# Patient Record
Sex: FEMALE | Race: BLACK OR AFRICAN AMERICAN | NOT HISPANIC OR LATINO | URBAN - METROPOLITAN AREA
[De-identification: names, ages, dates, MRNs, and addresses within clinical notes are randomized per-mention and may not be internally consistent; named-entity substitution may affect disease eponyms.]

---

## 2022-05-16 ENCOUNTER — INPATIENT (INPATIENT)
Facility: HOSPITAL | Age: 22
LOS: 23 days | Discharge: NURSING, REHAB OR LONG-TERM CARE | End: 2022-06-09
Attending: INTERNAL MEDICINE | Admitting: INTERNAL MEDICINE
Payer: MEDICAID

## 2022-05-16 VITALS
RESPIRATION RATE: 22 BRPM | DIASTOLIC BLOOD PRESSURE: 53 MMHG | SYSTOLIC BLOOD PRESSURE: 95 MMHG | WEIGHT: 125 LBS | TEMPERATURE: 99 F | HEART RATE: 121 BPM | OXYGEN SATURATION: 100 %

## 2022-05-16 LAB
ALBUMIN SERPL ELPH-MCNC: 3.9 G/DL — SIGNIFICANT CHANGE UP (ref 3.5–5.2)
ALP SERPL-CCNC: 129 U/L — HIGH (ref 30–115)
ALT FLD-CCNC: 29 U/L — SIGNIFICANT CHANGE UP (ref 0–41)
ANION GAP SERPL CALC-SCNC: 15 MMOL/L — HIGH (ref 7–14)
APPEARANCE UR: ABNORMAL
AST SERPL-CCNC: 22 U/L — SIGNIFICANT CHANGE UP (ref 0–41)
BACTERIA # UR AUTO: NEGATIVE — SIGNIFICANT CHANGE UP
BASOPHILS # BLD AUTO: 0.07 K/UL — SIGNIFICANT CHANGE UP (ref 0–0.2)
BASOPHILS NFR BLD AUTO: 0.3 % — SIGNIFICANT CHANGE UP (ref 0–1)
BILIRUB SERPL-MCNC: <0.2 MG/DL — SIGNIFICANT CHANGE UP (ref 0.2–1.2)
BILIRUB UR-MCNC: NEGATIVE — SIGNIFICANT CHANGE UP
BUN SERPL-MCNC: 15 MG/DL — SIGNIFICANT CHANGE UP (ref 10–20)
CALCIUM SERPL-MCNC: 9.6 MG/DL — SIGNIFICANT CHANGE UP (ref 8.5–10.1)
CHLORIDE SERPL-SCNC: 99 MMOL/L — SIGNIFICANT CHANGE UP (ref 98–110)
CO2 SERPL-SCNC: 27 MMOL/L — SIGNIFICANT CHANGE UP (ref 17–32)
COLOR SPEC: YELLOW — SIGNIFICANT CHANGE UP
CREAT SERPL-MCNC: 0.5 MG/DL — LOW (ref 0.7–1.5)
DIFF PNL FLD: NEGATIVE — SIGNIFICANT CHANGE UP
EGFR: 136 ML/MIN/1.73M2 — SIGNIFICANT CHANGE UP
EOSINOPHIL # BLD AUTO: 0.29 K/UL — SIGNIFICANT CHANGE UP (ref 0–0.7)
EOSINOPHIL NFR BLD AUTO: 1.2 % — SIGNIFICANT CHANGE UP (ref 0–8)
EPI CELLS # UR: 12 /HPF — HIGH (ref 0–5)
GLUCOSE SERPL-MCNC: 97 MG/DL — SIGNIFICANT CHANGE UP (ref 70–99)
GLUCOSE UR QL: NEGATIVE — SIGNIFICANT CHANGE UP
HCT VFR BLD CALC: 38.5 % — SIGNIFICANT CHANGE UP (ref 37–47)
HGB BLD-MCNC: 11.3 G/DL — LOW (ref 12–16)
HYALINE CASTS # UR AUTO: 13 /LPF — HIGH (ref 0–7)
IMM GRANULOCYTES NFR BLD AUTO: 0.5 % — HIGH (ref 0.1–0.3)
KETONES UR-MCNC: NEGATIVE — SIGNIFICANT CHANGE UP
LEUKOCYTE ESTERASE UR-ACNC: ABNORMAL
LYMPHOCYTES # BLD AUTO: 2.21 K/UL — SIGNIFICANT CHANGE UP (ref 1.2–3.4)
LYMPHOCYTES # BLD AUTO: 9.5 % — LOW (ref 20.5–51.1)
MCHC RBC-ENTMCNC: 23.3 PG — LOW (ref 27–31)
MCHC RBC-ENTMCNC: 29.4 G/DL — LOW (ref 32–37)
MCV RBC AUTO: 79.4 FL — LOW (ref 81–99)
MONOCYTES # BLD AUTO: 1.79 K/UL — HIGH (ref 0.1–0.6)
MONOCYTES NFR BLD AUTO: 7.7 % — SIGNIFICANT CHANGE UP (ref 1.7–9.3)
NEUTROPHILS # BLD AUTO: 18.82 K/UL — HIGH (ref 1.4–6.5)
NEUTROPHILS NFR BLD AUTO: 80.8 % — HIGH (ref 42.2–75.2)
NITRITE UR-MCNC: NEGATIVE — SIGNIFICANT CHANGE UP
NRBC # BLD: 0 /100 WBCS — SIGNIFICANT CHANGE UP (ref 0–0)
PH UR: 7.5 — SIGNIFICANT CHANGE UP (ref 5–8)
PLATELET # BLD AUTO: 535 K/UL — HIGH (ref 130–400)
POTASSIUM SERPL-MCNC: 4.9 MMOL/L — SIGNIFICANT CHANGE UP (ref 3.5–5)
POTASSIUM SERPL-SCNC: 4.9 MMOL/L — SIGNIFICANT CHANGE UP (ref 3.5–5)
PROT SERPL-MCNC: 7.6 G/DL — SIGNIFICANT CHANGE UP (ref 6–8)
PROT UR-MCNC: ABNORMAL
RAPID RVP RESULT: DETECTED
RBC # BLD: 4.85 M/UL — SIGNIFICANT CHANGE UP (ref 4.2–5.4)
RBC # FLD: 18.4 % — HIGH (ref 11.5–14.5)
RBC CASTS # UR COMP ASSIST: 6 /HPF — HIGH (ref 0–4)
SARS-COV-2 RNA SPEC QL NAA+PROBE: DETECTED
SODIUM SERPL-SCNC: 141 MMOL/L — SIGNIFICANT CHANGE UP (ref 135–146)
SP GR SPEC: 1.02 — SIGNIFICANT CHANGE UP (ref 1.01–1.03)
UROBILINOGEN FLD QL: SIGNIFICANT CHANGE UP
WBC # BLD: 23.29 K/UL — HIGH (ref 4.8–10.8)
WBC # FLD AUTO: 23.29 K/UL — HIGH (ref 4.8–10.8)
WBC UR QL: 65 /HPF — HIGH (ref 0–5)

## 2022-05-16 PROCEDURE — 71045 X-RAY EXAM CHEST 1 VIEW: CPT | Mod: 26

## 2022-05-16 PROCEDURE — 93010 ELECTROCARDIOGRAM REPORT: CPT

## 2022-05-16 PROCEDURE — 99223 1ST HOSP IP/OBS HIGH 75: CPT

## 2022-05-16 PROCEDURE — 99291 CRITICAL CARE FIRST HOUR: CPT

## 2022-05-16 PROCEDURE — 99497 ADVNCD CARE PLAN 30 MIN: CPT | Mod: 25

## 2022-05-16 RX ORDER — IPRATROPIUM/ALBUTEROL SULFATE 18-103MCG
3 AEROSOL WITH ADAPTER (GRAM) INHALATION EVERY 6 HOURS
Refills: 0 | Status: DISCONTINUED | OUTPATIENT
Start: 2022-05-16 | End: 2022-05-25

## 2022-05-16 RX ORDER — FERROUS SULFATE 325(65) MG
300 TABLET ORAL DAILY
Refills: 0 | Status: DISCONTINUED | OUTPATIENT
Start: 2022-05-16 | End: 2022-06-09

## 2022-05-16 RX ORDER — FOLIC ACID 0.8 MG
1 TABLET ORAL DAILY
Refills: 0 | Status: DISCONTINUED | OUTPATIENT
Start: 2022-05-16 | End: 2022-06-09

## 2022-05-16 RX ORDER — ACETAMINOPHEN 500 MG
650 TABLET ORAL EVERY 6 HOURS
Refills: 0 | Status: DISCONTINUED | OUTPATIENT
Start: 2022-05-16 | End: 2022-06-09

## 2022-05-16 RX ORDER — CYCLOBENZAPRINE HYDROCHLORIDE 10 MG/1
5 TABLET, FILM COATED ORAL THREE TIMES A DAY
Refills: 0 | Status: DISCONTINUED | OUTPATIENT
Start: 2022-05-16 | End: 2022-06-09

## 2022-05-16 RX ORDER — DEXAMETHASONE 0.5 MG/5ML
10 ELIXIR ORAL ONCE
Refills: 0 | Status: COMPLETED | OUTPATIENT
Start: 2022-05-16 | End: 2022-05-16

## 2022-05-16 RX ORDER — SODIUM CHLORIDE 9 MG/ML
250 INJECTION INTRAMUSCULAR; INTRAVENOUS; SUBCUTANEOUS ONCE
Refills: 0 | Status: COMPLETED | OUTPATIENT
Start: 2022-05-16 | End: 2022-05-16

## 2022-05-16 RX ORDER — CEFEPIME 1 G/1
2000 INJECTION, POWDER, FOR SOLUTION INTRAMUSCULAR; INTRAVENOUS ONCE
Refills: 0 | Status: COMPLETED | OUTPATIENT
Start: 2022-05-16 | End: 2022-05-16

## 2022-05-16 RX ORDER — ROBINUL 0.2 MG/ML
2 INJECTION INTRAMUSCULAR; INTRAVENOUS THREE TIMES A DAY
Refills: 0 | Status: DISCONTINUED | OUTPATIENT
Start: 2022-05-16 | End: 2022-05-26

## 2022-05-16 RX ORDER — VANCOMYCIN HCL 1 G
750 VIAL (EA) INTRAVENOUS ONCE
Refills: 0 | Status: COMPLETED | OUTPATIENT
Start: 2022-05-16 | End: 2022-05-16

## 2022-05-16 RX ORDER — APIXABAN 2.5 MG/1
5 TABLET, FILM COATED ORAL EVERY 12 HOURS
Refills: 0 | Status: DISCONTINUED | OUTPATIENT
Start: 2022-05-17 | End: 2022-05-17

## 2022-05-16 RX ORDER — THIAMINE MONONITRATE (VIT B1) 100 MG
100 TABLET ORAL
Refills: 0 | Status: DISCONTINUED | OUTPATIENT
Start: 2022-05-16 | End: 2022-06-09

## 2022-05-16 RX ORDER — METOCLOPRAMIDE HCL 10 MG
10 TABLET ORAL EVERY 6 HOURS
Refills: 0 | Status: DISCONTINUED | OUTPATIENT
Start: 2022-05-16 | End: 2022-06-09

## 2022-05-16 RX ORDER — ALBUTEROL 90 UG/1
2 AEROSOL, METERED ORAL EVERY 6 HOURS
Refills: 0 | Status: DISCONTINUED | OUTPATIENT
Start: 2022-05-16 | End: 2022-05-25

## 2022-05-16 RX ORDER — VANCOMYCIN HCL 1 G
750 VIAL (EA) INTRAVENOUS EVERY 12 HOURS
Refills: 0 | Status: DISCONTINUED | OUTPATIENT
Start: 2022-05-16 | End: 2022-05-18

## 2022-05-16 RX ORDER — SODIUM CHLORIDE 9 MG/ML
1750 INJECTION INTRAMUSCULAR; INTRAVENOUS; SUBCUTANEOUS ONCE
Refills: 0 | Status: COMPLETED | OUTPATIENT
Start: 2022-05-16 | End: 2022-05-16

## 2022-05-16 RX ORDER — CEFEPIME 1 G/1
2000 INJECTION, POWDER, FOR SOLUTION INTRAMUSCULAR; INTRAVENOUS EVERY 8 HOURS
Refills: 0 | Status: DISCONTINUED | OUTPATIENT
Start: 2022-05-16 | End: 2022-05-16

## 2022-05-16 RX ORDER — DEXAMETHASONE 0.5 MG/5ML
6 ELIXIR ORAL DAILY
Refills: 0 | Status: DISCONTINUED | OUTPATIENT
Start: 2022-05-16 | End: 2022-05-25

## 2022-05-16 RX ORDER — CEFEPIME 1 G/1
2000 INJECTION, POWDER, FOR SOLUTION INTRAMUSCULAR; INTRAVENOUS EVERY 8 HOURS
Refills: 0 | Status: DISCONTINUED | OUTPATIENT
Start: 2022-05-16 | End: 2022-05-18

## 2022-05-16 RX ORDER — PREGABALIN 225 MG/1
500 CAPSULE ORAL DAILY
Refills: 0 | Status: DISCONTINUED | OUTPATIENT
Start: 2022-05-16 | End: 2022-06-09

## 2022-05-16 RX ORDER — SCOPALAMINE 1 MG/3D
1 PATCH, EXTENDED RELEASE TRANSDERMAL
Refills: 0 | Status: DISCONTINUED | OUTPATIENT
Start: 2022-05-16 | End: 2022-06-09

## 2022-05-16 RX ORDER — PANTOPRAZOLE SODIUM 20 MG/1
40 TABLET, DELAYED RELEASE ORAL
Refills: 0 | Status: DISCONTINUED | OUTPATIENT
Start: 2022-05-16 | End: 2022-05-18

## 2022-05-16 RX ORDER — SODIUM CHLORIDE 9 MG/ML
1000 INJECTION, SOLUTION INTRAVENOUS
Refills: 0 | Status: DISCONTINUED | OUTPATIENT
Start: 2022-05-16 | End: 2022-05-17

## 2022-05-16 RX ORDER — CHLORHEXIDINE GLUCONATE 213 G/1000ML
1 SOLUTION TOPICAL
Refills: 0 | Status: DISCONTINUED | OUTPATIENT
Start: 2022-05-16 | End: 2022-06-09

## 2022-05-16 RX ADMIN — SODIUM CHLORIDE 500 MILLILITER(S): 9 INJECTION INTRAMUSCULAR; INTRAVENOUS; SUBCUTANEOUS at 20:46

## 2022-05-16 RX ADMIN — CEFEPIME 100 MILLIGRAM(S): 1 INJECTION, POWDER, FOR SOLUTION INTRAMUSCULAR; INTRAVENOUS at 14:45

## 2022-05-16 RX ADMIN — Medication 250 MILLIGRAM(S): at 20:46

## 2022-05-16 RX ADMIN — Medication 10 MILLIGRAM(S): at 21:07

## 2022-05-16 RX ADMIN — SODIUM CHLORIDE 1750 MILLILITER(S): 9 INJECTION INTRAMUSCULAR; INTRAVENOUS; SUBCUTANEOUS at 11:54

## 2022-05-16 NOTE — H&P ADULT - ASSESSMENT
Assessment and Plan  Case of a 23 yo female patient with a history of encephalitis s/p tooth abscess s/p tracheostomy and PEG tube placement, HTN, GERD, and SVC thrombosis who was brought to ED from [place] on 05/16 for evaluation of a broken tracheostomy flange, found to be septic on presentation with leukocytosis on labs, a positive RVP and COVID PCR on microbiologic testing, and evidence of left pleural effusion on imaging, to be admitted for further investigations, management and monitoring. Currently still tachycardic saturating well on 3LPM NC.      Sepsis Secondary to Likely Community Acquired Pneumonia and COVID Pneumonia  Broken Tracheostomy Flange - Resolved  * Not Vaccinated against COVID per sister  * ED Vitals 95/53 mmHg,  bpm, SAO2 100% on 2-3 LPM NC  * ED Labs WBC 23.39 N 80.8%, No D-dimer yet  * SARS-COV2: positive  * RVP positive  * CXR with left pleural effusion    - Infectious Disease team consulted  - Monitor for fever: afebrile in last 24 hours  - Trend WBC: 23.29 N 80.8 %  - Monitor SaO2 and Oxygen Requirements: on 3LPM NC right now with SaO2 100% (sister unsure about baseline O2 requirement and unable to reach NH staff)  - Follow up Chest X Ray in AM  - Trend Inflammatory Markers:  --> ESR   --> D-dimer; check VA duplex venous LE  --> Procalcitonin   --> C-reactive protein  --> LDH   --> Ferritin   --> Fibrinogen  - Follow up blood cultures   - Send urine legionella and strep  - Check MRSA  - Start IV Cefepime and Vancomycin  - Start IVF hydration with LR at 50mL/hour  - COVID therapy:  --> Started on IV Dexamethasone 6mg QD  --> Monitor POCT BID  - Anticoagulation (takes Eliquis 5mg BID for SVC thrombosis)      SVC Thrombosis and Embolism   * Home med Eliquis 5mg BID  - Resume eliquis 5mg BID      History of Encephalitis post Tooth Abscess in 10/2021   s/p Tracheostomy (has an O2 tank per sister but unsure about flow) and G-tube placement  - NPO for now (except meds)  - Monitor SaO2 and O2 requirements: as above      GERD  * Home med Omeprazole 40mg QD  - Start Protonix 40mg QD      Iron Deficiency Anemia   History of Wernicke Encephalopathy  * Home med iron replacement, thiamine, B12, and folic acid  * ED Hb 11.3, MCV 79.4  - Trend CBC  - Check iron studies, B12 and folate levels  - Resume iron replacement, thiamine, B12, and folic acid      History of Hypertension  Currently Hypotension in Setting of Sepsis  * Home med Lopressor 50mg BID  * ED BP 95/53 mmHg -> s/p fluids -> now 101/62mmHg at 21:30 PM  - Monitor BP  - Hold anti HTN  - IVF hydration as above      Others  - DVT Prophylaxis: as above  - GI Prophylaxis: Pantoprazole 40mg PO QD  - Diet: NPO for now  - Code Status: Full      Barriers to learning: NO  Discharge Planning: Patient will be discharged once stable   Plan was communicated with patient and medical team       Alicia Whitaker MD  PGY - 2 Internal Medicine   [facility]   Assessment and Plan  Case of a 23 yo female patient with a history of encephalitis s/p tooth abscess s/p tracheostomy and PEG tube placement, HTN, GERD, and SVC thrombosis who was brought to ED from [place] on 05/16 for evaluation of a broken tracheostomy flange, found to be septic on presentation with leukocytosis on labs, a positive RVP and COVID PCR on microbiologic testing, and evidence of left pleural effusion on imaging, to be admitted for further investigations, management and monitoring. Currently still tachycardic saturating well on 3LPM NC.      Sepsis Secondary to Likely Community Acquired Pneumonia and COVID Pneumonia  Broken Tracheostomy Flange - Resolved  * Not Vaccinated against COVID per sister  * ED Vitals 95/53 mmHg,  bpm, SAO2 100% on 2-3 LPM NC  * ED Labs WBC 23.39 N 80.8%, No D-dimer yet  * SARS-COV2: positive  * RVP positive  * CXR with left pleural effusion    - Infectious Disease team consulted  - Monitor for fever: afebrile in last 24 hours  - Trend WBC: 23.29 N 80.8 %  - Monitor SaO2 and Oxygen Requirements: on 3LPM NC right now with SaO2 100% (sister unsure about baseline O2 requirement and unable to reach NH staff)  - Follow up Chest X Ray in AM  - Trend Inflammatory Markers:  --> ESR   --> D-dimer; check VA duplex venous LE  --> Procalcitonin   --> C-reactive protein  --> LDH   --> Ferritin   --> Fibrinogen  - Follow up blood cultures   - Send urine legionella and strep  - Check MRSA, RVP, and influenza  - Start IV Cefepime and Vancomycin  - Start IVF hydration with LR at 50mL/hour  - COVID therapy:  --> Started on IV Dexamethasone 6mg QD  --> Monitor POCT BID  - Anticoagulation (takes Eliquis 5mg BID for SVC thrombosis)      SVC Thrombosis and Embolism   * Home med Eliquis 5mg BID  - Resume eliquis 5mg BID      History of Encephalitis post Tooth Abscess in 10/2021   s/p Tracheostomy (has an O2 tank per sister but unsure about flow) and G-tube placement  - NPO for now (except meds)  - Monitor SaO2 and O2 requirements: as above      GERD  * Home med Omeprazole 40mg QD  - Start Protonix 40mg QD      Iron Deficiency Anemia   History of Wernicke Encephalopathy  * Home med iron replacement, thiamine, B12, and folic acid  * ED Hb 11.3, MCV 79.4  - Trend CBC  - Check iron studies, B12 and folate levels  - Resume iron replacement, thiamine, B12, and folic acid      History of Hypertension  Currently Hypotension in Setting of Sepsis  * Home med Lopressor 50mg BID  * ED BP 95/53 mmHg -> s/p fluids -> now 101/62mmHg at 21:30 PM  - Monitor BP  - Hold anti HTN  - IVF hydration as above      Others  - DVT Prophylaxis: as above  - GI Prophylaxis: Pantoprazole 40mg PO QD  - Diet: NPO for now  - Code Status: Full      Barriers to learning: NO  Discharge Planning: Patient will be discharged once stable   Plan was communicated with patient and medical team       Alicia Whitaker MD  PGY - 2 Internal Medicine   [facility]   Assessment and Plan  Case of a 21 yo female patient with a history of encephalitis s/p tooth abscess s/p tracheostomy and PEG tube placement, HTN, GERD, and SVC thrombosis who was brought to ED from [place] on 05/16 for evaluation of a broken tracheostomy flange, found to be septic on presentation with leukocytosis on labs, a positive RVP and COVID PCR on microbiologic testing, and evidence of left pleural effusion on imaging, to be admitted for further investigations, management and monitoring. Currently still tachycardic saturating well on 3LPM NC.      Sepsis Secondary to Likely Community Acquired Pneumonia and COVID Pneumonia Versus UTI  Broken Tracheostomy Flange - Resolved  * Not Vaccinated against COVID per sister  * ED Vitals 95/53 mmHg,  bpm, SAO2 100% on 2-3 LPM NC  * ED Labs WBC 23.39 N 80.8%, No D-dimer yet  * SARS-COV2: positive  * Urinalysis (05.16.22 @ 17:15)    Glucose Qualitative, Urine: Negative    Blood, Urine: Negative    pH Urine: 7.5    Color: Yellow    Urine Appearance: Turbid    Bilirubin: Negative    Ketone - Urine: Negative    Specific Gravity: 1.023    Protein, Urine: 30 mg/dL    Urobilinogen: <2 mg/dL    Nitrite: Negative    Leukocyte Esterase Concentration: Large  * RVP positive  * CXR with left pleural effusion    - Infectious Disease team consulted  - Monitor for fever: afebrile in last 24 hours  - Trend WBC: 23.29 N 80.8 %  - Monitor SaO2 and Oxygen Requirements: on 3LPM NC right now with SaO2 100% (sister unsure about baseline O2 requirement and unable to reach NH staff)  - Follow up Chest X Ray in AM  - Trend Inflammatory Markers:  --> ESR   --> D-dimer; check VA duplex venous LE  --> Procalcitonin   --> C-reactive protein  --> LDH   --> Ferritin   --> Fibrinogen  - Follow up blood cultures and urine culture since u/a positive  - Send urine legionella and strep  - Check MRSA, RVP, and influenza  - Start IV Cefepime and Vancomycin  - Start IVF hydration with LR at 50mL/hour  - COVID therapy:  --> Started on IV Dexamethasone 6mg QD  --> Monitor POCT BID  - Anticoagulation (takes Eliquis 5mg BID for SVC thrombosis)      SVC Thrombosis and Embolism   * Home med Eliquis 5mg BID  - Resume eliquis 5mg BID      History of Encephalitis post Tooth Abscess in 10/2021   s/p Tracheostomy (has an O2 tank per sister but unsure about flow) and G-tube placement  - NPO for now (except meds)  - Monitor SaO2 and O2 requirements: as above      GERD  * Home med Omeprazole 40mg QD  - Start Protonix 40mg QD      Iron Deficiency Anemia   History of Wernicke Encephalopathy  * Home med iron replacement, thiamine, B12, and folic acid  * ED Hb 11.3, MCV 79.4  - Trend CBC  - Check iron studies, B12 and folate levels  - Resume iron replacement, thiamine, B12, and folic acid      History of Hypertension  Currently Hypotension in Setting of Sepsis  * Home med Lopressor 50mg BID  * ED BP 95/53 mmHg -> s/p fluids -> now 101/62mmHg at 21:30 PM  - Monitor BP  - Hold anti HTN  - IVF hydration as above      Others  - DVT Prophylaxis: as above  - GI Prophylaxis: Pantoprazole 40mg PO QD  - Diet: NPO for now  - Code Status: Full      Barriers to learning: NO  Discharge Planning: Patient will be discharged once stable   Plan was communicated with patient and medical team       Alicia Whitaker MD  PGY - 2 Internal Medicine   [facility]

## 2022-05-16 NOTE — H&P ADULT - ATTENDING COMMENTS
**Hx and physical limited due to baseline poor mental status. Supplemental information obtained from family, house staff, NH chart, and EMR.     21 YO F with a PMH of Encephalitis s/p Tooth Abscess s/p Tracheostomy/G-tube, Wernicke's, SVC Thrombosis (Apixaban), GERD, and HTN who was sent into the hospital from her NH ([place]) for eval of broken tracheostomy flange. As per family, pt w/ increased secretions from trach. Otherwise, unable to obtain ROS.     In the ED, Chest X-Ray w/ no acute process (pending official read). COVID PCR positive. Started on IV ABXs (Cefepime/Levoflox/Vanc), IV steroids, and IVFs (NS) in the ED.     FMHx: Reviewed, not relevant  COVID vaccination: Pt is not vaccinated    Physical exam shows obese pt who does not follow verbal commands, retracts to painful stimuli. HR (111) BP (110/56), afebrile, not hypoxic on trach collar. A&Ox0. Contracted LEs, significant muscle atrophy present. Rhonchi noted on lung exam, clear secretions from trach. no M/G/R. ABD is soft, obese, and non-tender, normoactive BSs. No rashes. Labs and radiology as above.     Tracheostomy flange malfunction (resolved). Replaced in the ED.     COVID19, rule out superimposed bacterial infection; sepsis present on admission. Admit to COVID19 isolation unit. Isolate pt. Send CBC/diff, CMP, procal/CRP, LDH, CPK, Ferritin, Ferritin, Coags, and baseline A1c (for pts who may receive steroids). FU official Chest XR report. IV ABxs (Cefe/Vanc). IV Steroids. IVFs (LR). APAP PRN. Anti-tussives PRN. Supplemental O2 PRN. ID consult. AC as per [facility] COVID Protocol.  -Obtain UA    Microcytic anemia, unknown baseline, hx of TANK. Send anemia work-up. Replace PRN.     Hx of Encephalitis s/p Tooth Abscess s/p Tracheostomy/G-tube, Wernicke's, SVC Thrombosis (Apixaban), GERD, and HTN. Restart home meds, except as stated above. DVT PPX. Inform PCP of pt's admission to hospital. My note supersedes the residents note.     Date seen by Attendin22

## 2022-05-16 NOTE — H&P ADULT - HISTORY OF PRESENT ILLNESS
History of Present Illness  Ms Moya is a 22 year old female known to have:  - From [place]   - History of Encephalitis post Tooth Abscess in 10/2021 s/p Tracheostomy (has an O2 tank per sister but unsure about flow) and G-tube placement  - SVC Thrombosis and Embolism on Eliquis 5mg BID  - GERD  - Iron Deficiency Anemia and History of Wernicke Encephalopathy  - Hypertension    She was brought to the ED from [place] on  for evaluation of a broken tracheostomy flange.  History obtained from chart and from sister over phone at [phone] since could not reach NH.  History goes back to today when the NH staff noticed that the tracheostomy flange was broken.  Per sister, she received the last update about her sister's condition last week.  She reports completion of an antibiotic course for a suspected infection (unsure if it was PNA versus UTI versus GI source and unsure about AB administered).  She notes that her sister hasn't been spiking fevers, requiring more O2, having more secretions, or diarrhea per NH staff updates.    No sick contacts: patients was visited by her cousin on  (but cousin is not sick)   No recent travel or exposure to recent travelers.  Not vaccinated against COVID.      Upon presentation to the ED, the patient was septic  Vital Signs in ED   - BP 92/53 s/p 1.75L NS bolus followed by 250mL bolus  -  bpm -> sinus tachycardia  - T 37.2  - SaO2 100% on 3LPM NC      Investigations   Laboratory Workup  - CBC:                        11.3   23.29 )-----------( 535      ( 16 May 2022 12:09 )             38.5     - Chemistry:      141  |  99  |  15  ----------------------------<  97  4.9   |  27  |  0.5<L>    Ca    9.6      16 May 2022 12:09    TPro  7.6  /  Alb  3.9  /  TBili  <0.2  /  DBili  x   /  AST  22  /  ALT  29  /  AlkPhos  129<H>        Microbiological Workup  Urinalysis Basic - ( 16 May 2022 17:15 )    Color: Yellow / Appearance: Turbid / S.023 / pH: x  Gluc: x / Ketone: Negative  / Bili: Negative / Urobili: <2 mg/dL   Blood: x / Protein: 30 mg/dL / Nitrite: Negative   Leuk Esterase: Large / RBC: 6 /HPF / WBC 65 /HPF   Sq Epi: x / Non Sq Epi: 12 /HPF / Bacteria: Negative      Microbiology  * COVID +  * RVP +      Radiological Workup  * CXR with left pleural effusion      - In the ED, patient was placed on trach collar 2-3LPM with SaO2 100%  - In setting of hypotension and tachycardia, patient received 1.75L NS bolus followed by 250mL bolus  - She received IV Cefepime 2g, Levaquin 750mg, and Vancomycin 1g x1 doses in ED  - She also received IV Dexamethasone 10mg x1   - She will be admitted for further investigations, management, and monitoring

## 2022-05-16 NOTE — ED PROVIDER NOTE - PROGRESS NOTE DETAILS
Sepsis suspected at this time as patient has elevated WBC count. Appropriate labs and cultures already sent. 30 cc/kg fluid bolus given based on ideal body weight. Will give broad spectrum antibiotics after blood cultures are drawn. Will follow up initial lactate and repeat after fluids if lactate > 2. Repeat sepsis Perfusion exam performed.  Patient has warm skin and strong pulses.  Lung exam unchanged from prior.  Capillary refill is normal. PS: Trach exchanged. Approved for stepdown

## 2022-05-16 NOTE — H&P ADULT - CONVERSATION DETAILS
The pt was sent in from her NH ([place]) w/ a MOLST form completed from 4/7/22 and signed by her HCP (Dasia Moya) indicating that the pt is FULL CODE w/ no limitations in medical interventions.     Confirmed w/ family

## 2022-05-16 NOTE — ED PROVIDER NOTE - NS ED ROS FT
Review of Systems:  CONSTITUTIONAL: No fever, No diaphoresis, No weight change  SKIN: No rash  HEMATOLOGIC: No abnormal bleeding or bruising  EYES: No eye pain, No blurred vision  ENT: No change in hearing, No sore throat, No neck pain, No rhinorrhea, No ear pain  RESPIRATORY: No shortness of breath, No cough  CARDIAC: No chest pain, No palpitations  GI: No abdominal pain, No nausea, No vomiting, No diarrhea, No constipation, No bright red blood per rectum or melena. No flank pain  : No dysuria, frequency, hematuria.   ENDO: No polydypsia, No polyuria, No heat/cold intolerance  MUSCULOSKELETAL: No joint paint, No swelling, No back pain  NEUROLOGIC: No numbness, No focal weakness, No headache, No dizziness  All other systems negative, unless specified in HPI

## 2022-05-16 NOTE — H&P ADULT - NSHPPHYSICALEXAM_GEN_ALL_CORE
- Physical Exam in ED  * General Appearance: Alert, s/p tracheostomy and PEG tube, not cooperative, not interactive, unable to assess if oriented to time, place, and person, in no acute distress  * Head: Normocephalic, without obvious abnormality, atraumatic  * Eyes: strabismus noted  * Ears: Normal TM's and external ear canals bilaterally  * Neck: Supple, symmetrical, trachea midline, no adenopathy;   * Lungs: Reduced bilateral air entry due to reduced effort, unable to appreciate wheezes, crackles, or rhonchi  * Heart: Regular Rate and Rhythm, tachycardic, normal S1 and S2, no audible murmur, rub, or gallop  * Abdomen: Symmetric, G-tube in place, non-distended, soft, non-tender, bowel sounds active all four quadrants, no masses, no organomegaly (no hepatosplenomegaly)  * Extremities: contracted hands and feet, no cyanosis, no lower extremity pitting edema bilaterally, adequate dorsalis pedis pulses  * Pulses: 2+ and symmetric all extremities  * Skin: Skin color, texture, turgor normal, no rashes or lesions  * Lymph nodes: Cervical, supraclavicular, and axillary nodes normal

## 2022-05-16 NOTE — ED PROVIDER NOTE - OBJECTIVE STATEMENT
Pt is a 21 y/o female with Pt is a 23 y/o female with PMH of encephalitis s/p trach and peg, SVC thrombosis on eliquis and GERD BIBEMS because her trach flange broke. On arrival, pt placed on trach collar 2L Pt is a 23 y/o female with PMH of encephalitis s/p trach and peg, SVC thrombosis on eliquis and GERD BIBEMS from [place] because her trach flange broke. According to staff, about a year ago, pt had a tooth abscess complicated by encephalitis and since then, has declined, requiring trach and peg. On arrival, pt placed on trach collar 2L, satting 100%.

## 2022-05-16 NOTE — ED PROVIDER NOTE - PHYSICAL EXAMINATION
CONST: contracted, trach collar  HEAD:  normocephalic, atraumatic  EYES:  conjunctivae without injection, drainage or discharge  ENMT:  tympanic membranes pearly gray with normal landmarks; nasal mucosa moist; mouth moist without ulcerations or lesions; throat moist without erythema, exudate, ulcerations or lesions  NECK:  supple  CARDIAC:  regular rate and rhythm, normal S1 and S2, no murmurs, rubs or gallops  RESP:  + trach collar, respiratory rate and effort appear normal for age; lungs are clear to auscultation bilaterally; no rales or wheezes  ABDOMEN:  soft, nontender, nondistended  LYMPHATICS:  no significant lymphadenopathy  MUSCULOSKELETAL/NEURO: follows commands, contracted  SKIN:  normal skin color for age and race, well-perfused; warm and dry

## 2022-05-16 NOTE — ED PROVIDER NOTE - CLINICAL SUMMARY MEDICAL DECISION MAKING FREE TEXT BOX
22-year-old female past medical history of encephalitis status post PEG and trach, SVC thrombosis on Eliquis, GERD, bedbound at baseline nonverbal who presents to the emergency department for evaluation of trach.  Piece of her trach and broken.  Patient brought to the critical care emergency department.  Patient initially was mildly hypoxic with EMS and when this was corrected patient was placed on vent.  Patient tachycardic and had a low-grade fever.  Concern for possible sepsis.  IV placed, broad-spectrum IV antibiotics given, COVID swab sent, chest x-ray done.  Patient appears chronically ill and is contracted.  Patient found to have an elevated white blood cell count and was later found to be COVID-positive.  Trach replaced as documented without issues.  Patient suctioned multiple times without issue.    Patient on isolation and will require admission for sepsis, COVID-positive, and unknown if sepsis is bacterial at this point.  Appropriate culture sent.

## 2022-05-16 NOTE — ED ADULT NURSE NOTE - OBJECTIVE STATEMENT
pt sent in from nh for broken piece on trach collar. pt not on o2. tachycardic hypotensive in triage

## 2022-05-16 NOTE — ED PROVIDER NOTE - CARE PLAN
1 Principal Discharge DX:	Acute sepsis  Secondary Diagnosis:	2019 novel coronavirus disease (COVID-19)  Secondary Diagnosis:	Acute tracheostomy management  Secondary Diagnosis:	Acute UTI

## 2022-05-17 LAB
A1C WITH ESTIMATED AVERAGE GLUCOSE RESULT: 4.9 % — SIGNIFICANT CHANGE UP (ref 4–5.6)
ALBUMIN SERPL ELPH-MCNC: 3.7 G/DL — SIGNIFICANT CHANGE UP (ref 3.5–5.2)
ALP SERPL-CCNC: 113 U/L — SIGNIFICANT CHANGE UP (ref 30–115)
ALT FLD-CCNC: 26 U/L — SIGNIFICANT CHANGE UP (ref 0–41)
ANION GAP SERPL CALC-SCNC: 13 MMOL/L — SIGNIFICANT CHANGE UP (ref 7–14)
AST SERPL-CCNC: 17 U/L — SIGNIFICANT CHANGE UP (ref 0–41)
BASOPHILS # BLD AUTO: 0.03 K/UL — SIGNIFICANT CHANGE UP (ref 0–0.2)
BASOPHILS NFR BLD AUTO: 0.2 % — SIGNIFICANT CHANGE UP (ref 0–1)
BILIRUB SERPL-MCNC: <0.2 MG/DL — SIGNIFICANT CHANGE UP (ref 0.2–1.2)
BUN SERPL-MCNC: 10 MG/DL — SIGNIFICANT CHANGE UP (ref 10–20)
CALCIUM SERPL-MCNC: 9.4 MG/DL — SIGNIFICANT CHANGE UP (ref 8.5–10.1)
CHLORIDE SERPL-SCNC: 102 MMOL/L — SIGNIFICANT CHANGE UP (ref 98–110)
CHOLEST SERPL-MCNC: 156 MG/DL — SIGNIFICANT CHANGE UP
CO2 SERPL-SCNC: 26 MMOL/L — SIGNIFICANT CHANGE UP (ref 17–32)
CREAT SERPL-MCNC: <0.5 MG/DL — LOW (ref 0.7–1.5)
CRP SERPL-MCNC: 61 MG/L — HIGH
CULTURE RESULTS: SIGNIFICANT CHANGE UP
D DIMER BLD IA.RAPID-MCNC: 267 NG/ML DDU — HIGH (ref 0–230)
EGFR: 143 ML/MIN/1.73M2 — SIGNIFICANT CHANGE UP
EOSINOPHIL # BLD AUTO: 0 K/UL — SIGNIFICANT CHANGE UP (ref 0–0.7)
EOSINOPHIL NFR BLD AUTO: 0 % — SIGNIFICANT CHANGE UP (ref 0–8)
ERYTHROCYTE [SEDIMENTATION RATE] IN BLOOD: 85 MM/HR — HIGH (ref 0–20)
ESTIMATED AVERAGE GLUCOSE: 94 MG/DL — SIGNIFICANT CHANGE UP (ref 68–114)
FERRITIN SERPL-MCNC: 99 NG/ML — SIGNIFICANT CHANGE UP (ref 15–150)
FOLATE SERPL-MCNC: >20 NG/ML — SIGNIFICANT CHANGE UP
GLUCOSE BLDC GLUCOMTR-MCNC: 105 MG/DL — HIGH (ref 70–99)
GLUCOSE BLDC GLUCOMTR-MCNC: 106 MG/DL — HIGH (ref 70–99)
GLUCOSE SERPL-MCNC: 134 MG/DL — HIGH (ref 70–99)
HCT VFR BLD CALC: 35.8 % — LOW (ref 37–47)
HDLC SERPL-MCNC: 44 MG/DL — LOW
HGB BLD-MCNC: 10.4 G/DL — LOW (ref 12–16)
IMM GRANULOCYTES NFR BLD AUTO: 0.6 % — HIGH (ref 0.1–0.3)
LACTATE SERPL-SCNC: 0.9 MMOL/L — SIGNIFICANT CHANGE UP (ref 0.7–2)
LDH SERPL L TO P-CCNC: 225 — SIGNIFICANT CHANGE UP (ref 50–242)
LIPID PNL WITH DIRECT LDL SERPL: 100 MG/DL — HIGH
LYMPHOCYTES # BLD AUTO: 0.75 K/UL — LOW (ref 1.2–3.4)
LYMPHOCYTES # BLD AUTO: 4.4 % — LOW (ref 20.5–51.1)
MAGNESIUM SERPL-MCNC: 1.9 MG/DL — SIGNIFICANT CHANGE UP (ref 1.8–2.4)
MCHC RBC-ENTMCNC: 22.9 PG — LOW (ref 27–31)
MCHC RBC-ENTMCNC: 29.1 G/DL — LOW (ref 32–37)
MCV RBC AUTO: 78.9 FL — LOW (ref 81–99)
MONOCYTES # BLD AUTO: 0.08 K/UL — LOW (ref 0.1–0.6)
MONOCYTES NFR BLD AUTO: 0.5 % — LOW (ref 1.7–9.3)
MRSA PCR RESULT.: NEGATIVE — SIGNIFICANT CHANGE UP
NEUTROPHILS # BLD AUTO: 16.07 K/UL — HIGH (ref 1.4–6.5)
NEUTROPHILS NFR BLD AUTO: 94.3 % — HIGH (ref 42.2–75.2)
NON HDL CHOLESTEROL: 112 MG/DL — SIGNIFICANT CHANGE UP
NRBC # BLD: 0 /100 WBCS — SIGNIFICANT CHANGE UP (ref 0–0)
PHOSPHATE SERPL-MCNC: 5.4 MG/DL — HIGH (ref 2.1–4.9)
PLATELET # BLD AUTO: 473 K/UL — HIGH (ref 130–400)
POTASSIUM SERPL-MCNC: 4.4 MMOL/L — SIGNIFICANT CHANGE UP (ref 3.5–5)
POTASSIUM SERPL-SCNC: 4.4 MMOL/L — SIGNIFICANT CHANGE UP (ref 3.5–5)
PROCALCITONIN SERPL-MCNC: 0.03 NG/ML — SIGNIFICANT CHANGE UP (ref 0.02–0.1)
PROT SERPL-MCNC: 7.1 G/DL — SIGNIFICANT CHANGE UP (ref 6–8)
RBC # BLD: 4.54 M/UL — SIGNIFICANT CHANGE UP (ref 4.2–5.4)
RBC # FLD: 17.5 % — HIGH (ref 11.5–14.5)
SODIUM SERPL-SCNC: 141 MMOL/L — SIGNIFICANT CHANGE UP (ref 135–146)
SPECIMEN SOURCE: SIGNIFICANT CHANGE UP
TRIGL SERPL-MCNC: 58 MG/DL — SIGNIFICANT CHANGE UP
TSH SERPL-MCNC: 0.32 UIU/ML — SIGNIFICANT CHANGE UP (ref 0.27–4.2)
VIT B12 SERPL-MCNC: >2000 PG/ML — HIGH (ref 232–1245)
WBC # BLD: 17.04 K/UL — HIGH (ref 4.8–10.8)
WBC # FLD AUTO: 17.04 K/UL — HIGH (ref 4.8–10.8)

## 2022-05-17 PROCEDURE — 93970 EXTREMITY STUDY: CPT | Mod: 26

## 2022-05-17 PROCEDURE — 71045 X-RAY EXAM CHEST 1 VIEW: CPT | Mod: 26

## 2022-05-17 PROCEDURE — 99222 1ST HOSP IP/OBS MODERATE 55: CPT

## 2022-05-17 PROCEDURE — 99232 SBSQ HOSP IP/OBS MODERATE 35: CPT

## 2022-05-17 RX ORDER — ENOXAPARIN SODIUM 100 MG/ML
60 INJECTION SUBCUTANEOUS EVERY 12 HOURS
Refills: 0 | Status: DISCONTINUED | OUTPATIENT
Start: 2022-05-17 | End: 2022-05-25

## 2022-05-17 RX ADMIN — Medication 3 MILLILITER(S): at 03:17

## 2022-05-17 RX ADMIN — APIXABAN 5 MILLIGRAM(S): 2.5 TABLET, FILM COATED ORAL at 05:34

## 2022-05-17 RX ADMIN — Medication 250 MILLIGRAM(S): at 16:00

## 2022-05-17 RX ADMIN — CYCLOBENZAPRINE HYDROCHLORIDE 5 MILLIGRAM(S): 10 TABLET, FILM COATED ORAL at 05:34

## 2022-05-17 RX ADMIN — Medication 1 MILLIGRAM(S): at 11:25

## 2022-05-17 RX ADMIN — CEFEPIME 100 MILLIGRAM(S): 1 INJECTION, POWDER, FOR SOLUTION INTRAMUSCULAR; INTRAVENOUS at 14:00

## 2022-05-17 RX ADMIN — CEFEPIME 100 MILLIGRAM(S): 1 INJECTION, POWDER, FOR SOLUTION INTRAMUSCULAR; INTRAVENOUS at 21:20

## 2022-05-17 RX ADMIN — SODIUM CHLORIDE 50 MILLILITER(S): 9 INJECTION, SOLUTION INTRAVENOUS at 05:34

## 2022-05-17 RX ADMIN — Medication 100 MILLIGRAM(S): at 05:33

## 2022-05-17 RX ADMIN — Medication 100 MILLIGRAM(S): at 16:00

## 2022-05-17 RX ADMIN — Medication 250 MILLIGRAM(S): at 05:32

## 2022-05-17 RX ADMIN — Medication 300 MILLIGRAM(S): at 11:25

## 2022-05-17 RX ADMIN — Medication 6 MILLIGRAM(S): at 05:33

## 2022-05-17 RX ADMIN — PANTOPRAZOLE SODIUM 40 MILLIGRAM(S): 20 TABLET, DELAYED RELEASE ORAL at 08:07

## 2022-05-17 RX ADMIN — CEFEPIME 100 MILLIGRAM(S): 1 INJECTION, POWDER, FOR SOLUTION INTRAMUSCULAR; INTRAVENOUS at 05:32

## 2022-05-17 RX ADMIN — CYCLOBENZAPRINE HYDROCHLORIDE 5 MILLIGRAM(S): 10 TABLET, FILM COATED ORAL at 11:27

## 2022-05-17 RX ADMIN — SODIUM CHLORIDE 50 MILLILITER(S): 9 INJECTION, SOLUTION INTRAVENOUS at 08:07

## 2022-05-17 RX ADMIN — Medication 3 MILLILITER(S): at 09:29

## 2022-05-17 RX ADMIN — ROBINUL 2 MILLIGRAM(S): 0.2 INJECTION INTRAMUSCULAR; INTRAVENOUS at 21:21

## 2022-05-17 RX ADMIN — PREGABALIN 500 MICROGRAM(S): 225 CAPSULE ORAL at 11:26

## 2022-05-17 RX ADMIN — ROBINUL 2 MILLIGRAM(S): 0.2 INJECTION INTRAMUSCULAR; INTRAVENOUS at 11:26

## 2022-05-17 RX ADMIN — CYCLOBENZAPRINE HYDROCHLORIDE 5 MILLIGRAM(S): 10 TABLET, FILM COATED ORAL at 21:21

## 2022-05-17 RX ADMIN — Medication 3 MILLILITER(S): at 15:59

## 2022-05-17 RX ADMIN — ENOXAPARIN SODIUM 60 MILLIGRAM(S): 100 INJECTION SUBCUTANEOUS at 16:00

## 2022-05-17 RX ADMIN — ROBINUL 2 MILLIGRAM(S): 0.2 INJECTION INTRAMUSCULAR; INTRAVENOUS at 05:34

## 2022-05-17 RX ADMIN — SCOPALAMINE 1 PATCH: 1 PATCH, EXTENDED RELEASE TRANSDERMAL at 05:33

## 2022-05-17 NOTE — CONSULT NOTE ADULT - ASSESSMENT
IMPRESSION:    Sepsis POA ( tachycardic with increased WBC )   HO encephalitis SP Trach and PEG  UTI   COVID 19 infection  HO VTE on ELiquis   Broken tracheostomy flange     PLAN:    CNS:  No depressants     HEENT: Oral care.  Trach care.  Surgery to replace trach     PULMONARY:  HOB @ 45 degrees.  Aspiration precautions.  Pulmonary toilet.  Wean O2 as tolerated.      CARDIOVASCULAR:  Avoid over load.  DC IVF     GI: GI prophylaxis.  PEG Feeding.  Bowel regimen     RENAL:  Follow up lytes.  Correct as needed.  Kidney bladder US     INFECTIOUS DISEASE: Follow up cultures.  ID Evaluation.  Nasal MRSA     HEMATOLOGICAL:  DVT prophylaxis.  On Eliquis.  Would switch to LMWH while in hospital     ENDOCRINE:  Follow up FS.     MUSCULOSKELETAL:  Bed rest     Admit to med surg.     Recall if status changes    Prognosis overall poor     GOC

## 2022-05-17 NOTE — CONSULT NOTE ADULT - ASSESSMENT
ASSESSMENT  21 yo F from NH, trach/PEG, History of Encephalitis post Tooth Abscess in 10/2021, SVC Thrombosis and Embolism on Eliquis 5mg BID, GERD, Iron Deficiency Anemia and History of Wernicke Encephalopathy, HTN, She was brought to the ED from [place] on 05/16 for evaluation of a broken tracheostomy flange.      IMPRESSION  #Sepsis on admission T>90 RR>20 WBC >12    UA WBC 65, poor specimen as +epith  #COVID19 , unvaccinated  < from: Xray Chest 1 View- PORTABLE-Urgent (Xray Chest 1 View- PORTABLE-Urgent .) (05.16.22 @ 18:00) >  Left basal linear subsegmental atelectasis. No rosie consolidation,   effusion or pneumothorax  #Trach/PEG  Creatinine, Serum: <0.5 (05-17-22 @ 07:37)    Weight (kg): 56.7 (05-16-22 @ 10:33)    RECOMMENDATIONS  This is an incomplete consult note. All final recommendations to follow after interview and examination of the patient. Please follow recommendations noted below.    If any questions, please call or send a message on [facility] Teams  Please continue to update ID with any pertinent new laboratory or radiographic findings  Spectra [phone]   ASSESSMENT  23 yo F from NH, trach/PEG, History of Encephalitis post Tooth Abscess in 10/2021, SVC Thrombosis and Embolism on Eliquis 5mg BID, GERD, Iron Deficiency Anemia and History of Wernicke Encephalopathy, HTN, She was brought to the ED from [place] on 05/16 for evaluation of a broken tracheostomy flange.      IMPRESSION  #Sepsis on admission T>90 RR>20 WBC >12    UA WBC 65, poor specimen as +epith- rule out acute cystitis   #COVID19 , unvaccinated    satting well on baseline settings  < from: Xray Chest 1 View- PORTABLE-Urgent (Xray Chest 1 View- PORTABLE-Urgent .) (05.16.22 @ 18:00) >  Left basal linear subsegmental atelectasis. No rosie consolidation,   effusion or pneumothorax  #Trach/PEG  Creatinine, Serum: <0.5 (05-17-22 @ 07:37)    Weight (kg): 56.7 (05-16-22 @ 10:33)    RECOMMENDATIONS  - f/u BCX, UCX  - cefepime   IVPB 2000 milliGRAM(s) IV Intermittent every 8 hours  - vancomycin  IVPB 750 milliGRAM(s) IV Intermittent every 12 hours  - Please check vanc trough 30 min prior to 4th dose   - Trend WBC  - if family consents- can obtain monoclonal Ab- please inform ID ASAP will need approval    If any questions, please call or send a message on [employer] Teams  Please continue to update ID with any pertinent new laboratory or radiographic findings  Spectra [phone]

## 2022-05-17 NOTE — PATIENT PROFILE ADULT - FALL HARM RISK - RISK INTERVENTIONS

## 2022-05-17 NOTE — PROGRESS NOTE ADULT - SUBJECTIVE AND OBJECTIVE BOX
SUBJECTIVE  Patient is a 22y old Female who presents with a chief complaint of Broken Tracheostomy Flange (17 May 2022 09:31)  Currently admitted to medicine with the primary diagnosis of Acute sepsis  Today is hospital day 1d, and this morning she is not in distress. still tachycardic: sinus tachycardia.       OBJECTIVE  PAST MEDICAL & SURGICAL HISTORY    ALLERGIES:  Allergy Status Unknown    MEDICATIONS:  STANDING MEDICATIONS  ALBUTerol    90 MICROgram(s) HFA Inhaler 2 Puff(s) Inhalation every 6 hours  albuterol/ipratropium for Nebulization 3 milliLiter(s) Nebulizer every 6 hours  cefepime   IVPB 2000 milliGRAM(s) IV Intermittent every 8 hours  chlorhexidine 4% Liquid 1 Application(s) Topical <User Schedule>  cyanocobalamin 500 MICROGram(s) Oral daily  cyclobenzaprine Oral Tab/Cap - Peds 5 milliGRAM(s) Oral three times a day  dexAMETHasone  Injectable 6 milliGRAM(s) IV Push daily  enoxaparin Injectable 60 milliGRAM(s) SubCutaneous every 12 hours  ferrous    sulfate Liquid 300 milliGRAM(s) Enteral Tube daily  folic acid 1 milliGRAM(s) Oral daily  glycopyrrolate 2 milliGRAM(s) Oral three times a day  pantoprazole    Tablet 40 milliGRAM(s) Oral before breakfast  scopolamine 1 mG/72 Hr(s) Patch 1 Patch Transdermal every 72 hours  thiamine  Oral Tab/Cap - Peds 100 milliGRAM(s) Oral two times a day  vancomycin  IVPB 750 milliGRAM(s) IV Intermittent every 12 hours    PRN MEDICATIONS  acetaminophen     Tablet .. 650 milliGRAM(s) Oral every 6 hours PRN  metoclopramide   Syrup 10 milliGRAM(s) Oral every 6 hours PRN      VITAL SIGNS: Last 24 Hours  T(C): 36.9 (17 May 2022 07:29), Max: 37.8 (17 May 2022 02:13)  T(F): 98.5 (17 May 2022 07:29), Max: 100 (17 May 2022 02:13)  HR: 121 (17 May 2022 07:29) (106 - 148)  BP: 124/67 (17 May 2022 07:29) (95/53 - 126/64)  BP(mean): --  RR: 16 (17 May 2022 07:29) (16 - 22)  SpO2: 100% (17 May 2022 07:29) (96% - 100%)    LABS:                        10.4   17.04 )-----------( 473      ( 17 May 2022 07:37 )             35.8         141  |  102  |  10  ----------------------------<  134<H>  4.4   |  26  |  <0.5<L>    Ca    9.4      17 May 2022 07:37  Phos  5.4       Mg     1.9         TPro  7.1  /  Alb  3.7  /  TBili  <0.2  /  DBili  x   /  AST  17  /  ALT  26  /  AlkPhos  113        Urinalysis Basic - ( 16 May 2022 17:15 )    Color: Yellow / Appearance: Turbid / S.023 / pH: x  Gluc: x / Ketone: Negative  / Bili: Negative / Urobili: <2 mg/dL   Blood: x / Protein: 30 mg/dL / Nitrite: Negative   Leuk Esterase: Large / RBC: 6 /HPF / WBC 65 /HPF   Sq Epi: x / Non Sq Epi: 12 /HPF / Bacteria: Negative        Lactate, Blood: 0.9 mmol/L (22 @ 07:37)          RADIOLOGY:      PHYSICAL EXAM:    GENERAL: NAD, Alert, trach and PEG  HEENT:  Atraumatic, Normocephalic. EOMI, PERRLA, conjunctiva and sclera clear, No JVD  PULMONARY: Clear to auscultation bilaterally; No wheeze  CARDIOVASCULAR: Regular rate and rhythm; No murmurs, rubs, or gallops  GASTROINTESTINAL: Soft, Nontender, Nondistended; Bowel sounds present  MUSCULOSKELETAL:  2+ Peripheral Pulses, No clubbing, cyanosis, or edema  NEUROLOGY: non-focal  SKIN: No rashes or lesions      ADMISSION SUMMARY  Case of a 21 yo female patient with a history of encephalitis s/p tooth abscess s/p tracheostomy and PEG tube placement, HTN, GERD, and SVC thrombosis who was brought to ED from [place] on  for evaluation of a broken tracheostomy flange, found to be septic on presentation with leukocytosis on labs, a positive RVP and COVID PCR on microbiologic testing, and evidence of left pleural effusion on imaging, to be admitted for further investigations, management and monitoring. Currently still tachycardic saturating well on 3LPM NC.      Sepsis Secondary to Likely Community Acquired Pneumonia and COVID Pneumonia Versus UTI  Broken Tracheostomy Flange - Resolved  * Not Vaccinated against COVID per sister  * ED Vitals 95/53 mmHg,  bpm, SAO2 100% on 2-3 LPM NC  * ED Labs WBC 23.39 N 80.8%, No D-dimer yet  * SARS-COV2: positive  * Urinalysis (22 @ 17:15)    Glucose Qualitative, Urine: Negative    Blood, Urine: Negative    pH Urine: 7.5    Color: Yellow    Urine Appearance: Turbid    Bilirubin: Negative    Ketone - Urine: Negative    Specific Gravity: 1.023    Protein, Urine: 30 mg/dL    Urobilinogen: <2 mg/dL    Nitrite: Negative    Leukocyte Esterase Concentration: Large  * RVP positive  * CXR with left pleural effusion    - Trach replaced in  ED, fu official XRAY,   -fu Infectious Disease  - Trend WBC: 23.29 N 80.8 %  - Monitor SaO2 and Oxygen Requirements: on 3LPM NC right now with SaO2 100% (sister unsure about baseline O2 requirement and unable to reach NH staff)  --> D-dimer; check [facility] duplex venous LE dup  --> Procalcitonin   --> C-reactive protein  --> LDH   --> Ferritin   --> Fibrinogen  - Follow up blood cultures and urine culture since u/a positive  - Send urine legionella and strep  - Check MRSA, RVP, and influenza  - Start IV Cefepime and Vancomycin  - COVID therapy:  --> Started on IV Dexamethasone 6mg QD  --> Monitor POCT BID  - Anticoagulation (takes Eliquis 5mg BID for SVC thrombosis)      SVC Thrombosis and Embolism   * Home med Eliquis 5mg BID  - Resume eliquis 5mg BID      History of Encephalitis post Tooth Abscess in 10/2021   s/p Tracheostomy (has an O2 tank per sister but unsure about flow) and G-tube placement  - Monitor SaO2 and O2 requirements: as above      GERD  * Home med Omeprazole 40mg QD  - Start Protonix 40mg QD      Iron Deficiency Anemia   History of Wernicke Encephalopathy  * Home med iron replacement, thiamine, B12, and folic acid  * ED Hb 11.3, MCV 79.4  - Trend CBC  - Check iron studies, B12 and folate levels  - Resume iron replacement, thiamine, B12, and folic acid      History of Hypertension  Currently Hypotension in Setting of Sepsis  * Home med Lopressor 50mg BID  * ED BP 95/53 mmHg -> s/p fluids -> now 101/62mmHg at 21:30 PM  - Monitor BP  - Hold anti HTN  - sp IVF hydration    Others  - DVT Prophylaxis: as above SUBJECTIVE  Patient is a 22y old Female who presents with a chief complaint of Broken Tracheostomy Flange (17 May 2022 09:31)  Currently admitted to medicine with the primary diagnosis of Acute sepsis  Today is hospital day 1d, and this morning she is not in distress. still tachycardic: sinus tachycardia.       OBJECTIVE  PAST MEDICAL & SURGICAL HISTORY    ALLERGIES:  Allergy Status Unknown    MEDICATIONS:  STANDING MEDICATIONS  ALBUTerol    90 MICROgram(s) HFA Inhaler 2 Puff(s) Inhalation every 6 hours  albuterol/ipratropium for Nebulization 3 milliLiter(s) Nebulizer every 6 hours  cefepime   IVPB 2000 milliGRAM(s) IV Intermittent every 8 hours  chlorhexidine 4% Liquid 1 Application(s) Topical <User Schedule>  cyanocobalamin 500 MICROGram(s) Oral daily  cyclobenzaprine Oral Tab/Cap - Peds 5 milliGRAM(s) Oral three times a day  dexAMETHasone  Injectable 6 milliGRAM(s) IV Push daily  enoxaparin Injectable 60 milliGRAM(s) SubCutaneous every 12 hours  ferrous    sulfate Liquid 300 milliGRAM(s) Enteral Tube daily  folic acid 1 milliGRAM(s) Oral daily  glycopyrrolate 2 milliGRAM(s) Oral three times a day  pantoprazole    Tablet 40 milliGRAM(s) Oral before breakfast  scopolamine 1 mG/72 Hr(s) Patch 1 Patch Transdermal every 72 hours  thiamine  Oral Tab/Cap - Peds 100 milliGRAM(s) Oral two times a day  vancomycin  IVPB 750 milliGRAM(s) IV Intermittent every 12 hours    PRN MEDICATIONS  acetaminophen     Tablet .. 650 milliGRAM(s) Oral every 6 hours PRN  metoclopramide   Syrup 10 milliGRAM(s) Oral every 6 hours PRN      VITAL SIGNS: Last 24 Hours  T(C): 36.9 (17 May 2022 07:29), Max: 37.8 (17 May 2022 02:13)  T(F): 98.5 (17 May 2022 07:29), Max: 100 (17 May 2022 02:13)  HR: 121 (17 May 2022 07:29) (106 - 148)  BP: 124/67 (17 May 2022 07:29) (95/53 - 126/64)  BP(mean): --  RR: 16 (17 May 2022 07:29) (16 - 22)  SpO2: 100% (17 May 2022 07:29) (96% - 100%)    LABS:                        10.4   17.04 )-----------( 473      ( 17 May 2022 07:37 )             35.8         141  |  102  |  10  ----------------------------<  134<H>  4.4   |  26  |  <0.5<L>    Ca    9.4      17 May 2022 07:37  Phos  5.4       Mg     1.9         TPro  7.1  /  Alb  3.7  /  TBili  <0.2  /  DBili  x   /  AST  17  /  ALT  26  /  AlkPhos  113        Urinalysis Basic - ( 16 May 2022 17:15 )    Color: Yellow / Appearance: Turbid / S.023 / pH: x  Gluc: x / Ketone: Negative  / Bili: Negative / Urobili: <2 mg/dL   Blood: x / Protein: 30 mg/dL / Nitrite: Negative   Leuk Esterase: Large / RBC: 6 /HPF / WBC 65 /HPF   Sq Epi: x / Non Sq Epi: 12 /HPF / Bacteria: Negative        Lactate, Blood: 0.9 mmol/L (22 @ 07:37)          RADIOLOGY:      PHYSICAL EXAM:    GENERAL: NAD,  trach and PEG  HEENT:  Atraumatic, Normocephalic. EOMI, PERRLA, conjunctiva and sclera clear, No JVD  PULMONARY: Clear to auscultation bilaterally; No wheeze  CARDIOVASCULAR: Regular rate and rhythm; No murmurs, rubs, or gallops  GASTROINTESTINAL: Soft, Nontender, Nondistended; Bowel sounds present  MUSCULOSKELETAL:  2+ Peripheral Pulses, contracted extremities   NEUROLOGY: non-focal  SKIN: No rashes or lesions      ADMISSION SUMMARY  Case of a 23 yo female patient with a history of encephalitis s/p tooth abscess s/p tracheostomy and PEG tube placement, HTN, GERD, and SVC thrombosis who was brought to ED from [place] on  for evaluation of a broken tracheostomy flange, found to be septic on presentation with leukocytosis on labs, a positive RVP and COVID PCR on microbiologic testing, and evidence of left pleural effusion on imaging, to be admitted for further investigations, management and monitoring. Currently still tachycardic saturating well on 3LPM NC.      Sepsis Secondary to Likely Community Acquired Pneumonia and COVID Pneumonia Versus UTI  Broken Tracheostomy Flange - Resolved  * Not Vaccinated against COVID per sister  * ED Vitals 95/53 mmHg,  bpm, SAO2 100% on 2-3 LPM NC  * ED Labs WBC 23.39 N 80.8%, No D-dimer yet  * SARS-COV2: positive  * Urinalysis (22 @ 17:15)    Glucose Qualitative, Urine: Negative    Blood, Urine: Negative    pH Urine: 7.5    Color: Yellow    Urine Appearance: Turbid    Bilirubin: Negative    Ketone - Urine: Negative    Specific Gravity: 1.023    Protein, Urine: 30 mg/dL    Urobilinogen: <2 mg/dL    Nitrite: Negative    Leukocyte Esterase Concentration: Large  * RVP positive  * CXR with left pleural effusion    - Trach replaced in  ED, fu official XRAY,   -fu Infectious Disease  - Trend WBC: 23.29 N 80.8 %  - Monitor SaO2 and Oxygen Requirements: on 3LPM NC right now with SaO2 100% (sister unsure about baseline O2 requirement and unable to reach NH staff)  --> D-dimer; check [facility] duplex venous LE dup  --> Procalcitonin   --> C-reactive protein  --> LDH   --> Ferritin   --> Fibrinogen  - Follow up blood cultures and urine culture since u/a positive  - Send urine legionella and strep  - Check MRSA, RVP, and influenza  - Start IV Cefepime and Vancomycin  - COVID therapy:  --> Started on IV Dexamethasone 6mg QD  --> Monitor POCT BID  - Anticoagulation (takes Eliquis 5mg BID for SVC thrombosis)      SVC Thrombosis and Embolism   * Home med Eliquis 5mg BID  - Resume eliquis 5mg BID      History of Encephalitis post Tooth Abscess in 10/2021   s/p Tracheostomy (has an O2 tank per sister but unsure about flow) and G-tube placement  - Monitor SaO2 and O2 requirements: as above      GERD  * Home med Omeprazole 40mg QD  - Start Protonix 40mg QD      Iron Deficiency Anemia   History of Wernicke Encephalopathy  * Home med iron replacement, thiamine, B12, and folic acid  * ED Hb 11.3, MCV 79.4  - Trend CBC  - Check iron studies, B12 and folate levels  - Resume iron replacement, thiamine, B12, and folic acid      History of Hypertension  Currently Hypotension in Setting of Sepsis  * Home med Lopressor 50mg BID  * ED BP 95/53 mmHg -> s/p fluids -> now 101/62mmHg at 21:30 PM  - Monitor BP  - Hold anti HTN  - sp IVF hydration    #Functional quadreplegia    Others  - DVT Prophylaxis: as above

## 2022-05-17 NOTE — CONSULT NOTE ADULT - SUBJECTIVE AND OBJECTIVE BOX
CONSUELO CARO  22y, Female  Allergy: Allergy Status Unknown      CHIEF COMPLAINT:   Broken Tracheostomy Flange (17 May 2022 07:10)      LOS  1d    HPI  HPI:  History of Present Illness  Ms Caro is a 22 year old female known to have:  - From [place]   - History of Encephalitis post Tooth Abscess in 10/2021 s/p Tracheostomy (has an O2 tank per sister but unsure about flow) and G-tube placement  - SVC Thrombosis and Embolism on Eliquis 5mg BID  - GERD  - Iron Deficiency Anemia and History of Wernicke Encephalopathy  - Hypertension    She was brought to the ED from [place] on  for evaluation of a broken tracheostomy flange.  History obtained from chart and from sister over phone at [phone] since could not reach NH.  History goes back to today when the NH staff noticed that the tracheostomy flange was broken.  Per sister, she received the last update about her sister's condition last week.  She reports completion of an antibiotic course for a suspected infection (unsure if it was PNA versus UTI versus GI source and unsure about AB administered).  She notes that her sister hasn't been spiking fevers, requiring more O2, having more secretions, or diarrhea per NH staff updates.    No sick contacts: patients was visited by her cousin on  (but cousin is not sick)   No recent travel or exposure to recent travelers.  Not vaccinated against COVID.      Upon presentation to the ED, the patient was septic  Vital Signs in ED   - BP 92/53 s/p 1.75L NS bolus followed by 250mL bolus  -  bpm -> sinus tachycardia  - T 37.2  - SaO2 100% on 3LPM NC    - In the ED, patient was placed on trach collar 2-3LPM with SaO2 100%  - In setting of hypotension and tachycardia, patient received 1.75L NS bolus followed by 250mL bolus  - She received IV Cefepime 2g, Levaquin 750mg, and Vancomycin 1g x1 doses in ED  - She also received IV Dexamethasone 10mg x1   - She will be admitted for further investigations, management, and monitoring   (16 May 2022 21:14)      INFECTIOUS DISEASE HISTORY:  ID consulted for COVID 19 and sepsis   Afebrile     UA WBC 65, poor specimen as +epith  CXR no PNA   started on vanc/cefepime s/p levaquin  Unvaccinated for COVID    PMH  PAST MEDICAL & SURGICAL HISTORY:      FAMILY HISTORY      SOCIAL HISTORY  Social History:  Please refer to above for more details (16 May 2022 21:14)        ROS  ***    VITALS:  T(F): 98.5, Max: 100 (22 @ 02:13)  HR: 121  BP: 124/67  RR: 16Vital Signs Last 24 Hrs  T(C): 36.9 (17 May 2022 07:29), Max: 37.8 (17 May 2022 02:13)  T(F): 98.5 (17 May 2022 07:29), Max: 100 (17 May 2022 02:13)  HR: 121 (17 May 2022 07:29) (106 - 148)  BP: 124/67 (17 May 2022 07:29) (95/53 - 126/64)  BP(mean): --  RR: 16 (17 May 2022 07:29) (16 - 22)  SpO2: 100% (17 May 2022 07:29) (96% - 100%)    PHYSICAL EXAM:  ***    TESTS & MEASUREMENTS:                        10.4   17.04 )-----------( 473      ( 17 May 2022 07:37 )             35.8     05-    141  |  102  |  10  ----------------------------<  134<H>  4.4   |  26  |  <0.5<L>    Ca    9.4      17 May 2022 07:37  Phos  5.4     05-  Mg     1.9     -    TPro  7.1  /  Alb  3.7  /  TBili  <0.2  /  DBili  x   /  AST  17  /  ALT  26  /  AlkPhos  113  05-17      LIVER FUNCTIONS - ( 17 May 2022 07:37 )  Alb: 3.7 g/dL / Pro: 7.1 g/dL / ALK PHOS: 113 U/L / ALT: 26 U/L / AST: 17 U/L / GGT: x           Urinalysis Basic - ( 16 May 2022 17:15 )    Color: Yellow / Appearance: Turbid / S.023 / pH: x  Gluc: x / Ketone: Negative  / Bili: Negative / Urobili: <2 mg/dL   Blood: x / Protein: 30 mg/dL / Nitrite: Negative   Leuk Esterase: Large / RBC: 6 /HPF / WBC 65 /HPF   Sq Epi: x / Non Sq Epi: 12 /HPF / Bacteria: Negative          Lactate, Blood: 0.9 mmol/L (22 @ 07:37)      INFECTIOUS DISEASES TESTING  Rapid RVP Result: Detected (22 @ 12:09)      INFLAMMATORY MARKERS      RADIOLOGY & ADDITIONAL TESTS:  I have personally reviewed the last Chest xray  CXR  Xray Chest 1 View- PORTABLE-Urgent:   ACC: [id number] EXAM:  XR CHEST PORTABLE URGENT 1V                          PROCEDURE DATE:  2022          INTERPRETATION:  Clinical History / Reason for exam: Tracheostomy,   respiratory abnormality    Comparison : Chest radiograph None.    Technique/Positioning: AP portable. Patient is rotated to the left.    Findings:    Support devices: None. Telemetry leads    Cardiac/mediastinum/hilum: Obscured.    Lung parenchyma/Pleura: Left basal linear subsegmental atelectasis. No   rosie consolidation, effusion or pneumothorax.    Skeleton/soft tissues: Unremarkable.    Impression:    Left basal linear subsegmental atelectasis. No rosie consolidation,   effusion or pneumothorax      --- End of Report ---            DANIELLE PALOMINO MD; Attending Radiologist  This document has been electronically signed. May 17 2022  1:49AM (22 @ 18:00)      CT      CARDIOLOGY TESTING  12 Lead ECG:   Ventricular Rate 120 BPM    Atrial Rate 120 BPM    P-R Interval 124 ms    QRS Duration 70 ms    Q-T Interval 324 ms    QTC Calculation(Bazett) 457 ms    P Axis 61 degrees    R Axis 45 degrees    T Axis 73 degrees    Diagnosis Line Sinus tachycardia  Otherwise normal ECG    Confirmed by Gail Schroeder MD ([id number]) on 2022 4:03:54 PM (22 @ 13:19)      MEDICATIONS  ALBUTerol    90 MICROgram(s) HFA Inhaler 2 Inhalation every 6 hours  albuterol/ipratropium for Nebulization 3 Nebulizer every 6 hours  cefepime   IVPB 2000 IV Intermittent every 8 hours  chlorhexidine 4% Liquid 1 Topical <User Schedule>  cyanocobalamin 500 Oral daily  cyclobenzaprine Oral Tab/Cap - Peds 5 Oral three times a day  dexAMETHasone  Injectable 6 IV Push daily  enoxaparin Injectable 60 SubCutaneous every 12 hours  ferrous    sulfate Liquid 300 Enteral Tube daily  folic acid 1 Oral daily  glycopyrrolate 2 Oral three times a day  pantoprazole    Tablet 40 Oral before breakfast  scopolamine 1 mG/72 Hr(s) Patch 1 Transdermal every 72 hours  thiamine  Oral Tab/Cap - Peds 100 Oral two times a day  vancomycin  IVPB 750 IV Intermittent every 12 hours        ANTIBIOTICS:  cefepime   IVPB 2000 milliGRAM(s) IV Intermittent every 8 hours  vancomycin  IVPB 750 milliGRAM(s) IV Intermittent every 12 hours      ALLERGIES:  Allergy Status Unknown         CONSUELO CARO  22y, Female  Allergy: Allergy Status Unknown      CHIEF COMPLAINT:   Broken Tracheostomy Flange (17 May 2022 07:10)      LOS  1d    HPI  HPI:  History of Present Illness  Ms Caro is a 22 year old female known to have:  - From [place]   - History of Encephalitis post Tooth Abscess in 10/2021 s/p Tracheostomy (has an O2 tank per sister but unsure about flow) and G-tube placement  - SVC Thrombosis and Embolism on Eliquis 5mg BID  - GERD  - Iron Deficiency Anemia and History of Wernicke Encephalopathy  - Hypertension    She was brought to the ED from [place] on  for evaluation of a broken tracheostomy flange.  History obtained from chart and from sister over phone at [phone] since could not reach NH.  History goes back to today when the NH staff noticed that the tracheostomy flange was broken.  Per sister, she received the last update about her sister's condition last week.  She reports completion of an antibiotic course for a suspected infection (unsure if it was PNA versus UTI versus GI source and unsure about AB administered).  She notes that her sister hasn't been spiking fevers, requiring more O2, having more secretions, or diarrhea per NH staff updates.    No sick contacts: patients was visited by her cousin on  (but cousin is not sick)   No recent travel or exposure to recent travelers.  Not vaccinated against COVID.      Upon presentation to the ED, the patient was septic  Vital Signs in ED   - BP 92/53 s/p 1.75L NS bolus followed by 250mL bolus  -  bpm -> sinus tachycardia  - T 37.2  - SaO2 100% on 3LPM NC    - In the ED, patient was placed on trach collar 2-3LPM with SaO2 100%  - In setting of hypotension and tachycardia, patient received 1.75L NS bolus followed by 250mL bolus  - She received IV Cefepime 2g, Levaquin 750mg, and Vancomycin 1g x1 doses in ED  - She also received IV Dexamethasone 10mg x1   - She will be admitted for further investigations, management, and monitoring   (16 May 2022 21:14)      INFECTIOUS DISEASE HISTORY:  ID consulted for COVID 19 and sepsis   Afebrile     UA WBC 65, poor specimen as +epith  CXR no PNA   started on vanc/cefepime s/p levaquin  Unvaccinated for COVID    PMH  PAST MEDICAL & SURGICAL HISTORY:      FAMILY HISTORY  non-contributory     SOCIAL HISTORY  Social History:  Please refer to above for more details (16 May 2022 21:14)        ROS  unable to obtain history secondary to patient's mental status and/or sedation     VITALS:  T(F): 98.5, Max: 100 (22 @ 02:13)  HR: 121  BP: 124/67  RR: 16Vital Signs Last 24 Hrs  T(C): 36.9 (17 May 2022 07:29), Max: 37.8 (17 May 2022 02:13)  T(F): 98.5 (17 May 2022 07:29), Max: 100 (17 May 2022 02:13)  HR: 121 (17 May 2022 07:29) (106 - 148)  BP: 124/67 (17 May 2022 07:29) (95/53 - 126/64)  BP(mean): --  RR: 16 (17 May 2022 07:29) (16 - 22)  SpO2: 100% (17 May 2022 07:29) (96% - 100%)    PHYSICAL EXAM:  Gen: trach/ vent  CV: RRR  Lungs: Decreased BS at bases  Abd: Soft  Neuro: does not follow commands  Skin: no rash   LE dressings   Lines clean, no phlebitis     TESTS & MEASUREMENTS:                        10.4   17.04 )-----------( 473      ( 17 May 2022 07:37 )             35.8     05-    141  |  102  |  10  ----------------------------<  134<H>  4.4   |  26  |  <0.5<L>    Ca    9.4      17 May 2022 07:37  Phos  5.4     05-  Mg     1.9     -    TPro  7.1  /  Alb  3.7  /  TBili  <0.2  /  DBili  x   /  AST  17  /  ALT  26  /  AlkPhos  113  05-17      LIVER FUNCTIONS - ( 17 May 2022 07:37 )  Alb: 3.7 g/dL / Pro: 7.1 g/dL / ALK PHOS: 113 U/L / ALT: 26 U/L / AST: 17 U/L / GGT: x           Urinalysis Basic - ( 16 May 2022 17:15 )    Color: Yellow / Appearance: Turbid / S.023 / pH: x  Gluc: x / Ketone: Negative  / Bili: Negative / Urobili: <2 mg/dL   Blood: x / Protein: 30 mg/dL / Nitrite: Negative   Leuk Esterase: Large / RBC: 6 /HPF / WBC 65 /HPF   Sq Epi: x / Non Sq Epi: 12 /HPF / Bacteria: Negative          Lactate, Blood: 0.9 mmol/L (22 @ 07:37)      INFECTIOUS DISEASES TESTING  Rapid RVP Result: Detected (22 @ 12:09)      INFLAMMATORY MARKERS      RADIOLOGY & ADDITIONAL TESTS:  I have personally reviewed the last Chest xray  CXR  Xray Chest 1 View- PORTABLE-Urgent:   ACC: [id number] EXAM:  XR CHEST PORTABLE URGENT 1V                          PROCEDURE DATE:  2022          INTERPRETATION:  Clinical History / Reason for exam: Tracheostomy,   respiratory abnormality    Comparison : Chest radiograph None.    Technique/Positioning: AP portable. Patient is rotated to the left.    Findings:    Support devices: None. Telemetry leads    Cardiac/mediastinum/hilum: Obscured.    Lung parenchyma/Pleura: Left basal linear subsegmental atelectasis. No   rosie consolidation, effusion or pneumothorax.    Skeleton/soft tissues: Unremarkable.    Impression:    Left basal linear subsegmental atelectasis. No rosie consolidation,   effusion or pneumothorax      --- End of Report ---            DANIELLE PALOMINO MD; Attending Radiologist  This document has been electronically signed. May 17 2022  1:49AM (22 @ 18:00)      CT      CARDIOLOGY TESTING  12 Lead ECG:   Ventricular Rate 120 BPM    Atrial Rate 120 BPM    P-R Interval 124 ms    QRS Duration 70 ms    Q-T Interval 324 ms    QTC Calculation(Bazett) 457 ms    P Axis 61 degrees    R Axis 45 degrees    T Axis 73 degrees    Diagnosis Line Sinus tachycardia  Otherwise normal ECG    Confirmed by Alexandre RINCON, Gail ([id number]) on 2022 4:03:54 PM (22 @ 13:19)      MEDICATIONS  ALBUTerol    90 MICROgram(s) HFA Inhaler 2 Inhalation every 6 hours  albuterol/ipratropium for Nebulization 3 Nebulizer every 6 hours  cefepime   IVPB 2000 IV Intermittent every 8 hours  chlorhexidine 4% Liquid 1 Topical <User Schedule>  cyanocobalamin 500 Oral daily  cyclobenzaprine Oral Tab/Cap - Peds 5 Oral three times a day  dexAMETHasone  Injectable 6 IV Push daily  enoxaparin Injectable 60 SubCutaneous every 12 hours  ferrous    sulfate Liquid 300 Enteral Tube daily  folic acid 1 Oral daily  glycopyrrolate 2 Oral three times a day  pantoprazole    Tablet 40 Oral before breakfast  scopolamine 1 mG/72 Hr(s) Patch 1 Transdermal every 72 hours  thiamine  Oral Tab/Cap - Peds 100 Oral two times a day  vancomycin  IVPB 750 IV Intermittent every 12 hours        ANTIBIOTICS:  cefepime   IVPB 2000 milliGRAM(s) IV Intermittent every 8 hours  vancomycin  IVPB 750 milliGRAM(s) IV Intermittent every 12 hours      ALLERGIES:  Allergy Status Unknown

## 2022-05-17 NOTE — PATIENT PROFILE ADULT - DATE OF LAST VACCINATION
Thanks for visiting us today!    Remember these important phone numbers:    [phone] for phone nurses during the day and our nurse answering service at night    [phone]  for scheduling or changing future appointments    [phone] for the Poison Control Center    When leaving a message for our staff, please include:   • the spelling of your child’s full name and date of birth  • your full name and relationship to child  • best phone number and time to reach you   • reason for the call    We strongly recommend that all people age 16 and older receive the COVID-19 vaccine.   There are three ways to schedule:  • Through the [employer] krystyna  • On our website at [email or web address]  • By calling [phone]      Is your child signed up for [employer]? If you do this, you can message us rather than playing phone tag! You can also look at labs, pay your bills, and do some scheduling. Go to your own account first. (If you don't have one yet, you can set one up at the website below or at your doctor's office).  Using a web browser (not the phone krystyna), on the right hand side of your page, click the button marked \"Request Access to my Child's Records.\" Fill out the information. In a few days your child's information will be linked to your account. It's that simple!! Here is the website for more information:     [email or web address]      If you haven't already liked us on [employer], please do so!  Just search for \"[facility]\"      --------------------------------------------------------------------------------------------------------------------         19-May-2022

## 2022-05-17 NOTE — CONSULT NOTE ADULT - SUBJECTIVE AND OBJECTIVE BOX
Patient is a 22y old  Female who presents with a chief complaint of Broken Tracheostomy Flange (16 May 2022 21:14)      HPI:  History of Present Illness  Ms Moya is a 22 year old female known to have:  - From [place]   - History of Encephalitis post Tooth Abscess in 10/2021 s/p Tracheostomy (has an O2 tank per sister but unsure about flow) and G-tube placement  - SVC Thrombosis and Embolism on Eliquis 5mg BID  - GERD  - Iron Deficiency Anemia and History of Wernicke Encephalopathy  - Hypertension    She was brought to the ED from [place] on  for evaluation of a broken tracheostomy flange.  History obtained from chart and from sister over phone at [phone] since could not reach NH.  History goes back to today when the NH staff noticed that the tracheostomy flange was broken.  Per sister, she received the last update about her sister's condition last week.  She reports completion of an antibiotic course for a suspected infection (unsure if it was PNA versus UTI versus GI source and unsure about AB administered).  She notes that her sister hasn't been spiking fevers, requiring more O2, having more secretions, or diarrhea per NH staff updates.    No sick contacts: patients was visited by her cousin on  (but cousin is not sick)   No recent travel or exposure to recent travelers.  Not vaccinated against COVID.      Upon presentation to the ED, the patient was septic  Vital Signs in ED   - BP 92/53 s/p 1.75L NS bolus followed by 250mL bolus  -  bpm -> sinus tachycardia  - T 37.2  - SaO2 100% on 3LPM NC      Investigations   Laboratory Workup  - CBC:                        11.3   23.29 )-----------( 535      ( 16 May 2022 12:09 )             38.5     - Chemistry:      141  |  99  |  15  ----------------------------<  97  4.9   |  27  |  0.5<L>    Ca    9.6      16 May 2022 12:09    TPro  7.6  /  Alb  3.9  /  TBili  <0.2  /  DBili  x   /  AST  22  /  ALT  29  /  AlkPhos  129<H>        Microbiological Workup  Urinalysis Basic - ( 16 May 2022 17:15 )    Color: Yellow / Appearance: Turbid / S.023 / pH: x  Gluc: x / Ketone: Negative  / Bili: Negative / Urobili: <2 mg/dL   Blood: x / Protein: 30 mg/dL / Nitrite: Negative   Leuk Esterase: Large / RBC: 6 /HPF / WBC 65 /HPF   Sq Epi: x / Non Sq Epi: 12 /HPF / Bacteria: Negative      Microbiology  * COVID +  * RVP +      Radiological Workup  * CXR with left pleural effusion      - In the ED, patient was placed on trach collar 2-3LPM with SaO2 100%  - In setting of hypotension and tachycardia, patient received 1.75L NS bolus followed by 250mL bolus  - She received IV Cefepime 2g, Levaquin 750mg, and Vancomycin 1g x1 doses in ED  - She also received IV Dexamethasone 10mg x1   - She will be admitted for further investigations, management, and monitoring   (16 May 2022 21:14)      PAST MEDICAL & SURGICAL HISTORY:      SOCIAL HX:   Smoking     NO                    ETOH                            Other    FAMILY HISTORY:  :  No known cardiovacular family hisotry     Review Of Systems:     All ROS are negative except per HPI       Allergies    Allergy Status Unknown    Intolerances          PHYSICAL EXAM    ICU Vital Signs Last 24 Hrs  T(C): 37.8 (17 May 2022 02:13), Max: 37.8 (17 May 2022 02:13)  T(F): 100 (17 May 2022 02:13), Max: 100 (17 May 2022 02:13)  HR: 118 (17 May 2022 05:59) (106 - 148)  BP: 110/61 (17 May 2022 05:59) (95/53 - 126/64)  BP(mean): --  ABP: --  ABP(mean): --  RR: 17 (17 May 2022 05:59) (17 - 22)  SpO2: 100% (17 May 2022 05:59) (96% - 100%)      CONSTITUTIONAL:  Ill appearing in NAD    ENT:   Airway patent,   Mouth with normal mucosa.   Trach       CARDIAC:   Normal rate,   Regular rhythm.      RESPIRATORY:   No wheezing  Bilateral BS   Not tachypneic,  No use of accessory muscles    GASTROINTESTINAL:  Abdomen soft,   Non-tender,   No guarding,   + BS      NEUROLOGICAL:   Opens eyes  \Contracted .    SKIN:   Skin normal color for race,         LABS:                          11.3   23.29 )-----------( 535      ( 16 May 2022 12:09 )             38.5                                               05-    141  |  99  |  15  ----------------------------<  97  4.9   |  27  |  0.5<L>    Ca    9.6      16 May 2022 12:09    TPro  7.6  /  Alb  3.9  /  TBili  <0.2  /  DBili  x   /  AST  22  /  ALT  29  /  AlkPhos  129<H>                                               Urinalysis Basic - ( 16 May 2022 17:15 )    Color: Yellow / Appearance: Turbid / S.023 / pH: x  Gluc: x / Ketone: Negative  / Bili: Negative / Urobili: <2 mg/dL   Blood: x / Protein: 30 mg/dL / Nitrite: Negative   Leuk Esterase: Large / RBC: 6 /HPF / WBC 65 /HPF   Sq Epi: x / Non Sq Epi: 12 /HPF / Bacteria: Negative                                                  LIVER FUNCTIONS - ( 16 May 2022 12:09 )  Alb: 3.9 g/dL / Pro: 7.6 g/dL / ALK PHOS: 129 U/L / ALT: 29 U/L / AST: 22 U/L / GGT: x                                                                                                                                       X-Rays reviewed                                                                                     ECHO    MEDICATIONS  (STANDING):  ALBUTerol    90 MICROgram(s) HFA Inhaler 2 Puff(s) Inhalation every 6 hours  albuterol/ipratropium for Nebulization 3 milliLiter(s) Nebulizer every 6 hours  apixaban 5 milliGRAM(s) Oral every 12 hours  cefepime   IVPB 2000 milliGRAM(s) IV Intermittent every 8 hours  chlorhexidine 4% Liquid 1 Application(s) Topical <User Schedule>  cyanocobalamin 500 MICROGram(s) Oral daily  cyclobenzaprine Oral Tab/Cap - Peds 5 milliGRAM(s) Oral three times a day  dexAMETHasone  Injectable 6 milliGRAM(s) IV Push daily  ferrous    sulfate Liquid 300 milliGRAM(s) Enteral Tube daily  folic acid 1 milliGRAM(s) Oral daily  glycopyrrolate 2 milliGRAM(s) Oral three times a day  lactated ringers. 1000 milliLiter(s) (50 mL/Hr) IV Continuous <Continuous>  pantoprazole    Tablet 40 milliGRAM(s) Oral before breakfast  scopolamine 1 mG/72 Hr(s) Patch 1 Patch Transdermal every 72 hours  thiamine  Oral Tab/Cap - Peds 100 milliGRAM(s) Oral two times a day  vancomycin  IVPB 750 milliGRAM(s) IV Intermittent every 12 hours    MEDICATIONS  (PRN):  acetaminophen     Tablet .. 650 milliGRAM(s) Oral every 6 hours PRN Temp greater or equal to 38C (100.4F), Mild Pain (1 - 3)  metoclopramide   Syrup 10 milliGRAM(s) Oral every 6 hours PRN as needed

## 2022-05-18 LAB
ALBUMIN SERPL ELPH-MCNC: 3.2 G/DL — LOW (ref 3.5–5.2)
ALP SERPL-CCNC: 99 U/L — SIGNIFICANT CHANGE UP (ref 30–115)
ALT FLD-CCNC: 28 U/L — SIGNIFICANT CHANGE UP (ref 0–41)
ANION GAP SERPL CALC-SCNC: 14 MMOL/L — SIGNIFICANT CHANGE UP (ref 7–14)
AST SERPL-CCNC: 21 U/L — SIGNIFICANT CHANGE UP (ref 0–41)
BASOPHILS # BLD AUTO: 0.03 K/UL — SIGNIFICANT CHANGE UP (ref 0–0.2)
BASOPHILS NFR BLD AUTO: 0.2 % — SIGNIFICANT CHANGE UP (ref 0–1)
BILIRUB SERPL-MCNC: <0.2 MG/DL — SIGNIFICANT CHANGE UP (ref 0.2–1.2)
BUN SERPL-MCNC: 11 MG/DL — SIGNIFICANT CHANGE UP (ref 10–20)
CALCIUM SERPL-MCNC: 8.9 MG/DL — SIGNIFICANT CHANGE UP (ref 8.5–10.1)
CHLORIDE SERPL-SCNC: 100 MMOL/L — SIGNIFICANT CHANGE UP (ref 98–110)
CO2 SERPL-SCNC: 26 MMOL/L — SIGNIFICANT CHANGE UP (ref 17–32)
CREAT SERPL-MCNC: 0.5 MG/DL — LOW (ref 0.7–1.5)
EGFR: 136 ML/MIN/1.73M2 — SIGNIFICANT CHANGE UP
EOSINOPHIL # BLD AUTO: 0.04 K/UL — SIGNIFICANT CHANGE UP (ref 0–0.7)
EOSINOPHIL NFR BLD AUTO: 0.3 % — SIGNIFICANT CHANGE UP (ref 0–8)
GLUCOSE SERPL-MCNC: 181 MG/DL — HIGH (ref 70–99)
HCT VFR BLD CALC: 30.5 % — LOW (ref 37–47)
HGB BLD-MCNC: 9.2 G/DL — LOW (ref 12–16)
IMM GRANULOCYTES NFR BLD AUTO: 0.4 % — HIGH (ref 0.1–0.3)
LYMPHOCYTES # BLD AUTO: 1.76 K/UL — SIGNIFICANT CHANGE UP (ref 1.2–3.4)
LYMPHOCYTES # BLD AUTO: 11.7 % — LOW (ref 20.5–51.1)
MAGNESIUM SERPL-MCNC: 1.8 MG/DL — SIGNIFICANT CHANGE UP (ref 1.8–2.4)
MCHC RBC-ENTMCNC: 23.4 PG — LOW (ref 27–31)
MCHC RBC-ENTMCNC: 30.2 G/DL — LOW (ref 32–37)
MCV RBC AUTO: 77.6 FL — LOW (ref 81–99)
MONOCYTES # BLD AUTO: 1.21 K/UL — HIGH (ref 0.1–0.6)
MONOCYTES NFR BLD AUTO: 8 % — SIGNIFICANT CHANGE UP (ref 1.7–9.3)
NEUTROPHILS # BLD AUTO: 11.95 K/UL — HIGH (ref 1.4–6.5)
NEUTROPHILS NFR BLD AUTO: 79.4 % — HIGH (ref 42.2–75.2)
NRBC # BLD: 0 /100 WBCS — SIGNIFICANT CHANGE UP (ref 0–0)
PLATELET # BLD AUTO: 412 K/UL — HIGH (ref 130–400)
POTASSIUM SERPL-MCNC: 3.7 MMOL/L — SIGNIFICANT CHANGE UP (ref 3.5–5)
POTASSIUM SERPL-SCNC: 3.7 MMOL/L — SIGNIFICANT CHANGE UP (ref 3.5–5)
PROT SERPL-MCNC: 6.3 G/DL — SIGNIFICANT CHANGE UP (ref 6–8)
RBC # BLD: 3.93 M/UL — LOW (ref 4.2–5.4)
RBC # FLD: 17.7 % — HIGH (ref 11.5–14.5)
SODIUM SERPL-SCNC: 140 MMOL/L — SIGNIFICANT CHANGE UP (ref 135–146)
WBC # BLD: 15.05 K/UL — HIGH (ref 4.8–10.8)
WBC # FLD AUTO: 15.05 K/UL — HIGH (ref 4.8–10.8)

## 2022-05-18 PROCEDURE — 93010 ELECTROCARDIOGRAM REPORT: CPT

## 2022-05-18 PROCEDURE — 99233 SBSQ HOSP IP/OBS HIGH 50: CPT

## 2022-05-18 RX ORDER — REMDESIVIR 5 MG/ML
200 INJECTION INTRAVENOUS EVERY 24 HOURS
Refills: 0 | Status: COMPLETED | OUTPATIENT
Start: 2022-05-18 | End: 2022-05-18

## 2022-05-18 RX ORDER — SODIUM CHLORIDE 9 MG/ML
250 INJECTION, SOLUTION INTRAVENOUS ONCE
Refills: 0 | Status: COMPLETED | OUTPATIENT
Start: 2022-05-18 | End: 2022-05-18

## 2022-05-18 RX ORDER — REMDESIVIR 5 MG/ML
100 INJECTION INTRAVENOUS EVERY 24 HOURS
Refills: 0 | Status: COMPLETED | OUTPATIENT
Start: 2022-05-19 | End: 2022-05-22

## 2022-05-18 RX ORDER — PANTOPRAZOLE SODIUM 20 MG/1
40 TABLET, DELAYED RELEASE ORAL DAILY
Refills: 0 | Status: ACTIVE | OUTPATIENT
Start: 2022-05-18 | End: 2023-04-16

## 2022-05-18 RX ORDER — REMDESIVIR 5 MG/ML
INJECTION INTRAVENOUS
Refills: 0 | Status: COMPLETED | OUTPATIENT
Start: 2022-05-18 | End: 2022-05-22

## 2022-05-18 RX ADMIN — CHLORHEXIDINE GLUCONATE 1 APPLICATION(S): 213 SOLUTION TOPICAL at 07:26

## 2022-05-18 RX ADMIN — CEFEPIME 100 MILLIGRAM(S): 1 INJECTION, POWDER, FOR SOLUTION INTRAMUSCULAR; INTRAVENOUS at 05:32

## 2022-05-18 RX ADMIN — CYCLOBENZAPRINE HYDROCHLORIDE 5 MILLIGRAM(S): 10 TABLET, FILM COATED ORAL at 13:44

## 2022-05-18 RX ADMIN — SODIUM CHLORIDE 500 MILLILITER(S): 9 INJECTION, SOLUTION INTRAVENOUS at 03:29

## 2022-05-18 RX ADMIN — CYCLOBENZAPRINE HYDROCHLORIDE 5 MILLIGRAM(S): 10 TABLET, FILM COATED ORAL at 21:28

## 2022-05-18 RX ADMIN — PREGABALIN 500 MICROGRAM(S): 225 CAPSULE ORAL at 11:10

## 2022-05-18 RX ADMIN — PANTOPRAZOLE SODIUM 40 MILLIGRAM(S): 20 TABLET, DELAYED RELEASE ORAL at 11:12

## 2022-05-18 RX ADMIN — Medication 100 MILLIGRAM(S): at 05:35

## 2022-05-18 RX ADMIN — ENOXAPARIN SODIUM 60 MILLIGRAM(S): 100 INJECTION SUBCUTANEOUS at 05:33

## 2022-05-18 RX ADMIN — Medication 100 MILLIGRAM(S): at 17:47

## 2022-05-18 RX ADMIN — ROBINUL 2 MILLIGRAM(S): 0.2 INJECTION INTRAMUSCULAR; INTRAVENOUS at 21:29

## 2022-05-18 RX ADMIN — Medication 250 MILLIGRAM(S): at 05:32

## 2022-05-18 RX ADMIN — Medication 300 MILLIGRAM(S): at 11:11

## 2022-05-18 RX ADMIN — ENOXAPARIN SODIUM 60 MILLIGRAM(S): 100 INJECTION SUBCUTANEOUS at 17:47

## 2022-05-18 RX ADMIN — Medication 1 MILLIGRAM(S): at 11:16

## 2022-05-18 RX ADMIN — CYCLOBENZAPRINE HYDROCHLORIDE 5 MILLIGRAM(S): 10 TABLET, FILM COATED ORAL at 05:33

## 2022-05-18 RX ADMIN — ROBINUL 2 MILLIGRAM(S): 0.2 INJECTION INTRAMUSCULAR; INTRAVENOUS at 13:42

## 2022-05-18 RX ADMIN — Medication 6 MILLIGRAM(S): at 05:35

## 2022-05-18 RX ADMIN — PANTOPRAZOLE SODIUM 40 MILLIGRAM(S): 20 TABLET, DELAYED RELEASE ORAL at 05:35

## 2022-05-18 RX ADMIN — ROBINUL 2 MILLIGRAM(S): 0.2 INJECTION INTRAMUSCULAR; INTRAVENOUS at 07:26

## 2022-05-18 RX ADMIN — REMDESIVIR 500 MILLIGRAM(S): 5 INJECTION INTRAVENOUS at 13:44

## 2022-05-18 NOTE — PROGRESS NOTE ADULT - ASSESSMENT
Case of a 21 yo female patient with a history of encephalitis s/p tooth abscess s/p tracheostomy and PEG tube placement, HTN, GERD, and SVC thrombosis who was brought to ED from [place] on 05/16 for evaluation of a broken tracheostomy flange, found to be septic on presentation with leukocytosis on labs, a positive RVP and COVID PCR on microbiologic testing, and evidence of left pleural effusion on imaging, to be admitted for further investigations, management and monitoring. Currently still tachycardic saturating well on 3LPM NC.      Sepsis Secondary to Likely Community Acquired Pneumonia and COVID Pneumonia   Broken Tracheostomy Flange - Resolved  Tracheostomy on 2L for Chronic Respiratory Failure  - Not Vaccinated against COVID per sister  - sepsis on presenatation   - COVID positive  - CXR with left pleural effusion  - Trach replaced in th ED,  - Requirements: on 3LPM NC right now with SaO2 100% (sister unsure about baseline O2 requirement and unable to reach NH staff)  - D-dimer; 267  - Procalcitonin .03  - C-reactive protein 61  - LDH - 225  - Ferritin 9  - Blood & Urine cxs NGTD   - MRSA negative   - dc Cefepime and Vancomycin  - continue IV Dexamethasone 6mg QD  - Anticoagulation (takes Eliquis 5mg BID for SVC thrombosis)  - fu Infectious Disease - stop abx. continue rdv    SVC Thrombosis and Embolism   * Home med Eliquis 5mg BID  - switch eliquis 5mg BID to lovenox for now     History of Encephalitis post Tooth Abscess in 10/2021   s/p Tracheostomy (has an O2 tank per sister but unsure about flow) and G-tube placement  - Monitor SaO2 and O2 requirements: as above    GERD  * Home med Omeprazole 40mg QD  - continue Protonix 40mg QD    Iron Deficiency Anemia   History of Wernicke Encephalopathy  * Home med iron replacement, thiamine, B12, and folic acid  * ED Hb 11.3, MCV 79.4  - Trend CBC  - Check iron studies, B12 and folate levels  - Resume iron replacement, thiamine, B12, and folic acid    History of Hypertension  * Home med Lopressor 50mg BID  - Monitor BP  - Hold anti HTN  - sp IVF hydration    #Functional quadreplegia    Others  - DVT Prophylaxis: as above

## 2022-05-18 NOTE — DIETITIAN INITIAL EVALUATION ADULT - ADD RECOMMEND
1. Recommend to adjust TF regimen to: Osmolite 1.5 Gilberto, 1000 mL/24hrs, Bolus 4x - 250 mL/bolus, 63 g/pro, 762 mL free water from formula; Additional water flushes: 120 mL pre/post feeds - will provide additional 960 mL water to meet estimated fluid needs, total water with additional flushes: 1722 mL/day.  2. Will monitor BG closely as levels are high; pt not dx with DM. If BG levels continue to increase, consider adjusting TF formula to Glucerna to control BG.  3. Pt is at high risk; will f/u in 4 days.  1. Recommend to adjust TF regimen to: Osmolite 1.5 Gilberto, 1000 mL/24hrs, Bolus 4x - 250 mL/bolus -- will provide 1500 kcal/day, 63 g/pro, 762 mL free water from formula; Additional water flushes: 120 mL pre/post feeds - will provide additional 960 mL water to meet estimated fluid needs, total water with additional flushes: 1722 mL/day.  2. Will monitor BG closely as levels are high; pt not dx with DM. If BG levels continue to increase, consider adjusting TF formula to Glucerna to control BG.  3. Pt is at high risk; will f/u in 4 days.  1. Recommend to adjust TF regimen to: Osmolite 1.5 Gilberto, 1000 mL/24hrs, Bolus 4x - 250 mL/bolus -- will provide 1500 kcal/day, 63 g/pro, 762 mL free water from formula; Additional water flushes: 120 mL pre and post feeds - will provide additional 960 mL water to meet estimated fluid needs, total water with additional flushes: 1722 mL/day.  2. Will monitor BG closely as levels are high; pt not dx with DM. If BG levels continue to increase, consider adjusting TF formula to Glucerna to control BG.  3. Pt is at high risk; will f/u in 4 days.

## 2022-05-18 NOTE — CDI QUERY NOTE - NSCDIOTHERTXTBX_GEN_ALL_CORE_HH
-------------------------------------------------------------------------------------------------------------------------------------------    22 F, with Diagnosis:  Sepsis,  Broken Tracheostomy Flange  Clinical Indicator:   Documentation:   5/16/2022:  ED note: Attending:  pt had a tooth abscess complicated by encephalitis and since then, has declined, requiring trach and peg.   5/16/2022: H/P:  Attending:   In the ED, patient was placed on trach collar 2-3LPM with SaO2 100%  History of Encephalitis post Tooth Abscess in 10/2021 ,  s/p Tracheostomy (has an O2 tank per sister but unsure about flow)   - Monitor SaO2 and O2 requirements:     Management:  Tracheostomy collar for 02 at 2L  PROCEDURE: Tracheostomy Replacement  5/16/2022    Based on your professional judgment and clinical indicators, can the Patient's need for Tracheostomy on 2L be further specified as:     -----  Tracheostomy on 2L for Chronic Respiratory Failure  -----  Other (please specify)  ------ Clinically unable to determine    Thank you.

## 2022-05-18 NOTE — DIETITIAN INITIAL EVALUATION ADULT - FLUID ACCUMULATION
NFPE: no signs or symptoms of muscles mass or body fat loss at this time.   Edema: no edema noted per flowsheet.

## 2022-05-18 NOTE — DIETITIAN INITIAL EVALUATION ADULT - PERTINENT LABORATORY DATA
05-18    140  |  100  |  11  ----------------------------<  181<H>  3.7   |  26  |  0.5<L>    Ca    8.9      18 May 2022 07:09  Phos  5.4     05-17  Mg     1.8     05-18    TPro  6.3  /  Alb  3.2<L>  /  TBili  <0.2  /  DBili  x   /  AST  21  /  ALT  28  /  AlkPhos  99  05-18  A1C with Estimated Average Glucose Result: 4.9 % (05-17-22 @ 07:37)

## 2022-05-18 NOTE — PROGRESS NOTE ADULT - ASSESSMENT
ASSESSMENT  23 yo F from NH, trach/PEG, History of Encephalitis post Tooth Abscess in 10/2021, SVC Thrombosis and Embolism on Eliquis 5mg BID, GERD, Iron Deficiency Anemia and History of Wernicke Encephalopathy, HTN, She was brought to the ED from [place] on 05/16 for evaluation of a broken tracheostomy flange.      IMPRESSION  #Sepsis on admission T>90 RR>20 WBC >12    Blood & Urine cxs NGTD     MRSA PCR Result.: Negative (05-17-22 @ 10:10)    Procalcitonin, Serum: 0.03 (05-17-22 @ 07:37)  #COVID19 , unvaccinated    satting well on baseline settings  < from: Xray Chest 1 View- PORTABLE-Urgent (Xray Chest 1 View- PORTABLE-Urgent .) (05.16.22 @ 18:00) >  Left basal linear subsegmental atelectasis. No rosie consolidation,   effusion or pneumothorax  #Trach/PEG  Creatinine, Serum: <0.5 (05-17-22 @ 07:37)    Weight (kg): 56.7 (05-16-22 @ 10:33)    RECOMMENDATIONS  - as requiring O2, too late for monoclonal Ab, can start RDV x 5 days  - steroids per primary team   - D/C ABX    If any questions, please call or send a message on [employer] Teams  Please continue to update ID with any pertinent new laboratory or radiographic findings  Spectra [id number]

## 2022-05-18 NOTE — DIETITIAN INITIAL EVALUATION ADULT - PERTINENT MEDS FT
MEDICATIONS  (STANDING):  ALBUTerol    90 MICROgram(s) HFA Inhaler 2 Puff(s) Inhalation every 6 hours  albuterol/ipratropium for Nebulization 3 milliLiter(s) Nebulizer every 6 hours  chlorhexidine 4% Liquid 1 Application(s) Topical <User Schedule>  cyanocobalamin 500 MICROGram(s) Oral daily  cyclobenzaprine Oral Tab/Cap - Peds 5 milliGRAM(s) Oral three times a day  dexAMETHasone  Injectable 6 milliGRAM(s) IV Push daily  enoxaparin Injectable 60 milliGRAM(s) SubCutaneous every 12 hours  ferrous    sulfate Liquid 300 milliGRAM(s) Enteral Tube daily  folic acid 1 milliGRAM(s) Oral daily  glycopyrrolate 2 milliGRAM(s) Oral three times a day  pantoprazole   Suspension 40 milliGRAM(s) Oral daily  remdesivir  IVPB   IV Intermittent   scopolamine 1 mG/72 Hr(s) Patch 1 Patch Transdermal every 72 hours  thiamine  Oral Tab/Cap - Peds 100 milliGRAM(s) Oral two times a day    MEDICATIONS  (PRN):  acetaminophen     Tablet .. 650 milliGRAM(s) Oral every 6 hours PRN Temp greater or equal to 38C (100.4F), Mild Pain (1 - 3)  metoclopramide   Syrup 10 milliGRAM(s) Oral every 6 hours PRN as needed

## 2022-05-18 NOTE — PROGRESS NOTE ADULT - ASSESSMENT
23 y/o woman with PMH of history of encephalitis s/p tooth abscess, chronic respiratory failure s/p tracheostomy and PEG tube placement, HTN, GERD, and SVC thrombosis on apixaban was brought to the ED from [facility] on 05/16 for evaluation of a broken tracheostomy flange. Tracheostomy was replaced in the ED. She was found to be septic on presentation with leukocytosis and COVID positive.     1. Sepsis present on admission due to COVID 19  unvaccinated pt  ID consult and f/u appreciated  remdesivir x 5 days   continue decadron 6mg IV daily  isolation per protocol  Ferritin, Serum: 99 ng/mL (05-17-22 @ 07:37)  C-Reactive Protein, Serum: 61 mg/L (05-17-22 @ 07:37)  D-Dimer Assay, Quantitative: 267 ng/mL DDU (05-17-22 @ 07:37)  Procalcitonin, Serum: 0.03 ng/mL (05-17-22 @ 07:37)  venous duplex negative for DVT  Sepsis Secondary to Likely Community Acquired Pneumonia and COVID Pneumonia Versus UTI  Broken Tracheostomy Flange - Resolved  * Not Vaccinated against COVID per sister  * ED Vitals 95/53 mmHg,  bpm, SAO2 100% on 2-3 LPM NC  * ED Labs WBC 23.39 N 80.8%, No D-dimer yet  * SARS-COV2: positive  * Urinalysis (05.16.22 @ 17:15)    Glucose Qualitative, Urine: Negative    Blood, Urine: Negative    pH Urine: 7.5    Color: Yellow    Urine Appearance: Turbid    Bilirubin: Negative    Ketone - Urine: Negative    Specific Gravity: 1.023    Protein, Urine: 30 mg/dL    Urobilinogen: <2 mg/dL    Nitrite: Negative    Leukocyte Esterase Concentration: Large  * RVP positive  * CXR with left pleural effusion    - Trach replaced in th ED, fu official XRAY,   -fu Infectious Disease  - Trend WBC: 23.29 N 80.8 %  - Monitor SaO2 and Oxygen Requirements: on 3LPM NC right now with SaO2 100% (sister unsure about baseline O2 requirement and unable to reach NH staff)  --> D-dimer; check VA duplex venous LE dup  --> Procalcitonin   --> C-reactive protein  --> LDH   --> Ferritin   --> Fibrinogen  - Follow up blood cultures and urine culture since u/a positive  - Send urine legionella and strep  - Check MRSA, RVP, and influenza  - Start IV Cefepime and Vancomycin  - COVID therapy:  --> Started on IV Dexamethasone 6mg QD  --> Monitor POCT BID  - Anticoagulation (takes Eliquis 5mg BID for SVC thrombosis)      SVC Thrombosis and Embolism   * Home med Eliquis 5mg BID  - Resume eliquis 5mg BID      History of Encephalitis post Tooth Abscess in 10/2021   s/p Tracheostomy (has an O2 tank per sister but unsure about flow) and G-tube placement  - Monitor SaO2 and O2 requirements: as above      GERD  * Home med Omeprazole 40mg QD  - Start Protonix 40mg QD      Iron Deficiency Anemia   History of Wernicke Encephalopathy  * Home med iron replacement, thiamine, B12, and folic acid  * ED Hb 11.3, MCV 79.4  - Trend CBC  - Check iron studies, B12 and folate levels  - Resume iron replacement, thiamine, B12, and folic acid      History of Hypertension  Currently Hypotension in Setting of Sepsis  * Home med Lopressor 50mg BID  * ED BP 95/53 mmHg -> s/p fluids -> now 101/62mmHg at 21:30 PM  - Monitor BP  - Hold anti HTN  - sp IVF hydration    #Functional quadreplegia   23 y/o woman with PMH of history of encephalitis s/p tooth abscess, chronic respiratory failure s/p tracheostomy and PEG tube placement, HTN, GERD, and SVC thrombosis on apixaban was brought to the ED from [facility] on 05/16 for evaluation of a broken tracheostomy flange. Tracheostomy was replaced in the ED. She was found to be septic on presentation with leukocytosis and COVID positive.     1. Sepsis present on admission due to COVID 19  unvaccinated pt  ID consult and f/u appreciated  remdesivir x 5 days   continue decadron 6mg IV daily  isolation per protocol  Ferritin, Serum: 99 ng/mL (05-17-22 @ 07:37)  C-Reactive Protein, Serum: 61 mg/L (05-17-22 @ 07:37)  D-Dimer Assay, Quantitative: 267 ng/mL DDU (05-17-22 @ 07:37)  Procalcitonin, Serum: 0.03 ng/mL (05-17-22 @ 07:37)  venous duplex negative for DVT  blood and urine cultures negative and MRSA negative - other abx discontinued  leukocytosis improving  guarded prognosis    2. Chronic respiratory failure  s/p tracheostomy replacement in ED and tolerating trach collar - 35%  suction prn  on glycopyrrolate    3. SVC Thrombosis and Embolism - on Eliquis 5mg bid as outpt  on therapeutic lovenox as inpt    4. Microcytic anemia on iron, vitamin B12 and folate for suspected deficiencies    5. H/O Wernicke's encephalopathy on thiamine    6. HTN - on metoprolol 50mg bid as outpt - held due to sepsis  if SBP remains >100, restart at 25mg bid and titrate as tolerated    7. Functional quadriplegia    8. DVT prophylaxis - on therapeutic dose lovenox      PROGRESS NOTE HANDOFF    Pending: restart metoprolol if BP remains stable, labs in AM, pulse ox monitoring    Family discussion: medical staff updating sister    Disposition: [facility]

## 2022-05-18 NOTE — PROGRESS NOTE ADULT - SUBJECTIVE AND OBJECTIVE BOX
CONSUELO CARO  22y, Female  Allergy: Allergy Status Unknown      LOS  2d    CHIEF COMPLAINT: Broken Tracheostomy Flange (17 May 2022 09:42)      INTERVAL EVENTS/HPI  - No acute events overnight, Blood & Urine cxs NGTD   - T(F): , Max: 98.6 (22 @ 22:00)  - Tolerating medication  - WBC Count: 15.05 (22 @ 07:09)  WBC Count: 17.04 (22 @ 07:37)     - Creatinine, Serum: 0.5 (22 @ 07:09)  Creatinine, Serum: <0.5 (22 @ 07:37)       ROS  unable to obtain history secondary to patient's mental status and/or sedation     VITALS:  T(F): 97.6, Max: 98.6 (22 @ 22:00)  HR: 114  BP: 117/59  RR: 18Vital Signs Last 24 Hrs  T(C): 36.4 (18 May 2022 06:12), Max: 37 (17 May 2022 22:00)  T(F): 97.6 (18 May 2022 06:12), Max: 98.6 (17 May 2022 22:00)  HR: 114 (18 May 2022 06:12) (88 - 114)  BP: 117/59 (18 May 2022 06:12) (106/54 - 121/68)  BP(mean): --  RR: 18 (18 May 2022 06:12) (16 - 18)  SpO2: 98% (18 May 2022 08:18) (98% - 100%)    PHYSICAL EXAM:  Gen: trach/ vent  CV: RRR  Lungs: Decreased BS at bases  Abd: Soft  Neuro: does not follow commands  Skin: no rash   Lines clean, no phlebitis     FH: Non-contributory  Social Hx: Non-contributory    TESTS & MEASUREMENTS:                        9.2    15.05 )-----------( 412      ( 18 May 2022 07:09 )             30.5     -    140  |  100  |  11  ----------------------------<  181<H>  3.7   |  26  |  0.5<L>    Ca    8.9      18 May 2022 07:09  Phos  5.4       Mg     1.8         TPro  6.3  /  Alb  3.2<L>  /  TBili  <0.2  /  DBili  x   /  AST  21  /  ALT  28  /  AlkPhos  99        LIVER FUNCTIONS - ( 18 May 2022 07:09 )  Alb: 3.2 g/dL / Pro: 6.3 g/dL / ALK PHOS: 99 U/L / ALT: 28 U/L / AST: 21 U/L / GGT: x           Urinalysis Basic - ( 16 May 2022 17:15 )    Color: Yellow / Appearance: Turbid / S.023 / pH: x  Gluc: x / Ketone: Negative  / Bili: Negative / Urobili: <2 mg/dL   Blood: x / Protein: 30 mg/dL / Nitrite: Negative   Leuk Esterase: Large / RBC: 6 /HPF / WBC 65 /HPF   Sq Epi: x / Non Sq Epi: 12 /HPF / Bacteria: Negative        Culture - Urine (collected 22 @ 17:15)  Source: Clean Catch Clean Catch (Midstream)  Final Report (22 @ 21:05):    <10,000 CFU/mL Normal Urogenital Trinidad    Culture - Blood (collected 22 @ 12:28)  Source: .Blood Blood-Peripheral  Preliminary Report (22 @ 23:02):    No growth to date.    Culture - Blood (collected 22 @ 12:09)  Source: .Blood Blood-Peripheral  Preliminary Report (22 @ 23:02):    No growth to date.        Lactate, Blood: 0.9 mmol/L (22 @ 07:37)      INFECTIOUS DISEASES TESTING  MRSA PCR Result.: Negative (22 @ 10:10)  Procalcitonin, Serum: 0.03 (22 @ 07:37)  Rapid RVP Result: Detected (22 @ 12:09)  strept    INFLAMMATORY MARKERS  Sedimentation Rate, Erythrocyte: 85 mm/Hr (22 @ 07:37)  C-Reactive Protein, Serum: 61 mg/L (22 @ 07:37)      RADIOLOGY & ADDITIONAL TESTS:  I have personally reviewed the last available Chest xray  CXR  Xray Chest 1 View- PORTABLE-Urgent:   ACC: [id number] EXAM:  XR CHEST PORTABLE URGENT 1V                          PROCEDURE DATE:  2022          INTERPRETATION:  Clinical History / Reason for exam: Tracheostomy,   respiratory abnormality    Comparison : Chest radiograph None.    Technique/Positioning: AP portable. Patient is rotated to the left.    Findings:    Support devices: None. Telemetry leads    Cardiac/mediastinum/hilum: Obscured.    Lung parenchyma/Pleura: Left basal linear subsegmental atelectasis. No   rosie consolidation, effusion or pneumothorax.    Skeleton/soft tissues: Unremarkable.    Impression:    Left basal linear subsegmental atelectasis. No rosie consolidation,   effusion or pneumothorax      --- End of Report ---            DANIELLE PALOMINO MD; Attending Radiologist  This document has been electronically signed. May 17 2022  1:49AM (22 @ 18:00)      CT      CARDIOLOGY TESTING  12 Lead ECG:   Ventricular Rate 120 BPM    Atrial Rate 120 BPM    P-R Interval 100 ms    QRS Duration 66 ms    Q-T Interval 308 ms    QTC Calculation(Bazett) 435 ms    P Axis 74 degrees    R Axis 58 degrees    T Axis -6 degrees    Diagnosis Line Sinus tachycardiawith short ME  Nonspecific ST and T wave abnormality  Abnormal ECG    Confirmed by GALILEO MATUTE MD ([id number]) on 2022 7:05:27 AM (22 @ 04:35)  12 Lead ECG:   Ventricular Rate 120 BPM    Atrial Rate 120 BPM    P-R Interval 124 ms    QRS Duration 70 ms    Q-T Interval 324 ms    QTC Calculation(Bazett) 457 ms    P Axis 61 degrees    R Axis 45 degrees    T Axis 73 degrees    Diagnosis Line Sinus tachycardia  Otherwise normal ECG    Confirmed by Gail Schroeder MD ([id number]) on 2022 4:03:54 PM (22 @ 13:19)      MEDICATIONS  ALBUTerol    90 MICROgram(s) HFA Inhaler 2 Inhalation every 6 hours  albuterol/ipratropium for Nebulization 3 Nebulizer every 6 hours  cefepime   IVPB 2000 IV Intermittent every 8 hours  chlorhexidine 4% Liquid 1 Topical <User Schedule>  cyanocobalamin 500 Oral daily  cyclobenzaprine Oral Tab/Cap - Peds 5 Oral three times a day  dexAMETHasone  Injectable 6 IV Push daily  enoxaparin Injectable 60 SubCutaneous every 12 hours  ferrous    sulfate Liquid 300 Enteral Tube daily  folic acid 1 Oral daily  glycopyrrolate 2 Oral three times a day  pantoprazole   Suspension 40 Oral daily  scopolamine 1 mG/72 Hr(s) Patch 1 Transdermal every 72 hours  thiamine  Oral Tab/Cap - Peds 100 Oral two times a day  vancomycin  IVPB 750 IV Intermittent every 12 hours      WEIGHT  Weight (kg): 56.7 (22 @ 10:33)  Creatinine, Serum: 0.5 mg/dL (22 @ 07:09)      ANTIBIOTICS:  cefepime   IVPB 2000 milliGRAM(s) IV Intermittent every 8 hours  vancomycin  IVPB 750 milliGRAM(s) IV Intermittent every 12 hours      All available historical records have been reviewed

## 2022-05-18 NOTE — DIETITIAN INITIAL EVALUATION ADULT - ORAL INTAKE PTA/DIET HISTORY
Pt unable to participate in nutrition assessment d/t mental status; pt also has a trach. Spoke to RN at [facility] at [phone] today. Per RN, pt was receiving Jevity 1.2 Gilberto, 1000 mL/24hrs with a goal rate of 65 mL/hr - continuous feed, 800 mL free water per 24 hrs. Pt took MVI, B12, and VIT D daily. UBW is 61.27 KG (134.8#);height is 5'5". Pt has NKFA. No weight loss reported. Pt unable to participate in nutrition assessment d/t mental status; pt also has a trach. Spoke to RN at [facility] [phone] today. Per RN, pt was receiving Jevity 1.2 Gilberto, 1000 mL/24hrs with a goal rate of 65 mL/hr - continuous feed, 800 mL free water per 24 hrs. Pt took MVI, B12, and VIT D daily. UBW is 61.27 KG (134.8#); height is 5'5". Pt has NKFA. No weight loss reported.

## 2022-05-18 NOTE — DIETITIAN INITIAL EVALUATION ADULT - NAME AND PHONE
Sheba [phone]    Nutrition Intervention: TF regimen; Nutrition Monitoring: Diet order, weights, labs, NFPF, body composition, BM and tolerance to TF regimen.

## 2022-05-18 NOTE — PROGRESS NOTE ADULT - SUBJECTIVE AND OBJECTIVE BOX
CONSUELO CARO  22y Female    INTERVAL HPI/OVERNIGHT EVENTS:    pt responds to her name  on trach collar  tachy at times  no fever  unable to obtain ROS due to mental status    T(F): 99.5 (05-18-22 @ 13:00), Max: 99.5 (05-18-22 @ 13:00)  HR: 121 (05-18-22 @ 13:00) (96 - 121)  BP: 126/55 (05-18-22 @ 13:00) (106/54 - 126/55)  RR: 18 (05-18-22 @ 13:00) (16 - 18)  SpO2: 99% (05-18-22 @ 13:15) (98% - 100%) on trach collar  I&O's Summary    17 May 2022 07:01  -  18 May 2022 07:00  --------------------------------------------------------  IN: 0 mL / OUT: 500 mL / NET: -500 mL      PHYSICAL EXAM:  GENERAL: NAD  HEAD:  Normocephalic  EYES:  conjunctiva and sclera clear  ENMT: Moist mucous membranes  NERVOUS SYSTEM: opens eyes to her name  CHEST/LUNG: decreased BS b/l  HEART: tachy  ABDOMEN: Soft, Nontender, Nondistended; Bowel sounds present, PEG  EXTREMITIES:  mild edema, contracted  SKIN: warm, dry    Consultant(s) Notes Reviewed:  [x ] YES  [ ] NO  Care Discussed with Consultants/Other Providers [ x] YES  [ ] NO    MEDICATIONS  (STANDING):  ALBUTerol    90 MICROgram(s) HFA Inhaler 2 Puff(s) Inhalation every 6 hours  albuterol/ipratropium for Nebulization 3 milliLiter(s) Nebulizer every 6 hours  chlorhexidine 4% Liquid 1 Application(s) Topical <User Schedule>  cyanocobalamin 500 MICROGram(s) Oral daily  cyclobenzaprine Oral Tab/Cap - Peds 5 milliGRAM(s) Oral three times a day  dexAMETHasone  Injectable 6 milliGRAM(s) IV Push daily  enoxaparin Injectable 60 milliGRAM(s) SubCutaneous every 12 hours  ferrous    sulfate Liquid 300 milliGRAM(s) Enteral Tube daily  folic acid 1 milliGRAM(s) Oral daily  glycopyrrolate 2 milliGRAM(s) Oral three times a day  pantoprazole   Suspension 40 milliGRAM(s) Oral daily  remdesivir  IVPB   IV Intermittent   scopolamine 1 mG/72 Hr(s) Patch 1 Patch Transdermal every 72 hours  thiamine  Oral Tab/Cap - Peds 100 milliGRAM(s) Oral two times a day    MEDICATIONS  (PRN):  acetaminophen     Tablet .. 650 milliGRAM(s) Oral every 6 hours PRN Temp greater or equal to 38C (100.4F), Mild Pain (1 - 3)  metoclopramide   Syrup 10 milliGRAM(s) Oral every 6 hours PRN as needed      LABS:                        9.2    15.05 )-----------( 412      ( 18 May 2022 07:09 )             30.5     05-18    140  |  100  |  11  ----------------------------<  181<H>  3.7   |  26  |  0.5<L>    Ca    8.9      18 May 2022 07:09  Phos  5.4     05-17  Mg     1.8     05-18    TPro  6.3  /  Alb  3.2<L>  /  TBili  <0.2  /  DBili  x   /  AST  21  /  ALT  28  /  AlkPhos  99  05-18        Culture - Urine (collected 16 May 2022 17:15)  Source: Clean Catch Clean Catch (Midstream)  Final Report (17 May 2022 21:05):    <10,000 CFU/mL Normal Urogenital Trinidad    Culture - Blood (collected 16 May 2022 12:28)  Source: .Blood Blood-Peripheral  Preliminary Report (17 May 2022 23:02):    No growth to date.    Culture - Blood (collected 16 May 2022 12:09)  Source: .Blood Blood-Peripheral  Preliminary Report (17 May 2022 23:02):    No growth to date.        SARS-CoV-2: Detected (16 May 2022 12:09)    Ferritin, Serum: 99 ng/mL (05-17-22 @ 07:37)    C-Reactive Protein, Serum: 61 mg/L (05-17-22 @ 07:37)    D-Dimer Assay, Quantitative: 267 ng/mL DDU (05-17-22 @ 07:37)    Procalcitonin, Serum: 0.03 ng/mL (05-17-22 @ 07:37)      RADIOLOGY & ADDITIONAL TESTS:    Imaging or report Personally Reviewed:  [x ] YES  [ ] NO    < from: VA Duplex Lower Ext Vein Scan, Bilat (05.17.22 @ 15:03) >  Impression:    No evidence of deep venous thrombosis or superficial thrombophlebitis in   the bilateral lower extremities.  Technically difficult exam due to patient's positioning. Soft tissue calf   veins were not visualized.    < end of copied text >      < from: Xray Chest 1 View- PORTABLE-Routine (Xray Chest 1 View- PORTABLE-Routine in AM.) (05.17.22 @ 06:32) >  IMPRESSION:    Stable left basilar linear atelectasis. Stable elevated left hemidiaphragm    < end of copied text >                Case discussed with resident and RN today

## 2022-05-18 NOTE — DIETITIAN INITIAL EVALUATION ADULT - OTHER INFO
Per RN, pt is tolerating current TF regimen; no holds. No diarrhea documented per flowsheet.     Weight hx: Per RN, UBW is 61.27 KG - last checked on 4/7 per RN at NH. Current dosing weight is 56.7 KG; 7.45% unintentional wt loss in over 1 month is significant. Pt meets 1 criteria for malnutrition.  Per RN, pt is tolerating current TF regimen; no holds. No diarrhea documented per flowsheet.     Weight hx: Per RN, UBW is 61.27 KG - last checked on 4/7 per RN at NH. Current dosing weight is 56.7 KG; 7.45% unintentional wt loss in over 1 month is significant. Pt meets 1 criteria for malnutrition.     Using height of 167.64 cm received by RN at NH to calculate TF. No height documented in flowsheet.

## 2022-05-18 NOTE — DIETITIAN INITIAL EVALUATION ADULT - OTHER CALCULATIONS
Using ABW 56.7 KG: ENERGY: 0216-3201 kcal/day (MSJ 1.1-1.3 AF); PROTEIN: 62-74 g/day (1.1-1.3 g/kg); FLUID: 1385-9890 mL/day (30-35 mL/kg) -- with consideration for weight loss PTA, age

## 2022-05-18 NOTE — PROGRESS NOTE ADULT - SUBJECTIVE AND OBJECTIVE BOX
SUBJECTIVE  Patient is a 22y old Female who presents with a chief complaint of Broken Tracheostomy Flange (17 May 2022 09:31)  Currently admitted to medicine with the primary diagnosis of Acute sepsis  Today is hospital day 2d,    No acute overnight events.       OBJECTIVE  PAST MEDICAL & SURGICAL HISTORY    ALLERGIES:  Allergy Status Unknown    MEDICATIONS:    MEDICATIONS  (STANDING):  ALBUTerol    90 MICROgram(s) HFA Inhaler 2 Puff(s) Inhalation every 6 hours  albuterol/ipratropium for Nebulization 3 milliLiter(s) Nebulizer every 6 hours  chlorhexidine 4% Liquid 1 Application(s) Topical <User Schedule>  cyanocobalamin 500 MICROGram(s) Oral daily  cyclobenzaprine Oral Tab/Cap - Peds 5 milliGRAM(s) Oral three times a day  dexAMETHasone  Injectable 6 milliGRAM(s) IV Push daily  enoxaparin Injectable 60 milliGRAM(s) SubCutaneous every 12 hours  ferrous    sulfate Liquid 300 milliGRAM(s) Enteral Tube daily  folic acid 1 milliGRAM(s) Oral daily  glycopyrrolate 2 milliGRAM(s) Oral three times a day  pantoprazole   Suspension 40 milliGRAM(s) Oral daily  remdesivir  IVPB   IV Intermittent   remdesivir  IVPB 200 milliGRAM(s) IV Intermittent every 24 hours  scopolamine 1 mG/72 Hr(s) Patch 1 Patch Transdermal every 72 hours  thiamine  Oral Tab/Cap - Peds 100 milliGRAM(s) Oral two times a day    MEDICATIONS  (PRN):  acetaminophen     Tablet .. 650 milliGRAM(s) Oral every 6 hours PRN Temp greater or equal to 38C (100.4F), Mild Pain (1 - 3)  metoclopramide   Syrup 10 milliGRAM(s) Oral every 6 hours PRN as needed        VITAL SIGNS: Last 24 Hours    ICU Vital Signs Last 24 Hrs  T(C): 36.4 (18 May 2022 06:12), Max: 37 (17 May 2022 22:00)  T(F): 97.6 (18 May 2022 06:12), Max: 98.6 (17 May 2022 22:00)  HR: 114 (18 May 2022 06:12) (96 - 114)  BP: 117/59 (18 May 2022 06:12) (106/54 - 118/61)  RR: 18 (18 May 2022 06:12) (16 - 18)  SpO2: 99% (18 May 2022 13:15) (98% - 100%)      LABS:               LABS:  cret                        9.2    15.05 )-----------( 412      ( 18 May 2022 07:09 )             30.5     05-18    140  |  100  |  11  ----------------------------<  181<H>  3.7   |  26  |  0.5<L>    Ca    8.9      18 May 2022 07:09  Phos  5.4     05-17  Mg     1.8     05-18    TPro  6.3  /  Alb  3.2<L>  /  TBili  <0.2  /  DBili  x   /  AST  21  /  ALT  28  /  AlkPhos  99  0518                   10.4   17.04 )-----------( 473      ( 17 May 2022 07:37 )             35.8     05-17    141  |  102  |  10  ----------------------------<  134<H>  4.4   |  26  |  <0.5<L>    Ca    9.4      17 May 2022 07:37  Phos  5.4     05-17  Mg     1.9     -17    TPro  7.1  /  Alb  3.7  /  TBili  <0.2  /  DBili  x   /  AST  17  /  ALT  26  /  AlkPhos  113  05-17      Urinalysis Basic - ( 16 May 2022 17:15 )    Color: Yellow / Appearance: Turbid / S.023 / pH: x  Gluc: x / Ketone: Negative  / Bili: Negative / Urobili: <2 mg/dL   Blood: x / Protein: 30 mg/dL / Nitrite: Negative   Leuk Esterase: Large / RBC: 6 /HPF / WBC 65 /HPF   Sq Epi: x / Non Sq Epi: 12 /HPF / Bacteria: Negative        Lactate, Blood: 0.9 mmol/L (22 @ 07:37)          RADIOLOGY:    < from: [facility] Duplex Lower Ext Vein Scan, Bilat (22 @ 15:03) >  Impression:    No evidence of deep venous thrombosis or superficial thrombophlebitis in   the bilateral lower extremities.  Technically difficult exam due to patient's positioning. Soft tissue calf   veins were not visualized.    ICD-10:M79.89    --- End of Report ---    < end of copied text >  < from: Xray Chest 1 View- PORTABLE-Routine (Xray Chest 1 View- PORTABLE-Routine in AM.) (22 @ 06:32) >  IMPRESSION:    Stable left basilar linear atelectasis. Stable elevated left hemidiaphragm    --- End of Report ---    < end of copied text >      PHYSICAL EXAM:    GENERAL: NAD,  trach and PEG  HEENT:  Atraumatic, Normocephalic. EOMI, PERRLA, conjunctiva and sclera clear, No JVD  PULMONARY: Clear to auscultation bilaterally; No wheeze  CARDIOVASCULAR: Regular rate and rhythm; No murmurs, rubs, or gallops  GASTROINTESTINAL: Soft, Nontender, Nondistended; Bowel sounds present  MUSCULOSKELETAL:  2+ Peripheral Pulses, contracted extremities   NEUROLOGY: non-focal  SKIN: No rashes or lesions

## 2022-05-19 PROCEDURE — 99233 SBSQ HOSP IP/OBS HIGH 50: CPT

## 2022-05-19 RX ADMIN — REMDESIVIR 500 MILLIGRAM(S): 5 INJECTION INTRAVENOUS at 14:56

## 2022-05-19 RX ADMIN — CYCLOBENZAPRINE HYDROCHLORIDE 5 MILLIGRAM(S): 10 TABLET, FILM COATED ORAL at 06:01

## 2022-05-19 RX ADMIN — ROBINUL 2 MILLIGRAM(S): 0.2 INJECTION INTRAMUSCULAR; INTRAVENOUS at 06:01

## 2022-05-19 RX ADMIN — Medication 6 MILLIGRAM(S): at 06:00

## 2022-05-19 RX ADMIN — ENOXAPARIN SODIUM 60 MILLIGRAM(S): 100 INJECTION SUBCUTANEOUS at 17:17

## 2022-05-19 RX ADMIN — ENOXAPARIN SODIUM 60 MILLIGRAM(S): 100 INJECTION SUBCUTANEOUS at 06:01

## 2022-05-19 RX ADMIN — Medication 100 MILLIGRAM(S): at 06:01

## 2022-05-19 RX ADMIN — Medication 300 MILLIGRAM(S): at 12:34

## 2022-05-19 RX ADMIN — Medication 1 MILLIGRAM(S): at 12:34

## 2022-05-19 RX ADMIN — CHLORHEXIDINE GLUCONATE 1 APPLICATION(S): 213 SOLUTION TOPICAL at 06:02

## 2022-05-19 RX ADMIN — CYCLOBENZAPRINE HYDROCHLORIDE 5 MILLIGRAM(S): 10 TABLET, FILM COATED ORAL at 14:19

## 2022-05-19 RX ADMIN — Medication 100 MILLIGRAM(S): at 17:16

## 2022-05-19 RX ADMIN — ROBINUL 2 MILLIGRAM(S): 0.2 INJECTION INTRAMUSCULAR; INTRAVENOUS at 23:06

## 2022-05-19 RX ADMIN — CYCLOBENZAPRINE HYDROCHLORIDE 5 MILLIGRAM(S): 10 TABLET, FILM COATED ORAL at 23:06

## 2022-05-19 RX ADMIN — SCOPALAMINE 1 PATCH: 1 PATCH, EXTENDED RELEASE TRANSDERMAL at 23:16

## 2022-05-19 RX ADMIN — PANTOPRAZOLE SODIUM 40 MILLIGRAM(S): 20 TABLET, DELAYED RELEASE ORAL at 12:34

## 2022-05-19 RX ADMIN — PREGABALIN 500 MICROGRAM(S): 225 CAPSULE ORAL at 12:34

## 2022-05-19 RX ADMIN — ROBINUL 2 MILLIGRAM(S): 0.2 INJECTION INTRAMUSCULAR; INTRAVENOUS at 14:18

## 2022-05-19 NOTE — PROGRESS NOTE ADULT - ASSESSMENT
21 y/o woman with PMH of history of encephalitis s/p tooth abscess, chronic respiratory failure s/p tracheostomy and PEG tube placement, HTN, GERD, and SVC thrombosis on apixaban was brought to the ED from [facility] on 05/16 for evaluation of a broken tracheostomy flange. Tracheostomy was replaced in the ED. She was found to be septic on presentation with leukocytosis and COVID positive.     1. Sepsis present on admission due to COVID 19  unvaccinated pt  ID consult and f/u appreciated  remdesivir x 5 days   continue decadron 6mg IV daily  isolation per protocol  Ferritin, Serum: 99 ng/mL (05-17-22 @ 07:37)  C-Reactive Protein, Serum: 61 mg/L (05-17-22 @ 07:37)  D-Dimer Assay, Quantitative: 267 ng/mL DDU (05-17-22 @ 07:37)  Procalcitonin, Serum: 0.03 ng/mL (05-17-22 @ 07:37)  venous duplex negative for DVT  blood and urine cultures negative and MRSA negative - other abx discontinued  leukocytosis improving  guarded prognosis    2. Chronic respiratory failure  s/p tracheostomy replacement in ED and tolerating trach collar - 35%  suction prn  on glycopyrrolate    3. SVC Thrombosis and Embolism - on Eliquis 5mg bid as outpt  on therapeutic lovenox as inpt    4. Microcytic anemia on iron, vitamin B12 and folate for suspected deficiencies    5. H/O Wernicke's encephalopathy on thiamine    6. HTN - on metoprolol 50mg bid as outpt - held due to sepsis  if SBP remains >100, restart at 25mg bid and titrate as tolerated    7. Functional quadriplegia    8. DVT prophylaxis - on therapeutic dose lovenox      PROGRESS NOTE HANDOFF    Pending: restart metoprolol if BP remains stable, labs in AM, pulse ox monitoring    Family discussion: medical staff updating sister    Disposition: [facility]

## 2022-05-19 NOTE — PROGRESS NOTE ADULT - SUBJECTIVE AND OBJECTIVE BOX
CONSUELO CARO  22y Female    INTERVAL HPI/OVERNIGHT EVENTS:    no fever  comfortable on trach collar  unable to obtain ROS due to mental status    T(F): 97.9 (05-19-22 @ 06:30), Max: 99.4 (05-18-22 @ 20:45)  HR: 111 (05-19-22 @ 06:30) (104 - 111)  BP: 111/58 (05-19-22 @ 06:30) (108/59 - 111/59)  RR: 19 (05-19-22 @ 06:30) (18 - 19)  SpO2: 99% (05-19-22 @ 09:14) (97% - 100%) on trach collar    I&O's Summary    18 May 2022 07:01  -  19 May 2022 07:00  --------------------------------------------------------  IN: 650 mL / OUT: 350 mL / NET: 300 mL      PHYSICAL EXAM:  GENERAL: NAD  HEAD:  Normocephalic  EYES:  conjunctiva and sclera clear  ENMT: Moist mucous membranes  trach  NERVOUS SYSTEM:  opens eyes  CHEST/LUNG: decreased BS on right side likely due to positioning  left - CTA  HEART: tachy  ABDOMEN: Soft, Nontender, Nondistended; Bowel sounds present, PEG  EXTREMITIES:  contracted  SKIN: warm, dry    Consultant(s) Notes Reviewed:  [x ] YES  [ ] NO  Care Discussed with Consultants/Other Providers [ x] YES  [ ] NO    MEDICATIONS  (STANDING):  ALBUTerol    90 MICROgram(s) HFA Inhaler 2 Puff(s) Inhalation every 6 hours  albuterol/ipratropium for Nebulization 3 milliLiter(s) Nebulizer every 6 hours  chlorhexidine 4% Liquid 1 Application(s) Topical <User Schedule>  cyanocobalamin 500 MICROGram(s) Oral daily  cyclobenzaprine Oral Tab/Cap - Peds 5 milliGRAM(s) Oral three times a day  dexAMETHasone  Injectable 6 milliGRAM(s) IV Push daily  enoxaparin Injectable 60 milliGRAM(s) SubCutaneous every 12 hours  ferrous    sulfate Liquid 300 milliGRAM(s) Enteral Tube daily  folic acid 1 milliGRAM(s) Oral daily  glycopyrrolate 2 milliGRAM(s) Oral three times a day  pantoprazole   Suspension 40 milliGRAM(s) Oral daily  remdesivir  IVPB   IV Intermittent   remdesivir  IVPB 100 milliGRAM(s) IV Intermittent every 24 hours  scopolamine 1 mG/72 Hr(s) Patch 1 Patch Transdermal every 72 hours  thiamine  Oral Tab/Cap - Peds 100 milliGRAM(s) Oral two times a day    MEDICATIONS  (PRN):  acetaminophen     Tablet .. 650 milliGRAM(s) Oral every 6 hours PRN Temp greater or equal to 38C (100.4F), Mild Pain (1 - 3)  metoclopramide   Syrup 10 milliGRAM(s) Oral every 6 hours PRN as needed      LABS:                        9.2    15.05 )-----------( 412      ( 18 May 2022 07:09 )             30.5     05-18    140  |  100  |  11  ----------------------------<  181<H>  3.7   |  26  |  0.5<L>    Ca    8.9      18 May 2022 07:09  Mg     1.8     05-18    TPro  6.3  /  Alb  3.2<L>  /  TBili  <0.2  /  DBili  x   /  AST  21  /  ALT  28  /  AlkPhos  99  05-18        Culture - Urine (collected 16 May 2022 17:15)  Source: Clean Catch Clean Catch (Midstream)  Final Report (17 May 2022 21:05):    <10,000 CFU/mL Normal Urogenital Trinidad          Case discussed with residents and RN today

## 2022-05-19 NOTE — PROGRESS NOTE ADULT - SUBJECTIVE AND OBJECTIVE BOX
SUBJECTIVE  Patient is a 22y old Female who presents with a chief complaint of Broken Tracheostomy Flange (17 May 2022 09:31)  Currently admitted to medicine with the primary diagnosis of Acute sepsis  Today is hospital day 3d,    No acute overnight events.       OBJECTIVE  PAST MEDICAL & SURGICAL HISTORY    ALLERGIES:  Allergy Status Unknown    MEDICATIONS:    MEDICATIONS  (STANDING):  ALBUTerol    90 MICROgram(s) HFA Inhaler 2 Puff(s) Inhalation every 6 hours  albuterol/ipratropium for Nebulization 3 milliLiter(s) Nebulizer every 6 hours  chlorhexidine 4% Liquid 1 Application(s) Topical <User Schedule>  cyanocobalamin 500 MICROGram(s) Oral daily  cyclobenzaprine Oral Tab/Cap - Peds 5 milliGRAM(s) Oral three times a day  dexAMETHasone  Injectable 6 milliGRAM(s) IV Push daily  enoxaparin Injectable 60 milliGRAM(s) SubCutaneous every 12 hours  ferrous    sulfate Liquid 300 milliGRAM(s) Enteral Tube daily  folic acid 1 milliGRAM(s) Oral daily  glycopyrrolate 2 milliGRAM(s) Oral three times a day  pantoprazole   Suspension 40 milliGRAM(s) Oral daily  remdesivir  IVPB   IV Intermittent   remdesivir  IVPB 100 milliGRAM(s) IV Intermittent every 24 hours  scopolamine 1 mG/72 Hr(s) Patch 1 Patch Transdermal every 72 hours  thiamine  Oral Tab/Cap - Peds 100 milliGRAM(s) Oral two times a day    MEDICATIONS  (PRN):  acetaminophen     Tablet .. 650 milliGRAM(s) Oral every 6 hours PRN Temp greater or equal to 38C (100.4F), Mild Pain (1 - 3)  metoclopramide   Syrup 10 milliGRAM(s) Oral every 6 hours PRN as needed    VITAL SIGNS: Last 24 Hours    ICU Vital Signs Last 24 Hrs  T(C): 36.6 (19 May 2022 06:30), Max: 37.4 (18 May 2022 20:45)  T(F): 97.9 (19 May 2022 06:30), Max: 99.4 (18 May 2022 20:45)  HR: 111 (19 May 2022 06:30) (104 - 111)  BP: 111/58 (19 May 2022 06:30) (108/59 - 111/59)  BP(mean): 81 (19 May 2022 06:30) (78 - 81)  RR: 19 (19 May 2022 06:30) (18 - 19)  SpO2: 99% (19 May 2022 09:14) (97% - 100%)      LABS:      LABS:  cret                        9.2    15.05 )-----------( 412      ( 18 May 2022 07:09 )             30.5     05-18    140  |  100  |  11  ----------------------------<  181<H>  3.7   |  26  |  0.5<L>    Ca    8.9      18 May 2022 07:09  Mg     1.8     05-18    TPro  6.3  /  Alb  3.2<L>  /  TBili  <0.2  /  DBili  x   /  AST  21  /  ALT  28  /  AlkPhos  99  05-18                   LABS:  cret                        9.2    15.05 )-----------( 412      ( 18 May 2022 07:09 )             30.5     05-18    140  |  100  |  11  ----------------------------<  181<H>  3.7   |  26  |  0.5<L>    Ca    8.9      18 May 2022 07:09  Phos  5.4     05-17  Mg     1.8     05-18    TPro  6.3  /  Alb  3.2<L>  /  TBili  <0.2  /  DBili  x   /  AST  21  /  ALT  28  /  AlkPhos  99  05-18                   10.4   17.04 )-----------( 473      ( 17 May 2022 07:37 )             35.8     05-17    141  |  102  |  10  ----------------------------<  134<H>  4.4   |  26  |  <0.5<L>    Ca    9.4      17 May 2022 07:37  Phos  5.4     05-17  Mg     1.9     05-17    TPro  7.1  /  Alb  3.7  /  TBili  <0.2  /  DBili  x   /  AST  17  /  ALT  26  /  AlkPhos  113  05-17      Urinalysis Basic - ( 16 May 2022 17:15 )    Color: Yellow / Appearance: Turbid / S.023 / pH: x  Gluc: x / Ketone: Negative  / Bili: Negative / Urobili: <2 mg/dL   Blood: x / Protein: 30 mg/dL / Nitrite: Negative   Leuk Esterase: Large / RBC: 6 /HPF / WBC 65 /HPF   Sq Epi: x / Non Sq Epi: 12 /HPF / Bacteria: Negative        Lactate, Blood: 0.9 mmol/L (22 @ 07:37)          RADIOLOGY:    < from: [facility] Duplex Lower Ext Vein Scan, Bilat (22 @ 15:03) >  Impression:    No evidence of deep venous thrombosis or superficial thrombophlebitis in   the bilateral lower extremities.  Technically difficult exam due to patient's positioning. Soft tissue calf   veins were not visualized.    ICD-10:M79.89    --- End of Report ---    < end of copied text >  < from: Xray Chest 1 View- PORTABLE-Routine (Xray Chest 1 View- PORTABLE-Routine in AM.) (22 @ 06:32) >  IMPRESSION:    Stable left basilar linear atelectasis. Stable elevated left hemidiaphragm    --- End of Report ---    < end of copied text >      PHYSICAL EXAM:    GENERAL: NAD,  trach and PEG  HEENT:  Atraumatic, Normocephalic. EOMI, PERRLA, conjunctiva and sclera clear, No JVD  PULMONARY: Clear to auscultation bilaterally; No wheeze  CARDIOVASCULAR: Regular rate and rhythm; No murmurs, rubs, or gallops  GASTROINTESTINAL: Soft, Nontender, Nondistended; Bowel sounds present  MUSCULOSKELETAL:  2+ Peripheral Pulses, contracted extremities   NEUROLOGY: non-focal  SKIN: No rashes or lesions

## 2022-05-19 NOTE — PROGRESS NOTE ADULT - ASSESSMENT
Case of a 21 yo female patient with a history of encephalitis s/p tooth abscess s/p tracheostomy and PEG tube placement, HTN, GERD, and SVC thrombosis who was brought to ED from [place] on 05/16 for evaluation of a broken tracheostomy flange, found to be septic on presentation with leukocytosis on labs, a positive RVP and COVID PCR on microbiologic testing, and evidence of left pleural effusion on imaging, to be admitted for further investigations, management and monitoring. Currently still tachycardic saturating well on 3LPM NC.      Sepsis Secondary to Likely Community Acquired Pneumonia and COVID Pneumonia   Broken Tracheostomy Flange - Resolved  Tracheostomy on 2L for Chronic Respiratory Failure  - Not Vaccinated against COVID per sister  - sepsis on presenatation   - COVID positive  - CXR with left pleural effusion  - Trach replaced in th ED,  - Requirements: on 3LPM NC right now with SaO2 100% (sister unsure about baseline O2 requirement and unable to reach NH staff)  - D-dimer; 267  - Procalcitonin .03  - C-reactive protein 61  - LDH - 225  - Ferritin 9  - Blood & Urine cxs NGTD   - MRSA negative   - dc Cefepime and Vancomycin  - continue IV Dexamethasone 6mg QD  - Anticoagulation (takes Eliquis 5mg BID for SVC thrombosis)  - fu Infectious Disease - stop abx. continue rdv    SVC Thrombosis and Embolism   * Home med Eliquis 5mg BID  - switch eliquis 5mg BID to lovenox for now     History of Encephalitis post Tooth Abscess in 10/2021   s/p Tracheostomy (has an O2 tank per sister but unsure about flow) and G-tube placement  - Monitor SaO2 and O2 requirements: as above    GERD  * Home med Omeprazole 40mg QD  - continue Protonix 40mg QD    Iron Deficiency Anemia   History of Wernicke Encephalopathy  * Home med iron replacement, thiamine, B12, and folic acid  * ED Hb 11.3, MCV 79.4  - Trend CBC  - Check iron studies, B12 and folate levels  - Resume iron replacement, thiamine, B12, and folic acid    History of Hypertension  * Home med Lopressor 50mg BID  - Monitor BP  - Hold anti HTN  - sp IVF hydration    #Functional quadreplegia    Others  - DVT Prophylaxis: as above   Case of a 21 yo female patient with a history of encephalitis s/p tooth abscess s/p tracheostomy and PEG tube placement, HTN, GERD, and SVC thrombosis who was brought to ED from [place] on 05/16 for evaluation of a broken tracheostomy flange, found to be septic on presentation with leukocytosis on labs, a positive RVP and COVID PCR on microbiologic testing, and evidence of left pleural effusion on imaging, to be admitted for further investigations, management and monitoring. Currently still tachycardic saturating well on 3LPM NC.      Sepsis Secondary to Likely Community Acquired Pneumonia and COVID Pneumonia   Broken Tracheostomy Flange - Resolved  Tracheostomy on 2L for Chronic Respiratory Failure  - Not Vaccinated against COVID per sister  - sepsis on presenatation   - COVID positive  - CXR with left pleural effusion  - Trach replaced in th ED,  - Requirements: on 3LPM NC right now with SaO2 100% (sister unsure about baseline O2 requirement and unable to reach NH staff)  - D-dimer; 267  - Procalcitonin .03  - C-reactive protein 61  - LDH - 225  - Ferritin 9  - Blood & Urine cxs NGTD   - MRSA negative   - continue IV Dexamethasone 6mg QD  - Anticoagulation (takes Eliquis 5mg BID for SVC thrombosis)  - fu Infectious Disease - stop abx. continue rdv    SVC Thrombosis and Embolism   * Home med Eliquis 5mg BID  - switch eliquis 5mg BID to lovenox for now     History of Encephalitis post Tooth Abscess in 10/2021   s/p Tracheostomy (has an O2 tank per sister but unsure about flow) and G-tube placement  - Monitor SaO2 and O2 requirements: as above    GERD  * Home med Omeprazole 40mg QD  - continue Protonix 40mg QD    Iron Deficiency Anemia   History of Wernicke Encephalopathy  * Home med iron replacement, thiamine, B12, and folic acid  * ED Hb 11.3, MCV 79.4  - Trend CBC  - Check iron studies, B12 and folate levels  - Resume iron replacement, thiamine, B12, and folic acid    History of Hypertension  * Home med Lopressor 50mg BID  - Monitor BP  - Hold anti HTN  - sp IVF hydration    #Functional quadreplegia    Others  - DVT Prophylaxis: as above

## 2022-05-20 LAB
ALBUMIN SERPL ELPH-MCNC: 3.6 G/DL — SIGNIFICANT CHANGE UP (ref 3.5–5.2)
ALP SERPL-CCNC: 91 U/L — SIGNIFICANT CHANGE UP (ref 30–115)
ALT FLD-CCNC: 43 U/L — HIGH (ref 0–41)
ANION GAP SERPL CALC-SCNC: 7 MMOL/L — SIGNIFICANT CHANGE UP (ref 7–14)
AST SERPL-CCNC: 28 U/L — SIGNIFICANT CHANGE UP (ref 0–41)
BASOPHILS # BLD AUTO: 0.05 K/UL — SIGNIFICANT CHANGE UP (ref 0–0.2)
BASOPHILS NFR BLD AUTO: 0.4 % — SIGNIFICANT CHANGE UP (ref 0–1)
BILIRUB SERPL-MCNC: <0.2 MG/DL — SIGNIFICANT CHANGE UP (ref 0.2–1.2)
BUN SERPL-MCNC: 10 MG/DL — SIGNIFICANT CHANGE UP (ref 10–20)
CALCIUM SERPL-MCNC: 9.1 MG/DL — SIGNIFICANT CHANGE UP (ref 8.5–10.1)
CHLORIDE SERPL-SCNC: 100 MMOL/L — SIGNIFICANT CHANGE UP (ref 98–110)
CO2 SERPL-SCNC: 32 MMOL/L — SIGNIFICANT CHANGE UP (ref 17–32)
CREAT SERPL-MCNC: <0.5 MG/DL — LOW (ref 0.7–1.5)
EGFR: 143 ML/MIN/1.73M2 — SIGNIFICANT CHANGE UP
EOSINOPHIL # BLD AUTO: 0.05 K/UL — SIGNIFICANT CHANGE UP (ref 0–0.7)
EOSINOPHIL NFR BLD AUTO: 0.4 % — SIGNIFICANT CHANGE UP (ref 0–8)
FIBRINOGEN AG PPP IA-MCNC: 634 MG/DL — HIGH (ref 180–350)
GLUCOSE BLDC GLUCOMTR-MCNC: 123 MG/DL — HIGH (ref 70–99)
GLUCOSE BLDC GLUCOMTR-MCNC: 159 MG/DL — HIGH (ref 70–99)
GLUCOSE SERPL-MCNC: 152 MG/DL — HIGH (ref 70–99)
HCT VFR BLD CALC: 35.8 % — LOW (ref 37–47)
HGB BLD-MCNC: 10.7 G/DL — LOW (ref 12–16)
IMM GRANULOCYTES NFR BLD AUTO: 0.4 % — HIGH (ref 0.1–0.3)
LYMPHOCYTES # BLD AUTO: 1.5 K/UL — SIGNIFICANT CHANGE UP (ref 1.2–3.4)
LYMPHOCYTES # BLD AUTO: 10.9 % — LOW (ref 20.5–51.1)
MAGNESIUM SERPL-MCNC: 2.1 MG/DL — SIGNIFICANT CHANGE UP (ref 1.8–2.4)
MCHC RBC-ENTMCNC: 23.6 PG — LOW (ref 27–31)
MCHC RBC-ENTMCNC: 29.9 G/DL — LOW (ref 32–37)
MCV RBC AUTO: 78.9 FL — LOW (ref 81–99)
MONOCYTES # BLD AUTO: 0.56 K/UL — SIGNIFICANT CHANGE UP (ref 0.1–0.6)
MONOCYTES NFR BLD AUTO: 4.1 % — SIGNIFICANT CHANGE UP (ref 1.7–9.3)
NEUTROPHILS # BLD AUTO: 11.54 K/UL — HIGH (ref 1.4–6.5)
NEUTROPHILS NFR BLD AUTO: 83.8 % — HIGH (ref 42.2–75.2)
NRBC # BLD: 0 /100 WBCS — SIGNIFICANT CHANGE UP (ref 0–0)
PLATELET # BLD AUTO: 416 K/UL — HIGH (ref 130–400)
POTASSIUM SERPL-MCNC: 4.5 MMOL/L — SIGNIFICANT CHANGE UP (ref 3.5–5)
POTASSIUM SERPL-SCNC: 4.5 MMOL/L — SIGNIFICANT CHANGE UP (ref 3.5–5)
PROT SERPL-MCNC: 6.7 G/DL — SIGNIFICANT CHANGE UP (ref 6–8)
RBC # BLD: 4.54 M/UL — SIGNIFICANT CHANGE UP (ref 4.2–5.4)
RBC # FLD: 17.7 % — HIGH (ref 11.5–14.5)
SODIUM SERPL-SCNC: 139 MMOL/L — SIGNIFICANT CHANGE UP (ref 135–146)
WBC # BLD: 13.76 K/UL — HIGH (ref 4.8–10.8)
WBC # FLD AUTO: 13.76 K/UL — HIGH (ref 4.8–10.8)

## 2022-05-20 PROCEDURE — 99233 SBSQ HOSP IP/OBS HIGH 50: CPT

## 2022-05-20 PROCEDURE — 93010 ELECTROCARDIOGRAM REPORT: CPT

## 2022-05-20 RX ADMIN — Medication 1 MILLIGRAM(S): at 12:34

## 2022-05-20 RX ADMIN — CYCLOBENZAPRINE HYDROCHLORIDE 5 MILLIGRAM(S): 10 TABLET, FILM COATED ORAL at 14:25

## 2022-05-20 RX ADMIN — Medication 3 MILLILITER(S): at 21:16

## 2022-05-20 RX ADMIN — ENOXAPARIN SODIUM 60 MILLIGRAM(S): 100 INJECTION SUBCUTANEOUS at 06:25

## 2022-05-20 RX ADMIN — Medication 6 MILLIGRAM(S): at 06:27

## 2022-05-20 RX ADMIN — Medication 100 MILLIGRAM(S): at 18:18

## 2022-05-20 RX ADMIN — SCOPALAMINE 1 PATCH: 1 PATCH, EXTENDED RELEASE TRANSDERMAL at 19:51

## 2022-05-20 RX ADMIN — ENOXAPARIN SODIUM 60 MILLIGRAM(S): 100 INJECTION SUBCUTANEOUS at 18:18

## 2022-05-20 RX ADMIN — ROBINUL 2 MILLIGRAM(S): 0.2 INJECTION INTRAMUSCULAR; INTRAVENOUS at 06:25

## 2022-05-20 RX ADMIN — CHLORHEXIDINE GLUCONATE 1 APPLICATION(S): 213 SOLUTION TOPICAL at 06:25

## 2022-05-20 RX ADMIN — Medication 300 MILLIGRAM(S): at 12:39

## 2022-05-20 RX ADMIN — SCOPALAMINE 1 PATCH: 1 PATCH, EXTENDED RELEASE TRANSDERMAL at 06:24

## 2022-05-20 RX ADMIN — CYCLOBENZAPRINE HYDROCHLORIDE 5 MILLIGRAM(S): 10 TABLET, FILM COATED ORAL at 21:54

## 2022-05-20 RX ADMIN — REMDESIVIR 500 MILLIGRAM(S): 5 INJECTION INTRAVENOUS at 14:42

## 2022-05-20 RX ADMIN — CYCLOBENZAPRINE HYDROCHLORIDE 5 MILLIGRAM(S): 10 TABLET, FILM COATED ORAL at 06:25

## 2022-05-20 RX ADMIN — SCOPALAMINE 1 PATCH: 1 PATCH, EXTENDED RELEASE TRANSDERMAL at 07:32

## 2022-05-20 RX ADMIN — SCOPALAMINE 1 PATCH: 1 PATCH, EXTENDED RELEASE TRANSDERMAL at 09:33

## 2022-05-20 RX ADMIN — ROBINUL 2 MILLIGRAM(S): 0.2 INJECTION INTRAMUSCULAR; INTRAVENOUS at 21:55

## 2022-05-20 RX ADMIN — ROBINUL 2 MILLIGRAM(S): 0.2 INJECTION INTRAMUSCULAR; INTRAVENOUS at 14:42

## 2022-05-20 RX ADMIN — Medication 100 MILLIGRAM(S): at 06:24

## 2022-05-20 RX ADMIN — PREGABALIN 500 MICROGRAM(S): 225 CAPSULE ORAL at 12:37

## 2022-05-20 RX ADMIN — PANTOPRAZOLE SODIUM 40 MILLIGRAM(S): 20 TABLET, DELAYED RELEASE ORAL at 12:37

## 2022-05-20 NOTE — PROGRESS NOTE ADULT - SUBJECTIVE AND OBJECTIVE BOX
CC:  21 yo female patient with a history of encephalitis s/p tooth abscess s/p tracheostomy and PEG tube placement, HTN, GERD, and SVC thrombosis who was brought to ED from [place] on 05/16 for evaluation of a broken tracheostomy flange, found to be septic on presentation with leukocytosis on labs, a positive RVP and COVID PCR on microbiologic testing, and evidence of left pleural effusion on imaging, to be admitted for further investigations, management and monitoring. Currently still tachycardic saturating well on 3LPM NC.    Hospital day # : 4  Events Overnight/VS:  no sig. events    Subjective:  unable to express    Physical Exam:  General: no acute distress, +Trach and + PEG, contracted  HEENT: atraumatic, normocephalic  Chest: RRR, S1/S2  Lungs: Clear to auscultation bilaterally, no wheeze,  Abd: BS audible, soft, non-tender  Ext: no clubbing, cyanosis, no edema  Skin: warm and dry  Neuro/MSK: MS exam limited    New Labs/Imaging/Procedures/Results:  ***                        10.7   13.76 )-----------( 416      ( 20 May 2022 08:32 )             35.8       20 May 2022 08:32    139    |  100    |  10     ----------------------------<  152    4.5     |  32     |  <0.5      Ca    9.1        20 May 2022 08:32  Mg     2.1       20 May 2022 08:32    MEDICATIONS  (STANDING):  ALBUTerol    90 MICROgram(s) HFA Inhaler 2 Puff(s) Inhalation every 6 hours  albuterol/ipratropium for Nebulization 3 milliLiter(s) Nebulizer every 6 hours  chlorhexidine 4% Liquid 1 Application(s) Topical <User Schedule>  cyanocobalamin 500 MICROGram(s) Oral daily  cyclobenzaprine Oral Tab/Cap - Peds 5 milliGRAM(s) Oral three times a day  dexAMETHasone  Injectable 6 milliGRAM(s) IV Push daily  enoxaparin Injectable 60 milliGRAM(s) SubCutaneous every 12 hours  ferrous    sulfate Liquid 300 milliGRAM(s) Enteral Tube daily  folic acid 1 milliGRAM(s) Oral daily  glycopyrrolate 2 milliGRAM(s) Oral three times a day  pantoprazole   Suspension 40 milliGRAM(s) Oral daily  remdesivir  IVPB   IV Intermittent   remdesivir  IVPB 100 milliGRAM(s) IV Intermittent every 24 hours  scopolamine 1 mG/72 Hr(s) Patch 1 Patch Transdermal every 72 hours  thiamine  Oral Tab/Cap - Peds 100 milliGRAM(s) Oral two times a day    MEDICATIONS  (PRN):  acetaminophen     Tablet .. 650 milliGRAM(s) Oral every 6 hours PRN Temp greater or equal to 38C (100.4F), Mild Pain (1 - 3)  metoclopramide   Syrup 10 milliGRAM(s) Oral every 6 hours PRN as needed        Assessment/Plan:    #Sepsis POA - resolved  #COVID Pneumonia   #Broken Tracheostomy Flange - Resolved  Tracheostomy on 2L for Chronic Respiratory Failure  Not Vaccinated against COVID per sister  sepsis on presentation   COVID positive  CXR with left pleural effusion  Trach replaced in the ED,  unsure about baseline O2 requirement and unable to reach NH staff  D-dimer; 267  Procalcitonin .03  C-reactive protein 61  LDH - 225  Ferritin 9  Blood & Urine cxs NGTD   MRSA negative   - ID following  - wean Oxygen as toelrated (currently on 7L 98%)  - c/w IV Dexamethasone 6mg QD (started 5/16)  - c/w RDV (day #3/5)  - no antibiotics per ID    #HO SVC Thrombosis and Embolism   * Home med Eliquis 5mg BID  - c/w therapeutic Lovenox for now    #History of Encephalitis post Tooth Abscess in 10/2021   s/p Tracheostomy (has an O2 tank per sister but unsure about flow) and G-tube placement  - Monitor SaO2 and O2 requirements: as above    #GERD  * Home med Omeprazole 40mg QD  - continue Protonix 40mg QD    #Iron Deficiency Anemia   History of Wernicke Encephalopathy  * Home med iron replacement, thiamine, B12, and folic acid  * ED Hb 11.3, MCV 79.4  B12 and folate nl  - c/w home iron replacement, thiamine, B12, and folic acid  - monitor for now    #History of Hypertension  * Home med Lopressor 50mg BID  - Hold anti HTN    #Functional quadriplegia   from [facility]      #Misc  DVT ppx- therapeutic lovenox  Diet- G-tube feeds  Code/GOC-Full  DCP-from [place]    #Provider handoff-

## 2022-05-21 LAB
ALBUMIN SERPL ELPH-MCNC: 3.5 G/DL — SIGNIFICANT CHANGE UP (ref 3.5–5.2)
ALP SERPL-CCNC: 96 U/L — SIGNIFICANT CHANGE UP (ref 30–115)
ALT FLD-CCNC: 64 U/L — HIGH (ref 0–41)
ANION GAP SERPL CALC-SCNC: 11 MMOL/L — SIGNIFICANT CHANGE UP (ref 7–14)
AST SERPL-CCNC: 39 U/L — SIGNIFICANT CHANGE UP (ref 0–41)
BASOPHILS # BLD AUTO: 0.07 K/UL — SIGNIFICANT CHANGE UP (ref 0–0.2)
BASOPHILS NFR BLD AUTO: 0.5 % — SIGNIFICANT CHANGE UP (ref 0–1)
BILIRUB SERPL-MCNC: <0.2 MG/DL — SIGNIFICANT CHANGE UP (ref 0.2–1.2)
BUN SERPL-MCNC: 12 MG/DL — SIGNIFICANT CHANGE UP (ref 10–20)
CALCIUM SERPL-MCNC: 9.2 MG/DL — SIGNIFICANT CHANGE UP (ref 8.5–10.1)
CHLORIDE SERPL-SCNC: 103 MMOL/L — SIGNIFICANT CHANGE UP (ref 98–110)
CO2 SERPL-SCNC: 31 MMOL/L — SIGNIFICANT CHANGE UP (ref 17–32)
CREAT SERPL-MCNC: <0.5 MG/DL — LOW (ref 0.7–1.5)
CULTURE RESULTS: SIGNIFICANT CHANGE UP
CULTURE RESULTS: SIGNIFICANT CHANGE UP
EGFR: 143 ML/MIN/1.73M2 — SIGNIFICANT CHANGE UP
EOSINOPHIL # BLD AUTO: 0.1 K/UL — SIGNIFICANT CHANGE UP (ref 0–0.7)
EOSINOPHIL NFR BLD AUTO: 0.7 % — SIGNIFICANT CHANGE UP (ref 0–8)
GLUCOSE BLDC GLUCOMTR-MCNC: 106 MG/DL — HIGH (ref 70–99)
GLUCOSE BLDC GLUCOMTR-MCNC: 119 MG/DL — HIGH (ref 70–99)
GLUCOSE BLDC GLUCOMTR-MCNC: 135 MG/DL — HIGH (ref 70–99)
GLUCOSE SERPL-MCNC: 108 MG/DL — HIGH (ref 70–99)
HCT VFR BLD CALC: 36.4 % — LOW (ref 37–47)
HGB BLD-MCNC: 10.9 G/DL — LOW (ref 12–16)
IMM GRANULOCYTES NFR BLD AUTO: 0.5 % — HIGH (ref 0.1–0.3)
LYMPHOCYTES # BLD AUTO: 23.8 % — SIGNIFICANT CHANGE UP (ref 20.5–51.1)
LYMPHOCYTES # BLD AUTO: 3.48 K/UL — HIGH (ref 1.2–3.4)
MAGNESIUM SERPL-MCNC: 2.1 MG/DL — SIGNIFICANT CHANGE UP (ref 1.8–2.4)
MCHC RBC-ENTMCNC: 23.5 PG — LOW (ref 27–31)
MCHC RBC-ENTMCNC: 29.9 G/DL — LOW (ref 32–37)
MCV RBC AUTO: 78.6 FL — LOW (ref 81–99)
MONOCYTES # BLD AUTO: 1.2 K/UL — HIGH (ref 0.1–0.6)
MONOCYTES NFR BLD AUTO: 8.2 % — SIGNIFICANT CHANGE UP (ref 1.7–9.3)
NEUTROPHILS # BLD AUTO: 9.69 K/UL — HIGH (ref 1.4–6.5)
NEUTROPHILS NFR BLD AUTO: 66.3 % — SIGNIFICANT CHANGE UP (ref 42.2–75.2)
NRBC # BLD: 0 /100 WBCS — SIGNIFICANT CHANGE UP (ref 0–0)
PLATELET # BLD AUTO: 473 K/UL — HIGH (ref 130–400)
POTASSIUM SERPL-MCNC: 4.2 MMOL/L — SIGNIFICANT CHANGE UP (ref 3.5–5)
POTASSIUM SERPL-SCNC: 4.2 MMOL/L — SIGNIFICANT CHANGE UP (ref 3.5–5)
PROT SERPL-MCNC: 6.6 G/DL — SIGNIFICANT CHANGE UP (ref 6–8)
RBC # BLD: 4.63 M/UL — SIGNIFICANT CHANGE UP (ref 4.2–5.4)
RBC # FLD: 18 % — HIGH (ref 11.5–14.5)
SODIUM SERPL-SCNC: 145 MMOL/L — SIGNIFICANT CHANGE UP (ref 135–146)
SPECIMEN SOURCE: SIGNIFICANT CHANGE UP
SPECIMEN SOURCE: SIGNIFICANT CHANGE UP
WBC # BLD: 14.61 K/UL — HIGH (ref 4.8–10.8)
WBC # FLD AUTO: 14.61 K/UL — HIGH (ref 4.8–10.8)

## 2022-05-21 PROCEDURE — 99233 SBSQ HOSP IP/OBS HIGH 50: CPT

## 2022-05-21 RX ORDER — SODIUM CHLORIDE 9 MG/ML
1000 INJECTION, SOLUTION INTRAVENOUS
Refills: 0 | Status: DISCONTINUED | OUTPATIENT
Start: 2022-05-21 | End: 2022-05-24

## 2022-05-21 RX ADMIN — ENOXAPARIN SODIUM 60 MILLIGRAM(S): 100 INJECTION SUBCUTANEOUS at 18:15

## 2022-05-21 RX ADMIN — CYCLOBENZAPRINE HYDROCHLORIDE 5 MILLIGRAM(S): 10 TABLET, FILM COATED ORAL at 05:41

## 2022-05-21 RX ADMIN — PREGABALIN 500 MICROGRAM(S): 225 CAPSULE ORAL at 12:23

## 2022-05-21 RX ADMIN — SODIUM CHLORIDE 75 MILLILITER(S): 9 INJECTION, SOLUTION INTRAVENOUS at 15:13

## 2022-05-21 RX ADMIN — CYCLOBENZAPRINE HYDROCHLORIDE 5 MILLIGRAM(S): 10 TABLET, FILM COATED ORAL at 23:08

## 2022-05-21 RX ADMIN — Medication 6 MILLIGRAM(S): at 05:41

## 2022-05-21 RX ADMIN — CHLORHEXIDINE GLUCONATE 1 APPLICATION(S): 213 SOLUTION TOPICAL at 05:42

## 2022-05-21 RX ADMIN — ENOXAPARIN SODIUM 60 MILLIGRAM(S): 100 INJECTION SUBCUTANEOUS at 05:41

## 2022-05-21 RX ADMIN — Medication 100 MILLIGRAM(S): at 18:15

## 2022-05-21 RX ADMIN — CYCLOBENZAPRINE HYDROCHLORIDE 5 MILLIGRAM(S): 10 TABLET, FILM COATED ORAL at 14:05

## 2022-05-21 RX ADMIN — Medication 1 MILLIGRAM(S): at 12:24

## 2022-05-21 RX ADMIN — Medication 100 MILLIGRAM(S): at 05:41

## 2022-05-21 RX ADMIN — REMDESIVIR 500 MILLIGRAM(S): 5 INJECTION INTRAVENOUS at 12:28

## 2022-05-21 RX ADMIN — PANTOPRAZOLE SODIUM 40 MILLIGRAM(S): 20 TABLET, DELAYED RELEASE ORAL at 12:24

## 2022-05-21 RX ADMIN — ROBINUL 2 MILLIGRAM(S): 0.2 INJECTION INTRAMUSCULAR; INTRAVENOUS at 13:53

## 2022-05-21 RX ADMIN — ROBINUL 2 MILLIGRAM(S): 0.2 INJECTION INTRAMUSCULAR; INTRAVENOUS at 05:42

## 2022-05-21 RX ADMIN — Medication 300 MILLIGRAM(S): at 12:24

## 2022-05-21 RX ADMIN — SCOPALAMINE 1 PATCH: 1 PATCH, EXTENDED RELEASE TRANSDERMAL at 19:38

## 2022-05-21 RX ADMIN — SCOPALAMINE 1 PATCH: 1 PATCH, EXTENDED RELEASE TRANSDERMAL at 09:30

## 2022-05-21 RX ADMIN — ROBINUL 2 MILLIGRAM(S): 0.2 INJECTION INTRAMUSCULAR; INTRAVENOUS at 23:09

## 2022-05-21 NOTE — PROGRESS NOTE ADULT - SUBJECTIVE AND OBJECTIVE BOX
S: NAD, breathing ok on trach collar    1  All other pertinent ROS negative.      05-20-22 @ 07:01  -  05-21-22 @ 07:00  --------------------------------------------------------  IN: 1075 mL / OUT: 1000 mL / NET: 75 mL    05-21-22 @ 07:01  -  05-21-22 @ 16:33  --------------------------------------------------------  IN: 0 mL / OUT: 550 mL / NET: -550 mL      Vital Signs Last 24 Hrs  T(C): 36.9 (21 May 2022 11:40), Max: 36.9 (21 May 2022 11:40)  T(F): 98.4 (21 May 2022 11:40), Max: 98.4 (21 May 2022 11:40)  HR: 103 (21 May 2022 11:40) (87 - 125)  BP: 86/51 (21 May 2022 11:40) (86/51 - 114/53)  BP(mean): --  RR: 19 (21 May 2022 11:40) (18 - 20)  SpO2: 99% (21 May 2022 11:40) (99% - 99%)  PHYSICAL EXAM:    Constitutional: trach/PEG, NAD  HEENT: PERR, EOMI, Normal Hearing, MMM  Neck: Soft and supple, No LAD, No JVD  Respiratory: Breath sounds are clear bilaterally, occ rhonchi  Cardiovascular: S1 and S2, regular rate and rhythm, no Murmurs, gallops or rubs  Gastrointestinal: Bowel Sounds present, soft, nontender, nondistended, no guarding, no rebound  Extremities: UEs outturned       MEDICATIONS:  MEDICATIONS  (STANDING):  ALBUTerol    90 MICROgram(s) HFA Inhaler 2 Puff(s) Inhalation every 6 hours  albuterol/ipratropium for Nebulization 3 milliLiter(s) Nebulizer every 6 hours  chlorhexidine 4% Liquid 1 Application(s) Topical <User Schedule>  cyanocobalamin 500 MICROGram(s) Oral daily  cyclobenzaprine Oral Tab/Cap - Peds 5 milliGRAM(s) Oral three times a day  dexAMETHasone  Injectable 6 milliGRAM(s) IV Push daily  enoxaparin Injectable 60 milliGRAM(s) SubCutaneous every 12 hours  ferrous    sulfate Liquid 300 milliGRAM(s) Enteral Tube daily  folic acid 1 milliGRAM(s) Oral daily  glycopyrrolate 2 milliGRAM(s) Oral three times a day  pantoprazole   Suspension 40 milliGRAM(s) Oral daily  remdesivir  IVPB   IV Intermittent   remdesivir  IVPB 100 milliGRAM(s) IV Intermittent every 24 hours  scopolamine 1 mG/72 Hr(s) Patch 1 Patch Transdermal every 72 hours  sodium chloride 0.45%. 1000 milliLiter(s) (75 mL/Hr) IV Continuous <Continuous>  thiamine  Oral Tab/Cap - Peds 100 milliGRAM(s) Oral two times a day      LABS: All Labs Reviewed:                        10.9   14.61 )-----------( 473      ( 21 May 2022 06:21 )             36.4     05-21    145  |  103  |  12  ----------------------------<  108<H>  4.2   |  31  |  <0.5<L>    Ca    9.2      21 May 2022 06:21  Mg     2.1     05-21    TPro  6.6  /  Alb  3.5  /  TBili  <0.2  /  DBili  x   /  AST  39  /  ALT  64<H>  /  AlkPhos  96  05-21          Blood Culture: 05-16 @ 17:15  Organism --  Gram Stain Blood -- Gram Stain --  Specimen Source Clean Catch Clean Catch (Midstream)  Culture-Blood --        Radiology: reviewed

## 2022-05-21 NOTE — PROGRESS NOTE ADULT - ASSESSMENT
21 y/o woman with PMH of history of encephalitis s/p tooth abscess, chronic respiratory failure s/p tracheostomy and PEG tube placement, HTN, GERD, and SVC thrombosis on apixaban was brought to the ED from [facility] on 05/16 for evaluation of a broken tracheostomy flange. Tracheostomy was replaced in the ED. She was found to be septic on presentation with leukocytosis and COVID positive.     1. Sepsis present on admission due to COVID 19  unvaccinated pt  ID consult and f/u appreciated  remdesivir x 5 days (Ends 5/22)  continue decadron 6mg IV daily  isolation per protocol  Ferritin, Serum: 99 ng/mL (05-17-22 @ 07:37)  C-Reactive Protein, Serum: 61 mg/L (05-17-22 @ 07:37)  D-Dimer Assay, Quantitative: 267 ng/mL DDU (05-17-22 @ 07:37)  Procalcitonin, Serum: 0.03 ng/mL (05-17-22 @ 07:37)  venous duplex negative for DVT  blood and urine cultures negative and MRSA negative - other abx discontinued  leukocytosis improving  guarded prognosis  5/20: 7 L NC. downtitrate as tolerated. off & on Sinus tachy - monitor.   521: Will decrease to 6L via trach collar (30%). Off & on sinus tachy with borderline BP noted. Start 1/2NS @ 75 x 24 hours .    2. Chronic respiratory failure  s/p tracheostomy replacement in ED and tolerating trach collar - 35%  suction prn  on glycopyrrolate    3. SVC Thrombosis and Embolism - on Eliquis 5mg bid as outpt  on therapeutic lovenox as inpt    4. Microcytic anemia on iron, vitamin B12 and folate for suspected deficiencies    5. H/O Wernicke's encephalopathy on thiamine    6. HTN - on metoprolol 50mg bid as outpt - held due to sepsis  if SBP remains >100, restart at 25mg bid and titrate as tolerated    7. Functional quadriplegia    8. DVT prophylaxis - on therapeutic dose lovenox      PROGRESS NOTE HANDOFF    Pending: restart metoprolol if BP remains stable, labs in AM, pulse ox monitoring    Family discussion: medical staff updating sister    Disposition: [facility] .

## 2022-05-22 LAB
ALBUMIN SERPL ELPH-MCNC: 3.3 G/DL — LOW (ref 3.5–5.2)
ALP SERPL-CCNC: 91 U/L — SIGNIFICANT CHANGE UP (ref 30–115)
ALT FLD-CCNC: 68 U/L — HIGH (ref 0–41)
ANION GAP SERPL CALC-SCNC: 9 MMOL/L — SIGNIFICANT CHANGE UP (ref 7–14)
AST SERPL-CCNC: 35 U/L — SIGNIFICANT CHANGE UP (ref 0–41)
BASOPHILS # BLD AUTO: 0.07 K/UL — SIGNIFICANT CHANGE UP (ref 0–0.2)
BASOPHILS NFR BLD AUTO: 0.4 % — SIGNIFICANT CHANGE UP (ref 0–1)
BILIRUB SERPL-MCNC: <0.2 MG/DL — SIGNIFICANT CHANGE UP (ref 0.2–1.2)
BUN SERPL-MCNC: 13 MG/DL — SIGNIFICANT CHANGE UP (ref 10–20)
CALCIUM SERPL-MCNC: 8.9 MG/DL — SIGNIFICANT CHANGE UP (ref 8.5–10.1)
CHLORIDE SERPL-SCNC: 102 MMOL/L — SIGNIFICANT CHANGE UP (ref 98–110)
CO2 SERPL-SCNC: 28 MMOL/L — SIGNIFICANT CHANGE UP (ref 17–32)
CREAT SERPL-MCNC: <0.5 MG/DL — LOW (ref 0.7–1.5)
EGFR: 143 ML/MIN/1.73M2 — SIGNIFICANT CHANGE UP
EOSINOPHIL # BLD AUTO: 0.12 K/UL — SIGNIFICANT CHANGE UP (ref 0–0.7)
EOSINOPHIL NFR BLD AUTO: 0.8 % — SIGNIFICANT CHANGE UP (ref 0–8)
GLUCOSE SERPL-MCNC: 79 MG/DL — SIGNIFICANT CHANGE UP (ref 70–99)
HCT VFR BLD CALC: 35.1 % — LOW (ref 37–47)
HGB BLD-MCNC: 10.3 G/DL — LOW (ref 12–16)
IMM GRANULOCYTES NFR BLD AUTO: 0.4 % — HIGH (ref 0.1–0.3)
LYMPHOCYTES # BLD AUTO: 22.6 % — SIGNIFICANT CHANGE UP (ref 20.5–51.1)
LYMPHOCYTES # BLD AUTO: 3.53 K/UL — HIGH (ref 1.2–3.4)
MAGNESIUM SERPL-MCNC: 2 MG/DL — SIGNIFICANT CHANGE UP (ref 1.8–2.4)
MCHC RBC-ENTMCNC: 23.3 PG — LOW (ref 27–31)
MCHC RBC-ENTMCNC: 29.3 G/DL — LOW (ref 32–37)
MCV RBC AUTO: 79.2 FL — LOW (ref 81–99)
MONOCYTES # BLD AUTO: 1.21 K/UL — HIGH (ref 0.1–0.6)
MONOCYTES NFR BLD AUTO: 7.7 % — SIGNIFICANT CHANGE UP (ref 1.7–9.3)
NEUTROPHILS # BLD AUTO: 10.65 K/UL — HIGH (ref 1.4–6.5)
NEUTROPHILS NFR BLD AUTO: 68.1 % — SIGNIFICANT CHANGE UP (ref 42.2–75.2)
NRBC # BLD: 0 /100 WBCS — SIGNIFICANT CHANGE UP (ref 0–0)
PLATELET # BLD AUTO: 427 K/UL — HIGH (ref 130–400)
POTASSIUM SERPL-MCNC: 4.4 MMOL/L — SIGNIFICANT CHANGE UP (ref 3.5–5)
POTASSIUM SERPL-SCNC: 4.4 MMOL/L — SIGNIFICANT CHANGE UP (ref 3.5–5)
PROT SERPL-MCNC: 6.1 G/DL — SIGNIFICANT CHANGE UP (ref 6–8)
RBC # BLD: 4.43 M/UL — SIGNIFICANT CHANGE UP (ref 4.2–5.4)
RBC # FLD: 18.1 % — HIGH (ref 11.5–14.5)
SODIUM SERPL-SCNC: 139 MMOL/L — SIGNIFICANT CHANGE UP (ref 135–146)
WBC # BLD: 15.65 K/UL — HIGH (ref 4.8–10.8)
WBC # FLD AUTO: 15.65 K/UL — HIGH (ref 4.8–10.8)

## 2022-05-22 PROCEDURE — 99232 SBSQ HOSP IP/OBS MODERATE 35: CPT

## 2022-05-22 RX ADMIN — SCOPALAMINE 1 PATCH: 1 PATCH, EXTENDED RELEASE TRANSDERMAL at 07:38

## 2022-05-22 RX ADMIN — CYCLOBENZAPRINE HYDROCHLORIDE 5 MILLIGRAM(S): 10 TABLET, FILM COATED ORAL at 21:36

## 2022-05-22 RX ADMIN — PREGABALIN 500 MICROGRAM(S): 225 CAPSULE ORAL at 12:35

## 2022-05-22 RX ADMIN — CYCLOBENZAPRINE HYDROCHLORIDE 5 MILLIGRAM(S): 10 TABLET, FILM COATED ORAL at 07:00

## 2022-05-22 RX ADMIN — Medication 100 MILLIGRAM(S): at 17:34

## 2022-05-22 RX ADMIN — REMDESIVIR 500 MILLIGRAM(S): 5 INJECTION INTRAVENOUS at 12:35

## 2022-05-22 RX ADMIN — ROBINUL 2 MILLIGRAM(S): 0.2 INJECTION INTRAMUSCULAR; INTRAVENOUS at 07:02

## 2022-05-22 RX ADMIN — CYCLOBENZAPRINE HYDROCHLORIDE 5 MILLIGRAM(S): 10 TABLET, FILM COATED ORAL at 14:33

## 2022-05-22 RX ADMIN — ENOXAPARIN SODIUM 60 MILLIGRAM(S): 100 INJECTION SUBCUTANEOUS at 07:01

## 2022-05-22 RX ADMIN — ROBINUL 2 MILLIGRAM(S): 0.2 INJECTION INTRAMUSCULAR; INTRAVENOUS at 21:38

## 2022-05-22 RX ADMIN — ENOXAPARIN SODIUM 60 MILLIGRAM(S): 100 INJECTION SUBCUTANEOUS at 17:34

## 2022-05-22 RX ADMIN — Medication 6 MILLIGRAM(S): at 07:01

## 2022-05-22 RX ADMIN — PANTOPRAZOLE SODIUM 40 MILLIGRAM(S): 20 TABLET, DELAYED RELEASE ORAL at 12:34

## 2022-05-22 RX ADMIN — Medication 1 MILLIGRAM(S): at 12:34

## 2022-05-22 RX ADMIN — Medication 100 MILLIGRAM(S): at 07:02

## 2022-05-22 RX ADMIN — ROBINUL 2 MILLIGRAM(S): 0.2 INJECTION INTRAMUSCULAR; INTRAVENOUS at 14:33

## 2022-05-22 RX ADMIN — Medication 300 MILLIGRAM(S): at 12:34

## 2022-05-22 RX ADMIN — SCOPALAMINE 1 PATCH: 1 PATCH, EXTENDED RELEASE TRANSDERMAL at 19:44

## 2022-05-22 RX ADMIN — CHLORHEXIDINE GLUCONATE 1 APPLICATION(S): 213 SOLUTION TOPICAL at 07:00

## 2022-05-22 NOTE — CHART NOTE - NSCHARTNOTEFT_GEN_A_CORE
November 5, 2021     Patient: Andie Nolasco   YOB: 2008   Date of Visit: 11/5/2021       To Whom it May Concern:    Andie Nolasco was seen in my clinic on 11/5/2021 at 8:20 am.     Please excuse Andie for her absence from school on the date listed above to be able to make her appointment.    Also she has a history of frequent headaches.  If she has a headache at school she may take up to 600mg of Ibuprofen.    Sincerely,           Faheem Frias MD    Medical information is confidential and cannot be disclosed without the written consent of the patient or her representative.       Registered Dietitian Follow-Up     Patient Profile Reviewed                           Yes [x]   No []     Nutrition History Previously Obtained        Yes [x]  No []       Pertinent Subjective Information: The patient tolerates current TF regimen      Pertinent Medical Interventions: 21y/o woman with h/o encephalitis s/p tooth abscess, chronic respiratory failure s/p tracheostomy and PEG tube placement, HTN, GERD, and SVC thrombosis on apixaban was brought to the ED from [facility] on  for evaluation of a broken tracheostomy flange. Tracheostomy was replaced in the ED. She was found to be septic on presentation with leukocytosis and COVID positive.      Diet order: () TF with Osmolite 1.5 at 200mL Q6hrs with flush with 100mL via PEG Tube     Anthropometrics:  - Ht: 5'6"  - Wt: 55.2kg  - %wt change  - BMI: 20 (WNL)  - IBW: 59kg     Pertinent Lab Data: () Na-139, K-4.4, CL-102, BUN-13, Cr<0.5, Glucose-79mg/dL, Ca-8.9, H/H-10.3/35.1, MCV-79.2, WBC-15.65     Pertinent Meds: 0.45% NS at 75mL/hr, Protonix, Lovenox, Decadron, Ferrous Sulfate, Folic acid, Reglan, Thiamine     Physical Findings:  - Appearance:  - GI function: Per EMR, the patient had a bowel movement ()  - Tubes: PEG Tube  - Oral/Mouth cavity: NPO  - Skin: Intact (Josafat Score-12)      Nutrition Requirements  Weight Used: 56.7kg -Derived from nutrition note ()     Estimated Energy Needs    Continue [x]  Adjust []  Adjusted Energy Recommendations: 1479-1748kcal/day (MSJ 1.1-1.3 AF) -Derived from nutrition note ()      Estimated Protein Needs    Continue [x]  Adjust []  Adjusted Protein Recommendations: 62-74g/day (1.1-1.3g/kg) -Derived from nutrition note ()      Estimated Fluid Needs        Continue [x]  Adjust []  Adjusted Fluid Recommendations: 1701-1985mL/day (30-35mL/kg) -with consideration for weight loss PTA, age -Derived from nutrition note (5/18)     Nutrient Intake: Current TF regimen provides 1200kcal, 50gm protein, 608mL free H2O, meeting 69% estimated calorie and 68% estimated protein needs      [x] Previous Nutrition Diagnosis: Inadequate Energy Intake            [x] Ongoing          [] Resolved    [] No active nutrition diagnosis identified at this time     Nutrition Diagnostic #1  Problem:  Etiology:  Statement:     Nutrition Diagnostic #2  Problem:  Etiology:  Statement:     Nutrition Intervention:  1.Enteral Nutrition      Goal/Expected Outcome:  1.Meet >85% estimated nutritional needs in 4 days    Indicator/Monitorin.Diet order, energy intake, nutrition focused physical findings, anemia profile    -I spoke to the patient's physician name Dr. Farias ([facility] #[id number]) regarding my nutritional recommendations.    Recommendations:  1.Recommend adjust the volume of TF with Osmolite 1.5 to 350mL Q6hrs, to provide (1800kcal, 88gm protein, 1064mL free H2O)

## 2022-05-22 NOTE — PROGRESS NOTE ADULT - SUBJECTIVE AND OBJECTIVE BOX
CC:  21 yo female patient with a history of encephalitis s/p tooth abscess s/p tracheostomy and PEG tube placement, HTN, GERD, and SVC thrombosis who was brought to ED from [place] on 05/16 for evaluation of a broken tracheostomy flange, found to be septic on presentation with leukocytosis on labs, a positive RVP and COVID PCR on microbiologic testing, and evidence of left pleural effusion on imaging, to be admitted for further investigations, management and monitoring. Currently still tachycardic saturating well on 3LPM NC.    Hospital day # : 6  Events Overnight/VS:  no sig. events    Subjective:  unable to express    Physical Exam:  General: no acute distress, +Trach and + PEG, contracted  HEENT: atraumatic, normocephalic  Chest: RRR, S1/S2  Lungs: Clear to auscultation bilaterally, no wheeze,  Abd: BS audible, soft, non-tender  Ext: no clubbing, cyanosis, no edema  Skin: warm and dry  Neuro/MSK: MS exam limited    New Labs/Imaging/Procedures/Results:  ***                        10.7   13.76 )-----------( 416      ( 20 May 2022 08:32 )             35.8       20 May 2022 08:32    139    |  100    |  10     ----------------------------<  152    4.5     |  32     |  <0.5      Ca    9.1        20 May 2022 08:32  Mg     2.1       20 May 2022 08:32    MEDICATIONS  (STANDING):  ALBUTerol    90 MICROgram(s) HFA Inhaler 2 Puff(s) Inhalation every 6 hours  albuterol/ipratropium for Nebulization 3 milliLiter(s) Nebulizer every 6 hours  chlorhexidine 4% Liquid 1 Application(s) Topical <User Schedule>  cyanocobalamin 500 MICROGram(s) Oral daily  cyclobenzaprine Oral Tab/Cap - Peds 5 milliGRAM(s) Oral three times a day  dexAMETHasone  Injectable 6 milliGRAM(s) IV Push daily  enoxaparin Injectable 60 milliGRAM(s) SubCutaneous every 12 hours  ferrous    sulfate Liquid 300 milliGRAM(s) Enteral Tube daily  folic acid 1 milliGRAM(s) Oral daily  glycopyrrolate 2 milliGRAM(s) Oral three times a day  pantoprazole   Suspension 40 milliGRAM(s) Oral daily  remdesivir  IVPB   IV Intermittent   remdesivir  IVPB 100 milliGRAM(s) IV Intermittent every 24 hours  scopolamine 1 mG/72 Hr(s) Patch 1 Patch Transdermal every 72 hours  thiamine  Oral Tab/Cap - Peds 100 milliGRAM(s) Oral two times a day    MEDICATIONS  (PRN):  acetaminophen     Tablet .. 650 milliGRAM(s) Oral every 6 hours PRN Temp greater or equal to 38C (100.4F), Mild Pain (1 - 3)  metoclopramide   Syrup 10 milliGRAM(s) Oral every 6 hours PRN as needed        Assessment/Plan:    #Sepsis POA - resolved  #COVID Pneumonia   #Broken Tracheostomy Flange - Resolved  Tracheostomy on 2L for Chronic Respiratory Failure  Not Vaccinated against COVID per sister  sepsis on presentation   COVID positive  CXR with left pleural effusion  Trach replaced in the ED,  unsure about baseline O2 requirement and unable to reach NH staff  D-dimer; 267  Procalcitonin .03  C-reactive protein 61  LDH - 225  Ferritin 9  Blood & Urine cxs NGTD   MRSA negative   5/20-21 borderline BP with off and on sinus tach  - ID following  - wean Oxygen as toelrated (currently on 7L 98%)  - c/w IV Dexamethasone 6mg QD (started 5/16)  - c/w RDV (day #5/5)  - no antibiotics per ID  - oxygenation : to trach collar, 6L/min via trach collar (30-35%)  - c/w 1/2 NS @ 75 cc x24 hours (started 5/21    #HO SVC Thrombosis and Embolism   * Home med Eliquis 5mg BID  - c/w therapeutic Lovenox for now    #History of Encephalitis post Tooth Abscess in 10/2021   s/p Tracheostomy (has an O2 tank per sister but unsure about flow) and G-tube placement  - Monitor SaO2 and O2 requirements: as above    #GERD  * Home med Omeprazole 40mg QD  - continue Protonix 40mg QD    #Iron Deficiency Anemia   History of Wernicke Encephalopathy  * Home med iron replacement, thiamine, B12, and folic acid  * ED Hb 11.3, MCV 79.4  B12 and folate nl  - c/w home iron replacement, thiamine, B12, and folic acid  - monitor for now    #History of Hypertension  * Home med Lopressor 50mg BID  - Hold anti HTN    #Functional quadriplegia   from [facility]      #Misc  DVT ppx- therapeutic lovenox  Diet- G-tube feeds  Code/GOC-Full  DCP-from [place]    #Provider handoff- vitals unsteady, intermittently tachy with borderline low BP, getting fluids, possible start Metoprolol if BP becomes more stable

## 2022-05-23 LAB
ALBUMIN SERPL ELPH-MCNC: 3.3 G/DL — LOW (ref 3.5–5.2)
ALP SERPL-CCNC: 82 U/L — SIGNIFICANT CHANGE UP (ref 30–115)
ALT FLD-CCNC: 66 U/L — HIGH (ref 0–41)
ANION GAP SERPL CALC-SCNC: 13 MMOL/L — SIGNIFICANT CHANGE UP (ref 7–14)
AST SERPL-CCNC: 36 U/L — SIGNIFICANT CHANGE UP (ref 0–41)
BASOPHILS # BLD AUTO: 0.03 K/UL — SIGNIFICANT CHANGE UP (ref 0–0.2)
BASOPHILS NFR BLD AUTO: 0.2 % — SIGNIFICANT CHANGE UP (ref 0–1)
BILIRUB SERPL-MCNC: <0.2 MG/DL — SIGNIFICANT CHANGE UP (ref 0.2–1.2)
BUN SERPL-MCNC: 12 MG/DL — SIGNIFICANT CHANGE UP (ref 10–20)
CALCIUM SERPL-MCNC: 9.1 MG/DL — SIGNIFICANT CHANGE UP (ref 8.5–10.1)
CHLORIDE SERPL-SCNC: 101 MMOL/L — SIGNIFICANT CHANGE UP (ref 98–110)
CO2 SERPL-SCNC: 24 MMOL/L — SIGNIFICANT CHANGE UP (ref 17–32)
CREAT SERPL-MCNC: <0.5 MG/DL — LOW (ref 0.7–1.5)
EGFR: 143 ML/MIN/1.73M2 — SIGNIFICANT CHANGE UP
EOSINOPHIL # BLD AUTO: 0 K/UL — SIGNIFICANT CHANGE UP (ref 0–0.7)
EOSINOPHIL NFR BLD AUTO: 0 % — SIGNIFICANT CHANGE UP (ref 0–8)
GLUCOSE BLDC GLUCOMTR-MCNC: 102 MG/DL — HIGH (ref 70–99)
GLUCOSE BLDC GLUCOMTR-MCNC: 102 MG/DL — HIGH (ref 70–99)
GLUCOSE BLDC GLUCOMTR-MCNC: 126 MG/DL — HIGH (ref 70–99)
GLUCOSE SERPL-MCNC: 78 MG/DL — SIGNIFICANT CHANGE UP (ref 70–99)
HCT VFR BLD CALC: 35.9 % — LOW (ref 37–47)
HGB BLD-MCNC: 10.7 G/DL — LOW (ref 12–16)
IMM GRANULOCYTES NFR BLD AUTO: 0.5 % — HIGH (ref 0.1–0.3)
LYMPHOCYTES # BLD AUTO: 0.73 K/UL — LOW (ref 1.2–3.4)
LYMPHOCYTES # BLD AUTO: 4.4 % — LOW (ref 20.5–51.1)
MAGNESIUM SERPL-MCNC: 1.8 MG/DL — SIGNIFICANT CHANGE UP (ref 1.8–2.4)
MCHC RBC-ENTMCNC: 23.5 PG — LOW (ref 27–31)
MCHC RBC-ENTMCNC: 29.8 G/DL — LOW (ref 32–37)
MCV RBC AUTO: 78.7 FL — LOW (ref 81–99)
MONOCYTES # BLD AUTO: 0.2 K/UL — SIGNIFICANT CHANGE UP (ref 0.1–0.6)
MONOCYTES NFR BLD AUTO: 1.2 % — LOW (ref 1.7–9.3)
NEUTROPHILS # BLD AUTO: 15.53 K/UL — HIGH (ref 1.4–6.5)
NEUTROPHILS NFR BLD AUTO: 93.7 % — HIGH (ref 42.2–75.2)
NRBC # BLD: 0 /100 WBCS — SIGNIFICANT CHANGE UP (ref 0–0)
PLATELET # BLD AUTO: 419 K/UL — HIGH (ref 130–400)
POTASSIUM SERPL-MCNC: 4.4 MMOL/L — SIGNIFICANT CHANGE UP (ref 3.5–5)
POTASSIUM SERPL-SCNC: 4.4 MMOL/L — SIGNIFICANT CHANGE UP (ref 3.5–5)
PROT SERPL-MCNC: 5.8 G/DL — LOW (ref 6–8)
RBC # BLD: 4.56 M/UL — SIGNIFICANT CHANGE UP (ref 4.2–5.4)
RBC # FLD: 17.9 % — HIGH (ref 11.5–14.5)
SODIUM SERPL-SCNC: 138 MMOL/L — SIGNIFICANT CHANGE UP (ref 135–146)
WBC # BLD: 16.57 K/UL — HIGH (ref 4.8–10.8)
WBC # FLD AUTO: 16.57 K/UL — HIGH (ref 4.8–10.8)

## 2022-05-23 PROCEDURE — 99233 SBSQ HOSP IP/OBS HIGH 50: CPT

## 2022-05-23 RX ORDER — METOPROLOL TARTRATE 50 MG
50 TABLET ORAL DAILY
Refills: 0 | Status: DISCONTINUED | OUTPATIENT
Start: 2022-05-23 | End: 2022-05-25

## 2022-05-23 RX ADMIN — Medication 6 MILLIGRAM(S): at 06:16

## 2022-05-23 RX ADMIN — ROBINUL 2 MILLIGRAM(S): 0.2 INJECTION INTRAMUSCULAR; INTRAVENOUS at 13:45

## 2022-05-23 RX ADMIN — ENOXAPARIN SODIUM 60 MILLIGRAM(S): 100 INJECTION SUBCUTANEOUS at 06:12

## 2022-05-23 RX ADMIN — Medication 100 MILLIGRAM(S): at 17:24

## 2022-05-23 RX ADMIN — PANTOPRAZOLE SODIUM 40 MILLIGRAM(S): 20 TABLET, DELAYED RELEASE ORAL at 13:44

## 2022-05-23 RX ADMIN — CYCLOBENZAPRINE HYDROCHLORIDE 5 MILLIGRAM(S): 10 TABLET, FILM COATED ORAL at 13:43

## 2022-05-23 RX ADMIN — Medication 1 MILLIGRAM(S): at 13:42

## 2022-05-23 RX ADMIN — CHLORHEXIDINE GLUCONATE 1 APPLICATION(S): 213 SOLUTION TOPICAL at 06:15

## 2022-05-23 RX ADMIN — Medication 50 MILLIGRAM(S): at 13:44

## 2022-05-23 RX ADMIN — CYCLOBENZAPRINE HYDROCHLORIDE 5 MILLIGRAM(S): 10 TABLET, FILM COATED ORAL at 21:59

## 2022-05-23 RX ADMIN — CYCLOBENZAPRINE HYDROCHLORIDE 5 MILLIGRAM(S): 10 TABLET, FILM COATED ORAL at 06:14

## 2022-05-23 RX ADMIN — Medication 100 MILLIGRAM(S): at 06:14

## 2022-05-23 RX ADMIN — ENOXAPARIN SODIUM 60 MILLIGRAM(S): 100 INJECTION SUBCUTANEOUS at 17:24

## 2022-05-23 RX ADMIN — ROBINUL 2 MILLIGRAM(S): 0.2 INJECTION INTRAMUSCULAR; INTRAVENOUS at 06:16

## 2022-05-23 RX ADMIN — ROBINUL 2 MILLIGRAM(S): 0.2 INJECTION INTRAMUSCULAR; INTRAVENOUS at 23:21

## 2022-05-23 RX ADMIN — Medication 300 MILLIGRAM(S): at 13:42

## 2022-05-23 RX ADMIN — SCOPALAMINE 1 PATCH: 1 PATCH, EXTENDED RELEASE TRANSDERMAL at 06:13

## 2022-05-23 RX ADMIN — PREGABALIN 500 MICROGRAM(S): 225 CAPSULE ORAL at 13:40

## 2022-05-23 NOTE — PROGRESS NOTE ADULT - SUBJECTIVE AND OBJECTIVE BOX
S: trach/PEG  UEs outturned.  eyes open. doesn't make eye contact  non verbal  defecation/urination ok     All other pertinent ROS negative.      05-22-22 @ 07:01  -  05-23-22 @ 07:00  --------------------------------------------------------  IN: 1900 mL / OUT: 1200 mL / NET: 700 mL    05-23-22 @ 07:01  -  05-23-22 @ 19:20  --------------------------------------------------------  IN: 0 mL / OUT: 1650 mL / NET: -1650 mL      Vital Signs Last 24 Hrs  T(C): 36.9 (23 May 2022 14:50), Max: 37.2 (22 May 2022 20:20)  T(F): 98.4 (23 May 2022 14:50), Max: 98.9 (22 May 2022 20:20)  HR: 110 (23 May 2022 14:50) (110 - 117)  BP: 97/55 (23 May 2022 14:50) (85/46 - 102/55)  BP(mean): --  RR: 18 (23 May 2022 14:50) (18 - 18)  SpO2: 98% (22 May 2022 20:20) (98% - 100%)  PHYSICAL EXAM:    Constitutional: as above   HEENT: PERR, EOMI, Normal Hearing, MMM  Neck: Soft and supple, No LAD, No JVD  Respiratory: Breath sounds are clear bilaterally, No wheezing, rales or rhonchi  Cardiovascular: S1 and S2, regular rate and rhythm, no Murmurs, gallops or rubs  Gastrointestinal: Bowel Sounds present, soft, nontender, nondistended, no guarding, no rebound  Extremities: No peripheral edema. UEs outturned      MEDICATIONS:  MEDICATIONS  (STANDING):  ALBUTerol    90 MICROgram(s) HFA Inhaler 2 Puff(s) Inhalation every 6 hours  albuterol/ipratropium for Nebulization 3 milliLiter(s) Nebulizer every 6 hours  chlorhexidine 4% Liquid 1 Application(s) Topical <User Schedule>  cyanocobalamin 500 MICROGram(s) Oral daily  cyclobenzaprine Oral Tab/Cap - Peds 5 milliGRAM(s) Oral three times a day  dexAMETHasone  Injectable 6 milliGRAM(s) IV Push daily  enoxaparin Injectable 60 milliGRAM(s) SubCutaneous every 12 hours  ferrous    sulfate Liquid 300 milliGRAM(s) Enteral Tube daily  folic acid 1 milliGRAM(s) Oral daily  glycopyrrolate 2 milliGRAM(s) Oral three times a day  metoprolol succinate ER 50 milliGRAM(s) Oral daily  pantoprazole   Suspension 40 milliGRAM(s) Oral daily  scopolamine 1 mG/72 Hr(s) Patch 1 Patch Transdermal every 72 hours  sodium chloride 0.45%. 1000 milliLiter(s) (75 mL/Hr) IV Continuous <Continuous>  thiamine  Oral Tab/Cap - Peds 100 milliGRAM(s) Oral two times a day      LABS: All Labs Reviewed:                        10.7   16.57 )-----------( 419      ( 23 May 2022 06:21 )             35.9     05-23    138  |  101  |  12  ----------------------------<  78  4.4   |  24  |  <0.5<L>    Ca    9.1      23 May 2022 06:21  Mg     1.8     05-23    TPro  5.8<L>  /  Alb  3.3<L>  /  TBili  <0.2  /  DBili  x   /  AST  36  /  ALT  66<H>  /  AlkPhos  82  05-23          Blood Culture:     Radiology: reviewed

## 2022-05-23 NOTE — PROGRESS NOTE ADULT - ASSESSMENT
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 Tracheostomy was replaced in the ED. She was found to be septic on presentation with leukocytosis and COVID positive.     1. Sepsis present on admission due to COVID 19  unvaccinated pt  ID consult and f/u appreciated  remdesivir x 5 days (Ended 5/22)  continue decadron 6mg IV daily  isolation per protocol  Ferritin, Serum: 99 ng/mL (05-17-22 @ 07:37)  C-Reactive Protein, Serum: 61 mg/L (05-17-22 @ 07:37)  D-Dimer Assay, Quantitative: 267 ng/mL DDU (05-17-22 @ 07:37)  Procalcitonin, Serum: 0.03 ng/mL (05-17-22 @ 07:37)  venous duplex negative for DVT  blood and urine cultures negative and MRSA negative - other abx discontinued  leukocytosis improving  guarded prognosis  5/20: 7 L NC. downtitrate as tolerated. off & on Sinus tachy - monitor.   521: Will decrease to 6L via trach collar (30%). Off & on sinus tachy with borderline BP noted. Start 1/2NS @ 75 x 24 hours .  5/22: c/w 1/2 NS @ 75 given borderline BP and tachycardia. Downtitrate O2 as tolerated. c/w Dexa  5/23: Continues to improve. even when trach collar was far from the Trach, patient is saturating ok. downtitrate Oxygen percentage.   c/w 1/2NS @ 75. started metoprolol XL 50mg QD for BP and HR.   REpeat Covid PCR. [place] in 24-48 hours.     2. Chronic respiratory failure  s/p tracheostomy replacement in ED and tolerating trach collar - 35%  suction prn  on glycopyrrolate    3. SVC Thrombosis and Embolism - on Eliquis 5mg bid as outpt  on therapeutic lovenox as inpt    4. Microcytic anemia on iron, vitamin B12 and folate for suspected deficiencies    5. H/O Wernicke's encephalopathy on thiamine    6. HTN - on metoprolol 50mg bid as outpt - held due to sepsis  if SBP remains >100, restart at 25mg bid and titrate as tolerated    7. Functional quadriplegia    8. DVT prophylaxis - on therapeutic dose lovenox      PROGRESS NOTE HANDOFF    Pending: restart metoprolol if BP remains stable, labs in AM, pulse ox monitoring    Family discussion: medical staff updating sister    Disposition: [facility] in 24-48 hours.

## 2022-05-24 LAB
APPEARANCE UR: ABNORMAL
BACTERIA # UR AUTO: ABNORMAL
BILIRUB UR-MCNC: NEGATIVE — SIGNIFICANT CHANGE UP
COLOR SPEC: SIGNIFICANT CHANGE UP
COMMENT - URINE: SIGNIFICANT CHANGE UP
DIFF PNL FLD: NEGATIVE — SIGNIFICANT CHANGE UP
EPI CELLS # UR: 4 /HPF — SIGNIFICANT CHANGE UP (ref 0–5)
GLUCOSE BLDC GLUCOMTR-MCNC: 106 MG/DL — HIGH (ref 70–99)
GLUCOSE BLDC GLUCOMTR-MCNC: 140 MG/DL — HIGH (ref 70–99)
GLUCOSE BLDC GLUCOMTR-MCNC: 143 MG/DL — HIGH (ref 70–99)
GLUCOSE BLDC GLUCOMTR-MCNC: 90 MG/DL — SIGNIFICANT CHANGE UP (ref 70–99)
GLUCOSE UR QL: NEGATIVE — SIGNIFICANT CHANGE UP
KETONES UR-MCNC: NEGATIVE — SIGNIFICANT CHANGE UP
LEUKOCYTE ESTERASE UR-ACNC: ABNORMAL
NITRITE UR-MCNC: NEGATIVE — SIGNIFICANT CHANGE UP
PH UR: 8.5 — HIGH (ref 5–8)
PROT UR-MCNC: NEGATIVE — SIGNIFICANT CHANGE UP
RBC CASTS # UR COMP ASSIST: 1 /HPF — SIGNIFICANT CHANGE UP (ref 0–4)
SARS-COV-2 RNA SPEC QL NAA+PROBE: SIGNIFICANT CHANGE UP
SP GR SPEC: 1 — LOW (ref 1.01–1.03)
TRI-PHOS CRY UR QL COMP ASSIST: ABNORMAL
UROBILINOGEN FLD QL: SIGNIFICANT CHANGE UP
WBC UR QL: 5 /HPF — SIGNIFICANT CHANGE UP (ref 0–5)

## 2022-05-24 PROCEDURE — 71045 X-RAY EXAM CHEST 1 VIEW: CPT | Mod: 26

## 2022-05-24 PROCEDURE — 99233 SBSQ HOSP IP/OBS HIGH 50: CPT

## 2022-05-24 RX ADMIN — ENOXAPARIN SODIUM 60 MILLIGRAM(S): 100 INJECTION SUBCUTANEOUS at 17:21

## 2022-05-24 RX ADMIN — Medication 6 MILLIGRAM(S): at 05:04

## 2022-05-24 RX ADMIN — Medication 1 MILLIGRAM(S): at 11:59

## 2022-05-24 RX ADMIN — PANTOPRAZOLE SODIUM 40 MILLIGRAM(S): 20 TABLET, DELAYED RELEASE ORAL at 12:00

## 2022-05-24 RX ADMIN — ROBINUL 2 MILLIGRAM(S): 0.2 INJECTION INTRAMUSCULAR; INTRAVENOUS at 05:04

## 2022-05-24 RX ADMIN — SODIUM CHLORIDE 75 MILLILITER(S): 9 INJECTION, SOLUTION INTRAVENOUS at 10:40

## 2022-05-24 RX ADMIN — ROBINUL 2 MILLIGRAM(S): 0.2 INJECTION INTRAMUSCULAR; INTRAVENOUS at 14:57

## 2022-05-24 RX ADMIN — ROBINUL 2 MILLIGRAM(S): 0.2 INJECTION INTRAMUSCULAR; INTRAVENOUS at 21:44

## 2022-05-24 RX ADMIN — SCOPALAMINE 1 PATCH: 1 PATCH, EXTENDED RELEASE TRANSDERMAL at 18:10

## 2022-05-24 RX ADMIN — SCOPALAMINE 1 PATCH: 1 PATCH, EXTENDED RELEASE TRANSDERMAL at 06:00

## 2022-05-24 RX ADMIN — Medication 100 MILLIGRAM(S): at 05:03

## 2022-05-24 RX ADMIN — Medication 100 MILLIGRAM(S): at 17:21

## 2022-05-24 RX ADMIN — CYCLOBENZAPRINE HYDROCHLORIDE 5 MILLIGRAM(S): 10 TABLET, FILM COATED ORAL at 05:02

## 2022-05-24 RX ADMIN — CYCLOBENZAPRINE HYDROCHLORIDE 5 MILLIGRAM(S): 10 TABLET, FILM COATED ORAL at 14:54

## 2022-05-24 RX ADMIN — Medication 300 MILLIGRAM(S): at 12:00

## 2022-05-24 RX ADMIN — CYCLOBENZAPRINE HYDROCHLORIDE 5 MILLIGRAM(S): 10 TABLET, FILM COATED ORAL at 21:43

## 2022-05-24 RX ADMIN — ENOXAPARIN SODIUM 60 MILLIGRAM(S): 100 INJECTION SUBCUTANEOUS at 05:03

## 2022-05-24 RX ADMIN — CHLORHEXIDINE GLUCONATE 1 APPLICATION(S): 213 SOLUTION TOPICAL at 05:04

## 2022-05-24 RX ADMIN — PREGABALIN 500 MICROGRAM(S): 225 CAPSULE ORAL at 12:00

## 2022-05-24 NOTE — PROGRESS NOTE ADULT - SUBJECTIVE AND OBJECTIVE BOX
S: saturating ok  bed bound   upper limbs outturned.   lots of mucus secretions from trach and mouth. being suctioned frequently.  eyes open. looks at examiner but doesn't engage.     All other pertinent ROS negative.      05-23-22 @ 07:01  -  05-24-22 @ 07:00  --------------------------------------------------------  IN: 0 mL / OUT: 2330 mL / NET: -2330 mL    05-24-22 @ 07:01  -  05-24-22 @ 16:06  --------------------------------------------------------  IN: 0 mL / OUT: 500 mL / NET: -500 mL      Vital Signs Last 24 Hrs  T(C): 36.7 (24 May 2022 11:40), Max: 36.9 (24 May 2022 05:22)  T(F): 98 (24 May 2022 11:40), Max: 98.5 (24 May 2022 05:22)  HR: 106 (24 May 2022 11:40) (99 - 107)  BP: 97/52 (24 May 2022 11:40) (93/54 - 97/53)  BP(mean): --  RR: 19 (24 May 2022 11:40) (18 - 19)  SpO2: 99% (24 May 2022 11:40) (97% - 100%)  PHYSICAL EXAM:    Constitutional: NAD, as above . trach/PEG   HEENT: PERR, EOMI, Normal Hearing, MMM  Neck: Soft and supple, No LAD, No JVD  Respiratory: CTAB except occasional coarse breath sounds from upper airway/pharynx secretions.   Cardiovascular: S1 and S2, regular rate and rhythm, no Murmurs, gallops or rubs  Gastrointestinal: Bowel Sounds present, soft, nontender, nondistended, no guarding, no rebound  Extremities: No peripheral edema      MEDICATIONS:  MEDICATIONS  (STANDING):  ALBUTerol    90 MICROgram(s) HFA Inhaler 2 Puff(s) Inhalation every 6 hours  albuterol/ipratropium for Nebulization 3 milliLiter(s) Nebulizer every 6 hours  chlorhexidine 4% Liquid 1 Application(s) Topical <User Schedule>  cyanocobalamin 500 MICROGram(s) Oral daily  cyclobenzaprine Oral Tab/Cap - Peds 5 milliGRAM(s) Oral three times a day  dexAMETHasone  Injectable 6 milliGRAM(s) IV Push daily  enoxaparin Injectable 60 milliGRAM(s) SubCutaneous every 12 hours  ferrous    sulfate Liquid 300 milliGRAM(s) Enteral Tube daily  folic acid 1 milliGRAM(s) Oral daily  glycopyrrolate 2 milliGRAM(s) Oral three times a day  metoprolol succinate ER 50 milliGRAM(s) Oral daily  pantoprazole   Suspension 40 milliGRAM(s) Oral daily  scopolamine 1 mG/72 Hr(s) Patch 1 Patch Transdermal every 72 hours  thiamine  Oral Tab/Cap - Peds 100 milliGRAM(s) Oral two times a day      LABS: All Labs Reviewed:                        10.7   16.57 )-----------( 419      ( 23 May 2022 06:21 )             35.9     05-23    138  |  101  |  12  ----------------------------<  78  4.4   |  24  |  <0.5<L>    Ca    9.1      23 May 2022 06:21  Mg     1.8     05-23    TPro  5.8<L>  /  Alb  3.3<L>  /  TBili  <0.2  /  DBili  x   /  AST  36  /  ALT  66<H>  /  AlkPhos  82  05-23          Blood Culture:     Radiology: reviewed

## 2022-05-24 NOTE — PROGRESS NOTE ADULT - ASSESSMENT
23 y/o woman with PMH of history of encephalitis s/p tooth abscess, chronic respiratory failure s/p tracheostomy and PEG tube placement, HTN, GERD, and SVC thrombosis on apixaban was brought to the ED from [facility] on 05/16 for evaluation of a broken tracheostomy flange. Tracheostomy was replaced in the ED. She was found to be septic on presentation with leukocytosis and COVID positive.     1. Sepsis present on admission due to COVID 19  unvaccinated pt  ID consult and f/u appreciated  remdesivir x 5 days (Ended 5/22)  continue decadron 6mg IV daily (5/16 - 5/26)  isolation per protocol  Ferritin, Serum: 99 ng/mL (05-17-22 @ 07:37)  C-Reactive Protein, Serum: 61 mg/L (05-17-22 @ 07:37)  D-Dimer Assay, Quantitative: 267 ng/mL DDU (05-17-22 @ 07:37)  Procalcitonin, Serum: 0.03 ng/mL (05-17-22 @ 07:37)  venous duplex negative for DVT  blood and urine cultures negative and MRSA negative - other abx discontinued  leukocytosis improving  guarded prognosis  5/20: 7 L NC. downtitrate as tolerated. off & on Sinus tachy - monitor.   521: Will decrease to 6L via trach collar (30%). Off & on sinus tachy with borderline BP noted. Start 1/2NS @ 75 x 24 hours .  5/22: c/w 1/2 NS @ 75 given borderline BP and tachycardia. Downtitrate O2 as tolerated. c/w Dexa  5/23: Continues to improve. even when trach collar was far from the Trach, patient is saturating ok. downtitrate Oxygen percentage.   c/w 1/2NS @ 75. started metoprolol XL 50mg QD for BP and HR.   REpeat Covid PCR. [place] in 24-48 hours.     5/24: Trach to room air now. Saturating ok. lots of mucus secretions from trach and mouth. being suctioned frequently.  complete dexa course (may be changed to PO) until 5/26. BP, HR better. D/celine 1/2NS. Doing fine with just Metoprolol XL 50mg QD (instead of Metoprolol T 50 BID she was on at home)  REpeat CXR today looks stable.   Medically ready for transfer back to [place] on 5/25.       2. Chronic respiratory failure  s/p tracheostomy replacement in ED and tolerating trach collar - 35%  suction prn  on glycopyrrolate & Scopolamine patch.     3. SVC Thrombosis and Embolism - on Eliquis 5mg bid as outpt  on therapeutic lovenox as inpt. MAy change to PO Eliquis from 5/25.     4. Microcytic anemia on iron, vitamin B12 and folate for suspected deficiencies    5. H/O Wernicke's encephalopathy on thiamine    6. HTN - on metoprolol 50mg bid as outpt - held due to sepsis  if SBP remains >100, restart at 25mg bid and titrate as tolerated    7. Functional quadriplegia    8. DVT prophylaxis - on therapeutic dose lovenox      PROGRESS NOTE HANDOFF     Medically ready for transfer back to [place] on 5/25.   I discussed with sister Ketty. Family is ok in agreement with patient going to Long term care at [place].   Anticipated.

## 2022-05-25 LAB
ALBUMIN SERPL ELPH-MCNC: 3.6 G/DL — SIGNIFICANT CHANGE UP (ref 3.5–5.2)
ALP SERPL-CCNC: 98 U/L — SIGNIFICANT CHANGE UP (ref 30–115)
ALT FLD-CCNC: 65 U/L — HIGH (ref 0–41)
ANION GAP SERPL CALC-SCNC: 12 MMOL/L — SIGNIFICANT CHANGE UP (ref 7–14)
AST SERPL-CCNC: 23 U/L — SIGNIFICANT CHANGE UP (ref 0–41)
BILIRUB SERPL-MCNC: <0.2 MG/DL — SIGNIFICANT CHANGE UP (ref 0.2–1.2)
BUN SERPL-MCNC: 13 MG/DL — SIGNIFICANT CHANGE UP (ref 10–20)
CALCIUM SERPL-MCNC: 9.3 MG/DL — SIGNIFICANT CHANGE UP (ref 8.5–10.1)
CHLORIDE SERPL-SCNC: 101 MMOL/L — SIGNIFICANT CHANGE UP (ref 98–110)
CO2 SERPL-SCNC: 28 MMOL/L — SIGNIFICANT CHANGE UP (ref 17–32)
CREAT SERPL-MCNC: <0.5 MG/DL — LOW (ref 0.7–1.5)
EGFR: 143 ML/MIN/1.73M2 — SIGNIFICANT CHANGE UP
GLUCOSE BLDC GLUCOMTR-MCNC: 101 MG/DL — HIGH (ref 70–99)
GLUCOSE BLDC GLUCOMTR-MCNC: 108 MG/DL — HIGH (ref 70–99)
GLUCOSE BLDC GLUCOMTR-MCNC: 93 MG/DL — SIGNIFICANT CHANGE UP (ref 70–99)
GLUCOSE SERPL-MCNC: 119 MG/DL — HIGH (ref 70–99)
HCT VFR BLD CALC: 39.5 % — SIGNIFICANT CHANGE UP (ref 37–47)
HGB BLD-MCNC: 11.6 G/DL — LOW (ref 12–16)
MAGNESIUM SERPL-MCNC: 2.1 MG/DL — SIGNIFICANT CHANGE UP (ref 1.8–2.4)
MCHC RBC-ENTMCNC: 23.4 PG — LOW (ref 27–31)
MCHC RBC-ENTMCNC: 29.4 G/DL — LOW (ref 32–37)
MCV RBC AUTO: 79.6 FL — LOW (ref 81–99)
NRBC # BLD: 0 /100 WBCS — SIGNIFICANT CHANGE UP (ref 0–0)
PLATELET # BLD AUTO: 475 K/UL — HIGH (ref 130–400)
POTASSIUM SERPL-MCNC: 4.2 MMOL/L — SIGNIFICANT CHANGE UP (ref 3.5–5)
POTASSIUM SERPL-SCNC: 4.2 MMOL/L — SIGNIFICANT CHANGE UP (ref 3.5–5)
PROT SERPL-MCNC: 6.6 G/DL — SIGNIFICANT CHANGE UP (ref 6–8)
RBC # BLD: 4.96 M/UL — SIGNIFICANT CHANGE UP (ref 4.2–5.4)
RBC # FLD: 18.7 % — HIGH (ref 11.5–14.5)
SODIUM SERPL-SCNC: 141 MMOL/L — SIGNIFICANT CHANGE UP (ref 135–146)
WBC # BLD: 15.08 K/UL — HIGH (ref 4.8–10.8)
WBC # FLD AUTO: 15.08 K/UL — HIGH (ref 4.8–10.8)

## 2022-05-25 PROCEDURE — 93010 ELECTROCARDIOGRAM REPORT: CPT

## 2022-05-25 PROCEDURE — 99233 SBSQ HOSP IP/OBS HIGH 50: CPT

## 2022-05-25 RX ORDER — METOPROLOL TARTRATE 50 MG
50 TABLET ORAL
Refills: 0 | Status: DISCONTINUED | OUTPATIENT
Start: 2022-05-25 | End: 2022-06-02

## 2022-05-25 RX ORDER — IPRATROPIUM/ALBUTEROL SULFATE 18-103MCG
3 AEROSOL WITH ADAPTER (GRAM) INHALATION ONCE
Refills: 0 | Status: DISCONTINUED | OUTPATIENT
Start: 2022-05-25 | End: 2022-06-09

## 2022-05-25 RX ORDER — APIXABAN 2.5 MG/1
5 TABLET, FILM COATED ORAL
Refills: 0 | Status: DISCONTINUED | OUTPATIENT
Start: 2022-05-25 | End: 2022-06-09

## 2022-05-25 RX ORDER — ALBUTEROL 90 UG/1
2 AEROSOL, METERED ORAL EVERY 6 HOURS
Refills: 0 | Status: DISCONTINUED | OUTPATIENT
Start: 2022-05-25 | End: 2022-05-25

## 2022-05-25 RX ORDER — DEXAMETHASONE 0.5 MG/5ML
1 ELIXIR ORAL
Qty: 1 | Refills: 0
Start: 2022-05-25 | End: 2022-05-25

## 2022-05-25 RX ORDER — ACETAMINOPHEN 500 MG
2 TABLET ORAL
Qty: 0 | Refills: 0 | DISCHARGE

## 2022-05-25 RX ADMIN — Medication 100 MILLIGRAM(S): at 18:21

## 2022-05-25 RX ADMIN — ROBINUL 2 MILLIGRAM(S): 0.2 INJECTION INTRAMUSCULAR; INTRAVENOUS at 05:13

## 2022-05-25 RX ADMIN — SCOPALAMINE 1 PATCH: 1 PATCH, EXTENDED RELEASE TRANSDERMAL at 08:39

## 2022-05-25 RX ADMIN — CYCLOBENZAPRINE HYDROCHLORIDE 5 MILLIGRAM(S): 10 TABLET, FILM COATED ORAL at 22:04

## 2022-05-25 RX ADMIN — Medication 50 MILLIGRAM(S): at 05:12

## 2022-05-25 RX ADMIN — Medication 6 MILLIGRAM(S): at 05:12

## 2022-05-25 RX ADMIN — APIXABAN 5 MILLIGRAM(S): 2.5 TABLET, FILM COATED ORAL at 18:21

## 2022-05-25 RX ADMIN — CYCLOBENZAPRINE HYDROCHLORIDE 5 MILLIGRAM(S): 10 TABLET, FILM COATED ORAL at 05:12

## 2022-05-25 RX ADMIN — CHLORHEXIDINE GLUCONATE 1 APPLICATION(S): 213 SOLUTION TOPICAL at 05:13

## 2022-05-25 RX ADMIN — Medication 50 MILLIGRAM(S): at 18:23

## 2022-05-25 RX ADMIN — SCOPALAMINE 1 PATCH: 1 PATCH, EXTENDED RELEASE TRANSDERMAL at 19:28

## 2022-05-25 RX ADMIN — ROBINUL 2 MILLIGRAM(S): 0.2 INJECTION INTRAMUSCULAR; INTRAVENOUS at 22:05

## 2022-05-25 RX ADMIN — PREGABALIN 500 MICROGRAM(S): 225 CAPSULE ORAL at 12:01

## 2022-05-25 RX ADMIN — ENOXAPARIN SODIUM 60 MILLIGRAM(S): 100 INJECTION SUBCUTANEOUS at 05:14

## 2022-05-25 RX ADMIN — Medication 1 MILLIGRAM(S): at 12:01

## 2022-05-25 RX ADMIN — Medication 300 MILLIGRAM(S): at 12:02

## 2022-05-25 RX ADMIN — PANTOPRAZOLE SODIUM 40 MILLIGRAM(S): 20 TABLET, DELAYED RELEASE ORAL at 12:00

## 2022-05-25 RX ADMIN — CYCLOBENZAPRINE HYDROCHLORIDE 5 MILLIGRAM(S): 10 TABLET, FILM COATED ORAL at 13:18

## 2022-05-25 RX ADMIN — Medication 100 MILLIGRAM(S): at 05:12

## 2022-05-25 RX ADMIN — ROBINUL 2 MILLIGRAM(S): 0.2 INJECTION INTRAMUSCULAR; INTRAVENOUS at 13:18

## 2022-05-25 NOTE — PROGRESS NOTE ADULT - SUBJECTIVE AND OBJECTIVE BOX
CONSUELO CARO  [facility] T2-3A 023 A ([facility] T2-3A)      Patient was evaluated and examined  by bedside, remains tachycardic at rest, no hypoxia    REVIEW OF SYSTEMS:  unable to obtain, due to patient's non-verbal status      T(C): , Max: 37.6 (05-25-22 @ 05:12)  HR: 122 (05-25-22 @ 12:20)  BP: 115/59 (05-25-22 @ 12:20)  RR: 20 (05-25-22 @ 12:20)  SpO2: 100% (05-25-22 @ 12:20)  CAPILLARY BLOOD GLUCOSE      POCT Blood Glucose.: 101 mg/dL (25 May 2022 11:23)  POCT Blood Glucose.: 108 mg/dL (25 May 2022 07:33)  POCT Blood Glucose.: 106 mg/dL (24 May 2022 22:38)  POCT Blood Glucose.: 90 mg/dL (24 May 2022 17:35)      PHYSICAL EXAM:  General: NAD, Awake, patient is laying comfortably in bed  HEENT: AT, NC, Supple, NO JVD, NO CB, trach in place  Lungs: mild decreased breath sounds B/L, no wheezing, no rhonchi  CVS: normal S1, S2, RRR, NO M/G/R  Abdomen: soft, bowel sounds present, non-tender, non-distended, peg in place  Extremities: no edema, no clubbing, no cyanosis, positive peripheral pulses b/l  Neuro: 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  Skin: no rash, no ecchymosis      LAB  CBC  Date: 05-25-22 @ 07:20  Mean cell Fckmfiwhpf32.4  Mean cell Hemoglobin Conc29.4  Mean cell Volum 79.6  Platelet count-Automate 475  RBC Count 4.96  Red Cell Distrib Width18.7  WBC Count15.08  % Albumin, Urine--  Hematocrit 39.5  Hemoglobin 11.6  CBC  Date: 05-23-22 @ 06:21  Mean cell Wqiistetrp15.5  Mean cell Hemoglobin Conc29.8  Mean cell Volum 78.7  Platelet count-Automate 419  RBC Count 4.56  Red Cell Distrib Width17.9  WBC Count16.57  % Albumin, Urine--  Hematocrit 35.9  Hemoglobin 10.7  CBC  Date: 05-22-22 @ 06:25  Mean cell Riguqmjhyg58.3  Mean cell Hemoglobin Conc29.3  Mean cell Volum 79.2  Platelet count-Automate 427  RBC Count 4.43  Red Cell Distrib Width18.1  WBC Count15.65  % Albumin, Urine--  Hematocrit 35.1  Hemoglobin 10.3  CBC  Date: 05-21-22 @ 06:21  Mean cell Quvafybiuf55.5  Mean cell Hemoglobin Conc29.9  Mean cell Volum 78.6  Platelet count-Automate 473  RBC Count 4.63  Red Cell Distrib Width18.0  WBC Count14.61  % Albumin, Urine--  Hematocrit 36.4  Hemoglobin 10.9  CBC  Date: 05-20-22 @ 08:32  Mean cell Zamxguorac33.6  Mean cell Hemoglobin Conc29.9  Mean cell Volum 78.9  Platelet count-Automate 416  RBC Count 4.54  Red Cell Distrib Width17.7  WBC Count13.76  % Albumin, Urine--  Hematocrit 35.8  Hemoglobin 10.7    [facility]  05-25-22 @ 07:20  Blood Gas Arterial-Calcium,Ionized--  Blood Urea Nitrogen, Serum 13 mg/dL [10 - 20]  Carbon Dioxide, Serum28 mmol/L [17 - 32]  Chloride, Jgzpj935 mmol/L [98 - 110]  Creatinie, Serum<0.5 mg/dL<L> [0.7 - 1.5]  Glucose, Vhlex260 mg/dL<H> [70 - 99]  Potassium, Serum4.2 mmol/L [3.5 - 5.0]  Sodium, Serum 141 mmol/L [135 - 146]  [facility]  05-23-22 @ 06:21  Blood Gas Arterial-Calcium,Ionized--  Blood Urea Nitrogen, Serum 12 mg/dL [10 - 20]  Carbon Dioxide, Serum24 mmol/L [17 - 32]  Chloride, Fpmem994 mmol/L [98 - 110]  Creatinie, Serum<0.5 mg/dL<L> [0.7 - 1.5]  Glucose, Serum78 mg/dL [70 - 99]  Potassium, Serum4.4 mmol/L [3.5 - 5.0] [Slighty Hemolyzed use with Caution]  Sodium, Serum 138 mmol/L [135 - 146]  [facility]  05-22-22 @ 06:25  Blood Gas Arterial-Calcium,Ionized--  Blood Urea Nitrogen, Serum 13 mg/dL [10 - 20]  Carbon Dioxide, Serum28 mmol/L [17 - 32]  Chloride, Evluh134 mmol/L [98 - 110]  Creatinie, Serum<0.5 mg/dL<L> [0.7 - 1.5]  Glucose, Serum79 mg/dL [70 - 99]  Potassium, Serum4.4 mmol/L [3.5 - 5.0]  Sodium, Serum 139 mmol/L [135 - 146]  BMP  05-21-22 @ 06:21  Blood Gas Arterial-Calcium,Ionized--  Blood Urea Nitrogen, Serum 12 mg/dL [10 - 20]  Carbon Dioxide, Serum31 mmol/L [17 - 32]  Chloride, Knivo192 mmol/L [98 - 110]  Creatinie, Serum<0.5 mg/dL<L> [0.7 - 1.5]  Glucose, Yzhpv692 mg/dL<H> [70 - 99]  Potassium, Serum4.2 mmol/L [3.5 - 5.0]  Sodium, Serum 145 mmol/L [135 - 146]              Microbiology:    Culture - Urine (collected 05-16-22 @ 17:15)  Source: Clean Catch Clean Catch (Midstream)  Final Report (05-17-22 @ 21:05):    <10,000 CFU/mL Normal Urogenital Trinidad    Culture - Blood (collected 05-16-22 @ 12:28)  Source: .Blood Blood-Peripheral  Final Report (05-21-22 @ 23:00):    No Growth Final    Culture - Blood (collected 05-16-22 @ 12:09)  Source: .Blood Blood-Peripheral  Final Report (05-21-22 @ 23:00):    No Growth Final        Medications:  acetaminophen     Tablet .. 650 milliGRAM(s) Oral every 6 hours PRN  ALBUTerol    90 MICROgram(s) HFA Inhaler 2 Puff(s) Inhalation every 6 hours PRN  albuterol/ipratropium for Nebulization. 3 milliLiter(s) Nebulizer once PRN  chlorhexidine 4% Liquid 1 Application(s) Topical <User Schedule>  cyanocobalamin 500 MICROGram(s) Oral daily  cyclobenzaprine Oral Tab/Cap - Peds 5 milliGRAM(s) Oral three times a day  dexAMETHasone  Injectable 6 milliGRAM(s) IV Push daily  enoxaparin Injectable 60 milliGRAM(s) SubCutaneous every 12 hours  ferrous    sulfate Liquid 300 milliGRAM(s) Enteral Tube daily  folic acid 1 milliGRAM(s) Oral daily  glycopyrrolate 2 milliGRAM(s) Oral three times a day  metoclopramide   Syrup 10 milliGRAM(s) Oral every 6 hours PRN  metoprolol succinate ER 50 milliGRAM(s) Oral daily  pantoprazole   Suspension 40 milliGRAM(s) Oral daily  scopolamine 1 mG/72 Hr(s) Patch 1 Patch Transdermal every 72 hours  thiamine  Oral Tab/Cap - Peds 100 milliGRAM(s) Oral two times a day        Assessment and Plan:  Patient is a 23 y/o woman with PMH of history of encephalitis s/p tooth abscess, chronic respiratory failure s/p tracheostomy and PEG tube placement, HTN, GERD, and SVC thrombosis on apixaban was brought to the ED from [facility] on 05/16 for evaluation of a broken tracheostomy flange. Tracheostomy was replaced in the ED. She was found to be septic on presentation with leukocytosis and COVID positive.      Sepsis present on admission due to COVID 19  unvaccinated pt  ID consult and f/u appreciated  remdesivir x 5 days (Ended 5/22)  continue decadron 6mg IV daily (5/16 - 5/26)  isolation per protocol  venous duplex negative for DVT  blood and urine cultures negative and MRSA negative - other abx discontinued  leukocytosis improving  guarded prognosis    # Persistent tachycardia- obtain 12 lead EKG, obtain Free T3, T4 Levels  - most recent CXR- no acute changes, no hypoxia     Chronic respiratory failure  s/p tracheostomy replacement in ED and tolerating trach collar - 35%  suction prn  on glycopyrrolate & Scopolamine patch.     SVC Thrombosis and Embolism - on Eliquis 5mg bid as outpt  on therapeutic lovenox as inpt.,  change to PO Eliquis .     Microcytic anemia on iron, vitamin B12 and folate for suspected deficiencies    H/O Wernicke's encephalopathy on thiamine    HTN - on metoprolol 50mg bid as outpt - held due to sepsis      Chronic quadriplegia- continue freq. body position change, decub prevention tx.     DVT prophylaxis - on therapeutic dose lovenox/changed back to Eliquis tx.    #Progress Note Handoff: Pending 12 lead EKG, increase lopressor dose to optimize HR control  Family discussion: yes, medical team Disposition: SNF , waiting for insurance auth.     Total time spent to complete patient's bedside assessment, review medical chart, discuss medical plan of care with covering medical team was more than 35 minutes

## 2022-05-25 NOTE — DISCHARGE NOTE PROVIDER - NSDCFUADDINST_GEN_ALL_CORE_FT
NPO w tube feeds  Osmolite 1.5 Gilberto  Total Volume for 24 Hours (mL): 1400  Bolus  Total Volume of Bolus (mL): 350  Tube Feed Frequency: Every 6 hours  Bolus Feed Rate (mL per Hour): 58

## 2022-05-25 NOTE — DISCHARGE NOTE PROVIDER - HOSPITAL COURSE
23 y/o F with PMHx of encephalitis 2/2 tooth abscess s/p trach/PEG, HTN, GERD, h/o SVC thrombosis admitted for a broken trach flange and sepsis 2/2 COVID-19. Tx w remdesivir and Decadron. O2 was weaned down.    Patient stable for d/c back to [facility] HPI: 21 y/o F with a PMHx of encephalitis s/p tooth abscess s/p tracheostomy and PEG tube placement, HTN, GERD, and SVC thrombosis who was brought to ED from [place] on 5/16 for evaluation of a broken tracheostomy flange. Admitted for sepsis 2/2 COVID. Initially placed on trach collar 2-3LPM now back on RA. S/p Decadron and IV abx. Hospital course complicated for repeat sepsis 2/2 bacteremia and UTI w procal 12.3 (neg on admission). Restarted on IV abx    #Sepsis 2/2 MDR Klebsiella bacteremia  #Leukocytosis  - Febrile 101.3 F, WBC 30, tachycardic, concern for sepsis again on 5/27, no fevers since  - S/p 1L LR bolus  - Procal 5/27: 12.3 (neg on admission)  - Repeat RVP: neg  - BCx 5/27 + 5/28: MDR Klebsiella  - BCx 5/29 + 5/30 + 5/31: NGTD  - ID recs appreciated  - Trach cx 6/3: Klebsiella, Proteus ESBL  - C/w Fetroja 2g IV q8h (started 5/28) -- to complete 10 d from cleared BCx (end 6/7)    #History of Hypertension  #Persistent sinus tachycardia  - c/w metoprolol 100 mg PO BID  - EP - if HR <130 being sinus tachy its acceptable      #Sepsis present on admission 2/2 COVID pneumonia -- resolved  #Broken tracheostomy flange - resolved  - Trach replaced in the ED  - Unvaccinated COVID per sister  - Sepsis on presentation  - COVID 5/16: (+)  - CXR with left pleural effusion  - D-dimer: 267  - Procal: 0.03, CRP 61, , ferritin 9  - BCx, UCx: neg   - MRSA negative   - ID following  - S/p Decadron, RDV  - COVID 5/24 + 5/27: (-)    #HO SVC Thrombosis and Embolism   - C/w Eliquis 5 mg PO BID    #History of Encephalitis post Tooth Abscess in 10/2021   s/p Tracheostomy (has an O2 tank per sister but unsure about flow) and G-tube placement  - Monitor SaO2 and O2 requirements: as above    #GERD  * Home med Omeprazole 40mg QD  - C/w Protonix 40 mg PO daily HPI: 23 y/o F with a PMHx of encephalitis s/p tooth abscess s/p tracheostomy and PEG tube placement, HTN, GERD, and SVC thrombosis who was brought to ED from [place] on 5/16 for evaluation of a broken tracheostomy flange. Admitted for sepsis 2/2 COVID. Initially placed on trach collar 2-3LPM now back on RA. S/p Decadron and IV abx. Hospital course complicated for repeat sepsis 2/2 bacteremia and UTI w procal 12.3 (neg on admission). Restarted on IV abx    #Sepsis 2/2 MDR Klebsiella bacteremia  #Leukocytosis  - Febrile 101.3 F, WBC 30, tachycardic, concern for sepsis again on 5/27, no fevers since  - S/p 1L LR bolus  - Procal 5/27: 12.3 (neg on admission)  - Repeat RVP: neg  - BCx 5/27 + 5/28: MDR Klebsiella  - BCx 5/29 + 5/30 + 5/31: NGTD  - ID recs appreciated  - Trach cx 6/3: Klebsiella, Proteus ESBL  - C/w Fetroja 2g IV q8h (started 5/28) -- completed on 6/7    #History of Hypertension  #Persistent sinus tachycardia  - on metoprolol 100 mg PO BID  - EP - if HR <130 being sinus tachy its acceptable  - discharging on prior home dose of metoprolol 50mg BID for sinus tachy      #Sepsis present on admission 2/2 COVID pneumonia -- resolved  #Broken tracheostomy flange - resolved  - Trach replaced in the ED  - Unvaccinated COVID per sister  - Sepsis on presentation  - COVID 5/16: (+)  - CXR with left pleural effusion  - D-dimer: 267  - Procal: 0.03, CRP 61, , ferritin 9  - BCx, UCx: neg   - MRSA negative   - ID followed  - S/p Decadron, RDV  - COVID 5/24 + 5/27: (-)    #HO SVC Thrombosis and Embolism   - C/w Eliquis 5 mg PO BID     HPI: 21 y/o F with a PMHx of encephalitis s/p tooth abscess s/p tracheostomy and PEG tube placement, HTN, GERD, and SVC thrombosis who was brought to ED from [place] on 5/16 for evaluation of a broken tracheostomy flange. Admitted for sepsis 2/2 COVID. Initially placed on trach collar 2-3LPM now back on RA. S/p Decadron and IV abx. Hospital course complicated for repeat sepsis 2/2 bacteremia and UTI w procal 12.3 (neg on admission). Restarted on IV abx    #Sepsis 2/2 MDR Klebsiella bacteremia  #Leukocytosis  - Febrile 101.3 F, WBC 30, tachycardic, concern for sepsis again on 5/27, no fevers since  - S/p 1L LR bolus  - Procal 5/27: 12.3 (neg on admission)  - Repeat RVP: neg  - BCx 5/27 + 5/28: MDR Klebsiella  - BCx 5/29 + 5/30 + 5/31: NGTD  - ID recs appreciated  - Trach cx 6/3: Klebsiella, Proteus ESBL  - C/w Fetroja 2g IV q8h (started 5/28) -- completed on 6/7    #History of Hypertension  #Persistent sinus tachycardia  - on metoprolol 100 mg PO BID  - EP - if HR <130 being sinus tachy its acceptable  - has h/o prior sinus tachycardia (was on metoprolol T 50 BID at home) - will discharge pt on same dose      #Sepsis present on admission 2/2 COVID pneumonia -- resolved  #Broken tracheostomy flange - resolved  - Trach replaced in the ED  - Unvaccinated COVID per sister  - Sepsis on presentation  - COVID 5/16: (+)  - CXR with left pleural effusion  - D-dimer: 267  - Procal: 0.03, CRP 61, , ferritin 9  - BCx, UCx: neg   - MRSA negative   - ID followed  - S/p Decadron, RDV  - COVID 5/24 + 5/27: (-)    #HO SVC Thrombosis and Embolism   - C/w Eliquis 5 mg PO BID

## 2022-05-25 NOTE — DISCHARGE NOTE PROVIDER - NSDCMRMEDTOKEN_GEN_ALL_CORE_FT
albuterol 90 mcg/inh inhalation aerosol with adapter: 1 puff(s) inhaled every 4 hours  cyclobenzaprine 5 mg oral tablet: 1 tab(s) orally 3 times a day  Eliquis: 5 milligram(s) orally every 12 hours  ferrous sulfate 220 mg/5 mL (44 mg/5 mL elemental iron) oral elixir: 7.5 milliliter(s) orally once a day  folic acid 1 mg oral tablet: 1 tab(s) orally once a day  glycopyrrolate 2 mg oral tablet: 1 tab(s) orally 3 times a day  ipratropium 18 mcg/inh inhalation aerosol: 2 puff(s) inhaled every 6 hours  Lopressor 50 mg oral tablet: 1 tab(s) orally 2 times a day  metoclopramide 10 mg oral tablet: 1 tab(s) orally 4 times a day (before meals and at bedtime)  omeprazole 40 mg oral delayed release capsule: 1 cap(s) orally once a day  scopolamine 0.4 mg oral tablet: 1 milligram(s) orally every 72 hours  Tylenol 325 mg oral capsule: 2 cap(s) orally 3 times a day, As Needed  Vitamin B1 100 mg oral tablet: 1 tab(s) orally 2 times a day  Vitamin B12 500 mcg oral tablet: 1 tab(s) orally once a day   albuterol 90 mcg/inh inhalation aerosol with adapter: 1 puff(s) inhaled every 4 hours  cyclobenzaprine 5 mg oral tablet: 1 tab(s) orally 3 times a day  Eliquis: 5 milligram(s) orally every 12 hours  ferrous sulfate 220 mg/5 mL (44 mg/5 mL elemental iron) oral elixir: 7.5 milliliter(s) orally once a day  folic acid 1 mg oral tablet: 1 tab(s) orally once a day  glycopyrrolate 2 mg oral tablet: 1 tab(s) orally 3 times a day  ipratropium 18 mcg/inh inhalation aerosol: 2 puff(s) inhaled every 6 hours  Lopressor 50 mg oral tablet: 1 tab(s) orally 2 times a day  metoclopramide 10 mg oral tablet: 1 tab(s) orally 4 times a day (before meals and at bedtime)  midodrine 5 mg oral tablet: 1 tab(s) orally every 8 hours  omeprazole 40 mg oral delayed release capsule: 1 cap(s) orally once a day  scopolamine 0.4 mg oral tablet: 1 milligram(s) orally every 72 hours  Tylenol 325 mg oral capsule: 2 cap(s) orally 3 times a day, As Needed  Vitamin B1 100 mg oral tablet: 1 tab(s) orally 2 times a day  Vitamin B12 500 mcg oral tablet: 1 tab(s) orally once a day

## 2022-05-25 NOTE — DISCHARGE NOTE PROVIDER - NSDCCPCAREPLAN_GEN_ALL_CORE_FT
PRINCIPAL DISCHARGE DIAGNOSIS  Diagnosis: Acute sepsis  Assessment and Plan of Treatment: Seen for sepsis 2/2 covid. Tx w remdesivir and Decadron       PRINCIPAL DISCHARGE DIAGNOSIS  Diagnosis: Acute sepsis  Assessment and Plan of Treatment: Seen for sepsis 2/2 covid. Tx w remdesivir and Decadron. Resolved  Seen again for sepsis 2/2 Klebsiela MDR bacteremia. Tx w/ abx per ID. Resolved  Please continue to take meds as prescribed

## 2022-05-25 NOTE — DISCHARGE NOTE PROVIDER - INSTRUCTIONS
Osmolite 1.5 Gilberto  Total Volume for 24 Hours (mL): 1400  Bolus  Total Volume of Bolus (mL): 350  Tube Feed Frequency: Every 6 hours  Bolus Feed Rate (mL per Hour): 58 Osmolite 1.5 Gilberto  Total Volume for 24 Hours (mL): 1000  Bolus  Total Volume of Bolus (mL): 250  Tube Feed Frequency: Every 6 hours  Bolus Feed Rate (mL per Hour): 250

## 2022-05-26 LAB
ANION GAP SERPL CALC-SCNC: 16 MMOL/L — HIGH (ref 7–14)
BUN SERPL-MCNC: 11 MG/DL — SIGNIFICANT CHANGE UP (ref 10–20)
CALCIUM SERPL-MCNC: 9.4 MG/DL — SIGNIFICANT CHANGE UP (ref 8.5–10.1)
CHLORIDE SERPL-SCNC: 102 MMOL/L — SIGNIFICANT CHANGE UP (ref 98–110)
CK MB CFR SERPL CALC: <1 NG/ML — SIGNIFICANT CHANGE UP (ref 0.6–6.3)
CK SERPL-CCNC: 92 U/L — SIGNIFICANT CHANGE UP (ref 0–225)
CO2 SERPL-SCNC: 24 MMOL/L — SIGNIFICANT CHANGE UP (ref 17–32)
CREAT SERPL-MCNC: <0.5 MG/DL — LOW (ref 0.7–1.5)
D DIMER BLD IA.RAPID-MCNC: <150 NG/ML DDU — SIGNIFICANT CHANGE UP (ref 0–230)
EGFR: 143 ML/MIN/1.73M2 — SIGNIFICANT CHANGE UP
GLUCOSE BLDC GLUCOMTR-MCNC: 194 MG/DL — HIGH (ref 70–99)
GLUCOSE SERPL-MCNC: 99 MG/DL — SIGNIFICANT CHANGE UP (ref 70–99)
HCT VFR BLD CALC: 41.3 % — SIGNIFICANT CHANGE UP (ref 37–47)
HGB BLD-MCNC: 12.3 G/DL — SIGNIFICANT CHANGE UP (ref 12–16)
LACTATE SERPL-SCNC: 3.5 MMOL/L — HIGH (ref 0.7–2)
MCHC RBC-ENTMCNC: 23.4 PG — LOW (ref 27–31)
MCHC RBC-ENTMCNC: 29.8 G/DL — LOW (ref 32–37)
MCV RBC AUTO: 78.7 FL — LOW (ref 81–99)
NRBC # BLD: 0 /100 WBCS — SIGNIFICANT CHANGE UP (ref 0–0)
PLATELET # BLD AUTO: 459 K/UL — HIGH (ref 130–400)
POTASSIUM SERPL-MCNC: 4.9 MMOL/L — SIGNIFICANT CHANGE UP (ref 3.5–5)
POTASSIUM SERPL-SCNC: 4.9 MMOL/L — SIGNIFICANT CHANGE UP (ref 3.5–5)
RBC # BLD: 5.25 M/UL — SIGNIFICANT CHANGE UP (ref 4.2–5.4)
RBC # FLD: 19.2 % — HIGH (ref 11.5–14.5)
SODIUM SERPL-SCNC: 142 MMOL/L — SIGNIFICANT CHANGE UP (ref 135–146)
T4 FREE SERPL-MCNC: 2.2 NG/DL — HIGH (ref 0.9–1.8)
TROPONIN T SERPL-MCNC: <0.01 NG/ML — SIGNIFICANT CHANGE UP
WBC # BLD: 11.61 K/UL — HIGH (ref 4.8–10.8)
WBC # FLD AUTO: 11.61 K/UL — HIGH (ref 4.8–10.8)

## 2022-05-26 PROCEDURE — 71045 X-RAY EXAM CHEST 1 VIEW: CPT | Mod: 26

## 2022-05-26 PROCEDURE — 93010 ELECTROCARDIOGRAM REPORT: CPT

## 2022-05-26 PROCEDURE — 99233 SBSQ HOSP IP/OBS HIGH 50: CPT

## 2022-05-26 RX ORDER — PANTOPRAZOLE SODIUM 20 MG/1
40 TABLET, DELAYED RELEASE ORAL DAILY
Refills: 0 | Status: DISCONTINUED | OUTPATIENT
Start: 2022-05-26 | End: 2022-05-26

## 2022-05-26 RX ORDER — PANTOPRAZOLE SODIUM 20 MG/1
40 TABLET, DELAYED RELEASE ORAL DAILY
Refills: 0 | Status: DISCONTINUED | OUTPATIENT
Start: 2022-05-27 | End: 2022-06-09

## 2022-05-26 RX ORDER — MORPHINE SULFATE 50 MG/1
2 CAPSULE, EXTENDED RELEASE ORAL ONCE
Refills: 0 | Status: DISCONTINUED | OUTPATIENT
Start: 2022-05-26 | End: 2022-05-26

## 2022-05-26 RX ORDER — POLYETHYLENE GLYCOL 3350 17 G/17G
17 POWDER, FOR SOLUTION ORAL ONCE
Refills: 0 | Status: COMPLETED | OUTPATIENT
Start: 2022-05-26 | End: 2022-05-26

## 2022-05-26 RX ORDER — TRAMADOL HYDROCHLORIDE 50 MG/1
50 TABLET ORAL ONCE
Refills: 0 | Status: DISCONTINUED | OUTPATIENT
Start: 2022-05-26 | End: 2022-05-26

## 2022-05-26 RX ADMIN — SCOPALAMINE 1 PATCH: 1 PATCH, EXTENDED RELEASE TRANSDERMAL at 06:05

## 2022-05-26 RX ADMIN — SCOPALAMINE 1 PATCH: 1 PATCH, EXTENDED RELEASE TRANSDERMAL at 06:17

## 2022-05-26 RX ADMIN — Medication 5 MILLIGRAM(S): at 15:21

## 2022-05-26 RX ADMIN — APIXABAN 5 MILLIGRAM(S): 2.5 TABLET, FILM COATED ORAL at 17:39

## 2022-05-26 RX ADMIN — Medication 300 MILLIGRAM(S): at 13:13

## 2022-05-26 RX ADMIN — TRAMADOL HYDROCHLORIDE 50 MILLIGRAM(S): 50 TABLET ORAL at 09:42

## 2022-05-26 RX ADMIN — CYCLOBENZAPRINE HYDROCHLORIDE 5 MILLIGRAM(S): 10 TABLET, FILM COATED ORAL at 06:06

## 2022-05-26 RX ADMIN — ROBINUL 2 MILLIGRAM(S): 0.2 INJECTION INTRAMUSCULAR; INTRAVENOUS at 06:06

## 2022-05-26 RX ADMIN — CYCLOBENZAPRINE HYDROCHLORIDE 5 MILLIGRAM(S): 10 TABLET, FILM COATED ORAL at 21:26

## 2022-05-26 RX ADMIN — Medication 100 MILLIGRAM(S): at 17:57

## 2022-05-26 RX ADMIN — Medication 1 MILLIGRAM(S): at 13:06

## 2022-05-26 RX ADMIN — Medication 100 MILLIGRAM(S): at 06:05

## 2022-05-26 RX ADMIN — POLYETHYLENE GLYCOL 3350 17 GRAM(S): 17 POWDER, FOR SOLUTION ORAL at 14:13

## 2022-05-26 RX ADMIN — MORPHINE SULFATE 2 MILLIGRAM(S): 50 CAPSULE, EXTENDED RELEASE ORAL at 14:05

## 2022-05-26 RX ADMIN — PREGABALIN 500 MICROGRAM(S): 225 CAPSULE ORAL at 13:13

## 2022-05-26 RX ADMIN — Medication 50 MILLIGRAM(S): at 06:06

## 2022-05-26 RX ADMIN — Medication 50 MILLIGRAM(S): at 17:39

## 2022-05-26 RX ADMIN — CHLORHEXIDINE GLUCONATE 1 APPLICATION(S): 213 SOLUTION TOPICAL at 06:06

## 2022-05-26 RX ADMIN — SCOPALAMINE 1 PATCH: 1 PATCH, EXTENDED RELEASE TRANSDERMAL at 19:00

## 2022-05-26 RX ADMIN — APIXABAN 5 MILLIGRAM(S): 2.5 TABLET, FILM COATED ORAL at 06:05

## 2022-05-26 NOTE — PROGRESS NOTE ADULT - ASSESSMENT
#Sinus tachycardia  - No evidence of infection to suggest sepsis, afebrile, leukocytosis downtrending after completing Deadron  - D-dimer: neg; also on Eliquis, very low likelihood of DVT or PE    #Sepsis POA - resolved  #COVID Pneumonia   #Broken Tracheostomy Flange - Resolved  - Tracheostomy on 2L for chronic respiratory failure  - Unvaccinated COVID per sister  - Sepsis on presentation  - COVID positive  - CXR with left pleural effusion  - Trach replaced in the ED  - D-dimer; 267  - Procal: 0.03  - CRP 61,   - Ferritin 9  - BCx, UCx: neg   - MRSA negative   5/20-21 borderline BP with off and on sinus tach  - ID following  - wean Oxygen as toelrated (currently on 7L 98%)  - c/w IV Dexamethasone 6mg QD (started 5/16)  - c/w RDV (day #5/5)  - no antibiotics per ID  - oxygenation : to trach collar, 6L/min via trach collar (30-35%)  - c/w 1/2 NS @ 75 cc x24 hours (started 5/21    #HO SVC Thrombosis and Embolism   - C/w Eliquis 5 mg PO BID    #History of Encephalitis post Tooth Abscess in 10/2021   s/p Tracheostomy (has an O2 tank per sister but unsure about flow) and G-tube placement  - Monitor SaO2 and O2 requirements: as above    #GERD  * Home med Omeprazole 40mg QD  - continue Protonix 40mg QD    #Iron Deficiency Anemia   History of Wernicke Encephalopathy  * Home med iron replacement, thiamine, B12, and folic acid  * ED Hb 11.3, MCV 79.4  B12 and folate nl  - c/w home iron replacement, thiamine, B12, and folic acid  - monitor for now    #History of Hypertension  - BP on lower side here  - C/w metoprolol 50 mg PO BID    #DVT ppx: Eliquis 5 mg PO BID  #GI ppx: Protonix 40 mg PO daily  #Diet: NPO w G-tube feeds  #Activity: Functional quadriplegic  #Dispo: From [facility], acute pending improvement in tachycardia  #Code status: Full code -- has MOLST confirming #Sinus tachycardia  - No evidence of infection to suggest sepsis, afebrile, leukocytosis downtrending after completing Deadron  - D-dimer: neg; also on Eliquis, very low likelihood of DVT or PE  - EKG confirms sinus tach  - C/w Lopressor 50 mg PO BID  - F/u repeat EKG    #Sepsis POA - resolved  #COVID Pneumonia   #Broken Tracheostomy Flange - Resolved  - Tracheostomy on 2L for chronic respiratory failure  - Unvaccinated COVID per sister  - Sepsis on presentation  - COVID positive  - CXR with left pleural effusion  - Trach replaced in the ED  - D-dimer; 267  - Procal: 0.03  - CRP 61,   - Ferritin 9  - BCx, UCx: neg   - MRSA negative   - ID following  - S/p Decadron, RDV    #HO SVC Thrombosis and Embolism   - C/w Eliquis 5 mg PO BID    #History of Encephalitis post Tooth Abscess in 10/2021   s/p Tracheostomy (has an O2 tank per sister but unsure about flow) and G-tube placement  - Monitor SaO2 and O2 requirements: as above    #GERD  * Home med Omeprazole 40mg QD  - C/w Protonix 40 mg PO daily    #Iron Deficiency Anemia   History of Wernicke Encephalopathy  * Home med iron replacement, thiamine, B12, and folic acid  * ED Hb 11.3, MCV 79.4  B12 and folate nl  - C/w home iron replacement, thiamine, B12, and folic acid  - Monitor for now    #History of Hypertension  - BP on lower side here  - C/w metoprolol 50 mg PO BID    #DVT ppx: Eliquis 5 mg PO BID  #GI ppx: Protonix 40 mg PO daily  #Diet: NPO w G-tube feeds  #Activity: Functional quadriplegic  #Dispo: From [facility], acute pending improvement in tachycardia  #Code status: Full code -- has MOLST confirming

## 2022-05-26 NOTE — PROGRESS NOTE ADULT - SUBJECTIVE AND OBJECTIVE BOX
CONSUELO CARO 22y Female  MRN#: [id number]   CODE STATUS: Full code    Hospital Day: 10d    Patient is currently admitted with the primary diagnosis of COVID      SUBJECTIVE:  Hospital Course  Admitted for sepsis 2/2 COVID. Initially placed on trach collar 2-3LPM now back on RA. S/p Decadron and IV abx.    Patient seen and examined at bedside. No events overnight. ROS unobtainable    Present Today:   - Alejo:  No [  ], Yes [  ] : Indication:     - Type of IV Access:       .. CVC/Piccline:  No [  ], Yes [  ] : Indication:       .. Midline: No [  ], Yes [  ] : Indication:                                             ----------------------------------------------------------  OBJECTIVE:  PAST MEDICAL & SURGICAL HISTORY:                                            -----------------------------------------------------------  ALLERGIES:  Allergy Status Unknown                                            ------------------------------------------------------------    HOME MEDICATIONS:  Home Medications:  albuterol 90 mcg/inh inhalation aerosol with adapter: 1 puff(s) inhaled every 4 hours (16 May 2022 21:37)  cyclobenzaprine 5 mg oral tablet: 1 tab(s) orally 3 times a day (16 May 2022 21:36)  Eliquis: 5 milligram(s) orally every 12 hours (16 May 2022 21:39)  ferrous sulfate 220 mg/5 mL (44 mg/5 mL elemental iron) oral elixir: 7.5 milliliter(s) orally once a day (16 May 2022 21:38)  folic acid 1 mg oral tablet: 1 tab(s) orally once a day (16 May 2022 21:38)  glycopyrrolate 2 mg oral tablet: 1 tab(s) orally 3 times a day (16 May 2022 21:36)  ipratropium 18 mcg/inh inhalation aerosol: 2 puff(s) inhaled every 6 hours (16 May 2022 21:37)  Lopressor 50 mg oral tablet: 1 tab(s) orally 2 times a day (16 May 2022 21:39)  metoclopramide 10 mg oral tablet: 1 tab(s) orally 4 times a day (before meals and at bedtime) (16 May 2022 21:38)  omeprazole 40 mg oral delayed release capsule: 1 cap(s) orally once a day (16 May 2022 21:39)  scopolamine 0.4 mg oral tablet: 1 milligram(s) orally every 72 hours (16 May 2022 21:36)  Tylenol 325 mg oral capsule: 2 cap(s) orally 3 times a day, As Needed (16 May 2022 21:37)  Vitamin B1 100 mg oral tablet: 1 tab(s) orally 2 times a day (16 May 2022 21:38)  Vitamin B12 500 mcg oral tablet: 1 tab(s) orally once a day (16 May 2022 21:38)                           MEDICATIONS:  STANDING MEDICATIONS  apixaban 5 milliGRAM(s) Enteral Tube two times a day  chlorhexidine 4% Liquid 1 Application(s) Topical <User Schedule>  cyanocobalamin 500 MICROGram(s) Oral daily  cyclobenzaprine Oral Tab/Cap - Peds 5 milliGRAM(s) Oral three times a day  ferrous    sulfate Liquid 300 milliGRAM(s) Enteral Tube daily  folic acid 1 milliGRAM(s) Oral daily  metoprolol tartrate 50 milliGRAM(s) Enteral Tube two times a day  scopolamine 1 mG/72 Hr(s) Patch 1 Patch Transdermal every 72 hours  thiamine  Oral Tab/Cap - Peds 100 milliGRAM(s) Oral two times a day    PRN MEDICATIONS  acetaminophen     Tablet .. 650 milliGRAM(s) Oral every 6 hours PRN  albuterol/ipratropium for Nebulization. 3 milliLiter(s) Nebulizer once PRN  metoclopramide   Syrup 10 milliGRAM(s) Oral every 6 hours PRN                                            ------------------------------------------------------------  VITAL SIGNS: Last 24 Hours  T(C): 37.6 (26 May 2022 12:20), Max: 37.6 (26 May 2022 12:20)  T(F): 99.7 (26 May 2022 12:20), Max: 99.7 (26 May 2022 12:20)  HR: 127 (26 May 2022 12:20) (89 - 139)  BP: 98/57 (26 May 2022 12:20) (98/57 - 124/55)  BP(mean): --  RR: 19 (26 May 2022 12:20) (18 - 19)  SpO2: 90% (26 May 2022 12:20) (90% - 96%)      05-25-22 @ 07:01  -  05-26-22 @ 07:00  --------------------------------------------------------  IN: 600 mL / OUT: 250 mL / NET: 350 mL    05-26-22 @ 07:01  -  05-26-22 @ 16:08  --------------------------------------------------------  IN: 0 mL / OUT: 400 mL / NET: -400 mL                                             --------------------------------------------------------------  LABS:                        12.3   11.61 )-----------( 459      ( 26 May 2022 12:00 )             41.3     05-26    142  |  102  |  11  ----------------------------<  99  4.9   |  24  |  <0.5<L>    Ca    9.4      26 May 2022 12:00  Mg     2.1     05-25    TPro  6.6  /  Alb  3.6  /  TBili  <0.2  /  DBili  x   /  AST  23  /  ALT  65<H>  /  AlkPhos  98  05-25                                            --------------------------------------------------------------    PHYSICAL EXAM:  General: Appears comfortable  HEENT: Normocephalic, atraumatic. Trach collar in place  LUNGS: Clear to auscultation bilaterally, no wheezes, rales, rhonchi  HEART: S1S2 present, tachycardic  ABDOMEN: Soft, nontender, nondistended. G tube in place  EXT: No edema. Contracted upper and lower extremities

## 2022-05-26 NOTE — PROGRESS NOTE ADULT - SUBJECTIVE AND OBJECTIVE BOX
CONSUELO CARO  [facility] T2-3A 023 A ([facility] T2-3A)      Patient was evaluated and examined  by bedside, remains tachycardic      REVIEW OF SYSTEMS:  unable to obtain, patient is non-verbal      T(C): , Max: 37.6 (05-26-22 @ 12:20)  HR: 127 (05-26-22 @ 12:20)  BP: 98/57 (05-26-22 @ 12:20)  RR: 19 (05-26-22 @ 12:20)  SpO2: 90% (05-26-22 @ 12:20)  CAPILLARY BLOOD GLUCOSE      POCT Blood Glucose.: 194 mg/dL (26 May 2022 07:34)  POCT Blood Glucose.: 93 mg/dL (25 May 2022 17:45)      PHYSICAL EXAM:  General: NAD, Awake, patient is laying comfortably in bed  HEENT: AT, NC, Supple, NO JVD, NO CB, trach in place, occasionally shaking head  Lungs: mild decreased breath sounds B/L, no wheezing, no rhonchi  CVS: normal S1, S2, RRR, tachy,  NO M/G/R  Abdomen: soft, bowel sounds present, non-tender, non-distended, peg in place  Extremities: no edema, no clubbing, no cyanosis, positive peripheral pulses b/l  Neuro: 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  Skin: no rash, no ecchymosis        LAB  CBC  Date: 05-26-22 @ 12:00  Mean cell Tpfhkbugxl64.4  Mean cell Hemoglobin Conc29.8  Mean cell Volum 78.7  Platelet count-Automate 459  RBC Count 5.25  Red Cell Distrib Width19.2  WBC Count11.61  % Albumin, Urine--  Hematocrit 41.3  Hemoglobin 12.3  CBC  Date: 05-25-22 @ 07:20  Mean cell Zxltvvtbbd01.4  Mean cell Hemoglobin Conc29.4  Mean cell Volum 79.6  Platelet count-Automate 475  RBC Count 4.96  Red Cell Distrib Width18.7  WBC Count15.08  % Albumin, Urine--  Hematocrit 39.5  Hemoglobin 11.6  CBC  Date: 05-23-22 @ 06:21  Mean cell Hujsbhqxqv16.5  Mean cell Hemoglobin Conc29.8  Mean cell Volum 78.7  Platelet count-Automate 419  RBC Count 4.56  Red Cell Distrib Width17.9  WBC Count16.57  % Albumin, Urine--  Hematocrit 35.9  Hemoglobin 10.7  CBC  Date: 05-22-22 @ 06:25  Mean cell Auhlhgfgxk76.3  Mean cell Hemoglobin Conc29.3  Mean cell Volum 79.2  Platelet count-Automate 427  RBC Count 4.43  Red Cell Distrib Width18.1  WBC Count15.65  % Albumin, Urine--  Hematocrit 35.1  Hemoglobin 10.3  CBC  Date: 05-21-22 @ 06:21  Mean cell Wmvrtqrrhz46.5  Mean cell Hemoglobin Conc29.9  Mean cell Volum 78.6  Platelet count-Automate 473  RBC Count 4.63  Red Cell Distrib Width18.0  WBC Count14.61  % Albumin, Urine--  Hematocrit 36.4  Hemoglobin 10.9  CBC  Date: 05-20-22 @ 08:32  Mean cell Folytcbcxt40.6  Mean cell Hemoglobin Conc29.9  Mean cell Volum 78.9  Platelet count-Automate 416  RBC Count 4.54  Red Cell Distrib Width17.7  WBC Count13.76  % Albumin, Urine--  Hematocrit 35.8  Hemoglobin 10.7    [facility]  05-26-22 @ 12:00  Blood Gas Arterial-Calcium,Ionized--  Blood Urea Nitrogen, Serum 11 mg/dL [10 - 20]  Carbon Dioxide, Serum24 mmol/L [17 - 32]  Chloride, Itzkf020 mmol/L [98 - 110]  Creatinie, Serum<0.5 mg/dL<L> [0.7 - 1.5]  Glucose, Serum99 mg/dL [70 - 99]  Potassium, Serum4.9 mmol/L [3.5 - 5.0]  Sodium, Serum 142 mmol/L [135 - 146]  [facility]  05-25-22 @ 07:20  Blood Gas Arterial-Calcium,Ionized--  Blood Urea Nitrogen, Serum 13 mg/dL [10 - 20]  Carbon Dioxide, Serum28 mmol/L [17 - 32]  Chloride, Negii260 mmol/L [98 - 110]  Creatinie, Serum<0.5 mg/dL<L> [0.7 - 1.5]  Glucose, Cotlc579 mg/dL<H> [70 - 99]  Potassium, Serum4.2 mmol/L [3.5 - 5.0]  Sodium, Serum 141 mmol/L [135 - 146]  [facility]  05-23-22 @ 06:21  Blood Gas Arterial-Calcium,Ionized--  Blood Urea Nitrogen, Serum 12 mg/dL [10 - 20]  Carbon Dioxide, Serum24 mmol/L [17 - 32]  Chloride, Iqltp261 mmol/L [98 - 110]  Creatinie, Serum<0.5 mg/dL<L> [0.7 - 1.5]  Glucose, Serum78 mg/dL [70 - 99]  Potassium, Serum4.4 mmol/L [3.5 - 5.0] [Slighty Hemolyzed use with Caution]  Sodium, Serum 138 mmol/L [135 - 146]  BMP  05-22-22 @ 06:25  Blood Gas Arterial-Calcium,Ionized--  Blood Urea Nitrogen, Serum 13 mg/dL [10 - 20]  Carbon Dioxide, Serum28 mmol/L [17 - 32]  Chloride, Hmwfq746 mmol/L [98 - 110]  Creatinie, Serum<0.5 mg/dL<L> [0.7 - 1.5]  Glucose, Serum79 mg/dL [70 - 99]  Potassium, Serum4.4 mmol/L [3.5 - 5.0]  Sodium, Serum 139 mmol/L [135 - 146]              Microbiology:    Culture - Urine (collected 05-16-22 @ 17:15)  Source: Clean Catch Clean Catch (Midstream)  Final Report (05-17-22 @ 21:05):    <10,000 CFU/mL Normal Urogenital Trinidad    Culture - Blood (collected 05-16-22 @ 12:28)  Source: .Blood Blood-Peripheral  Final Report (05-21-22 @ 23:00):    No Growth Final    Culture - Blood (collected 05-16-22 @ 12:09)  Source: .Blood Blood-Peripheral  Final Report (05-21-22 @ 23:00):    No Growth Final        Medications:  acetaminophen     Tablet .. 650 milliGRAM(s) Oral every 6 hours PRN  albuterol/ipratropium for Nebulization. 3 milliLiter(s) Nebulizer once PRN  apixaban 5 milliGRAM(s) Enteral Tube two times a day  bisacodyl 5 milliGRAM(s) Oral once  chlorhexidine 4% Liquid 1 Application(s) Topical <User Schedule>  cyanocobalamin 500 MICROGram(s) Oral daily  cyclobenzaprine Oral Tab/Cap - Peds 5 milliGRAM(s) Oral three times a day  ferrous    sulfate Liquid 300 milliGRAM(s) Enteral Tube daily  folic acid 1 milliGRAM(s) Oral daily  metoclopramide   Syrup 10 milliGRAM(s) Oral every 6 hours PRN  metoprolol tartrate 50 milliGRAM(s) Enteral Tube two times a day  morphine  - Injectable 2 milliGRAM(s) IV Push once  polyethylene glycol 3350 17 Gram(s) Oral once  scopolamine 1 mG/72 Hr(s) Patch 1 Patch Transdermal every 72 hours  thiamine  Oral Tab/Cap - Peds 100 milliGRAM(s) Oral two times a day        Assessment and Plan:  Patient is a 23 y/o woman with PMH of history of encephalitis s/p tooth abscess, chronic respiratory failure s/p tracheostomy and PEG tube placement, HTN, GERD, and SVC thrombosis on apixaban was brought to the ED from [facility] on 05/16 for evaluation of a broken tracheostomy flange. Tracheostomy was replaced in the ED. She was found to be septic on presentation with leukocytosis and COVID positive.     #Sepsis present on admission due to COVID 19  unvaccinated pt  ID consult and f/u appreciated  remdesivir x 5 days (Ended 5/22)  completed decadron 6mg IV daily (5/16 - 5/25)  isolation per protocol  venous duplex negative for DVT  blood and urine cultures negative and MRSA negative - other abx discontinued  leukocytosis improving  guarded prognosis    # Persistent tachycardia- sinus tachy on 12 lead EKG- resumed on home dose of lopressor 50 mg via peg twice daily  -remains afebrile, with decreased leukocytosis, off steroids now   - obtain Free T3, T4 Levels  - most recent CXR- no acute changes, no hypoxia  -low ddimer level  -?possible pain- not responded to 1 dose of tramadol, will give 1 dose of IV Morphine , f/up HR  -good urine output  - start on Laxatives with ? constipation     Chronic respiratory failure  s/p tracheostomy replacement in ED and tolerating trach collar - 35%  suction prn  on glycopyrrolate & Scopolamine patch.     SVC Thrombosis and Embolism - on Eliquis 5mg bid as outpt  on therapeutic lovenox as inpt.,  changed to PO Eliquis .     Microcytic anemia on iron, vitamin B12 and folate for suspected deficiencies    H/O Wernicke's encephalopathy on thiamine    HTN - on metoprolol 50mg bid as outpt -restarted       Chronic quadriplegia- continue freq. body position change, decub prevention tx.     DVT prophylaxis - on Eliquis tx.    #Progress Note Handoff: will continue to monitor HR, pain management trial, f/up thyroid profile, started on laxatives  Family discussion: yes, medical team Disposition: SNF , waiting for insurance auth.     Total time spent to complete patient's bedside assessment, review medical chart, discuss medical plan of care with covering medical team was more than 35 minutes

## 2022-05-26 NOTE — CHART NOTE - NSCHARTNOTEFT_GEN_A_CORE
Registered Dietitian Follow-Up     Patient Profile Reviewed                           Yes [x]   No []  Nutrition History Previously Obtained        Yes [x]  No []      Pertinent Medical Interventions:  #Sepsis POA - resolved  #COVID Pneumonia   #Broken Tracheostomy Flange - Resolved    Nutrition Interval History:   current regimen providing 1500mL formula, 2250kcal, 94g pro, 1125mL water  No S/S of intolerance  **Patient meeting >125% of estimated energy needs in-house     Diet order:   Diet, NPO with Tube Feed:   Tube Feeding Modality: Gastrostomy  Osmolite 1.5 Gilberto  Total Volume for 24 Hours (mL): 1400  Bolus  Total Volume of Bolus (mL):  350  Tube Feed Frequency: Every 6 hours   Tube Feed Start Time: 23:00  Bolus Feed Rate (mL per Hour): 58   Bolus Feed Duration (in Hours): 24 (05-22-22 @ 21:34) [Active]    Anthropometrics:  Height (cm): 167.6 (05-19-22 @ 21:56)  Weight (kg): 55.2 (05-19-22 @ 21:56)  BMI (kg/m2): 19.7 (05-19-22 @ 21:56)    OTHER WEIGHTS:   Weight hx: Per RN, UBW is 61.27 KG - last checked on 4/7 per RN at NH. Current dosing weight is 56.7 KG; 7.45% unintentional wt loss in over 1 month is significant. Pt meets 1 criteria for malnutrition.   Using height of 167.64 cm received by RN at NH to calculate TF. No height documented in flowsheet.    MEDICATIONS  (STANDING):  thiamine  Oral Tab/Cap - Peds 100 milliGRAM(s) Oral two times a day  ferrous    sulfate Liquid 300 milliGRAM(s) Enteral Tube daily  folic acid 1 milliGRAM(s) Oral daily  cyanocobalamin 500 MICROGram(s) Oral daily    MEDICATIONS  (PRN):  acetaminophen     Tablet .. 650 milliGRAM(s) Oral every 6 hours PRN Temp greater or equal to 38C (100.4F), Mild Pain (1 - 3)  albuterol/ipratropium for Nebulization. 3 milliLiter(s) Nebulizer once PRN Shortness of Breath and/or Wheezing  metoclopramide   Syrup 10 milliGRAM(s) Oral every 6 hours PRN as needed    Pertinent Labs: 05-26 @ 12:00: Na 142, BUN 11, Cr <0.5<L>, BG 99, K+ 4.9, Phos --, Mg --, Alk Phos --, ALT/SGPT --, AST/SGOT --, HbA1c --    Finger Sticks:  POCT Blood Glucose.: 194 mg/dL (05-26 @ 07:34)  POCT Blood Glucose.: 93 mg/dL (05-25 @ 17:45)    Physical Findings:  - Appearance: confused,, trach dependent  - GI function: fecal incontinence. BM 5/21  - Tubes: PEG  - Oral/Mouth cavity: NPO with EN   - Skin: Intact  - Edema: none noted     Nutrition Requirements:  Using ABW 56.7 KG: ENERGY: 0763-2643 kcal/day (MSJ 1.1-1.3 AF); PROTEIN: 62-74 g/day (1.1-1.3 g/kg); FLUID: 3110-3639 mL/day (30-35 mL/kg) -- with consideration for weight loss PTA, age    Estimated Energy Needs    Continue [x]  Adjust []  Estimated Protein Needs    Continue [x]  Adjust []  Estimated Fluid Needs        Continue [x]  Adjust []    Nutrient Intake: >125%     [x] Previous Nutrition Diagnosis:            [] Ongoing          [x] Resolved  #1 Inadequate energy intake              [x] Ongoing          [] Resolved  #2 Excessive Energy Intake  R/T Current enteral nutrition order  AEB EN infusion providing >125% est energy/pro needs  Goal/Expected Outcome: EN regimen to meet >85% and <105% x6days    Nutrition Intervention:   Meals and Snacks, Medical Food Supplement, Vitamin Supplement, Nutrition Related Medication, Coordination of Care      Indicator/Monitoring:   Jessa Sagastume, #[id number] to monitor diet order, energy intake, food and nutrient intake, body composition, weight    Recommendations:  1. Pending diet order please approve  Diet, NPO with Tube Feed:   Tube Feeding Modality: Gastrostomy  Osmolite 1.5 Gilberto  Total Volume for 24 Hours (mL): 1000  Bolus  Total Volume of Bolus (mL):  250  Total # of Feeds: 4  Tube Feed Frequency: Every 6 hours   Tube Feed Start Time: 00:00  Bolus Feed Rate (mL per Hour): 250   Bolus Feed Duration (in Hours): 6  Bolus Feed Instructions:  -- Additional water flushes: 120 mL pre and post feeds (05-18-22 @ 17:09) [Pending Verification By Attending]    (provides: 1500 kcal/day, 63 g/pro, 762 mL free water from formula)    2. CONTINUE:  thiamine  Oral Tab/Cap - Peds 100 milliGRAM(s) Oral two times a day  ferrous    sulfate Liquid 300 milliGRAM(s) Enteral Tube daily  folic acid 1 milliGRAM(s) Oral daily  cyanocobalamin 500 MICROGram(s) Oral daily      MOD Risk, follow up x 6 days Registered Dietitian Follow-Up     Patient Profile Reviewed                           Yes [x]   No []  Nutrition History Previously Obtained        Yes [x]  No []      Pertinent Medical Interventions:  #Sepsis POA - resolved  #COVID Pneumonia   #Broken Tracheostomy Flange - Resolved    Nutrition Interval History:   current regimen providing 1500mL formula, 2250kcal, 94g pro, 1125mL water  No S/S of intolerance  **Patient meeting >125% of estimated energy needs in-house     Diet order:   Diet, NPO with Tube Feed:   Tube Feeding Modality: Gastrostomy  Osmolite 1.5 Gilberto  Total Volume for 24 Hours (mL): 1400  Bolus  Total Volume of Bolus (mL):  350  Tube Feed Frequency: Every 6 hours   Tube Feed Start Time: 23:00  Bolus Feed Rate (mL per Hour): 58   Bolus Feed Duration (in Hours): 24 (05-22-22 @ 21:34) [Active]    Anthropometrics:  Height (cm): 167.6 (05-19-22 @ 21:56)  Weight (kg): 55.2 (05-19-22 @ 21:56)  BMI (kg/m2): 19.7 (05-19-22 @ 21:56)    OTHER WEIGHTS:   Weight hx: Per RN, UBW is 61.27 KG - last checked on 4/7 per RN at NH. Current dosing weight is 56.7 KG; 7.45% unintentional wt loss in over 1 month is significant. Pt meets 1 criteria for malnutrition.   Using height of 167.64 cm received by RN at NH to calculate TF. No height documented in flowsheet.    MEDICATIONS  (STANDING):  thiamine  Oral Tab/Cap - Peds 100 milliGRAM(s) Oral two times a day  ferrous    sulfate Liquid 300 milliGRAM(s) Enteral Tube daily  folic acid 1 milliGRAM(s) Oral daily  cyanocobalamin 500 MICROGram(s) Oral daily    MEDICATIONS  (PRN):  acetaminophen     Tablet .. 650 milliGRAM(s) Oral every 6 hours PRN Temp greater or equal to 38C (100.4F), Mild Pain (1 - 3)  albuterol/ipratropium for Nebulization. 3 milliLiter(s) Nebulizer once PRN Shortness of Breath and/or Wheezing  metoclopramide   Syrup 10 milliGRAM(s) Oral every 6 hours PRN as needed    Pertinent Labs: 05-26 @ 12:00: Na 142, BUN 11, Cr <0.5<L>, BG 99, K+ 4.9, Phos --, Mg --, Alk Phos --, ALT/SGPT --, AST/SGOT --, HbA1c --    Finger Sticks:  POCT Blood Glucose.: 194 mg/dL (05-26 @ 07:34)  POCT Blood Glucose.: 93 mg/dL (05-25 @ 17:45)    Physical Findings:  - Appearance: confused,, trach dependent  - GI function: fecal incontinence. BM 5/21  - Tubes: PEG  - Oral/Mouth cavity: NPO with EN   - Skin: Intact  - Edema: none noted     Nutrition Requirements:  Using ABW 56.7 KG: ENERGY: 6773-4553 kcal/day (MSJ 1.1-1.3 AF); PROTEIN: 62-74 g/day (1.1-1.3 g/kg); FLUID: 8349-0046 mL/day (30-35 mL/kg) -- with consideration for weight loss PTA, age    Estimated Energy Needs    Continue [x]  Adjust []  Estimated Protein Needs    Continue [x]  Adjust []  Estimated Fluid Needs        Continue [x]  Adjust []    Nutrient Intake: >125%     [x] Previous Nutrition Diagnosis:            [] Ongoing          [x] Resolved  #1 Inadequate energy intake              [x] Ongoing          [] Resolved  #2 Excessive Energy Intake  R/T Current enteral nutrition order  AEB EN infusion providing >125% est energy/pro needs  Goal/Expected Outcome: EN regimen to meet >85% and <105% x6days    Nutrition Intervention:   Meals and Snacks, Medical Food Supplement, Vitamin Supplement, Nutrition Related Medication, Coordination of Care      Indicator/Monitoring:   Jessa Sagastume, #[id number] to monitor diet order, energy intake, food and nutrient intake, body composition, weight    Recommendations:  1. Pending diet order please approve  Diet, NPO with Tube Feed:   Tube Feeding Modality: Gastrostomy  Osmolite 1.5 Gilberto  Total Volume for 24 Hours (mL): 1000  Bolus  Total Volume of Bolus (mL):  250  Total # of Feeds: 4  Tube Feed Frequency: Every 6 hours   Tube Feed Start Time: 00:00  Bolus Feed Rate (mL per Hour): 250   Bolus Feed Duration (in Hours): 6  Bolus Feed Instructions:  -- Additional water flushes: 120 mL pre and post feeds (05-18-22 @ 17:09) [Pending Verification By Attending]    (provides: 1500 kcal/day, 63 g/pro, 762 mL free water from formula)    2. CONTINUE:  thiamine  Oral Tab/Cap - Peds 100 milliGRAM(s) Oral two times a day  ferrous    sulfate Liquid 300 milliGRAM(s) Enteral Tube daily  folic acid 1 milliGRAM(s) Oral daily  cyanocobalamin 500 MICROGram(s) Oral daily    3. RECOMMEND bowel regimen pt has not had BM x5days     MOD Risk, follow up x 6 days Registered Dietitian Follow-Up     Patient Profile Reviewed                           Yes [x]   No []  Nutrition History Previously Obtained        Yes [x]  No []      Pertinent Medical Interventions:  #Sepsis POA - resolved  #COVID Pneumonia   #Broken Tracheostomy Flange - Resolved    Nutrition Interval History:   current regimen providing 1500mL formula, 2100kcal, 87g pro, 1125mL water  No S/S of intolerance  **Patient meeting >125% of estimated energy needs in-house     Diet order:   Diet, NPO with Tube Feed:   Tube Feeding Modality: Gastrostomy  Osmolite 1.5 Gilberto  Total Volume for 24 Hours (mL): 1400  Bolus  Total Volume of Bolus (mL):  350  Tube Feed Frequency: Every 6 hours   Tube Feed Start Time: 23:00  Bolus Feed Rate (mL per Hour): 58   Bolus Feed Duration (in Hours): 24 (05-22-22 @ 21:34) [Active]    Anthropometrics:  Height (cm): 167.6 (05-19-22 @ 21:56)  Weight (kg): 55.2 (05-19-22 @ 21:56)  BMI (kg/m2): 19.7 (05-19-22 @ 21:56)    OTHER WEIGHTS:   Weight hx: Per RN, UBW is 61.27 KG - last checked on 4/7 per RN at NH. Current dosing weight is 56.7 KG; 7.45% unintentional wt loss in over 1 month is significant. Pt meets 1 criteria for malnutrition.   Using height of 167.64 cm received by RN at NH to calculate TF. No height documented in flowsheet.    MEDICATIONS  (STANDING):  thiamine  Oral Tab/Cap - Peds 100 milliGRAM(s) Oral two times a day  ferrous    sulfate Liquid 300 milliGRAM(s) Enteral Tube daily  folic acid 1 milliGRAM(s) Oral daily  cyanocobalamin 500 MICROGram(s) Oral daily    MEDICATIONS  (PRN):  acetaminophen     Tablet .. 650 milliGRAM(s) Oral every 6 hours PRN Temp greater or equal to 38C (100.4F), Mild Pain (1 - 3)  albuterol/ipratropium for Nebulization. 3 milliLiter(s) Nebulizer once PRN Shortness of Breath and/or Wheezing  metoclopramide   Syrup 10 milliGRAM(s) Oral every 6 hours PRN as needed    Pertinent Labs: 05-26 @ 12:00: Na 142, BUN 11, Cr <0.5<L>, BG 99, K+ 4.9, Phos --, Mg --, Alk Phos --, ALT/SGPT --, AST/SGOT --, HbA1c --    Finger Sticks:  POCT Blood Glucose.: 194 mg/dL (05-26 @ 07:34)  POCT Blood Glucose.: 93 mg/dL (05-25 @ 17:45)    Physical Findings:  - Appearance: confused,, trach dependent  - GI function: fecal incontinence. BM 5/21  - Tubes: PEG  - Oral/Mouth cavity: NPO with EN   - Skin: Intact  - Edema: none noted     Nutrition Requirements:  Using ABW 56.7 KG: ENERGY: 0601-9331 kcal/day (MSJ 1.1-1.3 AF); PROTEIN: 62-74 g/day (1.1-1.3 g/kg); FLUID: 1543-9167 mL/day (30-35 mL/kg) -- with consideration for weight loss PTA, age    Estimated Energy Needs    Continue [x]  Adjust []  Estimated Protein Needs    Continue [x]  Adjust []  Estimated Fluid Needs        Continue [x]  Adjust []    Nutrient Intake: >120%     [x] Previous Nutrition Diagnosis:            [] Ongoing          [x] Resolved  #1 Inadequate energy intake              [x] Ongoing          [] Resolved  #2 Excessive Energy Intake  R/T Current enteral nutrition order  AEB EN infusion providing >120% est energy/pro needs  Goal/Expected Outcome: EN regimen to meet >85% and <105% x6days    Nutrition Intervention:   Enteral Nutrition, Medical Food Supplement, Vitamin Supplement, Nutrition Related Medication, Coordination of Care      Indicator/Monitoring:   Jessa Sagastume, #[id number] to monitor diet order, energy intake, food and nutrient intake, body composition, weight    Recommendations:  1. Recommend Osmolite 15 bolus @250mL Q6hr (4x)   (provides: 1500 kcal/day, 63 g/pro, 762 mL free water from formula)  ***Pending diet order please approve  Diet, NPO with Tube Feed:   Tube Feeding Modality: Gastrostomy  Osmolite 1.5 Gilberto  Total Volume for 24 Hours (mL): 1000  Bolus  Total Volume of Bolus (mL):  250  Total # of Feeds: 4  Tube Feed Frequency: Every 6 hours   Tube Feed Start Time: 00:00  Bolus Feed Rate (mL per Hour): 250   Bolus Feed Duration (in Hours): 6  Bolus Feed Instructions:  -- Additional water flushes: 120 mL pre and post feeds (05-18-22 @ 17:09) [Pending Verification By Attending]    2. CONTINUE:  thiamine  Oral Tab/Cap - Peds 100 milliGRAM(s) Oral two times a day  ferrous    sulfate Liquid 300 milliGRAM(s) Enteral Tube daily  folic acid 1 milliGRAM(s) Oral daily  cyanocobalamin 500 MICROGram(s) Oral daily    3. RECOMMEND bowel regimen pt has not had BM x5days     MOD Risk, follow up x 6 days

## 2022-05-27 LAB
ALBUMIN SERPL ELPH-MCNC: 3.7 G/DL — SIGNIFICANT CHANGE UP (ref 3.5–5.2)
ALP SERPL-CCNC: 146 U/L — HIGH (ref 30–115)
ALT FLD-CCNC: 80 U/L — HIGH (ref 0–41)
ANION GAP SERPL CALC-SCNC: 15 MMOL/L — HIGH (ref 7–14)
ANISOCYTOSIS BLD QL: SIGNIFICANT CHANGE UP
APPEARANCE UR: ABNORMAL
AST SERPL-CCNC: 32 U/L — SIGNIFICANT CHANGE UP (ref 0–41)
BACTERIA # UR AUTO: NEGATIVE — SIGNIFICANT CHANGE UP
BASOPHILS # BLD AUTO: 0 K/UL — SIGNIFICANT CHANGE UP (ref 0–0.2)
BASOPHILS NFR BLD AUTO: 0 % — SIGNIFICANT CHANGE UP (ref 0–1)
BILIRUB SERPL-MCNC: 0.2 MG/DL — SIGNIFICANT CHANGE UP (ref 0.2–1.2)
BILIRUB UR-MCNC: NEGATIVE — SIGNIFICANT CHANGE UP
BUN SERPL-MCNC: 15 MG/DL — SIGNIFICANT CHANGE UP (ref 10–20)
CALCIUM SERPL-MCNC: 8.9 MG/DL — SIGNIFICANT CHANGE UP (ref 8.5–10.1)
CHLORIDE SERPL-SCNC: 100 MMOL/L — SIGNIFICANT CHANGE UP (ref 98–110)
CK MB CFR SERPL CALC: 1.9 NG/ML — SIGNIFICANT CHANGE UP (ref 0.6–6.3)
CK SERPL-CCNC: 83 U/L — SIGNIFICANT CHANGE UP (ref 0–225)
CO2 SERPL-SCNC: 26 MMOL/L — SIGNIFICANT CHANGE UP (ref 17–32)
COLOR SPEC: YELLOW — SIGNIFICANT CHANGE UP
COMMENT - URINE: SIGNIFICANT CHANGE UP
CREAT SERPL-MCNC: <0.5 MG/DL — LOW (ref 0.7–1.5)
DIFF PNL FLD: ABNORMAL
EGFR: 143 ML/MIN/1.73M2 — SIGNIFICANT CHANGE UP
EOSINOPHIL # BLD AUTO: 0 K/UL — SIGNIFICANT CHANGE UP (ref 0–0.7)
EOSINOPHIL NFR BLD AUTO: 0 % — SIGNIFICANT CHANGE UP (ref 0–8)
EPI CELLS # UR: 27 /HPF — HIGH (ref 0–5)
ERYTHROCYTE [SEDIMENTATION RATE] IN BLOOD: 46 MM/HR — HIGH (ref 0–20)
GIANT PLATELETS BLD QL SMEAR: PRESENT — SIGNIFICANT CHANGE UP
GLUCOSE BLDC GLUCOMTR-MCNC: 174 MG/DL — HIGH (ref 70–99)
GLUCOSE SERPL-MCNC: 142 MG/DL — HIGH (ref 70–99)
GLUCOSE UR QL: NEGATIVE — SIGNIFICANT CHANGE UP
HCT VFR BLD CALC: 40.8 % — SIGNIFICANT CHANGE UP (ref 37–47)
HGB BLD-MCNC: 12.1 G/DL — SIGNIFICANT CHANGE UP (ref 12–16)
HYALINE CASTS # UR AUTO: 20 /LPF — HIGH (ref 0–7)
KETONES UR-MCNC: SIGNIFICANT CHANGE UP
LACTATE SERPL-SCNC: 1.8 MMOL/L — SIGNIFICANT CHANGE UP (ref 0.7–2)
LEUKOCYTE ESTERASE UR-ACNC: ABNORMAL
LYMPHOCYTES # BLD AUTO: 2.11 K/UL — SIGNIFICANT CHANGE UP (ref 1.2–3.4)
LYMPHOCYTES # BLD AUTO: 7 % — LOW (ref 20.5–51.1)
MANUAL SMEAR VERIFICATION: SIGNIFICANT CHANGE UP
MCHC RBC-ENTMCNC: 23.6 PG — LOW (ref 27–31)
MCHC RBC-ENTMCNC: 29.7 G/DL — LOW (ref 32–37)
MCV RBC AUTO: 79.5 FL — LOW (ref 81–99)
MICROCYTES BLD QL: SLIGHT — SIGNIFICANT CHANGE UP
MONOCYTES # BLD AUTO: 0.54 K/UL — SIGNIFICANT CHANGE UP (ref 0.1–0.6)
MONOCYTES NFR BLD AUTO: 1.8 % — SIGNIFICANT CHANGE UP (ref 1.7–9.3)
NEUTROPHILS # BLD AUTO: 27.5 K/UL — HIGH (ref 1.4–6.5)
NEUTROPHILS NFR BLD AUTO: 91.2 % — HIGH (ref 42.2–75.2)
NITRITE UR-MCNC: NEGATIVE — SIGNIFICANT CHANGE UP
PH UR: 8.5 — HIGH (ref 5–8)
PLAT MORPH BLD: ABNORMAL
PLATELET # BLD AUTO: 371 K/UL — SIGNIFICANT CHANGE UP (ref 130–400)
POLYCHROMASIA BLD QL SMEAR: SIGNIFICANT CHANGE UP
POTASSIUM SERPL-MCNC: 4.5 MMOL/L — SIGNIFICANT CHANGE UP (ref 3.5–5)
POTASSIUM SERPL-SCNC: 4.5 MMOL/L — SIGNIFICANT CHANGE UP (ref 3.5–5)
PROT SERPL-MCNC: 6.6 G/DL — SIGNIFICANT CHANGE UP (ref 6–8)
PROT UR-MCNC: ABNORMAL
RBC # BLD: 5.13 M/UL — SIGNIFICANT CHANGE UP (ref 4.2–5.4)
RBC # FLD: 19.1 % — HIGH (ref 11.5–14.5)
RBC BLD AUTO: ABNORMAL
RBC CASTS # UR COMP ASSIST: 56 /HPF — HIGH (ref 0–4)
SODIUM SERPL-SCNC: 141 MMOL/L — SIGNIFICANT CHANGE UP (ref 135–146)
SP GR SPEC: 1.02 — SIGNIFICANT CHANGE UP (ref 1.01–1.03)
T3FREE SERPL-MCNC: 3.69 PG/ML — SIGNIFICANT CHANGE UP (ref 1.8–4.6)
TROPONIN T SERPL-MCNC: <0.01 NG/ML — SIGNIFICANT CHANGE UP
UROBILINOGEN FLD QL: ABNORMAL
WBC # BLD: 30.15 K/UL — HIGH (ref 4.8–10.8)
WBC # FLD AUTO: 30.15 K/UL — HIGH (ref 4.8–10.8)
WBC UR QL: 87 /HPF — HIGH (ref 0–5)

## 2022-05-27 PROCEDURE — 71045 X-RAY EXAM CHEST 1 VIEW: CPT | Mod: 26

## 2022-05-27 PROCEDURE — 74018 RADEX ABDOMEN 1 VIEW: CPT | Mod: 26

## 2022-05-27 PROCEDURE — 99233 SBSQ HOSP IP/OBS HIGH 50: CPT

## 2022-05-27 RX ORDER — POLYETHYLENE GLYCOL 3350 17 G/17G
17 POWDER, FOR SOLUTION ORAL ONCE
Refills: 0 | Status: COMPLETED | OUTPATIENT
Start: 2022-05-27 | End: 2022-05-27

## 2022-05-27 RX ORDER — SODIUM CHLORIDE 9 MG/ML
1000 INJECTION INTRAMUSCULAR; INTRAVENOUS; SUBCUTANEOUS
Refills: 0 | Status: DISCONTINUED | OUTPATIENT
Start: 2022-05-27 | End: 2022-05-30

## 2022-05-27 RX ORDER — LACTULOSE 10 G/15ML
10 SOLUTION ORAL
Refills: 0 | Status: DISCONTINUED | OUTPATIENT
Start: 2022-05-27 | End: 2022-05-28

## 2022-05-27 RX ORDER — SODIUM CHLORIDE 9 MG/ML
1000 INJECTION, SOLUTION INTRAVENOUS ONCE
Refills: 0 | Status: COMPLETED | OUTPATIENT
Start: 2022-05-27 | End: 2022-05-27

## 2022-05-27 RX ORDER — CEFTRIAXONE 500 MG/1
1000 INJECTION, POWDER, FOR SOLUTION INTRAMUSCULAR; INTRAVENOUS EVERY 24 HOURS
Refills: 0 | Status: DISCONTINUED | OUTPATIENT
Start: 2022-05-27 | End: 2022-05-28

## 2022-05-27 RX ORDER — SENNA PLUS 8.6 MG/1
2 TABLET ORAL AT BEDTIME
Refills: 0 | Status: DISCONTINUED | OUTPATIENT
Start: 2022-05-27 | End: 2022-05-28

## 2022-05-27 RX ORDER — METRONIDAZOLE 500 MG
500 TABLET ORAL EVERY 8 HOURS
Refills: 0 | Status: DISCONTINUED | OUTPATIENT
Start: 2022-05-27 | End: 2022-05-28

## 2022-05-27 RX ADMIN — LACTULOSE 10 GRAM(S): 10 SOLUTION ORAL at 17:44

## 2022-05-27 RX ADMIN — SODIUM CHLORIDE 100 MILLILITER(S): 9 INJECTION INTRAMUSCULAR; INTRAVENOUS; SUBCUTANEOUS at 12:01

## 2022-05-27 RX ADMIN — Medication 300 MILLIGRAM(S): at 12:01

## 2022-05-27 RX ADMIN — Medication 5 MILLIGRAM(S): at 10:03

## 2022-05-27 RX ADMIN — CYCLOBENZAPRINE HYDROCHLORIDE 5 MILLIGRAM(S): 10 TABLET, FILM COATED ORAL at 06:03

## 2022-05-27 RX ADMIN — SODIUM CHLORIDE 1000 MILLILITER(S): 9 INJECTION, SOLUTION INTRAVENOUS at 09:03

## 2022-05-27 RX ADMIN — CEFTRIAXONE 100 MILLIGRAM(S): 500 INJECTION, POWDER, FOR SOLUTION INTRAMUSCULAR; INTRAVENOUS at 12:01

## 2022-05-27 RX ADMIN — SCOPALAMINE 1 PATCH: 1 PATCH, EXTENDED RELEASE TRANSDERMAL at 06:07

## 2022-05-27 RX ADMIN — PREGABALIN 500 MICROGRAM(S): 225 CAPSULE ORAL at 12:02

## 2022-05-27 RX ADMIN — POLYETHYLENE GLYCOL 3350 17 GRAM(S): 17 POWDER, FOR SOLUTION ORAL at 10:04

## 2022-05-27 RX ADMIN — Medication 50 MILLIGRAM(S): at 17:15

## 2022-05-27 RX ADMIN — CHLORHEXIDINE GLUCONATE 1 APPLICATION(S): 213 SOLUTION TOPICAL at 06:04

## 2022-05-27 RX ADMIN — CYCLOBENZAPRINE HYDROCHLORIDE 5 MILLIGRAM(S): 10 TABLET, FILM COATED ORAL at 14:26

## 2022-05-27 RX ADMIN — APIXABAN 5 MILLIGRAM(S): 2.5 TABLET, FILM COATED ORAL at 17:16

## 2022-05-27 RX ADMIN — Medication 100 MILLIGRAM(S): at 06:03

## 2022-05-27 RX ADMIN — APIXABAN 5 MILLIGRAM(S): 2.5 TABLET, FILM COATED ORAL at 06:03

## 2022-05-27 RX ADMIN — PANTOPRAZOLE SODIUM 40 MILLIGRAM(S): 20 TABLET, DELAYED RELEASE ORAL at 12:02

## 2022-05-27 RX ADMIN — Medication 100 MILLIGRAM(S): at 14:26

## 2022-05-27 RX ADMIN — Medication 100 MILLIGRAM(S): at 21:09

## 2022-05-27 RX ADMIN — CYCLOBENZAPRINE HYDROCHLORIDE 5 MILLIGRAM(S): 10 TABLET, FILM COATED ORAL at 21:03

## 2022-05-27 RX ADMIN — Medication 100 MILLIGRAM(S): at 17:15

## 2022-05-27 RX ADMIN — SENNA PLUS 2 TABLET(S): 8.6 TABLET ORAL at 21:03

## 2022-05-27 RX ADMIN — Medication 50 MILLIGRAM(S): at 06:04

## 2022-05-27 RX ADMIN — LACTULOSE 10 GRAM(S): 10 SOLUTION ORAL at 10:02

## 2022-05-27 RX ADMIN — Medication 1 MILLIGRAM(S): at 12:02

## 2022-05-27 NOTE — PROGRESS NOTE ADULT - SUBJECTIVE AND OBJECTIVE BOX
CONSUELO CARO 22y Female  MRN#: [id number]   CODE STATUS: Full code    Hospital Day: 11d    Patient is currently admitted with the primary diagnosis of COVID      SUBJECTIVE:  Hospital Course  Admitted for sepsis 2/2 COVID. Initially placed on trach collar 2-3LPM now back on RA. S/p Decadron and IV abx.    Patient seen and examined at bedside. Overnight patient was febrile to 101.3 F, tachy to 104, and hypotensive to 96/55.    Present Today:   - Alejo:  No [  ], Yes [ x ] : Indication: chronic Alejo    - Type of IV Access:       .. CVC/Piccline:  No [ x ], Yes [  ] : Indication:       .. Midline: No [ x ], Yes [  ] : Indication:                                             ----------------------------------------------------------  OBJECTIVE:  PAST MEDICAL & SURGICAL HISTORY:                                            -----------------------------------------------------------  ALLERGIES:  Allergy Status Unknown                                            ------------------------------------------------------------    HOME MEDICATIONS:  Home Medications:  albuterol 90 mcg/inh inhalation aerosol with adapter: 1 puff(s) inhaled every 4 hours (16 May 2022 21:37)  cyclobenzaprine 5 mg oral tablet: 1 tab(s) orally 3 times a day (16 May 2022 21:36)  Eliquis: 5 milligram(s) orally every 12 hours (16 May 2022 21:39)  ferrous sulfate 220 mg/5 mL (44 mg/5 mL elemental iron) oral elixir: 7.5 milliliter(s) orally once a day (16 May 2022 21:38)  folic acid 1 mg oral tablet: 1 tab(s) orally once a day (16 May 2022 21:38)  glycopyrrolate 2 mg oral tablet: 1 tab(s) orally 3 times a day (16 May 2022 21:36)  ipratropium 18 mcg/inh inhalation aerosol: 2 puff(s) inhaled every 6 hours (16 May 2022 21:37)  Lopressor 50 mg oral tablet: 1 tab(s) orally 2 times a day (16 May 2022 21:39)  metoclopramide 10 mg oral tablet: 1 tab(s) orally 4 times a day (before meals and at bedtime) (16 May 2022 21:38)  omeprazole 40 mg oral delayed release capsule: 1 cap(s) orally once a day (16 May 2022 21:39)  scopolamine 0.4 mg oral tablet: 1 milligram(s) orally every 72 hours (16 May 2022 21:36)  Tylenol 325 mg oral capsule: 2 cap(s) orally 3 times a day, As Needed (16 May 2022 21:37)  Vitamin B1 100 mg oral tablet: 1 tab(s) orally 2 times a day (16 May 2022 21:38)  Vitamin B12 500 mcg oral tablet: 1 tab(s) orally once a day (16 May 2022 21:38)                           MEDICATIONS:  STANDING MEDICATIONS  apixaban 5 milliGRAM(s) Enteral Tube two times a day  bisacodyl 5 milliGRAM(s) Oral daily  cefTRIAXone   IVPB 1000 milliGRAM(s) IV Intermittent every 24 hours  chlorhexidine 4% Liquid 1 Application(s) Topical <User Schedule>  cyanocobalamin 500 MICROGram(s) Oral daily  cyclobenzaprine Oral Tab/Cap - Peds 5 milliGRAM(s) Oral three times a day  ferrous    sulfate Liquid 300 milliGRAM(s) Enteral Tube daily  folic acid 1 milliGRAM(s) Oral daily  lactulose Syrup 10 Gram(s) Oral two times a day  metoprolol tartrate 50 milliGRAM(s) Enteral Tube two times a day  metroNIDAZOLE  IVPB 500 milliGRAM(s) IV Intermittent every 8 hours  pantoprazole   Suspension 40 milliGRAM(s) Enteral Tube daily  scopolamine 1 mG/72 Hr(s) Patch 1 Patch Transdermal every 72 hours  senna 2 Tablet(s) Oral at bedtime  thiamine  Oral Tab/Cap - Peds 100 milliGRAM(s) Oral two times a day    PRN MEDICATIONS  acetaminophen     Tablet .. 650 milliGRAM(s) Oral every 6 hours PRN  albuterol/ipratropium for Nebulization. 3 milliLiter(s) Nebulizer once PRN  metoclopramide   Syrup 10 milliGRAM(s) Oral every 6 hours PRN                                            ------------------------------------------------------------  VITAL SIGNS: Last 24 Hours  T(C): 38.5 (26 May 2022 20:46), Max: 38.5 (26 May 2022 20:46)  T(F): 101.3 (26 May 2022 20:46), Max: 101.3 (26 May 2022 20:46)  HR: 106 (27 May 2022 07:55) (104 - 127)  BP: 96/55 (26 May 2022 20:46) (96/55 - 98/57)  BP(mean): --  RR: 19 (27 May 2022 07:55) (19 - 19)  SpO2: 97% (27 May 2022 07:55) (90% - 97%)      05-26-22 @ 07:01  -  05-27-22 @ 07:00  --------------------------------------------------------  IN: 0 mL / OUT: 1200 mL / NET: -1200 mL                                             --------------------------------------------------------------  LABS:                        12.1   30.15 )-----------( 371      ( 27 May 2022 06:47 )             40.8     05-27    141  |  100  |  15  ----------------------------<  142<H>  4.5   |  26  |  <0.5<L>    Ca    8.9      27 May 2022 06:47    TPro  6.6  /  Alb  3.7  /  TBili  0.2  /  DBili  x   /  AST  32  /  ALT  80<H>  /  AlkPhos  146<H>  05-27      Troponin T, Serum: <0.01 ng/mL (05-27-22 @ 06:47)  Creatine Kinase, Serum: 83 U/L (05-27-22 @ 06:47)  Lactate, Blood: 1.8 mmol/L (05-27-22 @ 06:47)  Creatine Kinase, Serum: 92 U/L (05-26-22 @ 18:46)  Troponin T, Serum: <0.01 ng/mL (05-26-22 @ 18:46)  Lactate, Blood: 3.5 mmol/L *H* (05-26-22 @ 18:46)      CARDIAC MARKERS ( 27 May 2022 06:47 )  x     / <0.01 ng/mL / 83 U/L / x     / 1.9 ng/mL  CARDIAC MARKERS ( 26 May 2022 18:46 )  x     / <0.01 ng/mL / 92 U/L / x     / <1.0 ng/mL                                            -------------------------------------------------------------  RADIOLOGY:  < from: Xray Chest 1 View-PORTABLE IMMEDIATE (Xray Chest 1 View-PORTABLE IMMEDIATE .) (05.26.22 @ 10:54) >  Impression:    Left basilar bandlike atelectasis, unchanged.    < end of copied text >                                            --------------------------------------------------------------    PHYSICAL EXAM:  General: Appears comfortable  HEENT: Normocephalic, atraumatic  LUNGS: Clear to auscultation bilaterally, no wheezes, rales, rhonchi  HEART: S1S2 present, regular rate and rhythm, no murmurs, rubs, gallops  ABDOMEN: Soft, nontender, nondistended  EXT: No edema, contracted x4 extremities

## 2022-05-27 NOTE — PROGRESS NOTE ADULT - ASSESSMENT
#Sepsis present on admission -- initially resolved and now concerning for sepsis again  #COVID pneumonia   #Broken tracheostomy flange - resolved  - Trach replaced in the ED  - Unvaccinated COVID per sister  - Sepsis on presentation  - COVID positive  - CXR with left pleural effusion  - D-dimer: 267  - Procal: 0.03  - CRP 61, prLDH 225  - Ferritin 9  - BCx, UCx: neg   - MRSA negative   - ID following  - S/p Decadron, RDV  - Febrile 101.3 F, WBC 30, tachycardic, concern for sepsis again ? secondary to colitis?  - 1L LR bolus  - F/u repeat BCx, UA, UCx  - Start Rocephin 1g IV q24h  - Start Flagyl 500 mg IV q8h  - Start NS @ 100 cc/hr    #Constipation  - Unclear when last BM was  - Aggressive bowel reigmen  - F/u KUB    #HO SVC Thrombosis and Embolism   - C/w Eliquis 5 mg PO BID    #History of Encephalitis post Tooth Abscess in 10/2021   s/p Tracheostomy (has an O2 tank per sister but unsure about flow) and G-tube placement  - Monitor SaO2 and O2 requirements: as above    #GERD  * Home med Omeprazole 40mg QD  - C/w Protonix 40 mg PO daily    #Iron Deficiency Anemia   History of Wernicke Encephalopathy  * Home med iron replacement, thiamine, B12, and folic acid  * ED Hb 11.3, MCV 79.4  B12 and folate nl  - C/w home iron replacement, thiamine, B12, and folic acid  - Monitor for now    #History of Hypertension  - BP on lower side here  - C/w metoprolol 50 mg PO BID    #DVT ppx: Eliquis 5 mg PO BID  #GI ppx: Protonix 40 mg PO daily  #Diet: NPO w G-tube feeds  #Activity: Functional quadriplegic  #Dispo: From [facility], acute pending improvement in tachycardia  #Code status: Full code -- has MOLST confirming   #Sepsis present on admission -- initially resolved and now concerning for sepsis again  #COVID pneumonia   #Broken tracheostomy flange - resolved  - Trach replaced in the ED  - Unvaccinated COVID per sister  - Sepsis on presentation  - COVID positive  - CXR with left pleural effusion  - D-dimer: 267  - Procal: 0.03  - CRP 61, prLDH 225  - Ferritin 9  - BCx, UCx: neg   - MRSA negative   - ID following  - S/p Decadron, RDV  - Febrile 101.3 F, WBC 30, tachycardic, concern for sepsis again ? secondary to colitis?  - 1L LR bolus  - F/u repeat BCx, UA, UCx  - Start Rocephin 1g IV q24h  - Start Flagyl 500 mg IV q8h  - Start NS @ 100 cc/hr    #Constipation  - Unclear when last BM was  - Aggressive bowel reigmen  - F/u KUB    #HO SVC Thrombosis and Embolism   - C/w Eliquis 5 mg PO BID    #History of Encephalitis post Tooth Abscess in 10/2021   s/p Tracheostomy (has an O2 tank per sister but unsure about flow) and G-tube placement  - Monitor SaO2 and O2 requirements: as above    #GERD  * Home med Omeprazole 40mg QD  - C/w Protonix 40 mg PO daily    #Iron Deficiency Anemia   History of Wernicke Encephalopathy  * Home med iron replacement, thiamine, B12, and folic acid  * ED Hb 11.3, MCV 79.4  B12 and folate nl  - C/w home iron replacement, thiamine, B12, and folic acid  - Monitor for now    #History of Hypertension  - BP on lower side here  - C/w metoprolol 50 mg PO BID    #DVT ppx: Eliquis 5 mg PO BID  #GI ppx: Protonix 40 mg PO daily  #Diet: NPO w G-tube feeds  #Activity: Functional quadriplegic  #Dispo: From [facility], acute pending improvement in tachycardia  #Code status: Full code -- has MOLST confirming    *Attempted to call sister twice but getting no dial tone* #Sepsis present on admission -- initially resolved and now concerning for sepsis again  #COVID pneumonia   #Broken tracheostomy flange - resolved  - Trach replaced in the ED  - Unvaccinated COVID per sister  - Sepsis on presentation  - COVID positive  - CXR with left pleural effusion  - D-dimer: 267  - Procal: 0.03  - CRP 61, prLDH 225  - Ferritin 9  - BCx, UCx: neg   - MRSA negative   - ID following  - S/p Decadron, RDV  - Febrile 101.3 F, WBC 30, tachycardic, concern for sepsis again ? secondary to colitis?  - 1L LR bolus  - F/u repeat BCx, UA, UCx  - Start Rocephin 1g IV q24h  - Start Flagyl 500 mg IV q8h  - Start NS @ 100 cc/hr    #Constipation  - Unclear when last BM was  - Aggressive bowel reigmen  - F/u KUB    #HO SVC Thrombosis and Embolism   - C/w Eliquis 5 mg PO BID    #History of Encephalitis post Tooth Abscess in 10/2021   s/p Tracheostomy (has an O2 tank per sister but unsure about flow) and G-tube placement  - Monitor SaO2 and O2 requirements: as above    #GERD  * Home med Omeprazole 40mg QD  - C/w Protonix 40 mg PO daily    #Iron Deficiency Anemia   History of Wernicke Encephalopathy  * Home med iron replacement, thiamine, B12, and folic acid  * ED Hb 11.3, MCV 79.4  B12 and folate nl  - C/w home iron replacement, thiamine, B12, and folic acid  - Monitor for now    #History of Hypertension  - BP on lower side here  - C/w metoprolol 50 mg PO BID    #DVT ppx: Eliquis 5 mg PO BID  #GI ppx: Protonix 40 mg PO daily  #Diet: NPO w G-tube feeds  #Activity: Functional quadriplegic  #Dispo: From [facility], acute pending improvement in tachycardia  #Code status: Full code -- has MOLST confirming    Updated patient's sister today @ 3:30 PM

## 2022-05-28 LAB
-  CARBAPENEM RESISTANCE: SIGNIFICANT CHANGE UP
-  CTX-M RESISTANCE MARKER: SIGNIFICANT CHANGE UP
-  ESBL: SIGNIFICANT CHANGE UP
-  K. PNEUMONIAE GROUP: SIGNIFICANT CHANGE UP
-  NDM RESISTANCE MARKER: SIGNIFICANT CHANGE UP
ANION GAP SERPL CALC-SCNC: 12 MMOL/L — SIGNIFICANT CHANGE UP (ref 7–14)
BASOPHILS # BLD AUTO: 0.03 K/UL — SIGNIFICANT CHANGE UP (ref 0–0.2)
BASOPHILS NFR BLD AUTO: 0.2 % — SIGNIFICANT CHANGE UP (ref 0–1)
BUN SERPL-MCNC: 9 MG/DL — LOW (ref 10–20)
CALCIUM SERPL-MCNC: 8.5 MG/DL — SIGNIFICANT CHANGE UP (ref 8.5–10.1)
CHLORIDE SERPL-SCNC: 106 MMOL/L — SIGNIFICANT CHANGE UP (ref 98–110)
CO2 SERPL-SCNC: 23 MMOL/L — SIGNIFICANT CHANGE UP (ref 17–32)
CREAT SERPL-MCNC: <0.5 MG/DL — LOW (ref 0.7–1.5)
EGFR: 154 ML/MIN/1.73M2 — SIGNIFICANT CHANGE UP
EOSINOPHIL # BLD AUTO: 0.19 K/UL — SIGNIFICANT CHANGE UP (ref 0–0.7)
EOSINOPHIL NFR BLD AUTO: 1.3 % — SIGNIFICANT CHANGE UP (ref 0–8)
GLUCOSE BLDC GLUCOMTR-MCNC: 110 MG/DL — HIGH (ref 70–99)
GLUCOSE BLDC GLUCOMTR-MCNC: 111 MG/DL — HIGH (ref 70–99)
GLUCOSE BLDC GLUCOMTR-MCNC: 125 MG/DL — HIGH (ref 70–99)
GLUCOSE BLDC GLUCOMTR-MCNC: 150 MG/DL — HIGH (ref 70–99)
GLUCOSE BLDC GLUCOMTR-MCNC: 93 MG/DL — SIGNIFICANT CHANGE UP (ref 70–99)
GLUCOSE BLDC GLUCOMTR-MCNC: 94 MG/DL — SIGNIFICANT CHANGE UP (ref 70–99)
GLUCOSE SERPL-MCNC: 103 MG/DL — HIGH (ref 70–99)
GRAM STN FLD: SIGNIFICANT CHANGE UP
HCT VFR BLD CALC: 37.5 % — SIGNIFICANT CHANGE UP (ref 37–47)
HGB BLD-MCNC: 10.9 G/DL — LOW (ref 12–16)
IMM GRANULOCYTES NFR BLD AUTO: 0.5 % — HIGH (ref 0.1–0.3)
LYMPHOCYTES # BLD AUTO: 1.2 K/UL — SIGNIFICANT CHANGE UP (ref 1.2–3.4)
LYMPHOCYTES # BLD AUTO: 8 % — LOW (ref 20.5–51.1)
MCHC RBC-ENTMCNC: 23.5 PG — LOW (ref 27–31)
MCHC RBC-ENTMCNC: 29.1 G/DL — LOW (ref 32–37)
MCV RBC AUTO: 81 FL — SIGNIFICANT CHANGE UP (ref 81–99)
METHOD TYPE: SIGNIFICANT CHANGE UP
MONOCYTES # BLD AUTO: 1.48 K/UL — HIGH (ref 0.1–0.6)
MONOCYTES NFR BLD AUTO: 9.9 % — HIGH (ref 1.7–9.3)
NEUTROPHILS # BLD AUTO: 12 K/UL — HIGH (ref 1.4–6.5)
NEUTROPHILS NFR BLD AUTO: 80.1 % — HIGH (ref 42.2–75.2)
NRBC # BLD: 0 /100 WBCS — SIGNIFICANT CHANGE UP (ref 0–0)
PLATELET # BLD AUTO: 299 K/UL — SIGNIFICANT CHANGE UP (ref 130–400)
POTASSIUM SERPL-MCNC: 4.7 MMOL/L — SIGNIFICANT CHANGE UP (ref 3.5–5)
POTASSIUM SERPL-SCNC: 4.7 MMOL/L — SIGNIFICANT CHANGE UP (ref 3.5–5)
PROCALCITONIN SERPL-MCNC: 12.3 NG/ML — HIGH (ref 0.02–0.1)
RAPID RVP RESULT: SIGNIFICANT CHANGE UP
RBC # BLD: 4.63 M/UL — SIGNIFICANT CHANGE UP (ref 4.2–5.4)
RBC # FLD: 19.2 % — HIGH (ref 11.5–14.5)
SARS-COV-2 RNA SPEC QL NAA+PROBE: SIGNIFICANT CHANGE UP
SODIUM SERPL-SCNC: 141 MMOL/L — SIGNIFICANT CHANGE UP (ref 135–146)
SPECIMEN SOURCE: SIGNIFICANT CHANGE UP
WBC # BLD: 14.97 K/UL — HIGH (ref 4.8–10.8)
WBC # FLD AUTO: 14.97 K/UL — HIGH (ref 4.8–10.8)

## 2022-05-28 PROCEDURE — 99233 SBSQ HOSP IP/OBS HIGH 50: CPT

## 2022-05-28 RX ORDER — PIPERACILLIN AND TAZOBACTAM 4; .5 G/20ML; G/20ML
3.38 INJECTION, POWDER, LYOPHILIZED, FOR SOLUTION INTRAVENOUS EVERY 8 HOURS
Refills: 0 | Status: DISCONTINUED | OUTPATIENT
Start: 2022-05-28 | End: 2022-05-28

## 2022-05-28 RX ORDER — CEFIDEROCOL SULFATE TOSYLATE 1 G/10ML
2000 INJECTION, POWDER, FOR SOLUTION INTRAVENOUS EVERY 8 HOURS
Refills: 0 | Status: DISCONTINUED | OUTPATIENT
Start: 2022-05-28 | End: 2022-06-08

## 2022-05-28 RX ORDER — PIPERACILLIN AND TAZOBACTAM 4; .5 G/20ML; G/20ML
3.38 INJECTION, POWDER, LYOPHILIZED, FOR SOLUTION INTRAVENOUS ONCE
Refills: 0 | Status: COMPLETED | OUTPATIENT
Start: 2022-05-28 | End: 2022-05-28

## 2022-05-28 RX ADMIN — Medication 50 MILLIGRAM(S): at 05:01

## 2022-05-28 RX ADMIN — Medication 100 MILLIGRAM(S): at 05:16

## 2022-05-28 RX ADMIN — Medication 50 MILLIGRAM(S): at 17:25

## 2022-05-28 RX ADMIN — CYCLOBENZAPRINE HYDROCHLORIDE 5 MILLIGRAM(S): 10 TABLET, FILM COATED ORAL at 13:36

## 2022-05-28 RX ADMIN — SCOPALAMINE 1 PATCH: 1 PATCH, EXTENDED RELEASE TRANSDERMAL at 18:48

## 2022-05-28 RX ADMIN — Medication 100 MILLIGRAM(S): at 17:25

## 2022-05-28 RX ADMIN — CHLORHEXIDINE GLUCONATE 1 APPLICATION(S): 213 SOLUTION TOPICAL at 05:01

## 2022-05-28 RX ADMIN — APIXABAN 5 MILLIGRAM(S): 2.5 TABLET, FILM COATED ORAL at 17:25

## 2022-05-28 RX ADMIN — PREGABALIN 500 MICROGRAM(S): 225 CAPSULE ORAL at 11:04

## 2022-05-28 RX ADMIN — PANTOPRAZOLE SODIUM 40 MILLIGRAM(S): 20 TABLET, DELAYED RELEASE ORAL at 11:04

## 2022-05-28 RX ADMIN — CEFIDEROCOL SULFATE TOSYLATE 33.33 MILLIGRAM(S): 1 INJECTION, POWDER, FOR SOLUTION INTRAVENOUS at 13:36

## 2022-05-28 RX ADMIN — Medication 300 MILLIGRAM(S): at 11:04

## 2022-05-28 RX ADMIN — SCOPALAMINE 1 PATCH: 1 PATCH, EXTENDED RELEASE TRANSDERMAL at 06:48

## 2022-05-28 RX ADMIN — PIPERACILLIN AND TAZOBACTAM 200 GRAM(S): 4; .5 INJECTION, POWDER, LYOPHILIZED, FOR SOLUTION INTRAVENOUS at 10:51

## 2022-05-28 RX ADMIN — CEFIDEROCOL SULFATE TOSYLATE 33.33 MILLIGRAM(S): 1 INJECTION, POWDER, FOR SOLUTION INTRAVENOUS at 22:38

## 2022-05-28 RX ADMIN — CYCLOBENZAPRINE HYDROCHLORIDE 5 MILLIGRAM(S): 10 TABLET, FILM COATED ORAL at 21:17

## 2022-05-28 RX ADMIN — CYCLOBENZAPRINE HYDROCHLORIDE 5 MILLIGRAM(S): 10 TABLET, FILM COATED ORAL at 05:03

## 2022-05-28 RX ADMIN — Medication 100 MILLIGRAM(S): at 05:01

## 2022-05-28 RX ADMIN — Medication 1 MILLIGRAM(S): at 11:04

## 2022-05-28 RX ADMIN — APIXABAN 5 MILLIGRAM(S): 2.5 TABLET, FILM COATED ORAL at 05:01

## 2022-05-28 RX ADMIN — SODIUM CHLORIDE 100 MILLILITER(S): 9 INJECTION INTRAMUSCULAR; INTRAVENOUS; SUBCUTANEOUS at 21:16

## 2022-05-28 RX ADMIN — LACTULOSE 10 GRAM(S): 10 SOLUTION ORAL at 05:03

## 2022-05-28 NOTE — PROGRESS NOTE ADULT - ASSESSMENT
#Sepsis present on admission -- initially resolved and now concerning for sepsis again  #COVID pneumonia   #Broken tracheostomy flange - resolved  - Trach replaced in the ED  - Unvaccinated COVID per sister  - Sepsis on presentation  - COVID positive  - CXR with left pleural effusion  - D-dimer: 267  - Procal: 0.03  - CRP 61, prLDH 225  - Ferritin 9  - BCx, UCx: neg   - MRSA negative   - ID following  - S/p Decadron, RDV  - Febrile 101.3 F, WBC 30, tachycardic, concern for sepsis again ? secondary to colitis?  - 1L LR bolus  - F/u repeat BCx, UA, UCx  - Start Rocephin 1g IV q24h  - Start Flagyl 500 mg IV q8h  - Start NS @ 100 cc/hr    #Constipation  - Unclear when last BM was  - Aggressive bowel reigmen  - F/u KUB    #HO SVC Thrombosis and Embolism   - C/w Eliquis 5 mg PO BID    #History of Encephalitis post Tooth Abscess in 10/2021   s/p Tracheostomy (has an O2 tank per sister but unsure about flow) and G-tube placement  - Monitor SaO2 and O2 requirements: as above    #GERD  * Home med Omeprazole 40mg QD  - C/w Protonix 40 mg PO daily    #Iron Deficiency Anemia   History of Wernicke Encephalopathy  * Home med iron replacement, thiamine, B12, and folic acid  * ED Hb 11.3, MCV 79.4  B12 and folate nl  - C/w home iron replacement, thiamine, B12, and folic acid  - Monitor for now    #History of Hypertension  - BP on lower side here  - C/w metoprolol 50 mg PO BID    #DVT ppx: Eliquis 5 mg PO BID  #GI ppx: Protonix 40 mg PO daily  #Diet: NPO w G-tube feeds  #Activity: Functional quadriplegic  #Dispo: From [facility], acute pending improvement in tachycardia  #Code status: Full code -- has MOLST confirming    Updated patient's sister today @ 3:30 PM 23 y/o F with a PMHx of encephalitis s/p tooth abscess s/p tracheostomy and PEG tube placement, HTN, GERD, and SVC thrombosis who was brought to ED from [place] on 5/16 for evaluation of a broken tracheostomy flange. Admitted for sepsis 2/2 COVID. Initially placed on trach collar 2-3LPM now back on RA. S/p Decadron and IV abx. Hospital course complicated for repeat sepsis 2/2 bacteremia and UTI w procal 12.3 (neg on admission). Restarted on IV abx    #Sepsis 2/2 UTI  #Bacteremia  - Febrile 101.3 F, WBC 30, tachycardic, concern for sepsis again  - S/p 1L LR bolus  - BCx 5/27: GNR -- f/u speciation  - C/w NS @ 100 cc/hr  - Start Zosyn 3.375 g IV q8h  - F/u UCx  - F/u ID recs    #Sepsis present on admission 2/2 COVID pneumonia -- resolved  #Broken tracheostomy flange - resolved  - Trach replaced in the ED  - Unvaccinated COVID per sister  - Sepsis on presentation  - COVID 5/16: (+)  - CXR with left pleural effusion  - D-dimer: 267  - Procal: 0.03, CRP 61, , ferritin 9  - BCx, UCx: neg   - MRSA negative   - ID following  - S/p Decadron, RDV  - COVID 5/24 + 5/27: (-)    #Constipation -- resolved  - Unclear when last BM was  - Aggressive bowel reigmen  - F/u KUB    #HO SVC Thrombosis and Embolism   - C/w Eliquis 5 mg PO BID    #History of Encephalitis post Tooth Abscess in 10/2021   s/p Tracheostomy (has an O2 tank per sister but unsure about flow) and G-tube placement  - Monitor SaO2 and O2 requirements: as above    #GERD  * Home med Omeprazole 40mg QD  - C/w Protonix 40 mg PO daily    #Iron Deficiency Anemia   History of Wernicke Encephalopathy  * Home med iron replacement, thiamine, B12, and folic acid  * ED Hb 11.3, MCV 79.4  B12 and folate nl  - C/w home iron replacement, thiamine, B12, and folic acid  - Monitor for now    #History of Hypertension  - BP on lower side here  - C/w metoprolol 50 mg PO BID    #DVT ppx: Eliquis 5 mg PO BID  #GI ppx: Protonix 40 mg PO daily  #Diet: NPO w G-tube feeds  #Activity: Functional quadriplegic  #Dispo: From [facility], acute  #Code status: Full code -- has MOLST confirming 21 y/o F with a PMHx of encephalitis s/p tooth abscess s/p tracheostomy and PEG tube placement, HTN, GERD, and SVC thrombosis who was brought to ED from [place] on 5/16 for evaluation of a broken tracheostomy flange. Admitted for sepsis 2/2 COVID. Initially placed on trach collar 2-3LPM now back on RA. S/p Decadron and IV abx. Hospital course complicated for repeat sepsis 2/2 bacteremia and UTI w procal 12.3 (neg on admission). Restarted on IV abx    #Sepsis 2/2 UTI  #Bacteremia  - Febrile 101.3 F, WBC 30, tachycardic, concern for sepsis again  - S/p 1L LR bolus  - Procal 5/27: 12.3 (neg on admission)  - Repeat RVP: neg  - BCx 5/27: GNR -- f/u speciation  - C/w NS @ 100 cc/hr  - Start Zosyn 3.375 g IV q8h  - F/u UCx  - F/u ID recs    #Sepsis present on admission 2/2 COVID pneumonia -- resolved  #Broken tracheostomy flange - resolved  - Trach replaced in the ED  - Unvaccinated COVID per sister  - Sepsis on presentation  - COVID 5/16: (+)  - CXR with left pleural effusion  - D-dimer: 267  - Procal: 0.03, CRP 61, , ferritin 9  - BCx, UCx: neg   - MRSA negative   - ID following  - S/p Decadron, RDV  - COVID 5/24 + 5/27: (-)    #Constipation -- resolved  - Unclear when last BM was  - Aggressive bowel reigmen  - F/u KUB    #HO SVC Thrombosis and Embolism   - C/w Eliquis 5 mg PO BID    #History of Encephalitis post Tooth Abscess in 10/2021   s/p Tracheostomy (has an O2 tank per sister but unsure about flow) and G-tube placement  - Monitor SaO2 and O2 requirements: as above    #GERD  * Home med Omeprazole 40mg QD  - C/w Protonix 40 mg PO daily    #Iron Deficiency Anemia   History of Wernicke Encephalopathy  * Home med iron replacement, thiamine, B12, and folic acid  * ED Hb 11.3, MCV 79.4  B12 and folate nl  - C/w home iron replacement, thiamine, B12, and folic acid  - Monitor for now    #History of Hypertension  - BP on lower side here  - C/w metoprolol 50 mg PO BID    #DVT ppx: Eliquis 5 mg PO BID  #GI ppx: Protonix 40 mg PO daily  #Diet: NPO w G-tube feeds  #Activity: Functional quadriplegic  [place] [facility] [place]  #Code status: Full code -- has MOLST confirming

## 2022-05-29 LAB
ANION GAP SERPL CALC-SCNC: 12 MMOL/L — SIGNIFICANT CHANGE UP (ref 7–14)
BASOPHILS # BLD AUTO: 0.04 K/UL — SIGNIFICANT CHANGE UP (ref 0–0.2)
BASOPHILS NFR BLD AUTO: 0.3 % — SIGNIFICANT CHANGE UP (ref 0–1)
BUN SERPL-MCNC: 7 MG/DL — LOW (ref 10–20)
CALCIUM SERPL-MCNC: 8.3 MG/DL — LOW (ref 8.5–10.1)
CHLORIDE SERPL-SCNC: 102 MMOL/L — SIGNIFICANT CHANGE UP (ref 98–110)
CO2 SERPL-SCNC: 26 MMOL/L — SIGNIFICANT CHANGE UP (ref 17–32)
CREAT SERPL-MCNC: <0.5 MG/DL — LOW (ref 0.7–1.5)
CULTURE RESULTS: SIGNIFICANT CHANGE UP
EGFR: 143 ML/MIN/1.73M2 — SIGNIFICANT CHANGE UP
EOSINOPHIL # BLD AUTO: 0.15 K/UL — SIGNIFICANT CHANGE UP (ref 0–0.7)
EOSINOPHIL NFR BLD AUTO: 1.1 % — SIGNIFICANT CHANGE UP (ref 0–8)
GLUCOSE BLDC GLUCOMTR-MCNC: 109 MG/DL — HIGH (ref 70–99)
GLUCOSE BLDC GLUCOMTR-MCNC: 82 MG/DL — SIGNIFICANT CHANGE UP (ref 70–99)
GLUCOSE BLDC GLUCOMTR-MCNC: 94 MG/DL — SIGNIFICANT CHANGE UP (ref 70–99)
GLUCOSE SERPL-MCNC: 103 MG/DL — HIGH (ref 70–99)
GRAM STN FLD: SIGNIFICANT CHANGE UP
HCT VFR BLD CALC: 35.5 % — LOW (ref 37–47)
HGB BLD-MCNC: 10.7 G/DL — LOW (ref 12–16)
IMM GRANULOCYTES NFR BLD AUTO: 0.5 % — HIGH (ref 0.1–0.3)
LYMPHOCYTES # BLD AUTO: 1.4 K/UL — SIGNIFICANT CHANGE UP (ref 1.2–3.4)
LYMPHOCYTES # BLD AUTO: 10.7 % — LOW (ref 20.5–51.1)
MCHC RBC-ENTMCNC: 23.9 PG — LOW (ref 27–31)
MCHC RBC-ENTMCNC: 30.1 G/DL — LOW (ref 32–37)
MCV RBC AUTO: 79.2 FL — LOW (ref 81–99)
MONOCYTES # BLD AUTO: 1.52 K/UL — HIGH (ref 0.1–0.6)
MONOCYTES NFR BLD AUTO: 11.6 % — HIGH (ref 1.7–9.3)
NEUTROPHILS # BLD AUTO: 9.9 K/UL — HIGH (ref 1.4–6.5)
NEUTROPHILS NFR BLD AUTO: 75.8 % — HIGH (ref 42.2–75.2)
NRBC # BLD: 0 /100 WBCS — SIGNIFICANT CHANGE UP (ref 0–0)
PLATELET # BLD AUTO: 267 K/UL — SIGNIFICANT CHANGE UP (ref 130–400)
POTASSIUM SERPL-MCNC: 4.3 MMOL/L — SIGNIFICANT CHANGE UP (ref 3.5–5)
POTASSIUM SERPL-SCNC: 4.3 MMOL/L — SIGNIFICANT CHANGE UP (ref 3.5–5)
RBC # BLD: 4.48 M/UL — SIGNIFICANT CHANGE UP (ref 4.2–5.4)
RBC # FLD: 18.9 % — HIGH (ref 11.5–14.5)
SODIUM SERPL-SCNC: 140 MMOL/L — SIGNIFICANT CHANGE UP (ref 135–146)
SPECIMEN SOURCE: SIGNIFICANT CHANGE UP
WBC # BLD: 13.07 K/UL — HIGH (ref 4.8–10.8)
WBC # FLD AUTO: 13.07 K/UL — HIGH (ref 4.8–10.8)

## 2022-05-29 PROCEDURE — 99233 SBSQ HOSP IP/OBS HIGH 50: CPT

## 2022-05-29 RX ADMIN — Medication 300 MILLIGRAM(S): at 11:03

## 2022-05-29 RX ADMIN — CEFIDEROCOL SULFATE TOSYLATE 33.33 MILLIGRAM(S): 1 INJECTION, POWDER, FOR SOLUTION INTRAVENOUS at 21:54

## 2022-05-29 RX ADMIN — SODIUM CHLORIDE 100 MILLILITER(S): 9 INJECTION INTRAMUSCULAR; INTRAVENOUS; SUBCUTANEOUS at 11:04

## 2022-05-29 RX ADMIN — APIXABAN 5 MILLIGRAM(S): 2.5 TABLET, FILM COATED ORAL at 17:03

## 2022-05-29 RX ADMIN — CEFIDEROCOL SULFATE TOSYLATE 33.33 MILLIGRAM(S): 1 INJECTION, POWDER, FOR SOLUTION INTRAVENOUS at 05:16

## 2022-05-29 RX ADMIN — SCOPALAMINE 1 PATCH: 1 PATCH, EXTENDED RELEASE TRANSDERMAL at 05:13

## 2022-05-29 RX ADMIN — Medication 100 MILLIGRAM(S): at 17:03

## 2022-05-29 RX ADMIN — SCOPALAMINE 1 PATCH: 1 PATCH, EXTENDED RELEASE TRANSDERMAL at 19:39

## 2022-05-29 RX ADMIN — APIXABAN 5 MILLIGRAM(S): 2.5 TABLET, FILM COATED ORAL at 05:12

## 2022-05-29 RX ADMIN — Medication 50 MILLIGRAM(S): at 05:12

## 2022-05-29 RX ADMIN — PREGABALIN 500 MICROGRAM(S): 225 CAPSULE ORAL at 11:03

## 2022-05-29 RX ADMIN — PANTOPRAZOLE SODIUM 40 MILLIGRAM(S): 20 TABLET, DELAYED RELEASE ORAL at 11:04

## 2022-05-29 RX ADMIN — CYCLOBENZAPRINE HYDROCHLORIDE 5 MILLIGRAM(S): 10 TABLET, FILM COATED ORAL at 13:41

## 2022-05-29 RX ADMIN — SCOPALAMINE 1 PATCH: 1 PATCH, EXTENDED RELEASE TRANSDERMAL at 19:08

## 2022-05-29 RX ADMIN — Medication 100 MILLIGRAM(S): at 05:12

## 2022-05-29 RX ADMIN — SCOPALAMINE 1 PATCH: 1 PATCH, EXTENDED RELEASE TRANSDERMAL at 05:16

## 2022-05-29 RX ADMIN — CEFIDEROCOL SULFATE TOSYLATE 33.33 MILLIGRAM(S): 1 INJECTION, POWDER, FOR SOLUTION INTRAVENOUS at 13:41

## 2022-05-29 RX ADMIN — CHLORHEXIDINE GLUCONATE 1 APPLICATION(S): 213 SOLUTION TOPICAL at 05:15

## 2022-05-29 RX ADMIN — Medication 50 MILLIGRAM(S): at 17:03

## 2022-05-29 RX ADMIN — CYCLOBENZAPRINE HYDROCHLORIDE 5 MILLIGRAM(S): 10 TABLET, FILM COATED ORAL at 05:14

## 2022-05-29 RX ADMIN — Medication 1 MILLIGRAM(S): at 11:03

## 2022-05-29 RX ADMIN — CYCLOBENZAPRINE HYDROCHLORIDE 5 MILLIGRAM(S): 10 TABLET, FILM COATED ORAL at 21:55

## 2022-05-29 NOTE — PROGRESS NOTE ADULT - SUBJECTIVE AND OBJECTIVE BOX
CAROOCTAVIANO MAILAMONTE  [facility]      Patient was evaluated and examined  by bedside, no fever today, HR range in low 100's      REVIEW OF SYSTEMS:  unable to obtain, patient is non-verbal      T(C): , Max: 37.6 (05-29-22 @ 05:36)  HR: 97 (05-28-22 @ 23:29)  BP: 117/58 (05-29-22 @ 05:36)  RR: 20 (05-29-22 @ 05:36)  SpO2: 97% (05-29-22 @ 08:20)  CAPILLARY BLOOD GLUCOSE      POCT Blood Glucose.: 109 mg/dL (29 May 2022 11:29)  POCT Blood Glucose.: 94 mg/dL (28 May 2022 23:51)  POCT Blood Glucose.: 125 mg/dL (28 May 2022 21:42)  POCT Blood Glucose.: 150 mg/dL (28 May 2022 16:34)      PHYSICAL EXAM:  General: NAD, Awake, patient is laying comfortably in bed  HEENT: AT, NC, Supple, NO JVD, NO CB, trach in place, occasionally shaking head  Lungs: mild decreased breath sounds B/L, no wheezing, no rhonchi  CVS: normal S1, S2, RRR, tachy,  NO M/G/R  Abdomen: soft, bowel sounds present, non-tender, non-distended, peg in place  Extremities: no edema, no clubbing, no cyanosis, positive peripheral pulses b/l  Neuro: 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  Skin: no rash, no ecchymosis          LAB  CBC  Date: 05-29-22 @ 06:35  Mean cell Ivnsyrqkpv68.9  Mean cell Hemoglobin Conc30.1  Mean cell Volum 79.2  Platelet count-Automate 267  RBC Count 4.48  Red Cell Distrib Width18.9  WBC Count13.07  % Albumin, Urine--  Hematocrit 35.5  Hemoglobin 10.7  CBC  Date: 05-28-22 @ 06:20  Mean cell Urlkikxvbm96.5  Mean cell Hemoglobin Conc29.1  Mean cell Volum 81.0  Platelet count-Automate 299  RBC Count 4.63  Red Cell Distrib Width19.2  WBC Count14.97  % Albumin, Urine--  Hematocrit 37.5  Hemoglobin 10.9  CBC  Date: 05-27-22 @ 06:47  Mean cell Fgtfxgeczq42.6  Mean cell Hemoglobin Conc29.7  Mean cell Volum 79.5  Platelet count-Automate 371  RBC Count 5.13  Red Cell Distrib Width19.1  WBC Count30.15  % Albumin, Urine--  Hematocrit 40.8  Hemoglobin 12.1  CBC  Date: 05-26-22 @ 12:00  Mean cell Msgzuohkaw33.4  Mean cell Hemoglobin Conc29.8  Mean cell Volum 78.7  Platelet count-Automate 459  RBC Count 5.25  Red Cell Distrib Width19.2  WBC Count11.61  % Albumin, Urine--  Hematocrit 41.3  Hemoglobin 12.3  CBC  Date: 05-25-22 @ 07:20  Mean cell Pbfxcjnwyg57.4  Mean cell Hemoglobin Conc29.4  Mean cell Volum 79.6  Platelet count-Automate 475  RBC Count 4.96  Red Cell Distrib Width18.7  WBC Count15.08  % Albumin, Urine--  Hematocrit 39.5  Hemoglobin 11.6  CBC  Date: 05-23-22 @ 06:21  Mean cell Tcbaekhipy93.5  Mean cell Hemoglobin Conc29.8  Mean cell Volum 78.7  Platelet count-Automate 419  RBC Count 4.56  Red Cell Distrib Width17.9  WBC Count16.57  % Albumin, Urine--  Hematocrit 35.9  Hemoglobin 10.7    BMP  05-29-22 @ 06:35  Blood Gas Arterial-Calcium,Ionized--  Blood Urea Nitrogen, Serum 7 mg/dL<L> [10 - 20]  Carbon Dioxide, Serum26 mmol/L [17 - 32]  Chloride, Sufeg881 mmol/L [98 - 110]  Creatinie, Serum<0.5 mg/dL<L> [0.7 - 1.5]  Glucose, Otglf820 mg/dL<H> [70 - 99]  Potassium, Serum4.3 mmol/L [3.5 - 5.0]  Sodium, Serum 140 mmol/L [135 - 146]  BMP  05-28-22 @ 06:20  Blood Gas Arterial-Calcium,Ionized--  Blood Urea Nitrogen, Serum 9 mg/dL<L> [10 - 20]  Carbon Dioxide, Serum23 mmol/L [17 - 32]  Chloride, Mzwyb263 mmol/L [98 - 110]  Creatinie, Serum<0.5 mg/dL<L> [0.7 - 1.5]  Glucose, Dhiqe825 mg/dL<H> [70 - 99]  Potassium, Serum4.7 mmol/L [3.5 - 5.0]  Sodium, Serum 141 mmol/L [135 - 146]        Microbiology:    Culture - Blood (collected 05-28-22 @ 11:30)  Source: .Blood None  Gram Stain (05-29-22 @ 08:16):    Growth in anaerobic bottle: Gram Negative Rods  Preliminary Report (05-29-22 @ 08:16):    Growth in anaerobic bottle: Gram Negative Rods    Culture - Urine (collected 05-27-22 @ 16:11)  Source: Clean Catch Clean Catch (Midstream)  Final Report (05-29-22 @ 11:29):    >=3 organisms. Probable collection contamination.    Culture - Blood (collected 05-27-22 @ 09:05)  Source: .Blood Blood  Gram Stain (05-28-22 @ 06:19):    Growth in aerobic bottle: Gram Negative Rods  Preliminary Report (05-29-22 @ 11:53):    Growth in aerobic bottle: Klebsiella pneumoniae Susceptibility to follow.    ***Blood Panel PCR results on this specimen are available    approximately 3 hours after the Gram stain result.***    Gram stain, PCR, and/or culture results may not always    correspond due to difference in methodologies.    ************************************************************    This PCR assay was performed by multiplex PCR. This    Assay tests for 66 bacterial and resistance gene targets.    Please refer to the [facility] Labs test directory    at [email or web address] for details.  Organism: Blood Culture PCR (05-28-22 @ 09:02)  Organism: Blood Culture PCR (05-28-22 @ 09:02)      -  Carbapenem Resistance: Detec      -  CTX-M Resistance Marker: Detec      -  ESBL: Detec      -  Klebsiella pneumoniae: Detec      -  NDM Resistance Marker: Detec      Method Type: PCR    Culture - Urine (collected 05-16-22 @ 17:15)  Source: Clean Catch Clean Catch (Midstream)  Final Report (05-17-22 @ 21:05):    <10,000 CFU/mL Normal Urogenital Trinidad    Culture - Blood (collected 05-16-22 @ 12:28)  Source: .Blood Blood-Peripheral  Final Report (05-21-22 @ 23:00):    No Growth Final    Culture - Blood (collected 05-16-22 @ 12:09)  Source: .Blood Blood-Peripheral  Final Report (05-21-22 @ 23:00):    No Growth Final        Medications:  acetaminophen     Tablet .. 650 milliGRAM(s) Oral every 6 hours PRN  albuterol/ipratropium for Nebulization. 3 milliLiter(s) Nebulizer once PRN  apixaban 5 milliGRAM(s) Enteral Tube two times a day  bisacodyl 5 milliGRAM(s) Oral daily  cefiderocol IVPB 2000 milliGRAM(s) IV Intermittent every 8 hours  chlorhexidine 4% Liquid 1 Application(s) Topical <User Schedule>  cyanocobalamin 500 MICROGram(s) Oral daily  cyclobenzaprine Oral Tab/Cap - Peds 5 milliGRAM(s) Oral three times a day  ferrous    sulfate Liquid 300 milliGRAM(s) Enteral Tube daily  folic acid 1 milliGRAM(s) Oral daily  metoclopramide   Syrup 10 milliGRAM(s) Oral every 6 hours PRN  metoprolol tartrate 50 milliGRAM(s) Enteral Tube two times a day  pantoprazole   Suspension 40 milliGRAM(s) Enteral Tube daily  scopolamine 1 mG/72 Hr(s) Patch 1 Patch Transdermal every 72 hours  sodium chloride 0.9%. 1000 milliLiter(s) IV Continuous <Continuous>  thiamine  Oral Tab/Cap - Peds 100 milliGRAM(s) Oral two times a day        Assessment and Plan:  Patient is a 23 y/o woman with PMH of history of encephalitis s/p tooth abscess, chronic respiratory failure s/p tracheostomy and PEG tube placement, HTN, GERD, and SVC thrombosis on apixaban was brought to the ED from [facility] on 05/16 for evaluation of a broken tracheostomy flange. Tracheostomy was replaced in the ED. She was found to be septic on presentation with leukocytosis and COVID positive.     # Recurrent sepsis during inpatient stay- bacteremia due to Klebsiella infection, suspected source is urine   - blood cxs 5/27- ESBL, carbapenem resistant klebsiella- AS per ID rec- from 5/28- started on Fetroja 2 grams IV every 8 hours  -repeat blood cxs on 5/28- still positive  - echeverria was changed on 5/27  - f/up urine cxs- gram neg. rods  -contact isolation    #Sepsis was present on admission due to COVID 19  unvaccinated pt  ID consult and f/u appreciated  remdesivir x 5 days (Ended 5/22)  completed decadron 6mg IV daily (5/16 - 5/25)  isolation per protocol  venous duplex negative for DVT  blood and urine cultures negative and MRSA negative - other abx discontinued  -05/27- spiked fever on 5/26 pm, and now with leukocytosis- ? GI source - start on IV flagyl/IV Rocephin, obtain blood cxs, recent CXR- no infiltrates, repeat procal level, change echeverria and sent u/a with urine cxs, IVF for next 24 hours  - daily CBC      # Persistent tachycardia- sinus tachy on 12 lead EKG- resumed on home dose of lopressor 50 mg via peg twice daily  - Free T3- normal , T4 Levels- slightly above reference range  - most recent CXR- no acute changes, no hypoxia  -low ddimer level  -good urine output      #Chronic respiratory failure  s/p tracheostomy replacement in ED and tolerating trach collar - 35%  suction prn  on glycopyrrolate & Scopolamine patch.     #SVC Thrombosis and Embolism - on Eliquis 5mg bid as outpt  on therapeutic lovenox as inpt.,  changed to PO Eliquis .     # Microcytic anemia on iron, vitamin B12 and folate for suspected deficiencies    #H/O Wernicke's encephalopathy on thiamine    #HTN - on metoprolol 50mg bid as outpt -restarted     # Constipation: started on laxatives prn tx.   -good bm's      Chronic quadriplegia- continue freq. body position change, decub prevention tx.     DVT prophylaxis - on Eliquis tx.    Code status: full code     #Progress Note Handoff: repeat blood cxs, IV Fetroja abx tx, monitor CBC  Family discussion: yes, medical team Disposition: SNF  once medically stable     Total time spent to complete patient's bedside assessment, review medical chart, discuss medical plan of care with covering medical team was more than 35 minutes.

## 2022-05-29 NOTE — PROGRESS NOTE ADULT - ASSESSMENT
IMPRESSION;   Septic shock ( on pressors ) secondary to CRE GNR possibly related to acute pyelonephritis  5/27 BCx GNRs  5/27 UCx GNRs  Pyuria  CXR no consolidation    RECOMMENDATIONS;  f/u cultures  Cefiderocol 2 gm iv q8h  Repeat BCx  Off loading to prevent pressure sores and preventive measures to avoid aspiration

## 2022-05-29 NOTE — PROGRESS NOTE ADULT - SUBJECTIVE AND OBJECTIVE BOX
CONSUELO CARO  22y, Female    All available historical data reviewed    OVERNIGHT EVENTS:      ROS:  General: Denies rigors, nightsweats  HEENT: Denies headache, rhinorrhea, sore throat, eye pain  CV: Denies CP, palpitations  PULM: Denies wheezing, hemoptysis  GI: Denies hematemesis, hematochezia, melena  : Denies discharge, hematuria  MSK: Denies arthralgias, myalgias  SKIN: Denies rash, lesions  NEURO: Denies paresthesias, weakness  PSYCH: Denies depression, anxiety    VITALS:  T(F): 99.7, Max: 99.7 (22 @ 05:36)  HR: 97  BP: 117/58  RR: 20Vital Signs Last 24 Hrs  T(C): 37.6 (29 May 2022 05:36), Max: 37.6 (29 May 2022 05:36)  T(F): 99.7 (29 May 2022 05:36), Max: 99.7 (29 May 2022 05:36)  HR: 97 (28 May 2022 23:29) (93 - 126)  BP: 117/58 (29 May 2022 05:36) (103/51 - 117/58)  BP(mean): --  RR: 20 (29 May 2022 05:36) (20 - 20)  SpO2: 97% (29 May 2022 05:36) (96% - 98%)    TESTS & MEASUREMENTS:                        10.9   14.97 )-----------( 299      ( 28 May 2022 06:20 )             37.5         141  |  106  |  9<L>  ----------------------------<  103<H>  4.7   |  23  |  <0.5<L>    Ca    8.5      28 May 2022 06:20    TPro  6.6  /  Alb  3.7  /  TBili  0.2  /  DBili  x   /  AST  32  /  ALT  80<H>  /  AlkPhos  146<H>      LIVER FUNCTIONS - ( 27 May 2022 06:47 )  Alb: 3.7 g/dL / Pro: 6.6 g/dL / ALK PHOS: 146 U/L / ALT: 80 U/L / AST: 32 U/L / GGT: x             Culture - Urine (collected 22 @ 16:11)  Source: Clean Catch Clean Catch (Midstream)  Preliminary Report (22 @ 00:38):    >100,000 CFU/ml Gram Negative Rods    Culture - Blood (collected 22 @ 09:05)  Source: .Blood Blood  Gram Stain (22 @ 06:19):    Growth in aerobic bottle: Gram Negative Rods  Preliminary Report (22 @ 06:20):    Growth in aerobic bottle: Gram Negative Rods    ***Blood Panel PCR results on this specimen are available    approximately 3 hours after the Gram stain result.***    Gram stain, PCR, and/or culture results may not always    correspond due to difference in methodologies.    ************************************************************    This PCR assay was performed by multiplex PCR. This    Assay tests for 66 bacterial and resistance gene targets.    Please refer to the [facility] Labs test directory    at [email or web address] for details.  Organism: Blood Culture PCR (22 @ 09:02)  Organism: Blood Culture PCR (22 @ 09:02)      -  Carbapenem Resistance: Detec      -  CTX-M Resistance Marker: Detec      -  ESBL: Detec      -  Klebsiella pneumoniae: Detec      -  NDM Resistance Marker: Detec      Method Type: PCR      Urinalysis Basic - ( 27 May 2022 16:11 )    Color: Yellow / Appearance: Turbid / S.023 / pH: x  Gluc: x / Ketone: Trace  / Bili: Negative / Urobili: 3 mg/dL   Blood: x / Protein: 100 mg/dL / Nitrite: Negative   Leuk Esterase: Large / RBC: 56 /HPF / WBC 87 /HPF   Sq Epi: x / Non Sq Epi: 27 /HPF / Bacteria: Negative          RADIOLOGY & ADDITIONAL TESTS:  Personal review of radiological diagnostics performed  Echo and EKG results noted when applicable.     MEDICATIONS:  acetaminophen     Tablet .. 650 milliGRAM(s) Oral every 6 hours PRN  albuterol/ipratropium for Nebulization. 3 milliLiter(s) Nebulizer once PRN  apixaban 5 milliGRAM(s) Enteral Tube two times a day  bisacodyl 5 milliGRAM(s) Oral daily  cefiderocol IVPB 2000 milliGRAM(s) IV Intermittent every 8 hours  chlorhexidine 4% Liquid 1 Application(s) Topical <User Schedule>  cyanocobalamin 500 MICROGram(s) Oral daily  cyclobenzaprine Oral Tab/Cap - Peds 5 milliGRAM(s) Oral three times a day  ferrous    sulfate Liquid 300 milliGRAM(s) Enteral Tube daily  folic acid 1 milliGRAM(s) Oral daily  metoclopramide   Syrup 10 milliGRAM(s) Oral every 6 hours PRN  metoprolol tartrate 50 milliGRAM(s) Enteral Tube two times a day  pantoprazole   Suspension 40 milliGRAM(s) Enteral Tube daily  scopolamine 1 mG/72 Hr(s) Patch 1 Patch Transdermal every 72 hours  sodium chloride 0.9%. 1000 milliLiter(s) IV Continuous <Continuous>  thiamine  Oral Tab/Cap - Peds 100 milliGRAM(s) Oral two times a day      ANTIBIOTICS:  cefiderocol IVPB 2000 milliGRAM(s) IV Intermittent every 8 hours

## 2022-05-30 LAB
-  AMIKACIN: SIGNIFICANT CHANGE UP
-  AMPICILLIN/SULBACTAM: SIGNIFICANT CHANGE UP
-  AMPICILLIN: SIGNIFICANT CHANGE UP
-  AZTREONAM: SIGNIFICANT CHANGE UP
-  CEFAZOLIN: SIGNIFICANT CHANGE UP
-  CEFEPIME: SIGNIFICANT CHANGE UP
-  CEFOXITIN: SIGNIFICANT CHANGE UP
-  CEFTAZIDIME/AVIBACTAM: SIGNIFICANT CHANGE UP
-  CEFTOLOZANE/TAZOBACTAM: SIGNIFICANT CHANGE UP
-  CEFTRIAXONE: SIGNIFICANT CHANGE UP
-  CIPROFLOXACIN: SIGNIFICANT CHANGE UP
-  ERTAPENEM: SIGNIFICANT CHANGE UP
-  GENTAMICIN: SIGNIFICANT CHANGE UP
-  IMIPENEM: SIGNIFICANT CHANGE UP
-  LEVOFLOXACIN: SIGNIFICANT CHANGE UP
-  MEROPENEM: SIGNIFICANT CHANGE UP
-  PIPERACILLIN/TAZOBACTAM: SIGNIFICANT CHANGE UP
-  TIGECYCLINE: SIGNIFICANT CHANGE UP
-  TOBRAMYCIN: SIGNIFICANT CHANGE UP
-  TRIMETHOPRIM/SULFAMETHOXAZOLE: SIGNIFICANT CHANGE UP
ANION GAP SERPL CALC-SCNC: 12 MMOL/L — SIGNIFICANT CHANGE UP (ref 7–14)
BASOPHILS # BLD AUTO: 0.04 K/UL — SIGNIFICANT CHANGE UP (ref 0–0.2)
BASOPHILS NFR BLD AUTO: 0.4 % — SIGNIFICANT CHANGE UP (ref 0–1)
BUN SERPL-MCNC: 7 MG/DL — LOW (ref 10–20)
CALCIUM SERPL-MCNC: 8.7 MG/DL — SIGNIFICANT CHANGE UP (ref 8.5–10.1)
CHLORIDE SERPL-SCNC: 106 MMOL/L — SIGNIFICANT CHANGE UP (ref 98–110)
CO2 SERPL-SCNC: 26 MMOL/L — SIGNIFICANT CHANGE UP (ref 17–32)
CREAT SERPL-MCNC: <0.5 MG/DL — LOW (ref 0.7–1.5)
CULTURE RESULTS: SIGNIFICANT CHANGE UP
CULTURE RESULTS: SIGNIFICANT CHANGE UP
EGFR: 143 ML/MIN/1.73M2 — SIGNIFICANT CHANGE UP
EOSINOPHIL # BLD AUTO: 0.25 K/UL — SIGNIFICANT CHANGE UP (ref 0–0.7)
EOSINOPHIL NFR BLD AUTO: 2.5 % — SIGNIFICANT CHANGE UP (ref 0–8)
GLUCOSE BLDC GLUCOMTR-MCNC: 195 MG/DL — HIGH (ref 70–99)
GLUCOSE BLDC GLUCOMTR-MCNC: 81 MG/DL — SIGNIFICANT CHANGE UP (ref 70–99)
GLUCOSE BLDC GLUCOMTR-MCNC: 98 MG/DL — SIGNIFICANT CHANGE UP (ref 70–99)
GLUCOSE SERPL-MCNC: 139 MG/DL — HIGH (ref 70–99)
HCT VFR BLD CALC: 34.7 % — LOW (ref 37–47)
HGB BLD-MCNC: 10.3 G/DL — LOW (ref 12–16)
IMM GRANULOCYTES NFR BLD AUTO: 0.3 % — SIGNIFICANT CHANGE UP (ref 0.1–0.3)
LYMPHOCYTES # BLD AUTO: 1.39 K/UL — SIGNIFICANT CHANGE UP (ref 1.2–3.4)
LYMPHOCYTES # BLD AUTO: 14.2 % — LOW (ref 20.5–51.1)
MCHC RBC-ENTMCNC: 23.6 PG — LOW (ref 27–31)
MCHC RBC-ENTMCNC: 29.7 G/DL — LOW (ref 32–37)
MCV RBC AUTO: 79.4 FL — LOW (ref 81–99)
METHOD TYPE: SIGNIFICANT CHANGE UP
MONOCYTES # BLD AUTO: 0.93 K/UL — HIGH (ref 0.1–0.6)
MONOCYTES NFR BLD AUTO: 9.5 % — HIGH (ref 1.7–9.3)
NEUTROPHILS # BLD AUTO: 7.17 K/UL — HIGH (ref 1.4–6.5)
NEUTROPHILS NFR BLD AUTO: 73.1 % — SIGNIFICANT CHANGE UP (ref 42.2–75.2)
NRBC # BLD: 0 /100 WBCS — SIGNIFICANT CHANGE UP (ref 0–0)
ORGANISM # SPEC MICROSCOPIC CNT: SIGNIFICANT CHANGE UP
PLATELET # BLD AUTO: 284 K/UL — SIGNIFICANT CHANGE UP (ref 130–400)
POTASSIUM SERPL-MCNC: 4.5 MMOL/L — SIGNIFICANT CHANGE UP (ref 3.5–5)
POTASSIUM SERPL-SCNC: 4.5 MMOL/L — SIGNIFICANT CHANGE UP (ref 3.5–5)
RBC # BLD: 4.37 M/UL — SIGNIFICANT CHANGE UP (ref 4.2–5.4)
RBC # FLD: 18.9 % — HIGH (ref 11.5–14.5)
SODIUM SERPL-SCNC: 144 MMOL/L — SIGNIFICANT CHANGE UP (ref 135–146)
SPECIMEN SOURCE: SIGNIFICANT CHANGE UP
SPECIMEN SOURCE: SIGNIFICANT CHANGE UP
WBC # BLD: 9.81 K/UL — SIGNIFICANT CHANGE UP (ref 4.8–10.8)
WBC # FLD AUTO: 9.81 K/UL — SIGNIFICANT CHANGE UP (ref 4.8–10.8)

## 2022-05-30 PROCEDURE — 99233 SBSQ HOSP IP/OBS HIGH 50: CPT

## 2022-05-30 RX ADMIN — SODIUM CHLORIDE 100 MILLILITER(S): 9 INJECTION INTRAMUSCULAR; INTRAVENOUS; SUBCUTANEOUS at 06:19

## 2022-05-30 RX ADMIN — CYCLOBENZAPRINE HYDROCHLORIDE 5 MILLIGRAM(S): 10 TABLET, FILM COATED ORAL at 21:29

## 2022-05-30 RX ADMIN — CEFIDEROCOL SULFATE TOSYLATE 33.33 MILLIGRAM(S): 1 INJECTION, POWDER, FOR SOLUTION INTRAVENOUS at 21:29

## 2022-05-30 RX ADMIN — Medication 100 MILLIGRAM(S): at 17:45

## 2022-05-30 RX ADMIN — Medication 1 MILLIGRAM(S): at 11:15

## 2022-05-30 RX ADMIN — CEFIDEROCOL SULFATE TOSYLATE 33.33 MILLIGRAM(S): 1 INJECTION, POWDER, FOR SOLUTION INTRAVENOUS at 14:37

## 2022-05-30 RX ADMIN — Medication 5 MILLIGRAM(S): at 11:15

## 2022-05-30 RX ADMIN — SCOPALAMINE 1 PATCH: 1 PATCH, EXTENDED RELEASE TRANSDERMAL at 08:30

## 2022-05-30 RX ADMIN — PANTOPRAZOLE SODIUM 40 MILLIGRAM(S): 20 TABLET, DELAYED RELEASE ORAL at 11:15

## 2022-05-30 RX ADMIN — APIXABAN 5 MILLIGRAM(S): 2.5 TABLET, FILM COATED ORAL at 05:07

## 2022-05-30 RX ADMIN — CYCLOBENZAPRINE HYDROCHLORIDE 5 MILLIGRAM(S): 10 TABLET, FILM COATED ORAL at 05:08

## 2022-05-30 RX ADMIN — CHLORHEXIDINE GLUCONATE 1 APPLICATION(S): 213 SOLUTION TOPICAL at 05:08

## 2022-05-30 RX ADMIN — PREGABALIN 500 MICROGRAM(S): 225 CAPSULE ORAL at 11:15

## 2022-05-30 RX ADMIN — Medication 50 MILLIGRAM(S): at 17:45

## 2022-05-30 RX ADMIN — CYCLOBENZAPRINE HYDROCHLORIDE 5 MILLIGRAM(S): 10 TABLET, FILM COATED ORAL at 13:34

## 2022-05-30 RX ADMIN — APIXABAN 5 MILLIGRAM(S): 2.5 TABLET, FILM COATED ORAL at 17:45

## 2022-05-30 RX ADMIN — Medication 100 MILLIGRAM(S): at 05:07

## 2022-05-30 RX ADMIN — Medication 300 MILLIGRAM(S): at 11:15

## 2022-05-30 RX ADMIN — CEFIDEROCOL SULFATE TOSYLATE 33.33 MILLIGRAM(S): 1 INJECTION, POWDER, FOR SOLUTION INTRAVENOUS at 05:09

## 2022-05-30 RX ADMIN — Medication 50 MILLIGRAM(S): at 05:08

## 2022-05-30 NOTE — PROGRESS NOTE ADULT - SUBJECTIVE AND OBJECTIVE BOX
CAROCONSUELO  [facility] 023 A ([facility] T2-3A)    Patient was evaluated and examined  by bedside, no fever today , HR in range 90's- low 100's        REVIEW OF SYSTEMS:  unable to obtain, patient is non-verbal      T(C): , Max: 36.1 (05-30-22 @ 12:26)  HR: 97 (05-30-22 @ 12:26)  BP: 112/58 (05-30-22 @ 12:26)  RR: 19 (05-30-22 @ 12:26)  SpO2: 97% (05-30-22 @ 06:28)  CAPILLARY BLOOD GLUCOSE      POCT Blood Glucose.: 98 mg/dL (30 May 2022 11:02)  POCT Blood Glucose.: 81 mg/dL (30 May 2022 06:23)  POCT Blood Glucose.: 94 mg/dL (29 May 2022 21:07)  POCT Blood Glucose.: 82 mg/dL (29 May 2022 16:40)      PHYSICAL EXAM:  General: NAD, Awake, patient is laying comfortably in bed  HEENT: AT, NC, Supple, NO JVD, NO CB, trach in place, occasionally shaking head  Lungs: mild decreased breath sounds B/L, no wheezing, no rhonchi, trach present  CVS: normal S1, S2, RRR, tachy,  NO M/G/R  Abdomen: soft, bowel sounds present, non-tender, non-distended, peg in place  Extremities: no edema, no clubbing, no cyanosis, positive peripheral pulses b/l  Neuro: 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  Skin: no rash, no ecchymosis                LAB  CBC  Date: 05-30-22 @ 07:38  Mean cell Esohavipde58.6  Mean cell Hemoglobin Conc29.7  Mean cell Volum 79.4  Platelet count-Automate 284  RBC Count 4.37  Red Cell Distrib Width18.9  WBC Count9.81  % Albumin, Urine--  Hematocrit 34.7  Hemoglobin 10.3  CBC  Date: 05-29-22 @ 06:35  Mean cell Szxjzulfqz75.9  Mean cell Hemoglobin Conc30.1  Mean cell Volum 79.2  Platelet count-Automate 267  RBC Count 4.48  Red Cell Distrib Width18.9  WBC Count13.07  % Albumin, Urine--  Hematocrit 35.5  Hemoglobin 10.7  CBC  Date: 05-28-22 @ 06:20  Mean cell Obvxssiwmc29.5  Mean cell Hemoglobin Conc29.1  Mean cell Volum 81.0  Platelet count-Automate 299  RBC Count 4.63  Red Cell Distrib Width19.2  WBC Count14.97  % Albumin, Urine--  Hematocrit 37.5  Hemoglobin 10.9      BMP  05-30-22 @ 07:38  Blood Gas Arterial-Calcium,Ionized--  Blood Urea Nitrogen, Serum 7 mg/dL<L> [10 - 20]  Carbon Dioxide, Serum26 mmol/L [17 - 32]  Chloride, Csdta510 mmol/L [98 - 110]  Creatinie, Serum<0.5 mg/dL<L> [0.7 - 1.5]  Glucose, Rhsgv254 mg/dL<H> [70 - 99]  Potassium, Serum4.5 mmol/L [3.5 - 5.0]  Sodium, Serum 144 mmol/L [135 - 146]  BMP  05-29-22 @ 06:35  Blood Gas Arterial-Calcium,Ionized--  Blood Urea Nitrogen, Serum 7 mg/dL<L> [10 - 20]  Carbon Dioxide, Serum26 mmol/L [17 - 32]  Chloride, Qqcjn054 mmol/L [98 - 110]  Creatinie, Serum<0.5 mg/dL<L> [0.7 - 1.5]  Glucose, Ctpag312 mg/dL<H> [70 - 99]  Potassium, Serum4.3 mmol/L [3.5 - 5.0]  Sodium, Serum 140 mmol/L [135 - 146]      Microbiology:    Culture - Blood (collected 05-28-22 @ 11:30)  Source: .Blood None  Gram Stain (05-29-22 @ 08:16):    Growth in anaerobic bottle: Gram Negative Rods  Final Report (05-30-22 @ 12:49):    Growth in anaerobic bottle: Klebsiella pneumoniae (Carbapenem Resistant)    See previous culture [id number]    Culture - Urine (collected 05-27-22 @ 16:11)  Source: Clean Catch Clean Catch (Midstream)  Final Report (05-29-22 @ 11:29):    >=3 organisms. Probable collection contamination.    Culture - Blood (collected 05-27-22 @ 09:05)  Source: .Blood Blood  Gram Stain (05-28-22 @ 06:19):    Growth in aerobic bottle: Gram Negative Rods  Final Report (05-30-22 @ 10:50):    Growth in aerobic bottle: Klebsiella pneumoniae (Carbapenem Resistant)    ***Blood Panel PCR results on this specimen are available    approximately 3 hours after the Gram stain result.***    Gram stain, PCR, and/or culture results may not always    correspond due to difference in methodologies.    ************************************************************    This PCR assay was performed by multiplex PCR. This    Assay tests for 66 bacterial and resistance gene targets.    Please refer to the [facility] test directory    at [email or web address] for details.  Organism: Blood Culture PCR  Klepne MDRO (05-30-22 @ 10:50)  Organism: Klepne MDRO (05-30-22 @ 10:50)      -  Amikacin: R >32      -  Ampicillin: R >16 These ampicillin results predict results for amoxicillin      -  Ampicillin/Sulbactam: R >16/8 Enterobacter, Klebsiella aerogenes, Citrobacter, and Serratia may develop resistance during prolonged therapy (3-4 days)      -  Aztreonam: R >16      -  Cefazolin: R >16 Enterobacter, Klebsiella aerogenes, Citrobacter, and Serratia may develop resistance during prolonged therapy (3-4 days)      -  Cefepime: R >16      -  Cefoxitin: R >16      -  Ceftazidime/Avibactam: R >16      -  Ceftolozane/tazobactam: R >8      -  Ceftriaxone: R >32 Enterobacter, Klebsiella aerogenes, Citrobacter, and Serratia may develop resistance during prolonged therapy      -  Ciprofloxacin: R >2      -  Ertapenem: R >1      -  Gentamicin: R >8      -  Imipenem: R >8      -  Levofloxacin: R >4      -  Meropenem: R >8      -  Piperacillin/Tazobactam: R >64      -  Tigecycline: S <=2      -  Tobramycin: R >8      -  Trimethoprim/Sulfamethoxazole: R >2/38      Method Type: HERB  Organism: Blood Culture PCR (05-30-22 @ 10:50)      -  Carbapenem Resistance: Detec      -  CTX-M Resistance Marker: Detec      -  ESBL: Detec      -  Klebsiella pneumoniae: Detec      -  NDM Resistance Marker: Detec      Method Type: PCR    Culture - Urine (collected 05-16-22 @ 17:15)  Source: Clean Catch Clean Catch (Midstream)  Final Report (05-17-22 @ 21:05):    <10,000 CFU/mL Normal Urogenital Trinidad    Culture - Blood (collected 05-16-22 @ 12:28)  Source: .Blood Blood-Peripheral  Final Report (05-21-22 @ 23:00):    No Growth Final    Culture - Blood (collected 05-16-22 @ 12:09)  Source: .Blood Blood-Peripheral  Final Report (05-21-22 @ 23:00):    No Growth Final      Medications:  acetaminophen     Tablet .. 650 milliGRAM(s) Oral every 6 hours PRN  albuterol/ipratropium for Nebulization. 3 milliLiter(s) Nebulizer once PRN  apixaban 5 milliGRAM(s) Enteral Tube two times a day  bisacodyl 5 milliGRAM(s) Oral daily  cefiderocol IVPB 2000 milliGRAM(s) IV Intermittent every 8 hours  chlorhexidine 4% Liquid 1 Application(s) Topical <User Schedule>  cyanocobalamin 500 MICROGram(s) Oral daily  cyclobenzaprine Oral Tab/Cap - Peds 5 milliGRAM(s) Oral three times a day  ferrous    sulfate Liquid 300 milliGRAM(s) Enteral Tube daily  folic acid 1 milliGRAM(s) Oral daily  metoclopramide   Syrup 10 milliGRAM(s) Oral every 6 hours PRN  metoprolol tartrate 50 milliGRAM(s) Enteral Tube two times a day  pantoprazole   Suspension 40 milliGRAM(s) Enteral Tube daily  scopolamine 1 mG/72 Hr(s) Patch 1 Patch Transdermal every 72 hours  thiamine  Oral Tab/Cap - Peds 100 milliGRAM(s) Oral two times a day        Assessment and Plan:  Patient is a 21 y/o woman with PMH of history of encephalitis s/p tooth abscess, chronic respiratory failure s/p tracheostomy and PEG tube placement, HTN, GERD, and SVC thrombosis on apixaban was brought to the ED from [facility] on 05/16 for evaluation of a broken tracheostomy flange. Tracheostomy was replaced in the ED. She was found to be septic on presentation with leukocytosis and COVID positive.     # Recurrent sepsis during inpatient stay- bacteremia due to Klebsiella infection, suspected source is urine   - blood cxs 5/27- ESBL, carbapenem resistant klebsiella- AS per ID rec- from 5/28- started on Fetroja 2 grams IV every 8 hours  -repeat blood cxs on 5/28- still positive, daily blood cxs  - echeverria was changed on 5/27  - f/up urine cxs- more than 3 organisms  -contact isolation  -repeated CBC- normal WBC    #Sepsis was present on admission due to COVID 19  unvaccinated pt  ID consult and f/u appreciated  remdesivir x 5 days (Ended 5/22)  completed decadron 6mg IV daily (5/16 - 5/25)  d/c isolation       # Persistent tachycardia- sinus tachy on 12 lead EKG- resumed on home dose of lopressor 50 mg via peg twice daily  - Free T3- normal , T4 Levels- slightly above reference range  - most recent CXR- no acute changes, no hypoxia  -low ddimer level  -good urine output      #Chronic respiratory failure  s/p tracheostomy replacement in ED and tolerating trach collar - 35%  suction prn  on  Scopolamine patch.     #SVC Thrombosis and Embolism - on Eliquis 5mg bid as outpt  on therapeutic lovenox as inpt.,  changed to PO Eliquis .     # Microcytic anemia on iron, vitamin B12 and folate for suspected deficiencies    #H/O Wernicke's encephalopathy on thiamine    #HTN - on metoprolol 50mg bid as outpt -restarted     # Constipation: started on laxatives prn tx.   -good bm's      Chronic quadriplegia- continue freq. body position change, decub prevention tx.     DVT prophylaxis - on Eliquis tx.    Code status: full code     #Progress Note Handoff: repeat blood cxs, IV Fetroja abx tx,  Family discussion: yes, medical team Disposition: SNF  once medically stable     Total time spent to complete patient's bedside assessment, review medical chart, discuss medical plan of care with covering medical team was more than 35 minutes.

## 2022-05-31 LAB
ANION GAP SERPL CALC-SCNC: 10 MMOL/L — SIGNIFICANT CHANGE UP (ref 7–14)
BUN SERPL-MCNC: 9 MG/DL — LOW (ref 10–20)
CALCIUM SERPL-MCNC: 9.3 MG/DL — SIGNIFICANT CHANGE UP (ref 8.5–10.1)
CHLORIDE SERPL-SCNC: 106 MMOL/L — SIGNIFICANT CHANGE UP (ref 98–110)
CO2 SERPL-SCNC: 28 MMOL/L — SIGNIFICANT CHANGE UP (ref 17–32)
CREAT SERPL-MCNC: <0.5 MG/DL — LOW (ref 0.7–1.5)
EGFR: 143 ML/MIN/1.73M2 — SIGNIFICANT CHANGE UP
GLUCOSE BLDC GLUCOMTR-MCNC: 110 MG/DL — HIGH (ref 70–99)
GLUCOSE BLDC GLUCOMTR-MCNC: 128 MG/DL — HIGH (ref 70–99)
GLUCOSE BLDC GLUCOMTR-MCNC: 90 MG/DL — SIGNIFICANT CHANGE UP (ref 70–99)
GLUCOSE BLDC GLUCOMTR-MCNC: 91 MG/DL — SIGNIFICANT CHANGE UP (ref 70–99)
GLUCOSE BLDC GLUCOMTR-MCNC: 93 MG/DL — SIGNIFICANT CHANGE UP (ref 70–99)
GLUCOSE SERPL-MCNC: 142 MG/DL — HIGH (ref 70–99)
HCT VFR BLD CALC: 40.5 % — SIGNIFICANT CHANGE UP (ref 37–47)
HGB BLD-MCNC: 11.9 G/DL — LOW (ref 12–16)
MCHC RBC-ENTMCNC: 23.2 PG — LOW (ref 27–31)
MCHC RBC-ENTMCNC: 29.4 G/DL — LOW (ref 32–37)
MCV RBC AUTO: 79.1 FL — LOW (ref 81–99)
NRBC # BLD: 0 /100 WBCS — SIGNIFICANT CHANGE UP (ref 0–0)
PLATELET # BLD AUTO: 406 K/UL — HIGH (ref 130–400)
POTASSIUM SERPL-MCNC: 4.4 MMOL/L — SIGNIFICANT CHANGE UP (ref 3.5–5)
POTASSIUM SERPL-SCNC: 4.4 MMOL/L — SIGNIFICANT CHANGE UP (ref 3.5–5)
RBC # BLD: 5.12 M/UL — SIGNIFICANT CHANGE UP (ref 4.2–5.4)
RBC # FLD: 19.4 % — HIGH (ref 11.5–14.5)
SODIUM SERPL-SCNC: 144 MMOL/L — SIGNIFICANT CHANGE UP (ref 135–146)
WBC # BLD: 14.22 K/UL — HIGH (ref 4.8–10.8)
WBC # FLD AUTO: 14.22 K/UL — HIGH (ref 4.8–10.8)

## 2022-05-31 PROCEDURE — 99233 SBSQ HOSP IP/OBS HIGH 50: CPT

## 2022-05-31 RX ADMIN — Medication 300 MILLIGRAM(S): at 13:06

## 2022-05-31 RX ADMIN — APIXABAN 5 MILLIGRAM(S): 2.5 TABLET, FILM COATED ORAL at 17:06

## 2022-05-31 RX ADMIN — Medication 100 MILLIGRAM(S): at 17:07

## 2022-05-31 RX ADMIN — CEFIDEROCOL SULFATE TOSYLATE 33.33 MILLIGRAM(S): 1 INJECTION, POWDER, FOR SOLUTION INTRAVENOUS at 22:58

## 2022-05-31 RX ADMIN — CYCLOBENZAPRINE HYDROCHLORIDE 5 MILLIGRAM(S): 10 TABLET, FILM COATED ORAL at 22:58

## 2022-05-31 RX ADMIN — Medication 50 MILLIGRAM(S): at 17:06

## 2022-05-31 RX ADMIN — CYCLOBENZAPRINE HYDROCHLORIDE 5 MILLIGRAM(S): 10 TABLET, FILM COATED ORAL at 13:03

## 2022-05-31 RX ADMIN — PREGABALIN 500 MICROGRAM(S): 225 CAPSULE ORAL at 13:05

## 2022-05-31 RX ADMIN — Medication 100 MILLIGRAM(S): at 05:06

## 2022-05-31 RX ADMIN — Medication 50 MILLIGRAM(S): at 05:06

## 2022-05-31 RX ADMIN — CEFIDEROCOL SULFATE TOSYLATE 33.33 MILLIGRAM(S): 1 INJECTION, POWDER, FOR SOLUTION INTRAVENOUS at 05:04

## 2022-05-31 RX ADMIN — CEFIDEROCOL SULFATE TOSYLATE 33.33 MILLIGRAM(S): 1 INJECTION, POWDER, FOR SOLUTION INTRAVENOUS at 17:07

## 2022-05-31 RX ADMIN — PANTOPRAZOLE SODIUM 40 MILLIGRAM(S): 20 TABLET, DELAYED RELEASE ORAL at 13:06

## 2022-05-31 RX ADMIN — SCOPALAMINE 1 PATCH: 1 PATCH, EXTENDED RELEASE TRANSDERMAL at 04:06

## 2022-05-31 RX ADMIN — SCOPALAMINE 1 PATCH: 1 PATCH, EXTENDED RELEASE TRANSDERMAL at 20:18

## 2022-05-31 RX ADMIN — CYCLOBENZAPRINE HYDROCHLORIDE 5 MILLIGRAM(S): 10 TABLET, FILM COATED ORAL at 05:05

## 2022-05-31 RX ADMIN — Medication 1 MILLIGRAM(S): at 13:04

## 2022-05-31 RX ADMIN — CHLORHEXIDINE GLUCONATE 1 APPLICATION(S): 213 SOLUTION TOPICAL at 05:07

## 2022-05-31 RX ADMIN — APIXABAN 5 MILLIGRAM(S): 2.5 TABLET, FILM COATED ORAL at 05:05

## 2022-05-31 NOTE — PROGRESS NOTE ADULT - ASSESSMENT
23 y/o F with a PMHx of encephalitis s/p tooth abscess s/p tracheostomy and PEG tube placement, HTN, GERD, and SVC thrombosis who was brought to ED from [place] on 5/16 for evaluation of a broken tracheostomy flange. Admitted for sepsis 2/2 COVID. Initially placed on trach collar 2-3LPM now back on RA. S/p Decadron and IV abx. Hospital course complicated for repeat sepsis 2/2 bacteremia and UTI w procal 12.3 (neg on admission). Restarted on IV abx    #Sepsis 2/2   - Febrile 101.3 F, WBC 30, tachycardic, concern for sepsis again  - S/p 1L LR bolus  - Procal 5/27: 12.3 (neg on admission)  - Repeat RVP: neg  - BCx 5/27: GNR -- f/u speciation  - C/w NS @ 100 cc/hr  - Start Zosyn 3.375 g IV q8h  - F/u UCx  - F/u ID recs    #Sepsis present on admission 2/2 COVID pneumonia -- resolved  #Broken tracheostomy flange - resolved  - Trach replaced in the ED  - Unvaccinated COVID per sister  - Sepsis on presentation  - COVID 5/16: (+)  - CXR with left pleural effusion  - D-dimer: 267  - Procal: 0.03, CRP 61, , ferritin 9  - BCx, UCx: neg   - MRSA negative   - ID following  - S/p Decadron, RDV  - COVID 5/24 + 5/27: (-)    #Constipation -- resolved  - Unclear when last BM was  - Aggressive bowel reigmen  - F/u KUB    #HO SVC Thrombosis and Embolism   - C/w Eliquis 5 mg PO BID    #History of Encephalitis post Tooth Abscess in 10/2021   s/p Tracheostomy (has an O2 tank per sister but unsure about flow) and G-tube placement  - Monitor SaO2 and O2 requirements: as above    #GERD  * Home med Omeprazole 40mg QD  - C/w Protonix 40 mg PO daily    #Iron Deficiency Anemia   History of Wernicke Encephalopathy  * Home med iron replacement, thiamine, B12, and folic acid  * ED Hb 11.3, MCV 79.4  B12 and folate nl  - C/w home iron replacement, thiamine, B12, and folic acid  - Monitor for now    #History of Hypertension  - BP on lower side here  - C/w metoprolol 50 mg PO BID    #DVT ppx: Eliquis 5 mg PO BID  #GI ppx: Protonix 40 mg PO daily  #Diet: NPO w G-tube feeds  #Activity: Functional quadriplegic  #Dispo: From [facility], acute  #Code status: Full code -- has MOLST confirming 21 y/o F with a PMHx of encephalitis s/p tooth abscess s/p tracheostomy and PEG tube placement, HTN, GERD, and SVC thrombosis who was brought to ED from [place] on 5/16 for evaluation of a broken tracheostomy flange. Admitted for sepsis 2/2 COVID. Initially placed on trach collar 2-3LPM now back on RA. S/p Decadron and IV abx. Hospital course complicated for repeat sepsis 2/2 bacteremia and UTI w procal 12.3 (neg on admission). Restarted on IV abx    #Sepsis 2/2 MDR Klebsiella bacteremia  - Febrile 101.3 F, WBC 30, tachycardic, concern for sepsis again  - S/p 1L LR bolus  - Procal 5/27: 12.3 (neg on admission)  - Repeat RVP: neg  - BCx 5/27: MDR Klebsiella  - BCx 5/28: Klebsiella (carbapenem resistant)  - BCx 5/29: NGTD  - ID recs appreciated  - C/w NS @ 100 cc/hr  - C/w Fetroja 2g IV q8h (started 5/28)  - F/u BCx from 5/30 + 5/31    #Sepsis present on admission 2/2 COVID pneumonia -- resolved  #Broken tracheostomy flange - resolved  - Trach replaced in the ED  - Unvaccinated COVID per sister  - Sepsis on presentation  - COVID 5/16: (+)  - CXR with left pleural effusion  - D-dimer: 267  - Procal: 0.03, CRP 61, , ferritin 9  - BCx, UCx: neg   - MRSA negative   - ID following  - S/p Decadron, RDV  - COVID 5/24 + 5/27: (-)    #HO SVC Thrombosis and Embolism   - C/w Eliquis 5 mg PO BID    #History of Encephalitis post Tooth Abscess in 10/2021   s/p Tracheostomy (has an O2 tank per sister but unsure about flow) and G-tube placement  - Monitor SaO2 and O2 requirements: as above    #GERD  * Home med Omeprazole 40mg QD  - C/w Protonix 40 mg PO daily    #Iron Deficiency Anemia   History of Wernicke Encephalopathy  * Home med iron replacement, thiamine, B12, and folic acid  * ED Hb 11.3, MCV 79.4  B12 and folate nl  - C/w home iron replacement, thiamine, B12, and folic acid  - Monitor for now    #History of Hypertension  - BP on lower side here  - C/w metoprolol 50 mg PO BID    #DVT ppx: Eliquis 5 mg PO BID  #GI ppx: Protonix 40 mg PO daily  #Diet: NPO w G-tube feeds  #Activity: Functional quadriplegic  #Dispo: From [facility], acute  #Code status: Full code -- has MOLST confirming 21 y/o F with a PMHx of encephalitis s/p tooth abscess s/p tracheostomy and PEG tube placement, HTN, GERD, and SVC thrombosis who was brought to ED from [place] on 5/16 for evaluation of a broken tracheostomy flange. Admitted for sepsis 2/2 COVID. Initially placed on trach collar 2-3LPM now back on RA. S/p Decadron and IV abx. Hospital course complicated for repeat sepsis 2/2 bacteremia and UTI w procal 12.3 (neg on admission). Restarted on IV abx    #Sepsis 2/2 MDR Klebsiella bacteremia  - Febrile 101.3 F, WBC 30, tachycardic, concern for sepsis again  - S/p 1L LR bolus  - Procal 5/27: 12.3 (neg on admission)  - Repeat RVP: neg  - BCx 5/27: MDR Klebsiella  - BCx 5/28: Klebsiella (carbapenem resistant)  - BCx 5/29: NGTD  - ID recs appreciated  - C/w NS @ 100 cc/hr  - C/w Fetroja 2g IV q8h (started 5/28)  - F/u BCx from 5/30 + 5/31    #Sepsis present on admission 2/2 COVID pneumonia -- resolved  #Broken tracheostomy flange - resolved  - Trach replaced in the ED  - Unvaccinated COVID per sister  - Sepsis on presentation  - COVID 5/16: (+)  - CXR with left pleural effusion  - D-dimer: 267  - Procal: 0.03, CRP 61, , ferritin 9  - BCx, UCx: neg   - MRSA negative   - ID following  - S/p Decadron, RDV  - COVID 5/24 + 5/27: (-)    #HO SVC Thrombosis and Embolism   - C/w Eliquis 5 mg PO BID    #History of Encephalitis post Tooth Abscess in 10/2021   s/p Tracheostomy (has an O2 tank per sister but unsure about flow) and G-tube placement  - Monitor SaO2 and O2 requirements: as above    #GERD  * Home med Omeprazole 40mg QD  - C/w Protonix 40 mg PO daily    #Iron Deficiency Anemia   #History of Wernicke Encephalopathy  #Suspected thiamine deficiency  #Suspected folic acid deficiency  * Home med iron replacement, thiamine, B12, and folic acid  * ED Hb 11.3, MCV 79.4  B12 and folate nl  - C/w home iron replacement, thiamine, B12, and folic acid  - Monitor for now    #History of Hypertension  - BP on lower side here  - C/w metoprolol 50 mg PO BID    #DVT ppx: Eliquis 5 mg PO BID  #GI ppx: Protonix 40 mg PO daily  #Diet: NPO w G-tube feeds  #Activity: Functional quadriplegic  #Dispo: From [facility], acute  #Code status: Full code -- has MOLST confirming

## 2022-05-31 NOTE — PROGRESS NOTE ADULT - SUBJECTIVE AND OBJECTIVE BOX
CONSUELO CARO  22y, Female  Allergy: Allergy Status Unknown      LOS  15d    CHIEF COMPLAINT: Broken Tracheostomy Flange (31 May 2022 08:55)      INTERVAL EVENTS/HPI  - No acute events overnight  - T(F): , Max: 99.6 (05-31-22 @ 05:34)  - Tolerating medication  - WBC Count: 14.22 (05-31-22 @ 07:49)  WBC Count: 9.81 (05-30-22 @ 07:38)     - Creatinine, Serum: <0.5 (05-31-22 @ 07:49)  Creatinine, Serum: <0.5 (05-30-22 @ 07:38)       ROS  unable to obtain history secondary to patient's mental status and/or sedation     VITALS:  T(F): 99.6, Max: 99.6 (05-31-22 @ 05:34)  HR: 101  BP: 126/65  RR: 18Vital Signs Last 24 Hrs  T(C): 37.6 (31 May 2022 05:34), Max: 37.6 (31 May 2022 05:34)  T(F): 99.6 (31 May 2022 05:34), Max: 99.6 (31 May 2022 05:34)  HR: 101 (31 May 2022 06:25) (91 - 138)  BP: 126/65 (31 May 2022 05:34) (108/58 - 126/65)  BP(mean): 76 (30 May 2022 12:26) (76 - 76)  RR: 18 (31 May 2022 05:34) (16 - 20)  SpO2: 95% (31 May 2022 00:02) (94% - 96%)    PHYSICAL EXAM:  Gen: trach/ vent  CV: RRR  Lungs: Decreased BS at bases  Abd: Soft  Neuro: does not follow commands  Skin: no rash   Lines clean, no phlebitis     FH: Non-contributory  Social Hx: Non-contributory    TESTS & MEASUREMENTS:                        11.9   14.22 )-----------( 406      ( 31 May 2022 07:49 )             40.5     05-31    144  |  106  |  9<L>  ----------------------------<  142<H>  4.4   |  28  |  <0.5<L>    Ca    9.3      31 May 2022 07:49              Culture - Blood (collected 05-29-22 @ 17:58)  Source: .Blood None  Preliminary Report (05-31-22 @ 01:02):    No growth to date.    Culture - Blood (collected 05-28-22 @ 11:30)  Source: .Blood None  Gram Stain (05-29-22 @ 08:16):    Growth in anaerobic bottle: Gram Negative Rods  Final Report (05-30-22 @ 12:49):    Growth in anaerobic bottle: Klebsiella pneumoniae (Carbapenem Resistant)    See previous culture [id number]    Culture - Urine (collected 05-27-22 @ 16:11)  Source: Clean Catch Clean Catch (Midstream)  Final Report (05-29-22 @ 11:29):    >=3 organisms. Probable collection contamination.    Culture - Blood (collected 05-27-22 @ 09:05)  Source: .Blood Blood  Gram Stain (05-28-22 @ 06:19):    Growth in aerobic bottle: Gram Negative Rods  Final Report (05-30-22 @ 10:50):    Growth in aerobic bottle: Klebsiella pneumoniae (Carbapenem Resistant)    ***Blood Panel PCR results on this specimen are available    approximately 3 hours after the Gram stain result.***    Gram stain, PCR, and/or culture results may not always    correspond due to difference in methodologies.    ************************************************************    This PCR assay was performed by multiplex PCR. This    Assay tests for 66 bacterial and resistance gene targets.    Please refer to the [facility] test directory    at [email or web address] for details.  Organism: Blood Culture PCR  Klepne MDRO (05-30-22 @ 10:50)  Organism: Klepne MDRO (05-30-22 @ 10:50)      -  Amikacin: R >32      -  Ampicillin: R >16 These ampicillin results predict results for amoxicillin      -  Ampicillin/Sulbactam: R >16/8 Enterobacter, Klebsiella aerogenes, Citrobacter, and Serratia may develop resistance during prolonged therapy (3-4 days)      -  Aztreonam: R >16      -  Cefazolin: R >16 Enterobacter, Klebsiella aerogenes, Citrobacter, and Serratia may develop resistance during prolonged therapy (3-4 days)      -  Cefepime: R >16      -  Cefoxitin: R >16      -  Ceftazidime/Avibactam: R >16      -  Ceftolozane/tazobactam: R >8      -  Ceftriaxone: R >32 Enterobacter, Klebsiella aerogenes, Citrobacter, and Serratia may develop resistance during prolonged therapy      -  Ciprofloxacin: R >2      -  Ertapenem: R >1      -  Gentamicin: R >8      -  Imipenem: R >8      -  Levofloxacin: R >4      -  Meropenem: R >8      -  Piperacillin/Tazobactam: R >64      -  Tigecycline: S <=2      -  Tobramycin: R >8      -  Trimethoprim/Sulfamethoxazole: R >2/38      Method Type: HERB  Organism: Blood Culture PCR (05-30-22 @ 10:50)      -  Carbapenem Resistance: Detec      -  CTX-M Resistance Marker: Detec      -  ESBL: Detec      -  Klebsiella pneumoniae: Detec      -  NDM Resistance Marker: Detec      Method Type: PCR    Culture - Urine (collected 05-16-22 @ 17:15)  Source: Clean Catch Clean Catch (Midstream)  Final Report (05-17-22 @ 21:05):    <10,000 CFU/mL Normal Urogenital Trinidad    Culture - Blood (collected 05-16-22 @ 12:28)  Source: .Blood Blood-Peripheral  Final Report (05-21-22 @ 23:00):    No Growth Final    Culture - Blood (collected 05-16-22 @ 12:09)  Source: .Blood Blood-Peripheral  Final Report (05-21-22 @ 23:00):    No Growth Final        Lactate, Blood: 1.8 mmol/L (05-27-22 @ 06:47)  Lactate, Blood: 3.5 mmol/L (05-26-22 @ 18:46)      INFECTIOUS DISEASES TESTING  Rapid RVP Result: NotDetec (05-27-22 @ 16:07)  Procalcitonin, Serum: 12.30 (05-27-22 @ 11:39)  COVID-19 PCR: NotDetec (05-24-22 @ 07:59)  MRSA PCR Result.: Negative (05-17-22 @ 10:10)  Procalcitonin, Serum: 0.03 (05-17-22 @ 07:37)  Rapid RVP Result: Detected (05-16-22 @ 12:09)  strept    INFLAMMATORY MARKERS  Sedimentation Rate, Erythrocyte: 46 mm/Hr (05-27-22 @ 11:39)  Sedimentation Rate, Erythrocyte: 85 mm/Hr (05-17-22 @ 07:37)  C-Reactive Protein, Serum: 61 mg/L (05-17-22 @ 07:37)      RADIOLOGY & ADDITIONAL TESTS:  I have personally reviewed the last available Chest xray  CXR      CT      CARDIOLOGY TESTING  12 Lead ECG:   Ventricular Rate 169 BPM    Atrial Rate 169 BPM    P-R Interval 124 ms    QRS Duration 56 ms    Q-T Interval 274 ms    QTC Calculation(Bazett) 459 ms    P Axis 77 degrees    R Axis 17 degrees    T Axis 77 degrees    Diagnosis Line Sinus tachycardia  Otherwise normal ECG    Confirmed by Mike Medina ([id number]) on 5/28/2022 11:45:18 AM (05-26-22 @ 16:58)  12 Lead ECG:   Ventricular Rate 126 BPM    Atrial Rate 126 BPM    P-R Interval 96 ms    QRS Duration 70 ms    Q-T Interval 306 ms    QTC Calculation(Bazett) 443 ms    P Axis 62 degrees    R Axis 34 degrees    T Axis 56 degrees    Diagnosis Line Sinus tachycardia with short GA  Otherwise normal ECG    Confirmed by ERIN DAVENPORT MD ([id number]) on 5/26/2022 7:28:33 AM (05-25-22 @ 14:37)      MEDICATIONS  apixaban 5 Enteral Tube two times a day  bisacodyl 5 Oral daily  cefiderocol IVPB 2000 IV Intermittent every 8 hours  chlorhexidine 4% Liquid 1 Topical <User Schedule>  cyanocobalamin 500 Oral daily  cyclobenzaprine Oral Tab/Cap - Peds 5 Oral three times a day  ferrous    sulfate Liquid 300 Enteral Tube daily  folic acid 1 Oral daily  metoprolol tartrate 50 Enteral Tube two times a day  pantoprazole   Suspension 40 Enteral Tube daily  scopolamine 1 mG/72 Hr(s) Patch 1 Transdermal every 72 hours  thiamine  Oral Tab/Cap - Peds 100 Oral two times a day      WEIGHT  Weight (kg): 55.2 (05-19-22 @ 21:56)  Creatinine, Serum: <0.5 mg/dL (05-31-22 @ 07:49)      ANTIBIOTICS:  cefiderocol IVPB 2000 milliGRAM(s) IV Intermittent every 8 hours      All available historical records have been reviewed

## 2022-05-31 NOTE — PROGRESS NOTE ADULT - ASSESSMENT
IMPRESSION;   #Septic shock ( on pressors ) secondary to CRE GNR possibly related to acute pyelonephritis    5/29 BCX NGTD     5/28 BCX  Klebsiella pneumoniae (Carbapenem Resistant)    5/27 BCx Klebsiella pneumoniae (Carbapenem Resistant)    5/27 UCx Klebsiella pneumoniae (Carbapenem Resistant)    Pyuria  #Sepsis on admission T>90 RR>20 WBC >12    UA WBC 65, poor specimen as +epith- rule out acute cystitis   #COVID19 , unvaccinated    satting well on baseline settings  < from: Xray Chest 1 View- PORTABLE-Urgent (Xray Chest 1 View- PORTABLE-Urgent .) (05.16.22 @ 18:00) >  Left basal linear subsegmental atelectasis. No rosie consolidation,   effusion or pneumothorax  #Trach/PEG- trach malfunction on admission    RECOMMENDATIONS;  - Cefiderocol 2 gm iv q8h x 10 days from cleared BCX  - US kidney/bladder rule out stone- calcium oxalate in UA  - Off loading to prevent pressure sores and preventive measures to avoid aspiration     If any questions, please call or send a message on leemail Teams  Please continue to update ID with any pertinent new laboratory or radiographic findings  Spectra [id number]

## 2022-05-31 NOTE — PROGRESS NOTE ADULT - SUBJECTIVE AND OBJECTIVE BOX
CONSUELO CARO 22y Female  MRN#: [id number]   CODE STATUS: Full code    Hospital Day: 15d    Patient is currently admitted with the primary diagnosis of COVID    SUBJECTIVE:  Hospital Course  Admitted for sepsis 2/2 COVID. Initially placed on trach collar 2-3LPM now back on RA. S/p Decadron and IV abx. Hospital course complicated for repeat sepsis 2/2 MDR Klebsiella bacteremia w procal 12.3 (neg on admission). Started on Fetroja 5/28.    Patient seen and examined at bedside. No events overnight. ROS unobtainable    Present Today:  - Alejo:  No [  ], Yes [ x ] : Indication: chronic Alejo    - Type of IV Access:      .. CVC/Piccline:  No [ x ], Yes [  ] : Indication:      .. Midline: No [ x ], Yes [  ] : Indication:                                            ----------------------------------------------------------  OBJECTIVE:  PAST MEDICAL & SURGICAL HISTORY:                                            -----------------------------------------------------------  ALLERGIES:  Allergy Status Unknown                                            ------------------------------------------------------------    HOME MEDICATIONS:  Home Medications:  albuterol 90 mcg/inh inhalation aerosol with adapter: 1 puff(s) inhaled every 4 hours (16 May 2022 21:37)  cyclobenzaprine 5 mg oral tablet: 1 tab(s) orally 3 times a day (16 May 2022 21:36)  Eliquis: 5 milligram(s) orally every 12 hours (16 May 2022 21:39)  ferrous sulfate 220 mg/5 mL (44 mg/5 mL elemental iron) oral elixir: 7.5 milliliter(s) orally once a day (16 May 2022 21:38)  folic acid 1 mg oral tablet: 1 tab(s) orally once a day (16 May 2022 21:38)  glycopyrrolate 2 mg oral tablet: 1 tab(s) orally 3 times a day (16 May 2022 21:36)  ipratropium 18 mcg/inh inhalation aerosol: 2 puff(s) inhaled every 6 hours (16 May 2022 21:37)  Lopressor 50 mg oral tablet: 1 tab(s) orally 2 times a day (16 May 2022 21:39)  metoclopramide 10 mg oral tablet: 1 tab(s) orally 4 times a day (before meals and at bedtime) (16 May 2022 21:38)  omeprazole 40 mg oral delayed release capsule: 1 cap(s) orally once a day (16 May 2022 21:39)  scopolamine 0.4 mg oral tablet: 1 milligram(s) orally every 72 hours (16 May 2022 21:36)  Tylenol 325 mg oral capsule: 2 cap(s) orally 3 times a day, As Needed (16 May 2022 21:37)  Vitamin B1 100 mg oral tablet: 1 tab(s) orally 2 times a day (16 May 2022 21:38)  Vitamin B12 500 mcg oral tablet: 1 tab(s) orally once a day (16 May 2022 21:38)                           MEDICATIONS:  STANDING MEDICATIONS  apixaban 5 milliGRAM(s) Enteral Tube two times a day  bisacodyl 5 milliGRAM(s) Oral daily  cefiderocol IVPB 2000 milliGRAM(s) IV Intermittent every 8 hours  chlorhexidine 4% Liquid 1 Application(s) Topical <User Schedule>  cyanocobalamin 500 MICROGram(s) Oral daily  cyclobenzaprine Oral Tab/Cap - Peds 5 milliGRAM(s) Oral three times a day  ferrous    sulfate Liquid 300 milliGRAM(s) Enteral Tube daily  folic acid 1 milliGRAM(s) Oral daily  metoprolol tartrate 50 milliGRAM(s) Enteral Tube two times a day  pantoprazole   Suspension 40 milliGRAM(s) Enteral Tube daily  scopolamine 1 mG/72 Hr(s) Patch 1 Patch Transdermal every 72 hours  thiamine  Oral Tab/Cap - Peds 100 milliGRAM(s) Oral two times a day    PRN MEDICATIONS  acetaminophen     Tablet .. 650 milliGRAM(s) Oral every 6 hours PRN  albuterol/ipratropium for Nebulization. 3 milliLiter(s) Nebulizer once PRN  metoclopramide   Syrup 10 milliGRAM(s) Oral every 6 hours PRN                                            ------------------------------------------------------------  VITAL SIGNS: Last 24 Hours  T(C): 37.6 (31 May 2022 05:34), Max: 37.6 (31 May 2022 05:34)  T(F): 99.6 (31 May 2022 05:34), Max: 99.6 (31 May 2022 05:34)  HR: 101 (31 May 2022 06:25) (91 - 138)  BP: 126/65 (31 May 2022 05:34) (108/58 - 126/65)  BP(mean): 76 (30 May 2022 12:26) (76 - 76)  RR: 18 (31 May 2022 05:34) (16 - 20)  SpO2: 95% (31 May 2022 00:02) (94% - 96%)      05-30-22 @ 07:01  -  05-31-22 @ 07:00  --------------------------------------------------------  IN: 490 mL / OUT: 2125 mL / NET: -1635 mL                                             --------------------------------------------------------------  LABS:                        11.9   14.22 )-----------( x        ( 31 May 2022 07:49 )             40.5     05-30    144  |  106  |  7<L>  ----------------------------<  139<H>  4.5   |  26  |  <0.5<L>    Ca    8.7      30 May 2022 07:38      Culture - Blood (collected 29 May 2022 17:58)  Source: .Blood None  Preliminary Report (31 May 2022 01:02):    No growth to date.    Culture - Blood (collected 28 May 2022 11:30)  Source: .Blood None  Gram Stain (29 May 2022 08:16):    Growth in anaerobic bottle: Gram Negative Rods  Final Report (30 May 2022 12:49):    Growth in anaerobic bottle: Klebsiella pneumoniae (Carbapenem Resistant)    See previous culture [id number]                                            --------------------------------------------------------------    PHYSICAL EXAM:  General: Appears comfortable  HEENT: Normocephalic, atraumatic  LUNGS: Clear to auscultation bilaterally, no wheezes, rales, rhonchi  HEART: S1S2 present, regular rate and rhythm, no murmurs, rubs, gallops  ABDOMEN: Soft, nontender, nondistended  EXT: No edema, contracted x4 extremities

## 2022-06-01 LAB
ANION GAP SERPL CALC-SCNC: 12 MMOL/L — SIGNIFICANT CHANGE UP (ref 7–14)
BUN SERPL-MCNC: 12 MG/DL — SIGNIFICANT CHANGE UP (ref 10–20)
CALCIUM SERPL-MCNC: 9.1 MG/DL — SIGNIFICANT CHANGE UP (ref 8.5–10.1)
CHLORIDE SERPL-SCNC: 103 MMOL/L — SIGNIFICANT CHANGE UP (ref 98–110)
CO2 SERPL-SCNC: 27 MMOL/L — SIGNIFICANT CHANGE UP (ref 17–32)
CREAT SERPL-MCNC: <0.5 MG/DL — LOW (ref 0.7–1.5)
EGFR: 143 ML/MIN/1.73M2 — SIGNIFICANT CHANGE UP
GLUCOSE SERPL-MCNC: 88 MG/DL — SIGNIFICANT CHANGE UP (ref 70–99)
HCT VFR BLD CALC: 40.5 % — SIGNIFICANT CHANGE UP (ref 37–47)
HGB BLD-MCNC: 12 G/DL — SIGNIFICANT CHANGE UP (ref 12–16)
MCHC RBC-ENTMCNC: 23.2 PG — LOW (ref 27–31)
MCHC RBC-ENTMCNC: 29.6 G/DL — LOW (ref 32–37)
MCV RBC AUTO: 78.3 FL — LOW (ref 81–99)
NRBC # BLD: 0 /100 WBCS — SIGNIFICANT CHANGE UP (ref 0–0)
PLATELET # BLD AUTO: 421 K/UL — HIGH (ref 130–400)
POTASSIUM SERPL-MCNC: 4.4 MMOL/L — SIGNIFICANT CHANGE UP (ref 3.5–5)
POTASSIUM SERPL-SCNC: 4.4 MMOL/L — SIGNIFICANT CHANGE UP (ref 3.5–5)
RBC # BLD: 5.17 M/UL — SIGNIFICANT CHANGE UP (ref 4.2–5.4)
RBC # FLD: 19.3 % — HIGH (ref 11.5–14.5)
SODIUM SERPL-SCNC: 142 MMOL/L — SIGNIFICANT CHANGE UP (ref 135–146)
WBC # BLD: 14.05 K/UL — HIGH (ref 4.8–10.8)
WBC # FLD AUTO: 14.05 K/UL — HIGH (ref 4.8–10.8)

## 2022-06-01 PROCEDURE — 99233 SBSQ HOSP IP/OBS HIGH 50: CPT

## 2022-06-01 PROCEDURE — 76770 US EXAM ABDO BACK WALL COMP: CPT | Mod: 26

## 2022-06-01 RX ADMIN — SCOPALAMINE 1 PATCH: 1 PATCH, EXTENDED RELEASE TRANSDERMAL at 19:20

## 2022-06-01 RX ADMIN — SCOPALAMINE 1 PATCH: 1 PATCH, EXTENDED RELEASE TRANSDERMAL at 05:09

## 2022-06-01 RX ADMIN — Medication 50 MILLIGRAM(S): at 05:08

## 2022-06-01 RX ADMIN — CHLORHEXIDINE GLUCONATE 1 APPLICATION(S): 213 SOLUTION TOPICAL at 05:09

## 2022-06-01 RX ADMIN — PREGABALIN 500 MICROGRAM(S): 225 CAPSULE ORAL at 14:00

## 2022-06-01 RX ADMIN — CEFIDEROCOL SULFATE TOSYLATE 33.33 MILLIGRAM(S): 1 INJECTION, POWDER, FOR SOLUTION INTRAVENOUS at 17:14

## 2022-06-01 RX ADMIN — CYCLOBENZAPRINE HYDROCHLORIDE 5 MILLIGRAM(S): 10 TABLET, FILM COATED ORAL at 05:08

## 2022-06-01 RX ADMIN — Medication 300 MILLIGRAM(S): at 14:01

## 2022-06-01 RX ADMIN — Medication 50 MILLIGRAM(S): at 17:18

## 2022-06-01 RX ADMIN — PANTOPRAZOLE SODIUM 40 MILLIGRAM(S): 20 TABLET, DELAYED RELEASE ORAL at 14:00

## 2022-06-01 RX ADMIN — CEFIDEROCOL SULFATE TOSYLATE 33.33 MILLIGRAM(S): 1 INJECTION, POWDER, FOR SOLUTION INTRAVENOUS at 22:25

## 2022-06-01 RX ADMIN — CYCLOBENZAPRINE HYDROCHLORIDE 5 MILLIGRAM(S): 10 TABLET, FILM COATED ORAL at 14:00

## 2022-06-01 RX ADMIN — CEFIDEROCOL SULFATE TOSYLATE 33.33 MILLIGRAM(S): 1 INJECTION, POWDER, FOR SOLUTION INTRAVENOUS at 05:09

## 2022-06-01 RX ADMIN — Medication 1 MILLIGRAM(S): at 14:00

## 2022-06-01 RX ADMIN — CYCLOBENZAPRINE HYDROCHLORIDE 5 MILLIGRAM(S): 10 TABLET, FILM COATED ORAL at 22:25

## 2022-06-01 RX ADMIN — Medication 100 MILLIGRAM(S): at 17:18

## 2022-06-01 RX ADMIN — Medication 100 MILLIGRAM(S): at 05:08

## 2022-06-01 RX ADMIN — APIXABAN 5 MILLIGRAM(S): 2.5 TABLET, FILM COATED ORAL at 17:18

## 2022-06-01 RX ADMIN — APIXABAN 5 MILLIGRAM(S): 2.5 TABLET, FILM COATED ORAL at 05:08

## 2022-06-01 RX ADMIN — Medication 5 MILLIGRAM(S): at 14:02

## 2022-06-01 RX ADMIN — SCOPALAMINE 1 PATCH: 1 PATCH, EXTENDED RELEASE TRANSDERMAL at 06:36

## 2022-06-01 NOTE — CHART NOTE - NSCHARTNOTEFT_GEN_A_CORE
Registered Dietitian Follow-Up     Patient Profile Reviewed                           Yes [x]   No []  Nutrition History Previously Obtained        Yes [x]  No []      Pertinent Medical Interventions:  21 y/o F with a PMHx of encephalitis s/p tooth abscess s/p tracheostomy and PEG tube placement, HTN, GERD, and SVC thrombosis who was brought to ED from [place] on 5/16 for evaluation of a broken tracheostomy flange. Admitted for sepsis 2/2 COVID. Initially placed on trach collar 2-3LPM now back on RA. S/p Decadron and IV abx. Hospital course complicated for repeat sepsis 2/2 bacteremia and UTI w procal 12.3 (neg on admission). Restarted on IV abx  #Sepsis 2/2 MDR Klebsiella bacteremia    Nutrition Interval History:   patient tolerating current EN regimen per RN with no s/s of GI intolerance    Diet order:   Diet, NPO with Tube Feed:   Tube Feeding Modality: Gastrostomy  Osmolite 1.5 Gilberto  Total Volume for 24 Hours (mL): 1000  Bolus  Total Volume of Bolus (mL):  250  Total # of Feeds: 4  Tube Feed Frequency: Every 6 hours   Tube Feed Start Time: 00:00  Bolus Feed Rate (mL per Hour): 250   Bolus Feed Duration (in Hours): 6  Bolus Feed Instructions:  -- Additional water flushes: 120 mL pre and post feeds (05-18-22 @ 17:09) [Active]    Anthropometrics:  Height (cm): 167.6 (05-19-22 @ 21:56)  Weight (kg): 55.2 (05-19-22 @ 21:56)  BMI (kg/m2): 19.7 (05-19-22 @ 21:56)    OTHER WEIGHTS:   Weight hx: Per RN, UBW is 61.27 KG - last checked on 4/7 per RN at NH. Current dosing weight is 56.7 KG; 7.45% unintentional wt loss in over 1 month is significant. Pt meets 1 criteria for malnutrition.   Using height of 167.64 cm received by RN at NH to calculate TF. No height documented in flowsheet.    MEDICATIONS  (STANDING):  apixaban 5 milliGRAM(s) Enteral Tube two times a day  bisacodyl 5 milliGRAM(s) Oral daily  cefiderocol IVPB 2000 milliGRAM(s) IV Intermittent every 8 hours  chlorhexidine 4% Liquid 1 Application(s) Topical <User Schedule>  cyanocobalamin 500 MICROGram(s) Oral daily  cyclobenzaprine Oral Tab/Cap - Peds 5 milliGRAM(s) Oral three times a day  ferrous    sulfate Liquid 300 milliGRAM(s) Enteral Tube daily  folic acid 1 milliGRAM(s) Oral daily  metoprolol tartrate 50 milliGRAM(s) Enteral Tube two times a day  pantoprazole   Suspension 40 milliGRAM(s) Enteral Tube daily  scopolamine 1 mG/72 Hr(s) Patch 1 Patch Transdermal every 72 hours  thiamine  Oral Tab/Cap - Peds 100 milliGRAM(s) Oral two times a day    MEDICATIONS  (PRN):  acetaminophen     Tablet .. 650 milliGRAM(s) Oral every 6 hours PRN Temp greater or equal to 38C (100.4F), Mild Pain (1 - 3)  albuterol/ipratropium for Nebulization. 3 milliLiter(s) Nebulizer once PRN Shortness of Breath and/or Wheezing  metoclopramide   Syrup 10 milliGRAM(s) Oral every 6 hours PRN as needed    Pertinent Labs:   Finger Sticks:  POCT Blood Glucose.: 90 mg/dL (05-31 @ 16:29)  POCT Blood Glucose.: 93 mg/dL (05-31 @ 11:27)    Physical Findings:  - Appearance: confused,, trach dependent  - GI function: fecal incontinence. BM x2 on 5/31  - Tubes: PEG  - Oral/Mouth cavity: NPO with EN   - Skin: Intact  - Edema: none noted     Nutrition Requirements:  Using ABW 56.7 KG: ENERGY: 0013-8734 kcal/day (MSJ 1.1-1.3 AF); PROTEIN: 62-74 g/day (1.1-1.3 g/kg); FLUID: 5342-6523 mL/day (30-35 mL/kg) -- with consideration for weight loss PTA, age    Estimated Energy Needs    Continue [x]  Adjust []  Estimated Protein Needs    Continue [x]  Adjust []  Estimated Fluid Needs        Continue [x]  Adjust []    Nutrient Intake: >85% via EN     [x] Previous Nutrition Diagnosis:            [] Ongoing          [x] Resolved  #1 Inadequate energy intake              [] Ongoing          [x] Resolved  #2 Excessive Energy Intake    #3 Inadequate oral intake  related to unable to eat by mouth  as evidence by pt NPO with long term EN via PEG    Nutrition Intervention:   Enteral Nutrition, Medical Food Supplement, Vitamin Supplement, Nutrition Related Medication, Coordination of Care      Indicator/Monitoring:   Jessa Sagastume, #[id number] to monitor diet order, energy intake, food and nutrient intake, body composition, weight    Recommendations:  1. Continue Osmolite 15 bolus @250mL Q6hr (4x)   (provides: 1500 kcal/day, 63 g/pro, 762 mL free water from formula)    2. CONTINUE:  thiamine  Oral Tab/Cap - Peds 100 milliGRAM(s) Oral two times a day  ferrous    sulfate Liquid 300 milliGRAM(s) Enteral Tube daily  folic acid 1 milliGRAM(s) Oral daily  cyanocobalamin 500 MICROGram(s) Oral daily    LOW Risk, follow up x 10 days. Registered Dietitian Follow-Up     Patient Profile Reviewed                           Yes [x]   No []  Nutrition History Previously Obtained        Yes [x]  No []      Pertinent Medical Interventions:  21 y/o F with a PMHx of encephalitis s/p tooth abscess s/p tracheostomy and PEG tube placement, HTN, GERD, and SVC thrombosis who was brought to ED from [place] on 5/16 for evaluation of a broken tracheostomy flange. Admitted for sepsis 2/2 COVID. Initially placed on trach collar 2-3LPM now back on RA. S/p Decadron and IV abx. Hospital course complicated for repeat sepsis 2/2 bacteremia and UTI w procal 12.3 (neg on admission). Restarted on IV abx  #Sepsis 2/2 MDR Klebsiella bacteremia    Nutrition Interval History:   patient tolerating current EN regimen per RN with no s/s of GI intolerance    Diet order:   Diet, NPO with Tube Feed:   Tube Feeding Modality: Gastrostomy  Osmolite 1.5 Gilberto  Total Volume for 24 Hours (mL): 1000  Bolus  Total Volume of Bolus (mL):  250  Total # of Feeds: 4  Tube Feed Frequency: Every 6 hours   Tube Feed Start Time: 00:00  Bolus Feed Rate (mL per Hour): 250   Bolus Feed Duration (in Hours): 6  Bolus Feed Instructions:  -- Additional water flushes: 120 mL pre and post feeds (05-18-22 @ 17:09) [Active]    Anthropometrics:  Height (cm): 167.6 (05-19-22 @ 21:56)  Weight (kg): 55.2 (05-19-22 @ 21:56)  BMI (kg/m2): 19.7 (05-19-22 @ 21:56)    OTHER WEIGHTS:   Weight hx: Per RN, UBW is 61.27 KG - last checked on 4/7 per RN at NH. Current dosing weight is 56.7 KG; 7.45% unintentional wt loss in over 1 month is significant. Pt meets 1 criteria for malnutrition.   Using height of 167.64 cm received by RN at NH to calculate TF. No height documented in flowsheet.    MEDICATIONS  (STANDING):  apixaban 5 milliGRAM(s) Enteral Tube two times a day  bisacodyl 5 milliGRAM(s) Oral daily  cefiderocol IVPB 2000 milliGRAM(s) IV Intermittent every 8 hours  chlorhexidine 4% Liquid 1 Application(s) Topical <User Schedule>  cyanocobalamin 500 MICROGram(s) Oral daily  cyclobenzaprine Oral Tab/Cap - Peds 5 milliGRAM(s) Oral three times a day  ferrous    sulfate Liquid 300 milliGRAM(s) Enteral Tube daily  folic acid 1 milliGRAM(s) Oral daily  metoprolol tartrate 50 milliGRAM(s) Enteral Tube two times a day  pantoprazole   Suspension 40 milliGRAM(s) Enteral Tube daily  scopolamine 1 mG/72 Hr(s) Patch 1 Patch Transdermal every 72 hours  thiamine  Oral Tab/Cap - Peds 100 milliGRAM(s) Oral two times a day    MEDICATIONS  (PRN):  acetaminophen     Tablet .. 650 milliGRAM(s) Oral every 6 hours PRN Temp greater or equal to 38C (100.4F), Mild Pain (1 - 3)  albuterol/ipratropium for Nebulization. 3 milliLiter(s) Nebulizer once PRN Shortness of Breath and/or Wheezing  metoclopramide   Syrup 10 milliGRAM(s) Oral every 6 hours PRN as needed    Pertinent Labs:   Finger Sticks:  POCT Blood Glucose.: 90 mg/dL (05-31 @ 16:29)  POCT Blood Glucose.: 93 mg/dL (05-31 @ 11:27)    Physical Findings:  - Appearance: confused, trach dependent  - GI function: fecal incontinence. BM x2 on 5/31  - Tubes: PEG  - Oral/Mouth cavity: NPO with EN   - Skin: Intact  - Edema: none noted     Nutrition Requirements:  Using ABW 56.7 KG: ENERGY: 7665-9497 kcal/day (MSJ 1.1-1.3 AF); PROTEIN: 62-74 g/day (1.1-1.3 g/kg); FLUID: 1878-7542 mL/day (30-35 mL/kg) -- with consideration for weight loss PTA, age    Estimated Energy Needs    Continue [x]  Adjust []  Estimated Protein Needs    Continue [x]  Adjust []  Estimated Fluid Needs        Continue [x]  Adjust []    Nutrient Intake: >85% via EN     [x] Previous Nutrition Diagnosis:            [] Ongoing          [x] Resolved  #1 Inadequate energy intake              [] Ongoing          [x] Resolved  #2 Excessive Energy Intake    #3 Inadequate oral intake  related to unable to eat by mouth  as evidence by pt NPO with long term EN via PEG    Nutrition Intervention:   Enteral Nutrition, Medical Food Supplement, Vitamin Supplement, Nutrition Related Medication, Coordination of Care      Indicator/Monitoring:   Jessa Sagastume, #[id number] to monitor diet order, energy intake, food and nutrient intake, body composition, weight    Recommendations:  1. Continue Osmolite 1.5 bolus @250mL Q6hr (4x)   (provides: 1500 kcal/day, 63 g/pro, 762 mL free water from formula)    2. CONTINUE:  thiamine  Oral Tab/Cap - Peds 100 milliGRAM(s) Oral two times a day  ferrous    sulfate Liquid 300 milliGRAM(s) Enteral Tube daily  folic acid 1 milliGRAM(s) Oral daily  cyanocobalamin 500 MICROGram(s) Oral daily    LOW Risk, follow up x 10 days.

## 2022-06-01 NOTE — PROGRESS NOTE ADULT - SUBJECTIVE AND OBJECTIVE BOX
CONSUELO CARO 22y Female  MRN#: [id number]   CODE STATUS: Full code    Hospital Day: 16d    Patient is currently admitted with the primary diagnosis of COVID    SUBJECTIVE:  Hospital Course  Admitted for sepsis 2/2 COVID. Initially placed on trach collar 2-3LPM now back on RA. S/p Decadron and IV abx. Hospital course complicated for repeat sepsis 2/2 MDR Klebsiella bacteremia w procal 12.3 (neg on admission). Started on Fetroja 5/28, repeat BCx neg.    Patient seen and examined at bedside. No events overnight. ROS unobtainable    Present Today:  - Alejo:  No [  ], Yes [ x ] : Indication: chronic Alejo    - Type of IV Access:      .. CVC/Piccline:  No [ x ], Yes [  ] : Indication:      .. Midline: No [ x ], Yes [  ] : Indication:                                            ----------------------------------------------------------  OBJECTIVE:  PAST MEDICAL & SURGICAL HISTORY:                                            -----------------------------------------------------------  ALLERGIES:  Allergy Status Unknown                                            ------------------------------------------------------------    HOME MEDICATIONS:  Home Medications:  albuterol 90 mcg/inh inhalation aerosol with adapter: 1 puff(s) inhaled every 4 hours (16 May 2022 21:37)  cyclobenzaprine 5 mg oral tablet: 1 tab(s) orally 3 times a day (16 May 2022 21:36)  Eliquis: 5 milligram(s) orally every 12 hours (16 May 2022 21:39)  ferrous sulfate 220 mg/5 mL (44 mg/5 mL elemental iron) oral elixir: 7.5 milliliter(s) orally once a day (16 May 2022 21:38)  folic acid 1 mg oral tablet: 1 tab(s) orally once a day (16 May 2022 21:38)  glycopyrrolate 2 mg oral tablet: 1 tab(s) orally 3 times a day (16 May 2022 21:36)  ipratropium 18 mcg/inh inhalation aerosol: 2 puff(s) inhaled every 6 hours (16 May 2022 21:37)  Lopressor 50 mg oral tablet: 1 tab(s) orally 2 times a day (16 May 2022 21:39)  metoclopramide 10 mg oral tablet: 1 tab(s) orally 4 times a day (before meals and at bedtime) (16 May 2022 21:38)  omeprazole 40 mg oral delayed release capsule: 1 cap(s) orally once a day (16 May 2022 21:39)  scopolamine 0.4 mg oral tablet: 1 milligram(s) orally every 72 hours (16 May 2022 21:36)  Tylenol 325 mg oral capsule: 2 cap(s) orally 3 times a day, As Needed (16 May 2022 21:37)  Vitamin B1 100 mg oral tablet: 1 tab(s) orally 2 times a day (16 May 2022 21:38)  Vitamin B12 500 mcg oral tablet: 1 tab(s) orally once a day (16 May 2022 21:38)                           MEDICATIONS:  STANDING MEDICATIONS  apixaban 5 milliGRAM(s) Enteral Tube two times a day  bisacodyl 5 milliGRAM(s) Oral daily  cefiderocol IVPB 2000 milliGRAM(s) IV Intermittent every 8 hours  chlorhexidine 4% Liquid 1 Application(s) Topical <User Schedule>  cyanocobalamin 500 MICROGram(s) Oral daily  cyclobenzaprine Oral Tab/Cap - Peds 5 milliGRAM(s) Oral three times a day  ferrous    sulfate Liquid 300 milliGRAM(s) Enteral Tube daily  folic acid 1 milliGRAM(s) Oral daily  metoprolol tartrate 50 milliGRAM(s) Enteral Tube two times a day  pantoprazole   Suspension 40 milliGRAM(s) Enteral Tube daily  scopolamine 1 mG/72 Hr(s) Patch 1 Patch Transdermal every 72 hours  thiamine  Oral Tab/Cap - Peds 100 milliGRAM(s) Oral two times a day    PRN MEDICATIONS  acetaminophen     Tablet .. 650 milliGRAM(s) Oral every 6 hours PRN  albuterol/ipratropium for Nebulization. 3 milliLiter(s) Nebulizer once PRN  metoclopramide   Syrup 10 milliGRAM(s) Oral every 6 hours PRN                                            ------------------------------------------------------------  VITAL SIGNS: Last 24 Hours  T(C): 36.7 (01 Jun 2022 05:13), Max: 37.5 (31 May 2022 20:35)  T(F): 98 (01 Jun 2022 05:13), Max: 99.5 (31 May 2022 20:35)  HR: 112 (01 Jun 2022 06:35) (110 - 135)  BP: 123/60 (01 Jun 2022 05:13) (104/59 - 125/64)  BP(mean): --  RR: 18 (01 Jun 2022 06:35) (18 - 18)  SpO2: 96% (01 Jun 2022 06:35) (94% - 96%)      05-31-22 @ 07:01  -  06-01-22 @ 07:00  --------------------------------------------------------  IN: 980 mL / OUT: 0 mL / NET: 980 mL                                           --------------------------------------------------------------  LABS:                        11.9   14.22 )-----------( 406      ( 31 May 2022 07:49 )             40.5     05-31    144  |  106  |  9<L>  ----------------------------<  142<H>  4.4   |  28  |  <0.5<L>    Ca    9.3      31 May 2022 07:49      Culture - Blood (collected 30 May 2022 07:38)  Source: .Blood None  Preliminary Report (31 May 2022 17:01):    No growth to date.    Culture - Blood (collected 29 May 2022 17:58)  Source: .Blood None  Preliminary Report (31 May 2022 01:02):    No growth to date.                                            --------------------------------------------------------------    PHYSICAL EXAM:  General: Appears comfortable  HEENT: Normocephalic, atraumatic  LUNGS: Clear to auscultation bilaterally, no wheezes, rales, rhonchi  HEART: S1S2 present, regular rate and rhythm, no murmurs, rubs, gallops  ABDOMEN: Soft, nontender, nondistended  EXT: No edema, contracted x4 extremities

## 2022-06-01 NOTE — PROGRESS NOTE ADULT - ASSESSMENT
21 y/o F with a PMHx of encephalitis s/p tooth abscess s/p tracheostomy and PEG tube placement, HTN, GERD, and SVC thrombosis who was brought to ED from [place] on 5/16 for evaluation of a broken tracheostomy flange. Admitted for sepsis 2/2 COVID. Initially placed on trach collar 2-3LPM now back on RA. S/p Decadron and IV abx. Hospital course complicated for repeat sepsis 2/2 bacteremia and UTI w procal 12.3 (neg on admission). Restarted on IV abx    #Sepsis 2/2 MDR Klebsiella bacteremia  - Febrile 101.3 F, WBC 30, tachycardic, concern for sepsis again  - S/p 1L LR bolus  - Procal 5/27: 12.3 (neg on admission)  - Repeat RVP: neg  - BCx 5/27 + 5/28: MDR Klebsiella  - BCx 5/29 + 5/31: NGTD  - ID recs appreciated  - C/w NS @ 100 cc/hr  - C/w Fetroja 2g IV q8h (started 5/28) -- to complete 10 d from cleared BCx (6/7)  - F/u BCx from 5/31    #Sepsis present on admission 2/2 COVID pneumonia -- resolved  #Broken tracheostomy flange - resolved  - Trach replaced in the ED  - Unvaccinated COVID per sister  - Sepsis on presentation  - COVID 5/16: (+)  - CXR with left pleural effusion  - D-dimer: 267  - Procal: 0.03, CRP 61, , ferritin 9  - BCx, UCx: neg   - MRSA negative   - ID following  - S/p Decadron, RDV  - COVID 5/24 + 5/27: (-)    #HO SVC Thrombosis and Embolism   - C/w Eliquis 5 mg PO BID    #History of Encephalitis post Tooth Abscess in 10/2021   s/p Tracheostomy (has an O2 tank per sister but unsure about flow) and G-tube placement  - Monitor SaO2 and O2 requirements: as above    #GERD  * Home med Omeprazole 40mg QD  - C/w Protonix 40 mg PO daily    #Iron Deficiency Anemia   #History of Wernicke Encephalopathy  #Suspected thiamine deficiency  #Suspected folic acid deficiency  * Home med iron replacement, thiamine, B12, and folic acid  * ED Hb 11.3, MCV 79.4  B12 and folate nl  - C/w home iron replacement, thiamine, B12, and folic acid  - Monitor for now    #History of Hypertension  - BP on lower side here  - C/w metoprolol 50 mg PO BID    #DVT ppx: Eliquis 5 mg PO BID  #GI ppx: Protonix 40 mg PO daily  #Diet: NPO w G-tube feeds  #Activity: Functional quadriplegic  #Dispo: From [facility], acute  #Code status: Full code -- has MOLST confirming

## 2022-06-02 LAB
ANION GAP SERPL CALC-SCNC: 10 MMOL/L — SIGNIFICANT CHANGE UP (ref 7–14)
BASOPHILS # BLD AUTO: 0.05 K/UL — SIGNIFICANT CHANGE UP (ref 0–0.2)
BASOPHILS NFR BLD AUTO: 0.4 % — SIGNIFICANT CHANGE UP (ref 0–1)
BUN SERPL-MCNC: 9 MG/DL — LOW (ref 10–20)
CALCIUM SERPL-MCNC: 9.1 MG/DL — SIGNIFICANT CHANGE UP (ref 8.5–10.1)
CHLORIDE SERPL-SCNC: 98 MMOL/L — SIGNIFICANT CHANGE UP (ref 98–110)
CO2 SERPL-SCNC: 31 MMOL/L — SIGNIFICANT CHANGE UP (ref 17–32)
CREAT SERPL-MCNC: <0.5 MG/DL — LOW (ref 0.7–1.5)
EGFR: 143 ML/MIN/1.73M2 — SIGNIFICANT CHANGE UP
EOSINOPHIL # BLD AUTO: 0.25 K/UL — SIGNIFICANT CHANGE UP (ref 0–0.7)
EOSINOPHIL NFR BLD AUTO: 1.8 % — SIGNIFICANT CHANGE UP (ref 0–8)
GLUCOSE SERPL-MCNC: 150 MG/DL — HIGH (ref 70–99)
HCT VFR BLD CALC: 40.5 % — SIGNIFICANT CHANGE UP (ref 37–47)
HGB BLD-MCNC: 12.1 G/DL — SIGNIFICANT CHANGE UP (ref 12–16)
IMM GRANULOCYTES NFR BLD AUTO: 0.4 % — HIGH (ref 0.1–0.3)
LYMPHOCYTES # BLD AUTO: 1.98 K/UL — SIGNIFICANT CHANGE UP (ref 1.2–3.4)
LYMPHOCYTES # BLD AUTO: 14.6 % — LOW (ref 20.5–51.1)
MAGNESIUM SERPL-MCNC: 2.3 MG/DL — SIGNIFICANT CHANGE UP (ref 1.8–2.4)
MCHC RBC-ENTMCNC: 23.8 PG — LOW (ref 27–31)
MCHC RBC-ENTMCNC: 29.9 G/DL — LOW (ref 32–37)
MCV RBC AUTO: 79.7 FL — LOW (ref 81–99)
MONOCYTES # BLD AUTO: 1.16 K/UL — HIGH (ref 0.1–0.6)
MONOCYTES NFR BLD AUTO: 8.6 % — SIGNIFICANT CHANGE UP (ref 1.7–9.3)
NEUTROPHILS # BLD AUTO: 10.06 K/UL — HIGH (ref 1.4–6.5)
NEUTROPHILS NFR BLD AUTO: 74.2 % — SIGNIFICANT CHANGE UP (ref 42.2–75.2)
NRBC # BLD: 0 /100 WBCS — SIGNIFICANT CHANGE UP (ref 0–0)
PLATELET # BLD AUTO: 413 K/UL — HIGH (ref 130–400)
POTASSIUM SERPL-MCNC: 4.7 MMOL/L — SIGNIFICANT CHANGE UP (ref 3.5–5)
POTASSIUM SERPL-SCNC: 4.7 MMOL/L — SIGNIFICANT CHANGE UP (ref 3.5–5)
RBC # BLD: 5.08 M/UL — SIGNIFICANT CHANGE UP (ref 4.2–5.4)
RBC # FLD: 19.1 % — HIGH (ref 11.5–14.5)
SODIUM SERPL-SCNC: 139 MMOL/L — SIGNIFICANT CHANGE UP (ref 135–146)
WBC # BLD: 13.56 K/UL — HIGH (ref 4.8–10.8)
WBC # FLD AUTO: 13.56 K/UL — HIGH (ref 4.8–10.8)

## 2022-06-02 PROCEDURE — 99233 SBSQ HOSP IP/OBS HIGH 50: CPT

## 2022-06-02 PROCEDURE — 71045 X-RAY EXAM CHEST 1 VIEW: CPT | Mod: 26

## 2022-06-02 RX ORDER — METOPROLOL TARTRATE 50 MG
25 TABLET ORAL ONCE
Refills: 0 | Status: COMPLETED | OUTPATIENT
Start: 2022-06-02 | End: 2022-06-02

## 2022-06-02 RX ORDER — SODIUM CHLORIDE 9 MG/ML
500 INJECTION, SOLUTION INTRAVENOUS ONCE
Refills: 0 | Status: COMPLETED | OUTPATIENT
Start: 2022-06-02 | End: 2022-06-02

## 2022-06-02 RX ORDER — METOPROLOL TARTRATE 50 MG
75 TABLET ORAL
Refills: 0 | Status: DISCONTINUED | OUTPATIENT
Start: 2022-06-02 | End: 2022-06-04

## 2022-06-02 RX ADMIN — Medication 300 MILLIGRAM(S): at 12:22

## 2022-06-02 RX ADMIN — SCOPALAMINE 1 PATCH: 1 PATCH, EXTENDED RELEASE TRANSDERMAL at 07:53

## 2022-06-02 RX ADMIN — Medication 75 MILLIGRAM(S): at 17:43

## 2022-06-02 RX ADMIN — PANTOPRAZOLE SODIUM 40 MILLIGRAM(S): 20 TABLET, DELAYED RELEASE ORAL at 12:21

## 2022-06-02 RX ADMIN — Medication 5 MILLIGRAM(S): at 12:22

## 2022-06-02 RX ADMIN — Medication 50 MILLIGRAM(S): at 05:08

## 2022-06-02 RX ADMIN — CEFIDEROCOL SULFATE TOSYLATE 33.33 MILLIGRAM(S): 1 INJECTION, POWDER, FOR SOLUTION INTRAVENOUS at 21:55

## 2022-06-02 RX ADMIN — SODIUM CHLORIDE 2000 MILLILITER(S): 9 INJECTION, SOLUTION INTRAVENOUS at 20:51

## 2022-06-02 RX ADMIN — APIXABAN 5 MILLIGRAM(S): 2.5 TABLET, FILM COATED ORAL at 17:43

## 2022-06-02 RX ADMIN — CYCLOBENZAPRINE HYDROCHLORIDE 5 MILLIGRAM(S): 10 TABLET, FILM COATED ORAL at 05:08

## 2022-06-02 RX ADMIN — Medication 100 MILLIGRAM(S): at 05:08

## 2022-06-02 RX ADMIN — Medication 25 MILLIGRAM(S): at 12:22

## 2022-06-02 RX ADMIN — Medication 100 MILLIGRAM(S): at 17:42

## 2022-06-02 RX ADMIN — PREGABALIN 500 MICROGRAM(S): 225 CAPSULE ORAL at 12:22

## 2022-06-02 RX ADMIN — CYCLOBENZAPRINE HYDROCHLORIDE 5 MILLIGRAM(S): 10 TABLET, FILM COATED ORAL at 21:55

## 2022-06-02 RX ADMIN — APIXABAN 5 MILLIGRAM(S): 2.5 TABLET, FILM COATED ORAL at 05:07

## 2022-06-02 RX ADMIN — CHLORHEXIDINE GLUCONATE 1 APPLICATION(S): 213 SOLUTION TOPICAL at 05:07

## 2022-06-02 RX ADMIN — CEFIDEROCOL SULFATE TOSYLATE 33.33 MILLIGRAM(S): 1 INJECTION, POWDER, FOR SOLUTION INTRAVENOUS at 05:07

## 2022-06-02 RX ADMIN — Medication 1 MILLIGRAM(S): at 12:21

## 2022-06-02 RX ADMIN — SCOPALAMINE 1 PATCH: 1 PATCH, EXTENDED RELEASE TRANSDERMAL at 18:23

## 2022-06-02 NOTE — PROGRESS NOTE ADULT - SUBJECTIVE AND OBJECTIVE BOX
CONSUELO CARO 22y Female  MRN#: [id number]   CODE STATUS: Full code    Hospital Day: 17d    Patient is currently admitted with the primary diagnosis of COVID    SUBJECTIVE:  Hospital Course  Admitted for sepsis 2/2 COVID. Initially placed on trach collar 2-3LPM now back on RA. S/p Decadron and IV abx. Hospital course complicated for repeat sepsis 2/2 MDR Klebsiella bacteremia w procal 12.3 (neg on admission). Started on Fetroja 5/28, repeat BCx 5/29 through present neg.    Patient seen and examined at bedside. No events overnight. ROS unobtainable    Present Today:  - Alejo:  No [  ], Yes [ x ] : Indication: chronic Alejo    - Type of IV Access:      .. CVC/Piccline:  No [ x ], Yes [  ] : Indication:      .. Midline: No [ x ], Yes [  ] : Indication:                                            ----------------------------------------------------------  OBJECTIVE:  PAST MEDICAL & SURGICAL HISTORY:                                            -----------------------------------------------------------  ALLERGIES:  Allergy Status Unknown                                            ------------------------------------------------------------    HOME MEDICATIONS:  Home Medications:  albuterol 90 mcg/inh inhalation aerosol with adapter: 1 puff(s) inhaled every 4 hours (16 May 2022 21:37)  cyclobenzaprine 5 mg oral tablet: 1 tab(s) orally 3 times a day (16 May 2022 21:36)  Eliquis: 5 milligram(s) orally every 12 hours (16 May 2022 21:39)  ferrous sulfate 220 mg/5 mL (44 mg/5 mL elemental iron) oral elixir: 7.5 milliliter(s) orally once a day (16 May 2022 21:38)  folic acid 1 mg oral tablet: 1 tab(s) orally once a day (16 May 2022 21:38)  glycopyrrolate 2 mg oral tablet: 1 tab(s) orally 3 times a day (16 May 2022 21:36)  ipratropium 18 mcg/inh inhalation aerosol: 2 puff(s) inhaled every 6 hours (16 May 2022 21:37)  Lopressor 50 mg oral tablet: 1 tab(s) orally 2 times a day (16 May 2022 21:39)  metoclopramide 10 mg oral tablet: 1 tab(s) orally 4 times a day (before meals and at bedtime) (16 May 2022 21:38)  omeprazole 40 mg oral delayed release capsule: 1 cap(s) orally once a day (16 May 2022 21:39)  scopolamine 0.4 mg oral tablet: 1 milligram(s) orally every 72 hours (16 May 2022 21:36)  Tylenol 325 mg oral capsule: 2 cap(s) orally 3 times a day, As Needed (16 May 2022 21:37)  Vitamin B1 100 mg oral tablet: 1 tab(s) orally 2 times a day (16 May 2022 21:38)  Vitamin B12 500 mcg oral tablet: 1 tab(s) orally once a day (16 May 2022 21:38)                           MEDICATIONS:  STANDING MEDICATIONS  apixaban 5 milliGRAM(s) Enteral Tube two times a day  bisacodyl 5 milliGRAM(s) Oral daily  cefiderocol IVPB 2000 milliGRAM(s) IV Intermittent every 8 hours  chlorhexidine 4% Liquid 1 Application(s) Topical <User Schedule>  cyanocobalamin 500 MICROGram(s) Oral daily  cyclobenzaprine Oral Tab/Cap - Peds 5 milliGRAM(s) Oral three times a day  ferrous    sulfate Liquid 300 milliGRAM(s) Enteral Tube daily  folic acid 1 milliGRAM(s) Oral daily  metoprolol tartrate 50 milliGRAM(s) Enteral Tube two times a day  pantoprazole   Suspension 40 milliGRAM(s) Enteral Tube daily  scopolamine 1 mG/72 Hr(s) Patch 1 Patch Transdermal every 72 hours  thiamine  Oral Tab/Cap - Peds 100 milliGRAM(s) Oral two times a day    PRN MEDICATIONS  acetaminophen     Tablet .. 650 milliGRAM(s) Oral every 6 hours PRN  albuterol/ipratropium for Nebulization. 3 milliLiter(s) Nebulizer once PRN  metoclopramide   Syrup 10 milliGRAM(s) Oral every 6 hours PRN                                            ------------------------------------------------------------  VITAL SIGNS: Last 24 Hours  T(C): 36.1 (02 Jun 2022 05:15), Max: 36.9 (01 Jun 2022 13:00)  T(F): 97 (02 Jun 2022 05:15), Max: 98.4 (01 Jun 2022 13:00)  HR: 96 (02 Jun 2022 06:52) (96 - 129)  BP: 102/66 (02 Jun 2022 05:15) (95/50 - 117/60)  BP(mean): --  RR: 20 (02 Jun 2022 06:52) (18 - 20)  SpO2: 99% (02 Jun 2022 08:09) (94% - 99%)      06-01-22 @ 07:01  -  06-02-22 @ 07:00  --------------------------------------------------------  IN: 0 mL / OUT: 700 mL / NET: -700 mL                                             --------------------------------------------------------------  LABS:                        12.0   14.05 )-----------( 421      ( 01 Jun 2022 11:53 )             40.5     06-01    142  |  103  |  12  ----------------------------<  88  4.4   |  27  |  <0.5<L>    Ca    9.1      01 Jun 2022 11:53      Culture - Blood (collected 31 May 2022 07:49)  Source: .Blood None  Preliminary Report (01 Jun 2022 20:00):    No growth to date.                                          -------------------------------------------------------------  RADIOLOGY:  < from: US Kidney and Bladder (06.01.22 @ 09:14) >  IMPRESSION:    Normal renal ultrasound.    < end of copied text >                                            --------------------------------------------------------------    PHYSICAL EXAM:  General: Appears comfortable  HEENT: Normocephalic, atraumatic. Trach in place w secretions  LUNGS: Clear to auscultation bilaterally, no wheezes, rales, rhonchi  HEART: S1S2 present, regular rate and rhythm, no murmurs, rubs, gallops  ABDOMEN: Soft, nontender, nondistended. PEG in place  EXT: No edema, contracted x4 extremities CONSUELO CARO 22y Female  MRN#: [id number]   CODE STATUS: Full code    Hospital Day: 17d    Patient is currently admitted with the primary diagnosis of COVID    SUBJECTIVE:  Hospital Course  Admitted for sepsis 2/2 COVID. Initially placed on trach collar 2-3LPM now back on RA. S/p Decadron and IV abx. Hospital course complicated for repeat sepsis 2/2 MDR Klebsiella bacteremia w procal 12.3 (neg on admission). Started on Fetroja 5/28, repeat BCx 5/29 through present neg.    Patient seen and examined at bedside. No events overnight. ROS unobtainable. This morning, patient desaturated to high 80s-low 90s, placed on trach mask FiO2 30%. Also w increased secretions per RN    Present Today:  - Alejo:  No [  ], Yes [ x ] : Indication: chronic Alejo    - Type of IV Access:      .. CVC/Piccline:  No [ x ], Yes [  ] : Indication:      .. Midline: No [ x ], Yes [  ] : Indication:                                            ----------------------------------------------------------  OBJECTIVE:  PAST MEDICAL & SURGICAL HISTORY:                                            -----------------------------------------------------------  ALLERGIES:  Allergy Status Unknown                                            ------------------------------------------------------------    HOME MEDICATIONS:  Home Medications:  albuterol 90 mcg/inh inhalation aerosol with adapter: 1 puff(s) inhaled every 4 hours (16 May 2022 21:37)  cyclobenzaprine 5 mg oral tablet: 1 tab(s) orally 3 times a day (16 May 2022 21:36)  Eliquis: 5 milligram(s) orally every 12 hours (16 May 2022 21:39)  ferrous sulfate 220 mg/5 mL (44 mg/5 mL elemental iron) oral elixir: 7.5 milliliter(s) orally once a day (16 May 2022 21:38)  folic acid 1 mg oral tablet: 1 tab(s) orally once a day (16 May 2022 21:38)  glycopyrrolate 2 mg oral tablet: 1 tab(s) orally 3 times a day (16 May 2022 21:36)  ipratropium 18 mcg/inh inhalation aerosol: 2 puff(s) inhaled every 6 hours (16 May 2022 21:37)  Lopressor 50 mg oral tablet: 1 tab(s) orally 2 times a day (16 May 2022 21:39)  metoclopramide 10 mg oral tablet: 1 tab(s) orally 4 times a day (before meals and at bedtime) (16 May 2022 21:38)  omeprazole 40 mg oral delayed release capsule: 1 cap(s) orally once a day (16 May 2022 21:39)  scopolamine 0.4 mg oral tablet: 1 milligram(s) orally every 72 hours (16 May 2022 21:36)  Tylenol 325 mg oral capsule: 2 cap(s) orally 3 times a day, As Needed (16 May 2022 21:37)  Vitamin B1 100 mg oral tablet: 1 tab(s) orally 2 times a day (16 May 2022 21:38)  Vitamin B12 500 mcg oral tablet: 1 tab(s) orally once a day (16 May 2022 21:38)                           MEDICATIONS:  STANDING MEDICATIONS  apixaban 5 milliGRAM(s) Enteral Tube two times a day  bisacodyl 5 milliGRAM(s) Oral daily  cefiderocol IVPB 2000 milliGRAM(s) IV Intermittent every 8 hours  chlorhexidine 4% Liquid 1 Application(s) Topical <User Schedule>  cyanocobalamin 500 MICROGram(s) Oral daily  cyclobenzaprine Oral Tab/Cap - Peds 5 milliGRAM(s) Oral three times a day  ferrous    sulfate Liquid 300 milliGRAM(s) Enteral Tube daily  folic acid 1 milliGRAM(s) Oral daily  metoprolol tartrate 50 milliGRAM(s) Enteral Tube two times a day  pantoprazole   Suspension 40 milliGRAM(s) Enteral Tube daily  scopolamine 1 mG/72 Hr(s) Patch 1 Patch Transdermal every 72 hours  thiamine  Oral Tab/Cap - Peds 100 milliGRAM(s) Oral two times a day    PRN MEDICATIONS  acetaminophen     Tablet .. 650 milliGRAM(s) Oral every 6 hours PRN  albuterol/ipratropium for Nebulization. 3 milliLiter(s) Nebulizer once PRN  metoclopramide   Syrup 10 milliGRAM(s) Oral every 6 hours PRN                                            ------------------------------------------------------------  VITAL SIGNS: Last 24 Hours  T(C): 36.1 (02 Jun 2022 05:15), Max: 36.9 (01 Jun 2022 13:00)  T(F): 97 (02 Jun 2022 05:15), Max: 98.4 (01 Jun 2022 13:00)  HR: 96 (02 Jun 2022 06:52) (96 - 129)  BP: 102/66 (02 Jun 2022 05:15) (95/50 - 117/60)  BP(mean): --  RR: 20 (02 Jun 2022 06:52) (18 - 20)  SpO2: 99% (02 Jun 2022 08:09) (94% - 99%)      06-01-22 @ 07:01  -  06-02-22 @ 07:00  --------------------------------------------------------  IN: 0 mL / OUT: 700 mL / NET: -700 mL                                             --------------------------------------------------------------  LABS:                        12.0   14.05 )-----------( 421      ( 01 Jun 2022 11:53 )             40.5     06-01    142  |  103  |  12  ----------------------------<  88  4.4   |  27  |  <0.5<L>    Ca    9.1      01 Jun 2022 11:53      Culture - Blood (collected 31 May 2022 07:49)  Source: .Blood None  Preliminary Report (01 Jun 2022 20:00):    No growth to date.                                          -------------------------------------------------------------  RADIOLOGY:  < from: US Kidney and Bladder (06.01.22 @ 09:14) >  IMPRESSION:    Normal renal ultrasound.    < end of copied text >                                            --------------------------------------------------------------    PHYSICAL EXAM:  General: Appears comfortable  HEENT: Normocephalic, atraumatic. Trach in place w secretions  LUNGS: Clear to auscultation bilaterally, no wheezes, rales, rhonchi  HEART: S1S2 present, regular rate and rhythm, no murmurs, rubs, gallops  ABDOMEN: Soft, nontender, nondistended. PEG in place  EXT: No edema, contracted x4 extremities

## 2022-06-02 NOTE — PROGRESS NOTE ADULT - ASSESSMENT
21 y/o F with a PMHx of encephalitis s/p tooth abscess s/p tracheostomy and PEG tube placement, HTN, GERD, and SVC thrombosis who was brought to ED from [place] on 5/16 for evaluation of a broken tracheostomy flange. Admitted for sepsis 2/2 COVID. Initially placed on trach collar 2-3LPM now back on RA. S/p Decadron and IV abx. Hospital course complicated for repeat sepsis 2/2 bacteremia and UTI w procal 12.3 (neg on admission). Restarted on IV abx    #Sepsis 2/2 MDR Klebsiella bacteremia  - Febrile 101.3 F, WBC 30, tachycardic, concern for sepsis again  - S/p 1L LR bolus  - Procal 5/27: 12.3 (neg on admission)  - Repeat RVP: neg  - BCx 5/27 + 5/28: MDR Klebsiella  - BCx 5/29 + 5/30 + 5/31: NGTD  - ID recs appreciated  - C/w NS @ 100 cc/hr  - C/w Fetroja 2g IV q8h (started 5/28) -- to complete 10 d from cleared BCx (6/7)    #Sepsis present on admission 2/2 COVID pneumonia -- resolved  #Broken tracheostomy flange - resolved  - Trach replaced in the ED  - Unvaccinated COVID per sister  - Sepsis on presentation  - COVID 5/16: (+)  - CXR with left pleural effusion  - D-dimer: 267  - Procal: 0.03, CRP 61, , ferritin 9  - BCx, UCx: neg   - MRSA negative   - ID following  - S/p Decadron, RDV  - COVID 5/24 + 5/27: (-)    #HO SVC Thrombosis and Embolism   - C/w Eliquis 5 mg PO BID    #History of Encephalitis post Tooth Abscess in 10/2021   s/p Tracheostomy (has an O2 tank per sister but unsure about flow) and G-tube placement  - Monitor SaO2 and O2 requirements: as above    #GERD  * Home med Omeprazole 40mg QD  - C/w Protonix 40 mg PO daily    #Iron Deficiency Anemia   #History of Wernicke Encephalopathy  #Suspected thiamine deficiency  #Suspected folic acid deficiency  * Home med iron replacement, thiamine, B12, and folic acid  * ED Hb 11.3, MCV 79.4  B12 and folate nl  - C/w home iron replacement, thiamine, B12, and folic acid  - Monitor for now    #History of Hypertension  - BP on lower side here  - C/w metoprolol 50 mg PO BID    #DVT ppx: Eliquis 5 mg PO BID  #GI ppx: Protonix 40 mg PO daily  #Diet: NPO w G-tube feeds  #Activity: Functional quadriplegic  #Dispo: From [facility], acute  #Code status: Full code -- has MOLST confirming 21 y/o F with a PMHx of encephalitis s/p tooth abscess s/p tracheostomy and PEG tube placement, HTN, GERD, and SVC thrombosis who was brought to ED from [place] on 5/16 for evaluation of a broken tracheostomy flange. Admitted for sepsis 2/2 COVID. Initially placed on trach collar 2-3LPM now back on RA. S/p Decadron and IV abx. Hospital course complicated for repeat sepsis 2/2 bacteremia and UTI w procal 12.3 (neg on admission). Restarted on IV abx    #Sepsis 2/2 MDR Klebsiella bacteremia  #Leukocytosis  - Febrile 101.3 F, WBC 30, tachycardic, concern for sepsis again  - S/p 1L LR bolus  - Procal 5/27: 12.3 (neg on admission)  - Repeat RVP: neg  - BCx 5/27 + 5/28: MDR Klebsiella  - BCx 5/29 + 5/30 + 5/31: NGTD  - ID recs appreciated  - C/w NS @ 100 cc/hr  - C/w Fetroja 2g IV q8h (started 5/28) -- to complete 10 d from cleared BCx (6/7)  - F/u CXR + trach cx given persistent leukocytosis + increased trach secretions    #Sepsis present on admission 2/2 COVID pneumonia -- resolved  #Broken tracheostomy flange - resolved  - Trach replaced in the ED  - Unvaccinated COVID per sister  - Sepsis on presentation  - COVID 5/16: (+)  - CXR with left pleural effusion  - D-dimer: 267  - Procal: 0.03, CRP 61, , ferritin 9  - BCx, UCx: neg   - MRSA negative   - ID following  - S/p Decadron, RDV  - COVID 5/24 + 5/27: (-)    #HO SVC Thrombosis and Embolism   - C/w Eliquis 5 mg PO BID    #History of Encephalitis post Tooth Abscess in 10/2021   s/p Tracheostomy (has an O2 tank per sister but unsure about flow) and G-tube placement  - Monitor SaO2 and O2 requirements: as above    #GERD  * Home med Omeprazole 40mg QD  - C/w Protonix 40 mg PO daily    #Iron Deficiency Anemia   #History of Wernicke Encephalopathy  #Suspected thiamine deficiency  #Suspected folic acid deficiency  * Home med iron replacement, thiamine, B12, and folic acid  * ED Hb 11.3, MCV 79.4  B12 and folate nl  - C/w home iron replacement, thiamine, B12, and folic acid  - Monitor for now    #History of Hypertension  #Persistent sinus tachycardia  - BP on lower side here w persistent tachycardia  - Less likely sepsis, as even when tx w abx and wbc downtrending, still tachycardic  - Increase metoprolol from 50 --> 75 mg PO BID    #DVT ppx: Eliquis 5 mg PO BID  #GI ppx: Protonix 40 mg PO daily  #Diet: NPO w G-tube feeds  #Activity: Functional quadriplegic  #Dispo: From [facility], acute  #Code status: Full code -- has MOLST confirming 21 y/o F with a PMHx of encephalitis s/p tooth abscess s/p tracheostomy and PEG tube placement, HTN, GERD, and SVC thrombosis who was brought to ED from [place] on 5/16 for evaluation of a broken tracheostomy flange. Admitted for sepsis 2/2 COVID. Initially placed on trach collar 2-3LPM now back on RA. S/p Decadron and IV abx. Hospital course complicated for repeat sepsis 2/2 bacteremia and UTI w procal 12.3 (neg on admission). Restarted on IV abx    #Sepsis 2/2 MDR Klebsiella bacteremia  #Leukocytosis  - Febrile 101.3 F, WBC 30, tachycardic, concern for sepsis again  - S/p 1L LR bolus  - Procal 5/27: 12.3 (neg on admission)  - Repeat RVP: neg  - BCx 5/27 + 5/28: MDR Klebsiella  - BCx 5/29 + 5/30 + 5/31: NGTD  - ID recs appreciated  - C/w NS @ 100 cc/hr  - C/w Fetroja 2g IV q8h (started 5/28) -- to complete 10 d from cleared BCx (6/7)  - F/u CXR + trach cx given persistent leukocytosis, increased trach secretions, and desaturation    #Sepsis present on admission 2/2 COVID pneumonia -- resolved  #Broken tracheostomy flange - resolved  - Trach replaced in the ED  - Unvaccinated COVID per sister  - Sepsis on presentation  - COVID 5/16: (+)  - CXR with left pleural effusion  - D-dimer: 267  - Procal: 0.03, CRP 61, , ferritin 9  - BCx, UCx: neg   - MRSA negative   - ID following  - S/p Decadron, RDV  - COVID 5/24 + 5/27: (-)    #HO SVC Thrombosis and Embolism   - C/w Eliquis 5 mg PO BID    #History of Encephalitis post Tooth Abscess in 10/2021   s/p Tracheostomy (has an O2 tank per sister but unsure about flow) and G-tube placement  - Monitor SaO2 and O2 requirements: as above    #GERD  * Home med Omeprazole 40mg QD  - C/w Protonix 40 mg PO daily    #Iron Deficiency Anemia   #History of Wernicke Encephalopathy  #Suspected thiamine deficiency  #Suspected folic acid deficiency  * Home med iron replacement, thiamine, B12, and folic acid  * ED Hb 11.3, MCV 79.4  B12 and folate nl  - C/w home iron replacement, thiamine, B12, and folic acid  - Monitor for now    #History of Hypertension  #Persistent sinus tachycardia  - BP on lower side here w persistent tachycardia  - Less likely sepsis, as even when tx w abx and wbc downtrending, still tachycardic  - Increase metoprolol from 50 --> 75 mg PO BID    #DVT ppx: Eliquis 5 mg PO BID  #GI ppx: Protonix 40 mg PO daily  #Diet: NPO w G-tube feeds  #Activity: Functional quadriplegic  #Dispo: From [facility], acute  #Code status: Full code -- has MOLST confirming

## 2022-06-02 NOTE — PROGRESS NOTE ADULT - ASSESSMENT
IMPRESSION;   #Septic shock ( on pressors ) secondary to CRE GNR possibly related to acute pyelonephritis    5/29-5/31 BCX NGTD     5/28 BCX  Klebsiella pneumoniae (Carbapenem Resistant)    5/27 BCx Klebsiella pneumoniae (Carbapenem Resistant)    5/27 UCx Klebsiella pneumoniae (Carbapenem Resistant)    Pyuria  < from: US Kidney and Bladder (06.01.22 @ 09:14) >  Normal renal ultrasound.  #Sepsis on admission T>90 RR>20 WBC >12    UA WBC 65, poor specimen as +epith- rule out acute cystitis   #COVID19 , unvaccinated    satting well on baseline settings  < from: Xray Chest 1 View- PORTABLE-Urgent (Xray Chest 1 View- PORTABLE-Urgent .) (05.16.22 @ 18:00) >  Left basal linear subsegmental atelectasis. No rosie consolidation,   effusion or pneumothorax  #Trach/PEG- trach malfunction on admission    RECOMMENDATIONS;  - Cefiderocol 2 gm iv q8h x 10 days from cleared BCX end 6/7  - Off loading to prevent pressure sores and preventive measures to avoid aspiration   - Please recall ID as needed  - Trend WBC    If any questions, please call or send a message on [employer]  Please continue to update ID with any pertinent new laboratory or radiographic findings  Spectra [phone]

## 2022-06-02 NOTE — PROGRESS NOTE ADULT - SUBJECTIVE AND OBJECTIVE BOX
CONSUELO CARO  22y, Female  Allergy: Allergy Status Unknown      LOS  17d    CHIEF COMPLAINT: Broken Tracheostomy Flange (02 Jun 2022 08:41)      INTERVAL EVENTS/HPI  - T(F): , Max: 98.3 (06-01-22 @ 21:00)  - WBC Count: 13.56 (06-02-22 @ 08:31)  WBC Count: 14.05 (06-01-22 @ 11:53)     - Creatinine, Serum: <0.5 (06-02-22 @ 08:31)  Creatinine, Serum: <0.5 (06-01-22 @ 11:53)     -   -   -     ROS  cannot obtain secondary to patient's sedation and/or mental status    VITALS:  T(F): 97.3, Max: 98.3 (06-01-22 @ 21:00)  HR: 120  BP: 103/68  RR: 20Vital Signs Last 24 Hrs  T(C): 36.3 (02 Jun 2022 14:00), Max: 36.8 (01 Jun 2022 21:00)  T(F): 97.3 (02 Jun 2022 14:00), Max: 98.3 (01 Jun 2022 21:00)  HR: 120 (02 Jun 2022 14:00) (96 - 126)  BP: 103/68 (02 Jun 2022 14:00) (95/50 - 105/60)  BP(mean): --  RR: 20 (02 Jun 2022 14:00) (18 - 20)  SpO2: 99% (02 Jun 2022 08:09) (94% - 99%)    PHYSICAL EXAM:  Gen: trach/ vent  CV: RRR  Lungs: Decreased BS at bases  Abd: Soft  Neuro: does not follow commands  Skin: no rash   Lines clean, no phlebitis     FH: Non-contributory  Social Hx: Non-contributory    TESTS & MEASUREMENTS:                        12.1   13.56 )-----------( 413      ( 02 Jun 2022 08:31 )             40.5     06-02    139  |  98  |  9<L>  ----------------------------<  150<H>  4.7   |  31  |  <0.5<L>    Ca    9.1      02 Jun 2022 08:31  Mg     2.3     06-02              Culture - Blood (collected 05-31-22 @ 07:49)  Source: .Blood None  Preliminary Report (06-01-22 @ 20:00):    No growth to date.    Culture - Blood (collected 05-30-22 @ 07:38)  Source: .Blood None  Preliminary Report (05-31-22 @ 17:01):    No growth to date.    Culture - Blood (collected 05-29-22 @ 17:58)  Source: .Blood None  Preliminary Report (05-31-22 @ 01:02):    No growth to date.    Culture - Blood (collected 05-28-22 @ 11:30)  Source: .Blood None  Gram Stain (05-29-22 @ 08:16):    Growth in anaerobic bottle: Gram Negative Rods  Final Report (05-30-22 @ 12:49):    Growth in anaerobic bottle: Klebsiella pneumoniae (Carbapenem Resistant)    See previous culture [id number]    Culture - Urine (collected 05-27-22 @ 16:11)  Source: Clean Catch Clean Catch (Midstream)  Final Report (05-29-22 @ 11:29):    >=3 organisms. Probable collection contamination.    Culture - Blood (collected 05-27-22 @ 09:05)  Source: .Blood Blood  Gram Stain (05-28-22 @ 06:19):    Growth in aerobic bottle: Gram Negative Rods  Final Report (05-30-22 @ 10:50):    Growth in aerobic bottle: Klebsiella pneumoniae (Carbapenem Resistant)    ***Blood Panel PCR results on this specimen are available    approximately 3 hours after the Gram stain result.***    Gram stain, PCR, and/or culture results may not always    correspond due to difference in methodologies.    ************************************************************    This PCR assay was performed by multiplex PCR. This    Assay tests for 66 bacterial and resistance gene targets.    Please refer to the [facility] test directory    at [email or web address] for details.  Organism: Blood Culture PCR  Klepne MDRO (05-30-22 @ 10:50)  Organism: Klepne MDRO (05-30-22 @ 10:50)      -  Amikacin: R >32      -  Ampicillin: R >16 These ampicillin results predict results for amoxicillin      -  Ampicillin/Sulbactam: R >16/8 Enterobacter, Klebsiella aerogenes, Citrobacter, and Serratia may develop resistance during prolonged therapy (3-4 days)      -  Aztreonam: R >16      -  Cefazolin: R >16 Enterobacter, Klebsiella aerogenes, Citrobacter, and Serratia may develop resistance during prolonged therapy (3-4 days)      -  Cefepime: R >16      -  Cefoxitin: R >16      -  Ceftazidime/Avibactam: R >16      -  Ceftolozane/tazobactam: R >8      -  Ceftriaxone: R >32 Enterobacter, Klebsiella aerogenes, Citrobacter, and Serratia may develop resistance during prolonged therapy      -  Ciprofloxacin: R >2      -  Ertapenem: R >1      -  Gentamicin: R >8      -  Imipenem: R >8      -  Levofloxacin: R >4      -  Meropenem: R >8      -  Piperacillin/Tazobactam: R >64      -  Tigecycline: S <=2      -  Tobramycin: R >8      -  Trimethoprim/Sulfamethoxazole: R >2/38      Method Type: HERB  Organism: Blood Culture PCR (05-30-22 @ 10:50)      -  Carbapenem Resistance: Detec      -  CTX-M Resistance Marker: Detec      -  ESBL: Detec      -  Klebsiella pneumoniae: Detec      -  NDM Resistance Marker: Detec      Method Type: PCR    Culture - Urine (collected 05-16-22 @ 17:15)  Source: Clean Catch Clean Catch (Midstream)  Final Report (05-17-22 @ 21:05):    <10,000 CFU/mL Normal Urogenital Trinidad    Culture - Blood (collected 05-16-22 @ 12:28)  Source: .Blood Blood-Peripheral  Final Report (05-21-22 @ 23:00):    No Growth Final    Culture - Blood (collected 05-16-22 @ 12:09)  Source: .Blood Blood-Peripheral  Final Report (05-21-22 @ 23:00):    No Growth Final            INFECTIOUS DISEASES TESTING  Rapid RVP Result: NotDetec (05-27-22 @ 16:07)  Procalcitonin, Serum: 12.30 (05-27-22 @ 11:39)  COVID-19 PCR: NotDetec (05-24-22 @ 07:59)  MRSA PCR Result.: Negative (05-17-22 @ 10:10)  Procalcitonin, Serum: 0.03 (05-17-22 @ 07:37)  Rapid RVP Result: Detected (05-16-22 @ 12:09)      INFLAMMATORY MARKERS  Sedimentation Rate, Erythrocyte: 46 mm/Hr (05-27-22 @ 11:39)  Sedimentation Rate, Erythrocyte: 85 mm/Hr (05-17-22 @ 07:37)  C-Reactive Protein, Serum: 61 mg/L (05-17-22 @ 07:37)      RADIOLOGY & ADDITIONAL TESTS:  I have personally reviewed the last available Chest xray  CXR  Xray Chest 1 View- PORTABLE-Urgent:   ACC: [id number] EXAM:  XR CHEST PORTABLE URGENT 1V                          PROCEDURE DATE:  06/02/2022          INTERPRETATION:  CLINICAL HISTORY: desat, SOB.    COMPARISON: 5/27/2022.    TECHNIQUE: Portable frontal chest radiograph. Adequate positioning.    FINDINGS:    Support devices: Stable tracheostomy tube.    Cardiac/mediastinum/hilum: Unremarkable.    Lung parenchyma/Pleura: Grossly clear lungs. Left basilar linear   subsegmental atelectasis. Note that the patient's head positioning   obscures the left apex.    Skeleton/soft tissues: Unremarkable.      IMPRESSION:    Stable tracheostomy tube.    Grossly clear lungs. Left basilar linear subsegmental atelectasis. Note   that the patient's head positioning obscures the left apex.    ---End of Report ---            JACOBO MIRANDA MD; Attending Radiologist  This document has been electronically signed. Jun 2 2022 12:36PM (06-02-22 @ 11:45)      CT      CARDIOLOGY TESTING  12 Lead ECG:   Ventricular Rate 169 BPM    Atrial Rate 169 BPM    P-R Interval 124 ms    QRS Duration 56 ms    Q-T Interval 274 ms    QTC Calculation(Bazett) 459 ms    P Axis 77 degrees    R Axis 17 degrees    T Axis 77 degrees    Diagnosis Line Sinus tachycardia  Otherwise normal ECG    Confirmed by Mike Medina ([id number]) on 5/28/2022 11:45:18 AM (05-26-22 @ 16:58)  12 Lead ECG:   Ventricular Rate 126 BPM    Atrial Rate 126 BPM    P-R Interval 96 ms    QRS Duration 70 ms    Q-T Interval 306 ms    QTC Calculation(Bazett) 443 ms    P Axis 62 degrees    R Axis 34 degrees    T Axis 56 degrees    Diagnosis Line Sinus tachycardia with short NJ  Otherwise normal ECG    Confirmed by ERIN DAVENPORT MD ([id number]) on 5/26/2022 7:28:33 AM (05-25-22 @ 14:37)      MEDICATIONS  apixaban 5  bisacodyl 5  cefiderocol IVPB 2000  chlorhexidine 4% Liquid 1  cyanocobalamin 500  cyclobenzaprine Oral Tab/Cap - Peds 5  ferrous    sulfate Liquid 300  folic acid 1  metoprolol tartrate 75  pantoprazole   Suspension 40  scopolamine 1 mG/72 Hr(s) Patch 1  thiamine  Oral Tab/Cap - Peds 100      WEIGHT  Weight (kg): 55.2 (05-19-22 @ 21:56)  Creatinine, Serum: <0.5 mg/dL (06-02-22 @ 08:31)      ANTIBIOTICS:  cefiderocol IVPB 2000 milliGRAM(s) IV Intermittent every 8 hours      All available historical records have been reviewed

## 2022-06-03 LAB
GRAM STN FLD: SIGNIFICANT CHANGE UP
SPECIMEN SOURCE: SIGNIFICANT CHANGE UP

## 2022-06-03 PROCEDURE — 99233 SBSQ HOSP IP/OBS HIGH 50: CPT

## 2022-06-03 RX ORDER — SODIUM CHLORIDE 9 MG/ML
500 INJECTION INTRAMUSCULAR; INTRAVENOUS; SUBCUTANEOUS ONCE
Refills: 0 | Status: COMPLETED | OUTPATIENT
Start: 2022-06-03 | End: 2022-06-03

## 2022-06-03 RX ORDER — MIDODRINE HYDROCHLORIDE 2.5 MG/1
5 TABLET ORAL EVERY 8 HOURS
Refills: 0 | Status: DISCONTINUED | OUTPATIENT
Start: 2022-06-03 | End: 2022-06-09

## 2022-06-03 RX ADMIN — CHLORHEXIDINE GLUCONATE 1 APPLICATION(S): 213 SOLUTION TOPICAL at 05:24

## 2022-06-03 RX ADMIN — CEFIDEROCOL SULFATE TOSYLATE 33.33 MILLIGRAM(S): 1 INJECTION, POWDER, FOR SOLUTION INTRAVENOUS at 23:01

## 2022-06-03 RX ADMIN — CYCLOBENZAPRINE HYDROCHLORIDE 5 MILLIGRAM(S): 10 TABLET, FILM COATED ORAL at 14:13

## 2022-06-03 RX ADMIN — MIDODRINE HYDROCHLORIDE 5 MILLIGRAM(S): 2.5 TABLET ORAL at 16:04

## 2022-06-03 RX ADMIN — PANTOPRAZOLE SODIUM 40 MILLIGRAM(S): 20 TABLET, DELAYED RELEASE ORAL at 12:18

## 2022-06-03 RX ADMIN — SODIUM CHLORIDE 500 MILLILITER(S): 9 INJECTION INTRAMUSCULAR; INTRAVENOUS; SUBCUTANEOUS at 10:06

## 2022-06-03 RX ADMIN — CYCLOBENZAPRINE HYDROCHLORIDE 5 MILLIGRAM(S): 10 TABLET, FILM COATED ORAL at 05:24

## 2022-06-03 RX ADMIN — APIXABAN 5 MILLIGRAM(S): 2.5 TABLET, FILM COATED ORAL at 17:44

## 2022-06-03 RX ADMIN — Medication 1 MILLIGRAM(S): at 12:17

## 2022-06-03 RX ADMIN — Medication 100 MILLIGRAM(S): at 05:23

## 2022-06-03 RX ADMIN — Medication 75 MILLIGRAM(S): at 05:23

## 2022-06-03 RX ADMIN — Medication 5 MILLIGRAM(S): at 12:17

## 2022-06-03 RX ADMIN — PREGABALIN 500 MICROGRAM(S): 225 CAPSULE ORAL at 12:18

## 2022-06-03 RX ADMIN — SCOPALAMINE 1 PATCH: 1 PATCH, EXTENDED RELEASE TRANSDERMAL at 19:00

## 2022-06-03 RX ADMIN — CYCLOBENZAPRINE HYDROCHLORIDE 5 MILLIGRAM(S): 10 TABLET, FILM COATED ORAL at 21:40

## 2022-06-03 RX ADMIN — APIXABAN 5 MILLIGRAM(S): 2.5 TABLET, FILM COATED ORAL at 05:23

## 2022-06-03 RX ADMIN — CEFIDEROCOL SULFATE TOSYLATE 33.33 MILLIGRAM(S): 1 INJECTION, POWDER, FOR SOLUTION INTRAVENOUS at 05:24

## 2022-06-03 RX ADMIN — Medication 300 MILLIGRAM(S): at 12:17

## 2022-06-03 RX ADMIN — MIDODRINE HYDROCHLORIDE 5 MILLIGRAM(S): 2.5 TABLET ORAL at 21:40

## 2022-06-03 RX ADMIN — CEFIDEROCOL SULFATE TOSYLATE 33.33 MILLIGRAM(S): 1 INJECTION, POWDER, FOR SOLUTION INTRAVENOUS at 15:32

## 2022-06-03 RX ADMIN — Medication 100 MILLIGRAM(S): at 17:43

## 2022-06-03 NOTE — PROGRESS NOTE ADULT - ASSESSMENT
23 y/o F with a PMHx of encephalitis s/p tooth abscess s/p tracheostomy and PEG tube placement, HTN, GERD, and SVC thrombosis who was brought to ED from [place] on 5/16 for evaluation of a broken tracheostomy flange. Admitted for sepsis 2/2 COVID. Initially placed on trach collar 2-3LPM now back on RA. S/p Decadron and IV abx. Hospital course complicated for repeat sepsis 2/2 bacteremia and UTI w procal 12.3 (neg on admission). Restarted on IV abx    #Sepsis 2/2 MDR Klebsiella bacteremia  #Leukocytosis  - Febrile 101.3 F, WBC 30, tachycardic, concern for sepsis again  - S/p 1L LR bolus  - Procal 5/27: 12.3 (neg on admission)  - Repeat RVP: neg  - BCx 5/27 + 5/28: MDR Klebsiella  - BCx 5/29 + 5/30 + 5/31: NGTD  - ID recs appreciated  - C/w NS @ 100 cc/hr  - C/w Fetroja 2g IV q8h (started 5/28) -- to complete 10 d from cleared BCx (6/7)  - Trach cx: numerous GNR and GN coccobacilli, f/u speciation    #Sepsis present on admission 2/2 COVID pneumonia -- resolved  #Broken tracheostomy flange - resolved  - Trach replaced in the ED  - Unvaccinated COVID per sister  - Sepsis on presentation  - COVID 5/16: (+)  - CXR with left pleural effusion  - D-dimer: 267  - Procal: 0.03, CRP 61, , ferritin 9  - BCx, UCx: neg   - MRSA negative   - ID following  - S/p Decadron, RDV  - COVID 5/24 + 5/27: (-)    #HO SVC Thrombosis and Embolism   - C/w Eliquis 5 mg PO BID    #History of Encephalitis post Tooth Abscess in 10/2021   s/p Tracheostomy (has an O2 tank per sister but unsure about flow) and G-tube placement  - Monitor SaO2 and O2 requirements: as above    #GERD  * Home med Omeprazole 40mg QD  - C/w Protonix 40 mg PO daily    #Iron Deficiency Anemia   #History of Wernicke Encephalopathy  #Suspected thiamine deficiency  #Suspected folic acid deficiency  * Home med iron replacement, thiamine, B12, and folic acid  * ED Hb 11.3, MCV 79.4  B12 and folate nl  - C/w home iron replacement, thiamine, B12, and folic acid  - Monitor for now    #History of Hypertension  #Persistent sinus tachycardia  - BP on lower side here w persistent tachycardia  - Less likely sepsis, as patient currently being tx w very broad spectrum abx and wbc downtrending; also less likely agitation/pain given patient appears comfortable  - C/w metoprolol 75 mg PO BID    #DVT ppx: Eliquis 5 mg PO BID  #GI ppx: Protonix 40 mg PO daily  #Diet: NPO w G-tube feeds  #Activity: Functional quadriplegic  #Dispo: From [facility], acute  #Code status: Full code -- has MOLST confirming

## 2022-06-03 NOTE — PROGRESS NOTE ADULT - SUBJECTIVE AND OBJECTIVE BOX
CONSUELO CARO 22y Female  MRN#: [id number]   CODE STATUS: Full code    Hospital Day: 18d    Patient is currently admitted with the primary diagnosis of COVID    SUBJECTIVE:  Hospital Course  Admitted for sepsis 2/2 COVID. Initially placed on trach collar 2-3LPM now back on RA. S/p Decadron and IV abx. Hospital course complicated for repeat sepsis 2/2 MDR Klebsiella bacteremia w procal 12.3 (neg on admission). Started on Fetroja 5/28, repeat BCx 5/29 through present neg.    Patient seen and examined at bedside. No events overnight. ROS unobtainable.    Present Today:  - Alejo:  No [  ], Yes [ x ] : Indication: chronic Alejo    - Type of IV Access:      .. CVC/Piccline:  No [ x ], Yes [  ] : Indication:      .. Midline: No [ x ], Yes [  ] : Indication:                                            ----------------------------------------------------------  OBJECTIVE:  PAST MEDICAL & SURGICAL HISTORY:                                            -----------------------------------------------------------  ALLERGIES:  Allergy Status Unknown                                            ------------------------------------------------------------    HOME MEDICATIONS:  Home Medications:  albuterol 90 mcg/inh inhalation aerosol with adapter: 1 puff(s) inhaled every 4 hours (16 May 2022 21:37)  cyclobenzaprine 5 mg oral tablet: 1 tab(s) orally 3 times a day (16 May 2022 21:36)  Eliquis: 5 milligram(s) orally every 12 hours (16 May 2022 21:39)  ferrous sulfate 220 mg/5 mL (44 mg/5 mL elemental iron) oral elixir: 7.5 milliliter(s) orally once a day (16 May 2022 21:38)  folic acid 1 mg oral tablet: 1 tab(s) orally once a day (16 May 2022 21:38)  glycopyrrolate 2 mg oral tablet: 1 tab(s) orally 3 times a day (16 May 2022 21:36)  ipratropium 18 mcg/inh inhalation aerosol: 2 puff(s) inhaled every 6 hours (16 May 2022 21:37)  Lopressor 50 mg oral tablet: 1 tab(s) orally 2 times a day (16 May 2022 21:39)  metoclopramide 10 mg oral tablet: 1 tab(s) orally 4 times a day (before meals and at bedtime) (16 May 2022 21:38)  omeprazole 40 mg oral delayed release capsule: 1 cap(s) orally once a day (16 May 2022 21:39)  scopolamine 0.4 mg oral tablet: 1 milligram(s) orally every 72 hours (16 May 2022 21:36)  Tylenol 325 mg oral capsule: 2 cap(s) orally 3 times a day, As Needed (16 May 2022 21:37)  Vitamin B1 100 mg oral tablet: 1 tab(s) orally 2 times a day (16 May 2022 21:38)  Vitamin B12 500 mcg oral tablet: 1 tab(s) orally once a day (16 May 2022 21:38)                           MEDICATIONS:  STANDING MEDICATIONS  apixaban 5 milliGRAM(s) Enteral Tube two times a day  bisacodyl 5 milliGRAM(s) Oral daily  cefiderocol IVPB 2000 milliGRAM(s) IV Intermittent every 8 hours  chlorhexidine 4% Liquid 1 Application(s) Topical <User Schedule>  cyanocobalamin 500 MICROGram(s) Oral daily  cyclobenzaprine Oral Tab/Cap - Peds 5 milliGRAM(s) Oral three times a day  ferrous    sulfate Liquid 300 milliGRAM(s) Enteral Tube daily  folic acid 1 milliGRAM(s) Oral daily  metoprolol tartrate 75 milliGRAM(s) Enteral Tube two times a day  pantoprazole   Suspension 40 milliGRAM(s) Enteral Tube daily  scopolamine 1 mG/72 Hr(s) Patch 1 Patch Transdermal every 72 hours  thiamine  Oral Tab/Cap - Peds 100 milliGRAM(s) Oral two times a day    PRN MEDICATIONS  acetaminophen     Tablet .. 650 milliGRAM(s) Oral every 6 hours PRN  albuterol/ipratropium for Nebulization. 3 milliLiter(s) Nebulizer once PRN  metoclopramide   Syrup 10 milliGRAM(s) Oral every 6 hours PRN                                            ------------------------------------------------------------  VITAL SIGNS: Last 24 Hours  T(C): 35.7 (03 Jun 2022 04:59), Max: 36.6 (02 Jun 2022 20:30)  T(F): 96.3 (03 Jun 2022 04:59), Max: 97.9 (02 Jun 2022 20:30)  HR: 130 (03 Jun 2022 04:59) (75 - 130)  BP: 121/58 (03 Jun 2022 04:59) (86/52 - 121/58)  BP(mean): --  RR: 18 (03 Jun 2022 04:59) (18 - 20)  SpO2: 98% (03 Jun 2022 04:59) (96% - 98%)      06-02-22 @ 07:01  -  06-03-22 @ 07:00  --------------------------------------------------------  IN: 1960 mL / OUT: 850 mL / NET: 1110 mL                                             --------------------------------------------------------------  LABS:                        12.1   13.56 )-----------( 413      ( 02 Jun 2022 08:31 )             40.5     06-02    139  |  98  |  9<L>  ----------------------------<  150<H>  4.7   |  31  |  <0.5<L>    Ca    9.1      02 Jun 2022 08:31  Mg     2.3     06-02    Culture - Sputum (collected 02 Jun 2022 11:50)  Source: Trach Asp Tracheal Aspirate  Gram Stain (03 Jun 2022 07:15):    Moderate polymorphonuclear leukocytes per low power field    Rare Squamous epithelial cells per low power field    Numerous Gram Negative Rods seen per oil power field    Numerous Gram Negative Coccobacilli seen per oil power field                                            --------------------------------------------------------------    PHYSICAL EXAM:  General: Appears comfortable  HEENT: Normocephalic, atraumatic. Trach in place w secretions  LUNGS: Clear to auscultation bilaterally, no wheezes, rales, rhonchi  HEART: S1S2 present, regular rate and rhythm, no murmurs, rubs, gallops  ABDOMEN: Soft, nontender, nondistended. PEG in place  EXT: No edema, contracted x4 extremities

## 2022-06-04 LAB
-  AMIKACIN: SIGNIFICANT CHANGE UP
-  AMOXICILLIN/CLAVULANIC ACID: SIGNIFICANT CHANGE UP
-  AMPICILLIN/SULBACTAM: SIGNIFICANT CHANGE UP
-  AMPICILLIN: SIGNIFICANT CHANGE UP
-  AZTREONAM: SIGNIFICANT CHANGE UP
-  CEFAZOLIN: SIGNIFICANT CHANGE UP
-  CEFEPIME: SIGNIFICANT CHANGE UP
-  CEFTRIAXONE: SIGNIFICANT CHANGE UP
-  CIPROFLOXACIN: SIGNIFICANT CHANGE UP
-  ERTAPENEM: SIGNIFICANT CHANGE UP
-  GENTAMICIN: SIGNIFICANT CHANGE UP
-  LEVOFLOXACIN: SIGNIFICANT CHANGE UP
-  MEROPENEM: SIGNIFICANT CHANGE UP
-  PIPERACILLIN/TAZOBACTAM: SIGNIFICANT CHANGE UP
-  TOBRAMYCIN: SIGNIFICANT CHANGE UP
-  TRIMETHOPRIM/SULFAMETHOXAZOLE: SIGNIFICANT CHANGE UP
ALBUMIN SERPL ELPH-MCNC: 3.2 G/DL — LOW (ref 3.5–5.2)
ALP SERPL-CCNC: 115 U/L — SIGNIFICANT CHANGE UP (ref 30–115)
ALT FLD-CCNC: 64 U/L — HIGH (ref 0–41)
ANION GAP SERPL CALC-SCNC: 12 MMOL/L — SIGNIFICANT CHANGE UP (ref 7–14)
AST SERPL-CCNC: 31 U/L — SIGNIFICANT CHANGE UP (ref 0–41)
BASOPHILS # BLD AUTO: 0.04 K/UL — SIGNIFICANT CHANGE UP (ref 0–0.2)
BASOPHILS NFR BLD AUTO: 0.3 % — SIGNIFICANT CHANGE UP (ref 0–1)
BILIRUB SERPL-MCNC: <0.2 MG/DL — SIGNIFICANT CHANGE UP (ref 0.2–1.2)
BUN SERPL-MCNC: 10 MG/DL — SIGNIFICANT CHANGE UP (ref 10–20)
CALCIUM SERPL-MCNC: 8.9 MG/DL — SIGNIFICANT CHANGE UP (ref 8.5–10.1)
CHLORIDE SERPL-SCNC: 105 MMOL/L — SIGNIFICANT CHANGE UP (ref 98–110)
CO2 SERPL-SCNC: 26 MMOL/L — SIGNIFICANT CHANGE UP (ref 17–32)
CREAT SERPL-MCNC: <0.5 MG/DL — LOW (ref 0.7–1.5)
CULTURE RESULTS: SIGNIFICANT CHANGE UP
CULTURE RESULTS: SIGNIFICANT CHANGE UP
EGFR: 143 ML/MIN/1.73M2 — SIGNIFICANT CHANGE UP
EOSINOPHIL # BLD AUTO: 0.26 K/UL — SIGNIFICANT CHANGE UP (ref 0–0.7)
EOSINOPHIL NFR BLD AUTO: 2 % — SIGNIFICANT CHANGE UP (ref 0–8)
GLUCOSE SERPL-MCNC: 84 MG/DL — SIGNIFICANT CHANGE UP (ref 70–99)
HCT VFR BLD CALC: 36.2 % — LOW (ref 37–47)
HGB BLD-MCNC: 10.6 G/DL — LOW (ref 12–16)
IMM GRANULOCYTES NFR BLD AUTO: 0.4 % — HIGH (ref 0.1–0.3)
LYMPHOCYTES # BLD AUTO: 1.97 K/UL — SIGNIFICANT CHANGE UP (ref 1.2–3.4)
LYMPHOCYTES # BLD AUTO: 14.8 % — LOW (ref 20.5–51.1)
MCHC RBC-ENTMCNC: 23.7 PG — LOW (ref 27–31)
MCHC RBC-ENTMCNC: 29.3 G/DL — LOW (ref 32–37)
MCV RBC AUTO: 80.8 FL — LOW (ref 81–99)
METHOD TYPE: SIGNIFICANT CHANGE UP
MONOCYTES # BLD AUTO: 0.94 K/UL — HIGH (ref 0.1–0.6)
MONOCYTES NFR BLD AUTO: 7.1 % — SIGNIFICANT CHANGE UP (ref 1.7–9.3)
NEUTROPHILS # BLD AUTO: 10.01 K/UL — HIGH (ref 1.4–6.5)
NEUTROPHILS NFR BLD AUTO: 75.4 % — HIGH (ref 42.2–75.2)
NRBC # BLD: 0 /100 WBCS — SIGNIFICANT CHANGE UP (ref 0–0)
PLATELET # BLD AUTO: 382 K/UL — SIGNIFICANT CHANGE UP (ref 130–400)
POTASSIUM SERPL-MCNC: 4.3 MMOL/L — SIGNIFICANT CHANGE UP (ref 3.5–5)
POTASSIUM SERPL-SCNC: 4.3 MMOL/L — SIGNIFICANT CHANGE UP (ref 3.5–5)
PROT SERPL-MCNC: 6.1 G/DL — SIGNIFICANT CHANGE UP (ref 6–8)
RBC # BLD: 4.48 M/UL — SIGNIFICANT CHANGE UP (ref 4.2–5.4)
RBC # FLD: 18.5 % — HIGH (ref 11.5–14.5)
SODIUM SERPL-SCNC: 143 MMOL/L — SIGNIFICANT CHANGE UP (ref 135–146)
SPECIMEN SOURCE: SIGNIFICANT CHANGE UP
SPECIMEN SOURCE: SIGNIFICANT CHANGE UP
WBC # BLD: 13.27 K/UL — HIGH (ref 4.8–10.8)
WBC # FLD AUTO: 13.27 K/UL — HIGH (ref 4.8–10.8)

## 2022-06-04 PROCEDURE — 99232 SBSQ HOSP IP/OBS MODERATE 35: CPT | Mod: GC

## 2022-06-04 PROCEDURE — 99222 1ST HOSP IP/OBS MODERATE 55: CPT

## 2022-06-04 RX ORDER — MORPHINE SULFATE 50 MG/1
2 CAPSULE, EXTENDED RELEASE ORAL ONCE
Refills: 0 | Status: DISCONTINUED | OUTPATIENT
Start: 2022-06-04 | End: 2022-06-04

## 2022-06-04 RX ORDER — METOPROLOL TARTRATE 50 MG
100 TABLET ORAL
Refills: 0 | Status: DISCONTINUED | OUTPATIENT
Start: 2022-06-04 | End: 2022-06-08

## 2022-06-04 RX ADMIN — MIDODRINE HYDROCHLORIDE 5 MILLIGRAM(S): 2.5 TABLET ORAL at 05:08

## 2022-06-04 RX ADMIN — CEFIDEROCOL SULFATE TOSYLATE 33.33 MILLIGRAM(S): 1 INJECTION, POWDER, FOR SOLUTION INTRAVENOUS at 22:23

## 2022-06-04 RX ADMIN — CYCLOBENZAPRINE HYDROCHLORIDE 5 MILLIGRAM(S): 10 TABLET, FILM COATED ORAL at 05:08

## 2022-06-04 RX ADMIN — Medication 100 MILLIGRAM(S): at 05:08

## 2022-06-04 RX ADMIN — SCOPALAMINE 1 PATCH: 1 PATCH, EXTENDED RELEASE TRANSDERMAL at 05:00

## 2022-06-04 RX ADMIN — MIDODRINE HYDROCHLORIDE 5 MILLIGRAM(S): 2.5 TABLET ORAL at 13:22

## 2022-06-04 RX ADMIN — APIXABAN 5 MILLIGRAM(S): 2.5 TABLET, FILM COATED ORAL at 17:09

## 2022-06-04 RX ADMIN — CHLORHEXIDINE GLUCONATE 1 APPLICATION(S): 213 SOLUTION TOPICAL at 05:09

## 2022-06-04 RX ADMIN — Medication 300 MILLIGRAM(S): at 11:18

## 2022-06-04 RX ADMIN — Medication 100 MILLIGRAM(S): at 17:09

## 2022-06-04 RX ADMIN — Medication 1 MILLIGRAM(S): at 11:17

## 2022-06-04 RX ADMIN — SCOPALAMINE 1 PATCH: 1 PATCH, EXTENDED RELEASE TRANSDERMAL at 19:00

## 2022-06-04 RX ADMIN — Medication 5 MILLIGRAM(S): at 11:17

## 2022-06-04 RX ADMIN — CEFIDEROCOL SULFATE TOSYLATE 33.33 MILLIGRAM(S): 1 INJECTION, POWDER, FOR SOLUTION INTRAVENOUS at 13:23

## 2022-06-04 RX ADMIN — MORPHINE SULFATE 2 MILLIGRAM(S): 50 CAPSULE, EXTENDED RELEASE ORAL at 09:57

## 2022-06-04 RX ADMIN — PREGABALIN 500 MICROGRAM(S): 225 CAPSULE ORAL at 11:18

## 2022-06-04 RX ADMIN — MORPHINE SULFATE 2 MILLIGRAM(S): 50 CAPSULE, EXTENDED RELEASE ORAL at 11:18

## 2022-06-04 RX ADMIN — SCOPALAMINE 1 PATCH: 1 PATCH, EXTENDED RELEASE TRANSDERMAL at 07:00

## 2022-06-04 RX ADMIN — APIXABAN 5 MILLIGRAM(S): 2.5 TABLET, FILM COATED ORAL at 05:08

## 2022-06-04 RX ADMIN — CYCLOBENZAPRINE HYDROCHLORIDE 5 MILLIGRAM(S): 10 TABLET, FILM COATED ORAL at 21:24

## 2022-06-04 RX ADMIN — Medication 75 MILLIGRAM(S): at 05:08

## 2022-06-04 RX ADMIN — SCOPALAMINE 1 PATCH: 1 PATCH, EXTENDED RELEASE TRANSDERMAL at 05:10

## 2022-06-04 RX ADMIN — CYCLOBENZAPRINE HYDROCHLORIDE 5 MILLIGRAM(S): 10 TABLET, FILM COATED ORAL at 13:22

## 2022-06-04 RX ADMIN — PANTOPRAZOLE SODIUM 40 MILLIGRAM(S): 20 TABLET, DELAYED RELEASE ORAL at 11:17

## 2022-06-04 RX ADMIN — MIDODRINE HYDROCHLORIDE 5 MILLIGRAM(S): 2.5 TABLET ORAL at 21:25

## 2022-06-04 RX ADMIN — CEFIDEROCOL SULFATE TOSYLATE 33.33 MILLIGRAM(S): 1 INJECTION, POWDER, FOR SOLUTION INTRAVENOUS at 05:09
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
## 2022-06-04 NOTE — CONSULT NOTE ADULT - ASSESSMENT
This is a 23 y/o female from [facility] with PMH Encephalitis post Tooth Abscess in 10/2021, s/p Tracheostomy and G-tube placement, SVC Thrombosis and Embolism on Eliquis 5mg BID, GERD, Iron Deficiency Anemia, Wernicke Encephalopathy and HTN who presents with broken tracheostomy flange. Found to have sepsis 2/2 COVID PNA. Hospital course complicated for repeat sepsis 2/2 MDR Klebsiella bacteremia and UTI. Pt with persistent ST.     TSH 0.32    Impression:  COVID PNA  Broken trach flange, resolved  Sepsis 2/2 bacteremia and UTI  Persistent -130  Hx encephalitis, Wernicke encephalopathy  Hx trach and PEG  Hx SVC thrombosis (Eliquis)  Hx TANK  Hx GERD  Hx HTN    Plan:  - ST likely multifactorial: pt deconditioned, underlying infection (+WBC, no fevers), stress, mild anemia, ?pain, fluid status. TSH noted.  - HR <130 acceptable  - Last EKG from 5/26  - Please obtain EKG daily (EKG until pt on tele)  - Transfer to tele for closer monitoring  - Cont current management  - Will fu in AM once transferred and EKG is obtained

## 2022-06-04 NOTE — PROGRESS NOTE ADULT - ASSESSMENT
21 y/o F with a PMHx of encephalitis s/p tooth abscess s/p tracheostomy and PEG tube placement, HTN, GERD, and SVC thrombosis who was brought to ED from [place] on 5/16 for evaluation of a broken tracheostomy flange. Admitted for sepsis 2/2 COVID. Initially placed on trach collar 2-3LPM now back on RA. S/p Decadron and IV abx. Hospital course complicated for repeat sepsis 2/2 bacteremia and UTI w procal 12.3 (neg on admission). Restarted on IV abx    #Sepsis 2/2 MDR Klebsiella bacteremia  #Leukocytosis  - Febrile 101.3 F, WBC 30, tachycardic, concern for sepsis again  - S/p 1L LR bolus  - Procal 5/27: 12.3 (neg on admission)  - Repeat RVP: neg  - BCx 5/27 + 5/28: MDR Klebsiella  - BCx 5/29 + 5/30 + 5/31: NGTD  - ID recs appreciated  - C/w Fetroja 2g IV q8h (started 5/28) -- to complete 10 d from cleared BCx (6/7)  - Trach cx 6/3: Klebsiella, Proteus -- f/u sensitivities    #History of Hypertension  #Persistent sinus tachycardia  - BP on lower side here w persistent tachycardia 110s up to 140s-150s at times  - Less likely sepsis, as patient currently being tx w very broad spectrum abx and wbc downtrending  - Increase metoprolol 75 --> 100 mg PO BID  - Give morphine 2 mg IV x1 and assess HR response -- very difficult to assess pain in patient given aphasia  - EP consult if still no improvement    #Sepsis present on admission 2/2 COVID pneumonia -- resolved  #Broken tracheostomy flange - resolved  - Trach replaced in the ED  - Unvaccinated COVID per sister  - Sepsis on presentation  - COVID 5/16: (+)  - CXR with left pleural effusion  - D-dimer: 267  - Procal: 0.03, CRP 61, , ferritin 9  - BCx, UCx: neg   - MRSA negative   - ID following  - S/p Decadron, RDV  - COVID 5/24 + 5/27: (-)    #HO SVC Thrombosis and Embolism   - C/w Eliquis 5 mg PO BID    #History of Encephalitis post Tooth Abscess in 10/2021   s/p Tracheostomy (has an O2 tank per sister but unsure about flow) and G-tube placement  - Monitor SaO2 and O2 requirements: as above    #GERD  * Home med Omeprazole 40mg QD  - C/w Protonix 40 mg PO daily    #Iron Deficiency Anemia   #History of Wernicke Encephalopathy  #Suspected thiamine deficiency  #Suspected folic acid deficiency  * Home med iron replacement, thiamine, B12, and folic acid  * ED Hb 11.3, MCV 79.4  B12 and folate nl  - C/w home iron replacement, thiamine, B12, and folic acid  - Monitor for now    #DVT ppx: Eliquis 5 mg PO BID  #GI ppx: Protonix 40 mg PO daily  #Diet: NPO w G-tube feeds  #Activity: Functional quadriplegic  #Dispo: From [facility], acute  #Code status: Full code -- has MOLST confirming 23 y/o F with a PMHx of encephalitis s/p tooth abscess s/p tracheostomy and PEG tube placement, HTN, GERD, and SVC thrombosis who was brought to ED from [place] on 5/16 for evaluation of a broken tracheostomy flange. Admitted for sepsis 2/2 COVID. Initially placed on trach collar 2-3LPM now back on RA. S/p Decadron and IV abx. Hospital course complicated for repeat sepsis 2/2 bacteremia and UTI w procal 12.3 (neg on admission). Restarted on IV abx    #Sepsis 2/2 MDR Klebsiella bacteremia  #Leukocytosis  - Febrile 101.3 F, WBC 30, tachycardic, concern for sepsis again on 5/27, no fevers since  - S/p 1L LR bolus  - Procal 5/27: 12.3 (neg on admission)  - Repeat RVP: neg  - BCx 5/27 + 5/28: MDR Klebsiella  - BCx 5/29 + 5/30 + 5/31: NGTD  - ID recs appreciated  - C/w Fetroja 2g IV q8h (started 5/28) -- to complete 10 d from cleared BCx (6/7)  - Trach cx 6/3: Klebsiella, Proteus -- f/u sensitivities    #History of Hypertension  #Persistent sinus tachycardia  - BP on lower side here w persistent tachycardia 110s up to 140s-150s at times  - Less likely sepsis, as patient currently being tx w very broad spectrum abx and WBC downtrending  - Less likely agitation/pain as patient appears comfortable -- give morphine 2 mg IV x1 and assess HR response, as patient minimally responsive at baseline so may not have nonverbal signs of pain other than tachy  - Increase metoprolol 75 --> 100 mg PO BID  - EP consult if still no improvement    #Sepsis present on admission 2/2 COVID pneumonia -- resolved  #Broken tracheostomy flange - resolved  - Trach replaced in the ED  - Unvaccinated COVID per sister  - Sepsis on presentation  - COVID 5/16: (+)  - CXR with left pleural effusion  - D-dimer: 267  - Procal: 0.03, CRP 61, , ferritin 9  - BCx, UCx: neg   - MRSA negative   - ID following  - S/p Decadron, RDV  - COVID 5/24 + 5/27: (-)    #HO SVC Thrombosis and Embolism   - C/w Eliquis 5 mg PO BID    #History of Encephalitis post Tooth Abscess in 10/2021   s/p Tracheostomy (has an O2 tank per sister but unsure about flow) and G-tube placement  - Monitor SaO2 and O2 requirements: as above    #GERD  * Home med Omeprazole 40mg QD  - C/w Protonix 40 mg PO daily    #Iron Deficiency Anemia   #History of Wernicke Encephalopathy  #Suspected thiamine deficiency  #Suspected folic acid deficiency  * Home med iron replacement, thiamine, B12, and folic acid  * ED Hb 11.3, MCV 79.4  B12 and folate nl  - C/w home iron replacement, thiamine, B12, and folic acid  - Monitor for now    #DVT ppx: Eliquis 5 mg PO BID  #GI ppx: Protonix 40 mg PO daily  #Diet: NPO w G-tube feeds  #Activity: Functional quadriplegic  #Dispo: From [facility], acute  #Code status: Full code -- has MOLST confirming

## 2022-06-04 NOTE — CONSULT NOTE ADULT - SUBJECTIVE AND OBJECTIVE BOX
Patient is a 22y old  Female who presents with a chief complaint of Broken Tracheostomy Flange (2022 09:51)    HPI:  History of Present Illness  Ms Moya is a 22 year old female known to have:  - From [place]   - History of Encephalitis post Tooth Abscess in 10/2021 s/p Tracheostomy (has an O2 tank per sister but unsure about flow) and G-tube placement  - SVC Thrombosis and Embolism on Eliquis 5mg BID  - GERD  - Iron Deficiency Anemia and History of Wernicke Encephalopathy  - Hypertension    She was brought to the ED from [place] on  for evaluation of a broken tracheostomy flange.  History obtained from chart and from sister over phone at [phone] since could not reach NH.  History goes back to today when the NH staff noticed that the tracheostomy flange was broken.  Per sister, she received the last update about her sister's condition last week.  She reports completion of an antibiotic course for a suspected infection (unsure if it was PNA versus UTI versus GI source and unsure about AB administered).  She notes that her sister hasn't been spiking fevers, requiring more O2, having more secretions, or diarrhea per NH staff updates.    No sick contacts: patients was visited by her cousin on  (but cousin is not sick)   No recent travel or exposure to recent travelers.  Not vaccinated against COVID.      Upon presentation to the ED, the patient was septic  Vital Signs in ED   - BP 92/53 s/p 1.75L NS bolus followed by 250mL bolus  -  bpm -> sinus tachycardia  - T 37.2  - SaO2 100% on 3LPM NC      Investigations   Laboratory Workup  - CBC:                        11.3   23.29 )-----------( 535      ( 16 May 2022 12:09 )             38.5     - Chemistry:      141  |  99  |  15  ----------------------------<  97  4.9   |  27  |  0.5<L>    Ca    9.6      16 May 2022 12:09    TPro  7.6  /  Alb  3.9  /  TBili  <0.2  /  DBili  x   /  AST  22  /  ALT  29  /  AlkPhos  129<H>        Microbiological Workup  Urinalysis Basic - ( 16 May 2022 17:15 )    Color: Yellow / Appearance: Turbid / S.023 / pH: x  Gluc: x / Ketone: Negative  / Bili: Negative / Urobili: <2 mg/dL   Blood: x / Protein: 30 mg/dL / Nitrite: Negative   Leuk Esterase: Large / RBC: 6 /HPF / WBC 65 /HPF   Sq Epi: x / Non Sq Epi: 12 /HPF / Bacteria: Negative    Microbiology  * COVID +  * RVP +    Radiological Workup  * CXR with left pleural effusion    - In the ED, patient was placed on trach collar 2-3LPM with SaO2 100%  - In setting of hypotension and tachycardia, patient received 1.75L NS bolus followed by 250mL bolus  - She received IV Cefepime 2g, Levaquin 750mg, and Vancomycin 1g x1 doses in ED  - She also received IV Dexamethasone 10mg x1   - She will be admitted for further investigations, management, and monitoring   (16 May 2022 21:14)    EP consulted for sinus tachycardia.     REVIEW OF SYSTEMS  [x] COVID +, subjective information were not able to be obtained from the patient. History was obtained, to the extent possible, from review of the chart and collateral sources of information.      PAST MEDICAL & SURGICAL HISTORY:      Home Medications:  albuterol 90 mcg/inh inhalation aerosol with adapter: 1 puff(s) inhaled every 4 hours (16 May 2022 21:37)  cyclobenzaprine 5 mg oral tablet: 1 tab(s) orally 3 times a day (16 May 2022 21:36)  Eliquis: 5 milligram(s) orally every 12 hours (16 May 2022 21:39)  ferrous sulfate 220 mg/5 mL (44 mg/5 mL elemental iron) oral elixir: 7.5 milliliter(s) orally once a day (16 May 2022 21:38)  folic acid 1 mg oral tablet: 1 tab(s) orally once a day (16 May 2022 21:38)  glycopyrrolate 2 mg oral tablet: 1 tab(s) orally 3 times a day (16 May 2022 21:36)  ipratropium 18 mcg/inh inhalation aerosol: 2 puff(s) inhaled every 6 hours (16 May 2022 21:37)  Lopressor 50 mg oral tablet: 1 tab(s) orally 2 times a day (16 May 2022 21:39)  metoclopramide 10 mg oral tablet: 1 tab(s) orally 4 times a day (before meals and at bedtime) (16 May 2022 21:38)  omeprazole 40 mg oral delayed release capsule: 1 cap(s) orally once a day (16 May 2022 21:39)  scopolamine 0.4 mg oral tablet: 1 milligram(s) orally every 72 hours (16 May 2022 21:36)  Tylenol 325 mg oral capsule: 2 cap(s) orally 3 times a day, As Needed (16 May 2022 21:37)  Vitamin B1 100 mg oral tablet: 1 tab(s) orally 2 times a day (16 May 2022 21:38)  Vitamin B12 500 mcg oral tablet: 1 tab(s) orally once a day (16 May 2022 21:38)      Allergies:  Allergy Status Unknown    MEDICATIONS  (STANDING):  apixaban 5 milliGRAM(s) Enteral Tube two times a day  bisacodyl 5 milliGRAM(s) Oral daily  cefiderocol IVPB 2000 milliGRAM(s) IV Intermittent every 8 hours  chlorhexidine 4% Liquid 1 Application(s) Topical <User Schedule>  cyanocobalamin 500 MICROGram(s) Oral daily  cyclobenzaprine Oral Tab/Cap - Peds 5 milliGRAM(s) Oral three times a day  ferrous    sulfate Liquid 300 milliGRAM(s) Enteral Tube daily  folic acid 1 milliGRAM(s) Oral daily  metoprolol tartrate 100 milliGRAM(s) Oral two times a day  midodrine 5 milliGRAM(s) Oral every 8 hours  pantoprazole   Suspension 40 milliGRAM(s) Enteral Tube daily  scopolamine 1 mG/72 Hr(s) Patch 1 Patch Transdermal every 72 hours  thiamine  Oral Tab/Cap - Peds 100 milliGRAM(s) Oral two times a day    MEDICATIONS  (PRN):  acetaminophen     Tablet .. 650 milliGRAM(s) Oral every 6 hours PRN Temp greater or equal to 38C (100.4F), Mild Pain (1 - 3)  albuterol/ipratropium for Nebulization. 3 milliLiter(s) Nebulizer once PRN Shortness of Breath and/or Wheezing  metoclopramide   Syrup 10 milliGRAM(s) Oral every 6 hours PRN as needed      Vital Signs Last 24 Hrs  T(C): 36.9 (2022 05:46), Max: 37 (2022 14:33)  T(F): 98.4 (2022 05:46), Max: 98.6 (2022 14:33)  HR: 108 (2022 09:01) (107 - 124)  BP: 104/53 (2022 05:46) (96/59 - 104/53)  BP(mean): --  RR: 20 (2022 05:46) (18 - 20)  SpO2: 100% (2022 09:01) (94% - 100%)    PHYSICAL EXAM:  n/a    INTERPRETATION OF TELEMETRY: n/a Bedside pulse ox monitor with     ECG:  < from: 12 Lead ECG (22 @ 16:58) >  Ventricular Rate 169 BPM    Atrial Rate 169 BPM    P-R Interval 124 ms    QRS Duration 56 ms    Q-T Interval 274 ms    QTC Calculation(Bazett) 459 ms    P Axis 77 degrees    R Axis 17 degrees    T Axis 77 degrees    Diagnosis Line Sinus tachycardia  Otherwise normal ECG    < end of copied text >    I&O's Detail    2022 07:01  -  2022 07:00  --------------------------------------------------------  IN:    Enteral Tube Flush: 200 mL    Osmolite 1.2: 250 mL  Total IN: 450 mL    OUT:    Indwelling Catheter - Urethral (mL): 250 mL  Total OUT: 250 mL    Total NET: 200 mL      LABS:                        10.6   13.27 )-----------( 382      ( 2022 06:31 )             36.2     06-04    143  |  105  |  10  ----------------------------<  84  4.3   |  26  |  <0.5<L>    Ca    8.9      2022 06:31    TPro  6.1  /  Alb  3.2<L>  /  TBili  <0.2  /  DBili  x   /  AST  31  /  ALT  64<H>  /  AlkPhos  115  06-04      I&O's Detail    2022 07:01  -  2022 07:00  --------------------------------------------------------  IN:    Enteral Tube Flush: 200 mL    Osmolite 1.2: 250 mL  Total IN: 450 mL    OUT:    Indwelling Catheter - Urethral (mL): 250 mL  Total OUT: 250 mL    Total NET: 200 mL      RADIOLOGY:

## 2022-06-04 NOTE — PROGRESS NOTE ADULT - SUBJECTIVE AND OBJECTIVE BOX
CONSUELO CARO 22y Female  MRN#: [id number]   CODE STATUS: Full code    Hospital Day: 19d    Patient is currently admitted with the primary diagnosis of COVID    SUBJECTIVE:  Hospital Course  Admitted for sepsis 2/2 COVID. Initially placed on trach collar 2-3LPM now back on RA. S/p Decadron and IV abx. Hospital course complicated for repeat sepsis 2/2 MDR Klebsiella bacteremia w procal 12.3 (neg on admission). Started on Fetroja 5/28, repeat BCx 5/29 through present neg.    Patient seen and examined at bedside. No events overnight. ROS unobtainable.    Present Today:  - Alejo:  No [  ], Yes [ x ] : Indication: chronic Alejo    - Type of IV Access:      .. CVC/Piccline:  No [ x ], Yes [  ] : Indication:      .. Midline: No [ x ], Yes [  ] : Indication:                                            ----------------------------------------------------------  OBJECTIVE:  PAST MEDICAL & SURGICAL HISTORY:                                            -----------------------------------------------------------  ALLERGIES:  Allergy Status Unknown                                            ------------------------------------------------------------    HOME MEDICATIONS:  Home Medications:  albuterol 90 mcg/inh inhalation aerosol with adapter: 1 puff(s) inhaled every 4 hours (16 May 2022 21:37)  cyclobenzaprine 5 mg oral tablet: 1 tab(s) orally 3 times a day (16 May 2022 21:36)  Eliquis: 5 milligram(s) orally every 12 hours (16 May 2022 21:39)  ferrous sulfate 220 mg/5 mL (44 mg/5 mL elemental iron) oral elixir: 7.5 milliliter(s) orally once a day (16 May 2022 21:38)  folic acid 1 mg oral tablet: 1 tab(s) orally once a day (16 May 2022 21:38)  glycopyrrolate 2 mg oral tablet: 1 tab(s) orally 3 times a day (16 May 2022 21:36)  ipratropium 18 mcg/inh inhalation aerosol: 2 puff(s) inhaled every 6 hours (16 May 2022 21:37)  Lopressor 50 mg oral tablet: 1 tab(s) orally 2 times a day (16 May 2022 21:39)  metoclopramide 10 mg oral tablet: 1 tab(s) orally 4 times a day (before meals and at bedtime) (16 May 2022 21:38)  omeprazole 40 mg oral delayed release capsule: 1 cap(s) orally once a day (16 May 2022 21:39)  scopolamine 0.4 mg oral tablet: 1 milligram(s) orally every 72 hours (16 May 2022 21:36)  Tylenol 325 mg oral capsule: 2 cap(s) orally 3 times a day, As Needed (16 May 2022 21:37)  Vitamin B1 100 mg oral tablet: 1 tab(s) orally 2 times a day (16 May 2022 21:38)  Vitamin B12 500 mcg oral tablet: 1 tab(s) orally once a day (16 May 2022 21:38)                           MEDICATIONS:  STANDING MEDICATIONS  apixaban 5 milliGRAM(s) Enteral Tube two times a day  bisacodyl 5 milliGRAM(s) Oral daily  cefiderocol IVPB 2000 milliGRAM(s) IV Intermittent every 8 hours  chlorhexidine 4% Liquid 1 Application(s) Topical <User Schedule>  cyanocobalamin 500 MICROGram(s) Oral daily  cyclobenzaprine Oral Tab/Cap - Peds 5 milliGRAM(s) Oral three times a day  ferrous    sulfate Liquid 300 milliGRAM(s) Enteral Tube daily  folic acid 1 milliGRAM(s) Oral daily  metoprolol tartrate 100 milliGRAM(s) Oral two times a day  midodrine 5 milliGRAM(s) Oral every 8 hours  morphine  - Injectable 2 milliGRAM(s) IV Push once  pantoprazole   Suspension 40 milliGRAM(s) Enteral Tube daily  scopolamine 1 mG/72 Hr(s) Patch 1 Patch Transdermal every 72 hours  thiamine  Oral Tab/Cap - Peds 100 milliGRAM(s) Oral two times a day    PRN MEDICATIONS  acetaminophen     Tablet .. 650 milliGRAM(s) Oral every 6 hours PRN  albuterol/ipratropium for Nebulization. 3 milliLiter(s) Nebulizer once PRN  metoclopramide   Syrup 10 milliGRAM(s) Oral every 6 hours PRN                                            ------------------------------------------------------------  VITAL SIGNS: Last 24 Hours  T(C): 36.9 (04 Jun 2022 05:46), Max: 37 (03 Jun 2022 14:33)  T(F): 98.4 (04 Jun 2022 05:46), Max: 98.6 (03 Jun 2022 14:33)  HR: 124 (04 Jun 2022 05:46) (107 - 124)  BP: 104/53 (04 Jun 2022 05:46) (96/59 - 104/53)  BP(mean): --  RR: 20 (04 Jun 2022 05:46) (18 - 20)  SpO2: 94% (03 Jun 2022 20:42) (94% - 98%)      06-03-22 @ 07:01  -  06-04-22 @ 07:00  --------------------------------------------------------  IN: 450 mL / OUT: 250 mL / NET: 200 mL                                             --------------------------------------------------------------  LABS:                        10.6   13.27 )-----------( 382      ( 04 Jun 2022 06:31 )             36.2     06-04    143  |  105  |  10  ----------------------------<  84  4.3   |  26  |  <0.5<L>    Ca    8.9      04 Jun 2022 06:31    TPro  6.1  /  Alb  3.2<L>  /  TBili  <0.2  /  DBili  x   /  AST  31  /  ALT  64<H>  /  AlkPhos  115  06-04      Culture - Sputum (collected 02 Jun 2022 11:50)  Source: Trach Asp Tracheal Aspirate  Gram Stain (03 Jun 2022 07:15):    Moderate polymorphonuclear leukocytes per low power field    Rare Squamous epithelial cells per low power field    Numerous Gram Negative Rods seen per oil power field    Numerous Gram Negative Coccobacilli seen per oil power field  Preliminary Report (03 Jun 2022 23:22):    Numerous Klebsiella pneumoniae    Numerous Proteus mirabilis                                            --------------------------------------------------------------    PHYSICAL EXAM:  General: Appears comfortable  HEENT: Normocephalic, atraumatic. Trach in place w secretions  LUNGS: Clear to auscultation bilaterally, no wheezes, rales, rhonchi  HEART: S1S2 present, regular rate and rhythm, no murmurs, rubs, gallops  ABDOMEN: Soft, nontender, nondistended. PEG in place  EXT: No edema, contracted x4 extremities CONSUELO CARO 22y Female  MRN#: [id number]   CODE STATUS: Full code    Hospital Day: 19d    Patient is currently admitted with the primary diagnosis of COVID    SUBJECTIVE:  Hospital Course  Admitted for sepsis 2/2 COVID. Initially placed on trach collar 2-3LPM now back on RA. S/p Decadron and IV abx. Hospital course complicated for repeat sepsis 2/2 MDR Klebsiella bacteremia w procal 12.3 (neg on admission) on 5/27. Started on Fetroja 5/28, repeat BCx 5/29 through present neg.    Patient seen and examined at bedside. No events overnight. ROS unobtainable.    Present Today:  - Alejo:  No [  ], Yes [ x ] : Indication: chronic Alejo    - Type of IV Access:      .. CVC/Piccline:  No [ x ], Yes [  ] : Indication:      .. Midline: No [ x ], Yes [  ] : Indication:                                            ----------------------------------------------------------  OBJECTIVE:  PAST MEDICAL & SURGICAL HISTORY:                                            -----------------------------------------------------------  ALLERGIES:  Allergy Status Unknown                                            ------------------------------------------------------------    HOME MEDICATIONS:  Home Medications:  albuterol 90 mcg/inh inhalation aerosol with adapter: 1 puff(s) inhaled every 4 hours (16 May 2022 21:37)  cyclobenzaprine 5 mg oral tablet: 1 tab(s) orally 3 times a day (16 May 2022 21:36)  Eliquis: 5 milligram(s) orally every 12 hours (16 May 2022 21:39)  ferrous sulfate 220 mg/5 mL (44 mg/5 mL elemental iron) oral elixir: 7.5 milliliter(s) orally once a day (16 May 2022 21:38)  folic acid 1 mg oral tablet: 1 tab(s) orally once a day (16 May 2022 21:38)  glycopyrrolate 2 mg oral tablet: 1 tab(s) orally 3 times a day (16 May 2022 21:36)  ipratropium 18 mcg/inh inhalation aerosol: 2 puff(s) inhaled every 6 hours (16 May 2022 21:37)  Lopressor 50 mg oral tablet: 1 tab(s) orally 2 times a day (16 May 2022 21:39)  metoclopramide 10 mg oral tablet: 1 tab(s) orally 4 times a day (before meals and at bedtime) (16 May 2022 21:38)  omeprazole 40 mg oral delayed release capsule: 1 cap(s) orally once a day (16 May 2022 21:39)  scopolamine 0.4 mg oral tablet: 1 milligram(s) orally every 72 hours (16 May 2022 21:36)  Tylenol 325 mg oral capsule: 2 cap(s) orally 3 times a day, As Needed (16 May 2022 21:37)  Vitamin B1 100 mg oral tablet: 1 tab(s) orally 2 times a day (16 May 2022 21:38)  Vitamin B12 500 mcg oral tablet: 1 tab(s) orally once a day (16 May 2022 21:38)                           MEDICATIONS:  STANDING MEDICATIONS  apixaban 5 milliGRAM(s) Enteral Tube two times a day  bisacodyl 5 milliGRAM(s) Oral daily  cefiderocol IVPB 2000 milliGRAM(s) IV Intermittent every 8 hours  chlorhexidine 4% Liquid 1 Application(s) Topical <User Schedule>  cyanocobalamin 500 MICROGram(s) Oral daily  cyclobenzaprine Oral Tab/Cap - Peds 5 milliGRAM(s) Oral three times a day  ferrous    sulfate Liquid 300 milliGRAM(s) Enteral Tube daily  folic acid 1 milliGRAM(s) Oral daily  metoprolol tartrate 100 milliGRAM(s) Oral two times a day  midodrine 5 milliGRAM(s) Oral every 8 hours  morphine  - Injectable 2 milliGRAM(s) IV Push once  pantoprazole   Suspension 40 milliGRAM(s) Enteral Tube daily  scopolamine 1 mG/72 Hr(s) Patch 1 Patch Transdermal every 72 hours  thiamine  Oral Tab/Cap - Peds 100 milliGRAM(s) Oral two times a day    PRN MEDICATIONS  acetaminophen     Tablet .. 650 milliGRAM(s) Oral every 6 hours PRN  albuterol/ipratropium for Nebulization. 3 milliLiter(s) Nebulizer once PRN  metoclopramide   Syrup 10 milliGRAM(s) Oral every 6 hours PRN                                            ------------------------------------------------------------  VITAL SIGNS: Last 24 Hours  T(C): 36.9 (04 Jun 2022 05:46), Max: 37 (03 Jun 2022 14:33)  T(F): 98.4 (04 Jun 2022 05:46), Max: 98.6 (03 Jun 2022 14:33)  HR: 124 (04 Jun 2022 05:46) (107 - 124)  BP: 104/53 (04 Jun 2022 05:46) (96/59 - 104/53)  BP(mean): --  RR: 20 (04 Jun 2022 05:46) (18 - 20)  SpO2: 94% (03 Jun 2022 20:42) (94% - 98%)      06-03-22 @ 07:01  -  06-04-22 @ 07:00  --------------------------------------------------------  IN: 450 mL / OUT: 250 mL / NET: 200 mL                                             --------------------------------------------------------------  LABS:                        10.6   13.27 )-----------( 382      ( 04 Jun 2022 06:31 )             36.2     06-04    143  |  105  |  10  ----------------------------<  84  4.3   |  26  |  <0.5<L>    Ca    8.9      04 Jun 2022 06:31    TPro  6.1  /  Alb  3.2<L>  /  TBili  <0.2  /  DBili  x   /  AST  31  /  ALT  64<H>  /  AlkPhos  115  06-04      Culture - Sputum (collected 02 Jun 2022 11:50)  Source: Trach Asp Tracheal Aspirate  Gram Stain (03 Jun 2022 07:15):    Moderate polymorphonuclear leukocytes per low power field    Rare Squamous epithelial cells per low power field    Numerous Gram Negative Rods seen per oil power field    Numerous Gram Negative Coccobacilli seen per oil power field  Preliminary Report (03 Jun 2022 23:22):    Numerous Klebsiella pneumoniae    Numerous Proteus mirabilis                                            --------------------------------------------------------------    PHYSICAL EXAM:  General: Appears comfortable  HEENT: Normocephalic, atraumatic. Trach in place w secretions  LUNGS: Clear to auscultation bilaterally, no wheezes, rales, rhonchi  HEART: S1S2 present, regular rate and rhythm, no murmurs, rubs, gallops  ABDOMEN: Soft, nontender, nondistended. PEG in place  EXT: No edema, contracted x4 extremities

## 2022-06-05 LAB
-  AMIKACIN: SIGNIFICANT CHANGE UP
-  AMOXICILLIN/CLAVULANIC ACID: SIGNIFICANT CHANGE UP
-  AMPICILLIN/SULBACTAM: SIGNIFICANT CHANGE UP
-  AMPICILLIN: SIGNIFICANT CHANGE UP
-  AZTREONAM: SIGNIFICANT CHANGE UP
-  CEFAZOLIN: SIGNIFICANT CHANGE UP
-  CEFEPIME: SIGNIFICANT CHANGE UP
-  CEFOXITIN: SIGNIFICANT CHANGE UP
-  CEFTAZIDIME/AVIBACTAM: SIGNIFICANT CHANGE UP
-  CEFTOLOZANE/TAZOBACTAM: SIGNIFICANT CHANGE UP
-  CEFTRIAXONE: SIGNIFICANT CHANGE UP
-  CIPROFLOXACIN: SIGNIFICANT CHANGE UP
-  ERTAPENEM: SIGNIFICANT CHANGE UP
-  GENTAMICIN: SIGNIFICANT CHANGE UP
-  IMIPENEM: SIGNIFICANT CHANGE UP
-  LEVOFLOXACIN: SIGNIFICANT CHANGE UP
-  MEROPENEM: SIGNIFICANT CHANGE UP
-  PIPERACILLIN/TAZOBACTAM: SIGNIFICANT CHANGE UP
-  TOBRAMYCIN: SIGNIFICANT CHANGE UP
-  TRIMETHOPRIM/SULFAMETHOXAZOLE: SIGNIFICANT CHANGE UP
ANION GAP SERPL CALC-SCNC: 11 MMOL/L — SIGNIFICANT CHANGE UP (ref 7–14)
BASOPHILS # BLD AUTO: 0.03 K/UL — SIGNIFICANT CHANGE UP (ref 0–0.2)
BASOPHILS NFR BLD AUTO: 0.3 % — SIGNIFICANT CHANGE UP (ref 0–1)
BUN SERPL-MCNC: 9 MG/DL — LOW (ref 10–20)
CALCIUM SERPL-MCNC: 8.9 MG/DL — SIGNIFICANT CHANGE UP (ref 8.5–10.1)
CHLORIDE SERPL-SCNC: 104 MMOL/L — SIGNIFICANT CHANGE UP (ref 98–110)
CO2 SERPL-SCNC: 27 MMOL/L — SIGNIFICANT CHANGE UP (ref 17–32)
CREAT SERPL-MCNC: <0.5 MG/DL — LOW (ref 0.7–1.5)
CULTURE RESULTS: SIGNIFICANT CHANGE UP
CULTURE RESULTS: SIGNIFICANT CHANGE UP
EGFR: 143 ML/MIN/1.73M2 — SIGNIFICANT CHANGE UP
EOSINOPHIL # BLD AUTO: 0.27 K/UL — SIGNIFICANT CHANGE UP (ref 0–0.7)
EOSINOPHIL NFR BLD AUTO: 2.9 % — SIGNIFICANT CHANGE UP (ref 0–8)
GLUCOSE BLDC GLUCOMTR-MCNC: 100 MG/DL — HIGH (ref 70–99)
GLUCOSE BLDC GLUCOMTR-MCNC: 79 MG/DL — SIGNIFICANT CHANGE UP (ref 70–99)
GLUCOSE SERPL-MCNC: 83 MG/DL — SIGNIFICANT CHANGE UP (ref 70–99)
HCT VFR BLD CALC: 37.1 % — SIGNIFICANT CHANGE UP (ref 37–47)
HGB BLD-MCNC: 10.8 G/DL — LOW (ref 12–16)
IMM GRANULOCYTES NFR BLD AUTO: 0.2 % — SIGNIFICANT CHANGE UP (ref 0.1–0.3)
LYMPHOCYTES # BLD AUTO: 1.68 K/UL — SIGNIFICANT CHANGE UP (ref 1.2–3.4)
LYMPHOCYTES # BLD AUTO: 18 % — LOW (ref 20.5–51.1)
MCHC RBC-ENTMCNC: 23.4 PG — LOW (ref 27–31)
MCHC RBC-ENTMCNC: 29.1 G/DL — LOW (ref 32–37)
MCV RBC AUTO: 80.3 FL — LOW (ref 81–99)
METHOD TYPE: SIGNIFICANT CHANGE UP
MONOCYTES # BLD AUTO: 0.55 K/UL — SIGNIFICANT CHANGE UP (ref 0.1–0.6)
MONOCYTES NFR BLD AUTO: 5.9 % — SIGNIFICANT CHANGE UP (ref 1.7–9.3)
NEUTROPHILS # BLD AUTO: 6.8 K/UL — HIGH (ref 1.4–6.5)
NEUTROPHILS NFR BLD AUTO: 72.7 % — SIGNIFICANT CHANGE UP (ref 42.2–75.2)
NRBC # BLD: 0 /100 WBCS — SIGNIFICANT CHANGE UP (ref 0–0)
ORGANISM # SPEC MICROSCOPIC CNT: SIGNIFICANT CHANGE UP
PLATELET # BLD AUTO: 437 K/UL — HIGH (ref 130–400)
POTASSIUM SERPL-MCNC: 4.4 MMOL/L — SIGNIFICANT CHANGE UP (ref 3.5–5)
POTASSIUM SERPL-SCNC: 4.4 MMOL/L — SIGNIFICANT CHANGE UP (ref 3.5–5)
RBC # BLD: 4.62 M/UL — SIGNIFICANT CHANGE UP (ref 4.2–5.4)
RBC # FLD: 18.6 % — HIGH (ref 11.5–14.5)
SARS-COV-2 RNA SPEC QL NAA+PROBE: SIGNIFICANT CHANGE UP
SODIUM SERPL-SCNC: 142 MMOL/L — SIGNIFICANT CHANGE UP (ref 135–146)
SPECIMEN SOURCE: SIGNIFICANT CHANGE UP
SPECIMEN SOURCE: SIGNIFICANT CHANGE UP
WBC # BLD: 9.35 K/UL — SIGNIFICANT CHANGE UP (ref 4.8–10.8)
WBC # FLD AUTO: 9.35 K/UL — SIGNIFICANT CHANGE UP (ref 4.8–10.8)

## 2022-06-05 PROCEDURE — 93010 ELECTROCARDIOGRAM REPORT: CPT

## 2022-06-05 PROCEDURE — 99232 SBSQ HOSP IP/OBS MODERATE 35: CPT

## 2022-06-05 RX ADMIN — Medication 100 MILLIGRAM(S): at 17:19

## 2022-06-05 RX ADMIN — Medication 5 MILLIGRAM(S): at 12:17

## 2022-06-05 RX ADMIN — CEFIDEROCOL SULFATE TOSYLATE 33.33 MILLIGRAM(S): 1 INJECTION, POWDER, FOR SOLUTION INTRAVENOUS at 21:55

## 2022-06-05 RX ADMIN — CHLORHEXIDINE GLUCONATE 1 APPLICATION(S): 213 SOLUTION TOPICAL at 05:11

## 2022-06-05 RX ADMIN — Medication 300 MILLIGRAM(S): at 12:16

## 2022-06-05 RX ADMIN — CEFIDEROCOL SULFATE TOSYLATE 33.33 MILLIGRAM(S): 1 INJECTION, POWDER, FOR SOLUTION INTRAVENOUS at 05:10

## 2022-06-05 RX ADMIN — CEFIDEROCOL SULFATE TOSYLATE 33.33 MILLIGRAM(S): 1 INJECTION, POWDER, FOR SOLUTION INTRAVENOUS at 16:33

## 2022-06-05 RX ADMIN — CYCLOBENZAPRINE HYDROCHLORIDE 5 MILLIGRAM(S): 10 TABLET, FILM COATED ORAL at 05:10

## 2022-06-05 RX ADMIN — MIDODRINE HYDROCHLORIDE 5 MILLIGRAM(S): 2.5 TABLET ORAL at 13:48

## 2022-06-05 RX ADMIN — PREGABALIN 500 MICROGRAM(S): 225 CAPSULE ORAL at 12:17

## 2022-06-05 RX ADMIN — SCOPALAMINE 1 PATCH: 1 PATCH, EXTENDED RELEASE TRANSDERMAL at 07:00

## 2022-06-05 RX ADMIN — APIXABAN 5 MILLIGRAM(S): 2.5 TABLET, FILM COATED ORAL at 17:20

## 2022-06-05 RX ADMIN — MIDODRINE HYDROCHLORIDE 5 MILLIGRAM(S): 2.5 TABLET ORAL at 21:24

## 2022-06-05 RX ADMIN — Medication 100 MILLIGRAM(S): at 17:20

## 2022-06-05 RX ADMIN — Medication 1 MILLIGRAM(S): at 12:16

## 2022-06-05 RX ADMIN — Medication 100 MILLIGRAM(S): at 05:10

## 2022-06-05 RX ADMIN — APIXABAN 5 MILLIGRAM(S): 2.5 TABLET, FILM COATED ORAL at 05:10

## 2022-06-05 RX ADMIN — CYCLOBENZAPRINE HYDROCHLORIDE 5 MILLIGRAM(S): 10 TABLET, FILM COATED ORAL at 13:48

## 2022-06-05 RX ADMIN — MIDODRINE HYDROCHLORIDE 5 MILLIGRAM(S): 2.5 TABLET ORAL at 05:10

## 2022-06-05 RX ADMIN — SCOPALAMINE 1 PATCH: 1 PATCH, EXTENDED RELEASE TRANSDERMAL at 19:00

## 2022-06-05 RX ADMIN — CYCLOBENZAPRINE HYDROCHLORIDE 5 MILLIGRAM(S): 10 TABLET, FILM COATED ORAL at 21:24

## 2022-06-05 RX ADMIN — PANTOPRAZOLE SODIUM 40 MILLIGRAM(S): 20 TABLET, DELAYED RELEASE ORAL at 12:17

## 2022-06-05 NOTE — PROGRESS NOTE ADULT - SUBJECTIVE AND OBJECTIVE BOX
S: no new events/complaints  bed bound. eyes open. doesn't look at examiner/engage  non- verbal  trach feeds ok.       All other pertinent ROS negative.      06-04-22 @ 07:01  -  06-05-22 @ 07:00  --------------------------------------------------------  IN: 1200 mL / OUT: 300 mL / NET: 900 mL    06-05-22 @ 07:01  -  06-05-22 @ 14:54  --------------------------------------------------------  IN: 0 mL / OUT: 300 mL / NET: -300 mL      Vital Signs Last 24 Hrs  T(C): 35.9 (05 Jun 2022 12:10), Max: 36.9 (05 Jun 2022 05:00)  T(F): 96.6 (05 Jun 2022 12:10), Max: 98.4 (05 Jun 2022 05:00)  HR: 126 (05 Jun 2022 12:10) (99 - 129)  BP: 88/51 (05 Jun 2022 12:10) (88/51 - 100/57)  BP(mean): --  RR: 20 (05 Jun 2022 12:10) (19 - 20)  SpO2: 99% (05 Jun 2022 08:44) (99% - 100%)  PHYSICAL EXAM:    Constitutional: as above. trach/peg  HEENT: PERR, EOMI, Normal Hearing, MMM  Neck: Soft and supple, No LAD, No JVD. excessive trach secretions  Respiratory: Breath sounds are clear bilaterally, No wheezing, rales or rhonchi  Cardiovascular: S1 and S2, regular rate and rhythm, no Murmurs, gallops or rubs  Gastrointestinal: Bowel Sounds present, soft, nontender, nondistended, no guarding, no rebound  Extremities: No peripheral edema      MEDICATIONS:  MEDICATIONS  (STANDING):  apixaban 5 milliGRAM(s) Enteral Tube two times a day  bisacodyl 5 milliGRAM(s) Oral daily  cefiderocol IVPB 2000 milliGRAM(s) IV Intermittent every 8 hours  chlorhexidine 4% Liquid 1 Application(s) Topical <User Schedule>  cyanocobalamin 500 MICROGram(s) Oral daily  cyclobenzaprine Oral Tab/Cap - Peds 5 milliGRAM(s) Oral three times a day  ferrous    sulfate Liquid 300 milliGRAM(s) Enteral Tube daily  folic acid 1 milliGRAM(s) Oral daily  metoprolol tartrate 100 milliGRAM(s) Oral two times a day  midodrine 5 milliGRAM(s) Oral every 8 hours  pantoprazole   Suspension 40 milliGRAM(s) Enteral Tube daily  scopolamine 1 mG/72 Hr(s) Patch 1 Patch Transdermal every 72 hours  thiamine  Oral Tab/Cap - Peds 100 milliGRAM(s) Oral two times a day      LABS: All Labs Reviewed:                        10.8   9.35  )-----------( 437      ( 05 Jun 2022 07:15 )             37.1     06-05    142  |  104  |  9<L>  ----------------------------<  83  4.4   |  27  |  <0.5<L>    Ca    8.9      05 Jun 2022 07:15    TPro  6.1  /  Alb  3.2<L>  /  TBili  <0.2  /  DBili  x   /  AST  31  /  ALT  64<H>  /  AlkPhos  115  06-04          Blood Culture: 06-02 @ 11:50  Organism Proteus mirabilis ESBL  Gram Stain Blood -- Gram Stain   Moderate polymorphonuclear leukocytes per low power field  Rare Squamous epithelial cells per low power field  Numerous Gram Negative Rods seen per oil power field  Numerous Gram Negative Coccobacilli seen per oil power field  Specimen Source Trach Asp Tracheal Aspirate  Culture-Blood --        Radiology: reviewed

## 2022-06-05 NOTE — PROGRESS NOTE ADULT - ASSESSMENT
21 y/o F with a PMHx of encephalitis s/p tooth abscess s/p tracheostomy and PEG tube placement, HTN, GERD, and SVC thrombosis who was brought to ED from [place] on 5/16 for evaluation of a broken tracheostomy flange. Admitted for sepsis 2/2 COVID. Initially placed on trach collar 2-3LPM now back on RA. S/p Decadron and IV abx. Hospital course complicated for repeat sepsis 2/2 bacteremia and UTI w procal 12.3 (neg on admission). Restarted on IV abx    #Sepsis (POA) 2/2 Cystitis & Covid : s/p Abx. resolved.  #Septic shock again (5/27) 2/2 MDR Klebsiella bacteremia (likely from Pyelonephritis)  5/29-5/31 BCX NGTD     5/28 BCX  Klebsiella pneumoniae (Carbapenem Resistant)    5/27 BCx Klebsiella pneumoniae (Carbapenem Resistant)    5/27 UCx Klebsiella pneumoniae (Carbapenem Resistant)  - Procal 5/27: 12.3 (neg on admission)    - C/w Fetroja (Cefiderocol) 2g IV q8h (started 5/28) -- to complete 10 d from cleared BCx (6/7)  - Trach cx 6/3: MDR Klebsiella, Proteus --sensitivities reviewed with ID over phone (6/5): c/w cefiderocol    6/5: BP acceptable. MAP 62 but good urine output. Tachycardic @ 110-120s. c/w Metoprolol T 100 BID. EKG . consider transfer to [facility] if persists.     #Persistent sinus tachycardia  also has h/o same (was on metoprolol T 50 BID at home)  as above     #COVID pneumonia ( on admission) (5/16 PCR +)-- resolved  #Broken tracheostomy flange - resolved  - Trach replaced in the ED  - Unvaccinated COVID per sister  - Sepsis on presentation      #HO SVC Thrombosis and Embolism   - C/w Eliquis 5 mg PO BID    #History of Encephalitis post Tooth Abscess in 10/2021   s/p Tracheostomy (has an O2 tank per sister but unsure about flow) and G-tube placement  - Monitor SaO2 and O2 requirements: as above    #GERD  * Home med Omeprazole 40mg QD  - C/w Protonix 40 mg PO daily    #Iron Deficiency Anemia   #History of Wernicke Encephalopathy  #Suspected thiamine deficiency  #Suspected folic acid deficiency  * Home med iron replacement, thiamine, B12, and folic acid  * ED Hb 11.3, MCV 79.4  B12 and folate nl  - C/w home iron replacement, thiamine, B12, and folic acid  - Monitor for now    #DVT ppx: Eliquis 5 mg PO BID  #GI ppx: Protonix 40 mg PO daily  #Diet: NPO w G-tube feeds  #Activity: Functional quadriplegic  #Dispo: From [facility], acute  #Code status: Full code -- has MOLST confirming  Will need repeat Covid PCR closer to d/c

## 2022-06-06 LAB
ANION GAP SERPL CALC-SCNC: 10 MMOL/L — SIGNIFICANT CHANGE UP (ref 7–14)
BASOPHILS # BLD AUTO: 0.04 K/UL — SIGNIFICANT CHANGE UP (ref 0–0.2)
BASOPHILS NFR BLD AUTO: 0.3 % — SIGNIFICANT CHANGE UP (ref 0–1)
BUN SERPL-MCNC: 9 MG/DL — LOW (ref 10–20)
CALCIUM SERPL-MCNC: 9.4 MG/DL — SIGNIFICANT CHANGE UP (ref 8.5–10.1)
CHLORIDE SERPL-SCNC: 105 MMOL/L — SIGNIFICANT CHANGE UP (ref 98–110)
CO2 SERPL-SCNC: 27 MMOL/L — SIGNIFICANT CHANGE UP (ref 17–32)
CREAT SERPL-MCNC: 0.5 MG/DL — LOW (ref 0.7–1.5)
EGFR: 136 ML/MIN/1.73M2 — SIGNIFICANT CHANGE UP
EOSINOPHIL # BLD AUTO: 0.28 K/UL — SIGNIFICANT CHANGE UP (ref 0–0.7)
EOSINOPHIL NFR BLD AUTO: 2 % — SIGNIFICANT CHANGE UP (ref 0–8)
GLUCOSE SERPL-MCNC: 182 MG/DL — HIGH (ref 70–99)
HCT VFR BLD CALC: 36.1 % — LOW (ref 37–47)
HGB BLD-MCNC: 10.7 G/DL — LOW (ref 12–16)
IMM GRANULOCYTES NFR BLD AUTO: 0.5 % — HIGH (ref 0.1–0.3)
LYMPHOCYTES # BLD AUTO: 1.49 K/UL — SIGNIFICANT CHANGE UP (ref 1.2–3.4)
LYMPHOCYTES # BLD AUTO: 10.4 % — LOW (ref 20.5–51.1)
MCHC RBC-ENTMCNC: 23.9 PG — LOW (ref 27–31)
MCHC RBC-ENTMCNC: 29.6 G/DL — LOW (ref 32–37)
MCV RBC AUTO: 80.6 FL — LOW (ref 81–99)
MONOCYTES # BLD AUTO: 0.56 K/UL — SIGNIFICANT CHANGE UP (ref 0.1–0.6)
MONOCYTES NFR BLD AUTO: 3.9 % — SIGNIFICANT CHANGE UP (ref 1.7–9.3)
NEUTROPHILS # BLD AUTO: 11.83 K/UL — HIGH (ref 1.4–6.5)
NEUTROPHILS NFR BLD AUTO: 82.9 % — HIGH (ref 42.2–75.2)
NRBC # BLD: 0 /100 WBCS — SIGNIFICANT CHANGE UP (ref 0–0)
PLATELET # BLD AUTO: 414 K/UL — HIGH (ref 130–400)
POTASSIUM SERPL-MCNC: 4.5 MMOL/L — SIGNIFICANT CHANGE UP (ref 3.5–5)
POTASSIUM SERPL-SCNC: 4.5 MMOL/L — SIGNIFICANT CHANGE UP (ref 3.5–5)
RBC # BLD: 4.48 M/UL — SIGNIFICANT CHANGE UP (ref 4.2–5.4)
RBC # FLD: 18.6 % — HIGH (ref 11.5–14.5)
SODIUM SERPL-SCNC: 142 MMOL/L — SIGNIFICANT CHANGE UP (ref 135–146)
WBC # BLD: 14.27 K/UL — HIGH (ref 4.8–10.8)
WBC # FLD AUTO: 14.27 K/UL — HIGH (ref 4.8–10.8)

## 2022-06-06 PROCEDURE — 99232 SBSQ HOSP IP/OBS MODERATE 35: CPT | Mod: GC

## 2022-06-06 PROCEDURE — 93010 ELECTROCARDIOGRAM REPORT: CPT

## 2022-06-06 RX ADMIN — Medication 100 MILLIGRAM(S): at 17:21

## 2022-06-06 RX ADMIN — CYCLOBENZAPRINE HYDROCHLORIDE 5 MILLIGRAM(S): 10 TABLET, FILM COATED ORAL at 05:00

## 2022-06-06 RX ADMIN — CYCLOBENZAPRINE HYDROCHLORIDE 5 MILLIGRAM(S): 10 TABLET, FILM COATED ORAL at 13:37

## 2022-06-06 RX ADMIN — Medication 100 MILLIGRAM(S): at 05:01

## 2022-06-06 RX ADMIN — MIDODRINE HYDROCHLORIDE 5 MILLIGRAM(S): 2.5 TABLET ORAL at 05:01

## 2022-06-06 RX ADMIN — CEFIDEROCOL SULFATE TOSYLATE 33.33 MILLIGRAM(S): 1 INJECTION, POWDER, FOR SOLUTION INTRAVENOUS at 22:02

## 2022-06-06 RX ADMIN — APIXABAN 5 MILLIGRAM(S): 2.5 TABLET, FILM COATED ORAL at 05:01

## 2022-06-06 RX ADMIN — APIXABAN 5 MILLIGRAM(S): 2.5 TABLET, FILM COATED ORAL at 17:21

## 2022-06-06 RX ADMIN — Medication 300 MILLIGRAM(S): at 12:51

## 2022-06-06 RX ADMIN — CHLORHEXIDINE GLUCONATE 1 APPLICATION(S): 213 SOLUTION TOPICAL at 05:02

## 2022-06-06 RX ADMIN — Medication 1 MILLIGRAM(S): at 12:51

## 2022-06-06 RX ADMIN — Medication 5 MILLIGRAM(S): at 12:51

## 2022-06-06 RX ADMIN — SCOPALAMINE 1 PATCH: 1 PATCH, EXTENDED RELEASE TRANSDERMAL at 19:03

## 2022-06-06 RX ADMIN — PREGABALIN 500 MICROGRAM(S): 225 CAPSULE ORAL at 12:51

## 2022-06-06 RX ADMIN — MIDODRINE HYDROCHLORIDE 5 MILLIGRAM(S): 2.5 TABLET ORAL at 21:39

## 2022-06-06 RX ADMIN — SCOPALAMINE 1 PATCH: 1 PATCH, EXTENDED RELEASE TRANSDERMAL at 07:00

## 2022-06-06 RX ADMIN — CYCLOBENZAPRINE HYDROCHLORIDE 5 MILLIGRAM(S): 10 TABLET, FILM COATED ORAL at 21:40

## 2022-06-06 RX ADMIN — MIDODRINE HYDROCHLORIDE 5 MILLIGRAM(S): 2.5 TABLET ORAL at 13:37

## 2022-06-06 RX ADMIN — CEFIDEROCOL SULFATE TOSYLATE 33.33 MILLIGRAM(S): 1 INJECTION, POWDER, FOR SOLUTION INTRAVENOUS at 13:37

## 2022-06-06 RX ADMIN — PANTOPRAZOLE SODIUM 40 MILLIGRAM(S): 20 TABLET, DELAYED RELEASE ORAL at 12:52

## 2022-06-06 RX ADMIN — CEFIDEROCOL SULFATE TOSYLATE 33.33 MILLIGRAM(S): 1 INJECTION, POWDER, FOR SOLUTION INTRAVENOUS at 05:01

## 2022-06-06 NOTE — PROGRESS NOTE ADULT - ASSESSMENT
IMPRESSION;   #Tracheal colonization    6/2 trach  Numerous Klebsiella pneumoniae (Carbapenem Resistant)    Numerous Proteus mirabilis ESBL  #RESOLVED Septic shock ( on pressors ) secondary to CRE GNR possibly related to acute pyelonephritis    5/29-5/31 BCX NGTD     5/28 BCX  Klebsiella pneumoniae (Carbapenem Resistant)    5/27 BCx Klebsiella pneumoniae (Carbapenem Resistant)    5/27 UCx Klebsiella pneumoniae (Carbapenem Resistant)    Pyuria  < from: US Kidney and Bladder (06.01.22 @ 09:14) >  Normal renal ultrasound.  #Sepsis on admission T>90 RR>20 WBC >12    UA WBC 65, poor specimen as +epith- rule out acute cystitis   #COVID19 , unvaccinated    satting well on baseline settings  < from: Xray Chest 1 View- PORTABLE-Urgent (Xray Chest 1 View- PORTABLE-Urgent .) (05.16.22 @ 18:00) >  Left basal linear subsegmental atelectasis. No rosie consolidation,   effusion or pneumothorax  #Trach/PEG- trach malfunction on admission    RECOMMENDATIONS;  - Cefiderocol 2 gm iv q8h x 10 days from cleared BCX end 6/7  - Off loading to prevent pressure sores and preventive measures to avoid aspiration   - Please recall ID as needed  - Trend WBC    If any questions, please call or send a message on [employer] Teams  Please continue to update ID with any pertinent new laboratory or radiographic findings  Spectra [phone]

## 2022-06-06 NOTE — PROGRESS NOTE ADULT - ASSESSMENT
21 y/o F with a PMHx of encephalitis s/p tooth abscess s/p tracheostomy and PEG tube placement, HTN, GERD, and SVC thrombosis who was brought to ED from [place] on 5/16 for evaluation of a broken tracheostomy flange. Admitted for sepsis 2/2 COVID. Initially placed on trach collar 2-3LPM now back on RA. S/p Decadron and IV abx. Hospital course complicated for repeat sepsis 2/2 bacteremia and UTI w procal 12.3 (neg on admission). Restarted on IV abx    #Sepsis 2/2 MDR Klebsiella bacteremia  #Leukocytosis  - Febrile 101.3 F, WBC 30, tachycardic, concern for sepsis again on 5/27, no fevers since  - S/p 1L LR bolus  - Procal 5/27: 12.3 (neg on admission)  - Repeat RVP: neg  - BCx 5/27 + 5/28: MDR Klebsiella  - BCx 5/29 + 5/30 + 5/31: NGTD  - ID recs appreciated  - Trach cx 6/3: Klebsiella, Proteus ESBL  - C/w Fetroja 2g IV q8h (started 5/28) -- to complete 10 d from cleared BCx (end 6/7)    #History of Hypertension  #Persistent sinus tachycardia  - BP on lower side here w persistent tachycardia 110s up to 140s-150s at times  - Less likely sepsis, as patient currently being tx w very broad spectrum abx and WBC downtrending  - Less likely agitation/pain as patient appears comfortable  - c/w 100 mg PO BID  - as per EP, transfer to [facility] for further monitoring  - daily ECGs    #Sepsis present on admission 2/2 COVID pneumonia -- resolved  #Broken tracheostomy flange - resolved  - Trach replaced in the ED  - Unvaccinated COVID per sister  - Sepsis on presentation  - COVID 5/16: (+)  - CXR with left pleural effusion  - D-dimer: 267  - Procal: 0.03, CRP 61, , ferritin 9  - BCx, UCx: neg   - MRSA negative   - ID following  - S/p Decadron, RDV  - COVID 5/24 + 5/27: (-)    #HO SVC Thrombosis and Embolism   - C/w Eliquis 5 mg PO BID    #History of Encephalitis post Tooth Abscess in 10/2021   s/p Tracheostomy (has an O2 tank per sister but unsure about flow) and G-tube placement  - Monitor SaO2 and O2 requirements: as above    #GERD  * Home med Omeprazole 40mg QD  - C/w Protonix 40 mg PO daily    #Iron Deficiency Anemia   #History of Wernicke Encephalopathy  #Suspected thiamine deficiency  #Suspected folic acid deficiency  * Home med iron replacement, thiamine, B12, and folic acid  * ED Hb 11.3, MCV 79.4  B12 and folate nl  - C/w home iron replacement, thiamine, B12, and folic acid  - Monitor for now    #DVT ppx: Eliquis 5 mg PO BID  #GI ppx: Protonix 40 mg PO daily  #Diet: NPO w G-tube feeds  #Activity: Functional quadriplegic  #Dispo: From [facility], acute  #Code status: Full code -- has MOLST confirming  #Pending: HR control   23 y/o F with a PMHx of encephalitis s/p tooth abscess s/p tracheostomy and PEG tube placement, HTN, GERD, and SVC thrombosis who was brought to ED from [place] on 5/16 for evaluation of a broken tracheostomy flange. Admitted for sepsis 2/2 COVID. Initially placed on trach collar 2-3LPM now back on RA. S/p Decadron and IV abx. Hospital course complicated for repeat sepsis 2/2 bacteremia and UTI w procal 12.3 (neg on admission). Restarted on IV abx    #Sepsis 2/2 MDR Klebsiella bacteremia  #Leukocytosis  - Febrile 101.3 F, WBC 30, tachycardic, concern for sepsis again on 5/27, no fevers since  - S/p 1L LR bolus  - Procal 5/27: 12.3 (neg on admission)  - Repeat RVP: neg  - BCx 5/27 + 5/28: MDR Klebsiella  - BCx 5/29 + 5/30 + 5/31: NGTD  - ID recs appreciated  - Trach cx 6/3: Klebsiella, Proteus ESBL  - C/w Fetroja 2g IV q8h (started 5/28) -- to complete 10 d from cleared BCx (end 6/7)    #History of Hypertension  #Persistent sinus tachycardia  - BP on lower side here w persistent tachycardia 110s up to 140s-150s at times  - Less likely sepsis, as patient currently being tx w very broad spectrum abx and WBC downtrending  - Less likely agitation/pain as patient appears comfortable  - c/w 100 mg PO BID  - as per EP, transfer to tele for further monitoring  - daily ECGs  - transfer to tele    #Sepsis present on admission 2/2 COVID pneumonia -- resolved  #Broken tracheostomy flange - resolved  - Trach replaced in the ED  - Unvaccinated COVID per sister  - Sepsis on presentation  - COVID 5/16: (+)  - CXR with left pleural effusion  - D-dimer: 267  - Procal: 0.03, CRP 61, , ferritin 9  - BCx, UCx: neg   - MRSA negative   - ID following  - S/p Decadron, RDV  - COVID 5/24 + 5/27: (-)    #HO SVC Thrombosis and Embolism   - C/w Eliquis 5 mg PO BID    #History of Encephalitis post Tooth Abscess in 10/2021   s/p Tracheostomy (has an O2 tank per sister but unsure about flow) and G-tube placement  - Monitor SaO2 and O2 requirements: as above    #GERD  * Home med Omeprazole 40mg QD  - C/w Protonix 40 mg PO daily    #Iron Deficiency Anemia   #History of Wernicke Encephalopathy  #Suspected thiamine deficiency  #Suspected folic acid deficiency  * Home med iron replacement, thiamine, B12, and folic acid  * ED Hb 11.3, MCV 79.4  B12 and folate nl  - C/w home iron replacement, thiamine, B12, and folic acid  - Monitor for now    #DVT ppx: Eliquis 5 mg PO BID  #GI ppx: Protonix 40 mg PO daily  #Diet: NPO w G-tube feeds  #Activity: Functional quadriplegic  #Dispo: From [facility], acute  #Code status: Full code -- has MOLST confirming  #Pending: transfer to [facility]

## 2022-06-06 NOTE — PROGRESS NOTE ADULT - TIME BILLING
I have personally seen and examined this patient.  I have reviewed all pertinent clinical information and reviewed all relevant imaging and diagnostic studies personally.   If possible, I counseled the patient about diagnostic testing and treatment plan.   I discussed my recommendations with the primary team.
Counseled hospitalist about diagnostic testing and treatment plan. All questions answered.

## 2022-06-06 NOTE — PROGRESS NOTE ADULT - SUBJECTIVE AND OBJECTIVE BOX
CONSUELO CARO  22y, Female  Allergy: Allergy Status Unknown      LOS  21d    CHIEF COMPLAINT: Broken Tracheostomy Flange (06 Jun 2022 08:39)      INTERVAL EVENTS/HPI  - T(F): , Max: 97.1 (06-06-22 @ 05:00)  - WBC Count: 9.35 (06-05-22 @ 07:15)  WBC Count: 13.27 (06-04-22 @ 06:31)     - Creatinine, Serum: <0.5 (06-05-22 @ 07:15)     -   -   -     ROS  cannot obtain secondary to patient's sedation and/or mental status    VITALS:  T(F): 97.1, Max: 97.1 (06-06-22 @ 05:00)  HR: 124  BP: 102/58  RR: 19Vital Signs Last 24 Hrs  T(C): 36.2 (06 Jun 2022 05:00), Max: 36.2 (06 Jun 2022 05:00)  T(F): 97.1 (06 Jun 2022 05:00), Max: 97.1 (06 Jun 2022 05:00)  HR: 124 (06 Jun 2022 05:00) (96 - 126)  BP: 102/58 (06 Jun 2022 05:00) (88/51 - 108/56)  BP(mean): --  RR: 19 (06 Jun 2022 05:00) (19 - 20)  SpO2: --    PHYSICAL EXAM:  Gen: trach/ vent  CV: RRR  Lungs: Decreased BS at bases  Abd: Soft  Neuro: does not follow commands  Skin: no rash   Lines clean, no phlebitis   FH: Non-contributory  Social Hx: Non-contributory    TESTS & MEASUREMENTS:                        10.8   9.35  )-----------( 437      ( 05 Jun 2022 07:15 )             37.1     06-05    142  |  104  |  9<L>  ----------------------------<  83  4.4   |  27  |  <0.5<L>    Ca    8.9      05 Jun 2022 07:15              Culture - Sputum (collected 06-02-22 @ 11:50)  Source: Trach Asp Tracheal Aspirate  Gram Stain (06-03-22 @ 07:15):    Moderate polymorphonuclear leukocytes per low power field    Rare Squamous epithelial cells per low power field    Numerous Gram Negative Rods seen per oil power field    Numerous Gram Negative Coccobacilli seen per oil power field  Final Report (06-05-22 @ 17:39):    Numerous Klebsiella pneumoniae (Carbapenem Resistant)    Numerous Proteus mirabilis ESBL    Normal Respiratory Trindiad present  Organism: Chintanpncaleb MDRO  Proteus mirabilis ESBL (06-05-22 @ 17:39)  Organism: Proteus mirabilis ESBL (06-05-22 @ 17:39)      -  Amikacin: S <=16      -  Amoxicillin/Clavulanic Acid: S <=8/4      -  Ampicillin: R >16 These ampicillin results predict results for amoxicillin      -  Ampicillin/Sulbactam: R 8/4 Enterobacter, Klebsiella aerogenes, Citrobacter, and Serratia may develop resistance during prolonged therapy (3-4 days)      -  Aztreonam: R <=4      -  Cefazolin: R >16 Enterobacter, Klebsiella aerogenes, Citrobacter, and Serratia may develop resistance during prolonged therapy (3-4 days)      -  Cefepime: R >16      -  Ceftriaxone: R >32 Enterobacter, Klebsiella aerogenes, Citrobacter, and Serratia may develop resistance during prolonged therapy      -  Ciprofloxacin: R >2      -  Ertapenem: S <=0.5      -  Gentamicin: S <=2      -  Levofloxacin: R >4      -  Meropenem: S <=1      -  Piperacillin/Tazobactam: R <=8      -  Tobramycin: S <=2      -  Trimethoprim/Sulfamethoxazole: S <=0.5/9.5      Method Type: HERB  Organism: Chintanpncaleb MDRO (06-05-22 @ 17:39)      -  Amikacin: R >32      -  Amoxicillin/Clavulanic Acid: R >16/8      -  Ampicillin: R >16 These ampicillin results predict results for amoxicillin      -  Ampicillin/Sulbactam: R >16/8 Enterobacter, Klebsiella aerogenes, Citrobacter, and Serratia may develop resistance during prolonged therapy (3-4 days)      -  Aztreonam: R >16      -  Cefazolin: R >16 Enterobacter, Klebsiella aerogenes, Citrobacter, and Serratia may develop resistance during prolonged therapy (3-4 days)      -  Cefepime: R >16      -  Cefoxitin: R >16      -  Ceftazidime/Avibactam: R >16      -  Ceftolozane/tazobactam: R >8      -  Ceftriaxone: R >32 Enterobacter, Klebsiella aerogenes, Citrobacter, and Serratia may develop resistance during prolonged therapy      -  Ciprofloxacin: R >2      -  Ertapenem: R >1      -  Gentamicin: R >8      -  Imipenem: R >8      -  Levofloxacin: R >4      -  Meropenem: R >8      -  Piperacillin/Tazobactam: R >64      -  Tobramycin: R >8      -  Trimethoprim/Sulfamethoxazole: R >2/38      Method Type: HERB    Culture - Blood (collected 05-31-22 @ 07:49)  Source: .Blood None  Final Report (06-05-22 @ 20:00):    No Growth Final    Culture - Blood (collected 05-30-22 @ 07:38)  Source: .Blood None  Final Report (06-04-22 @ 17:01):    No Growth Final    Culture - Blood (collected 05-29-22 @ 17:58)  Source: .Blood None  Final Report (06-04-22 @ 01:00):    No Growth Final    Culture - Blood (collected 05-28-22 @ 11:30)  Source: .Blood None  Gram Stain (05-29-22 @ 08:16):    Growth in anaerobic bottle: Gram Negative Rods  Final Report (05-30-22 @ 12:49):    Growth in anaerobic bottle: Klebsiella pneumoniae (Carbapenem Resistant)    See previous culture [id number]    Culture - Urine (collected 05-27-22 @ 16:11)  Source: Clean Catch Clean Catch (Midstream)  Final Report (05-29-22 @ 11:29):    >=3 organisms. Probable collection contamination.    Culture - Blood (collected 05-27-22 @ 09:05)  Source: .Blood Blood  Gram Stain (05-28-22 @ 06:19):    Growth in aerobic bottle: Gram Negative Rods  Final Report (05-30-22 @ 10:50):    Growth in aerobic bottle: Klebsiella pneumoniae (Carbapenem Resistant)    ***Blood Panel PCR results on this specimen are available    approximately 3 hours after the Gram stain result.***    Gram stain, PCR, and/or culture results may not always    correspond due to difference in methodologies.    ************************************************************    This PCR assay was performed by multiplex PCR. This    Assay tests for 66 bacterial and resistance gene targets.    Please refer to the [facility] test directory    at [email or web address] for details.  Organism: Blood Culture PCR  Klepne MDRO (05-30-22 @ 10:50)  Organism: Klepne MDRO (05-30-22 @ 10:50)      -  Amikacin: R >32      -  Ampicillin: R >16 These ampicillin results predict results for amoxicillin      -  Ampicillin/Sulbactam: R >16/8 Enterobacter, Klebsiella aerogenes, Citrobacter, and Serratia may develop resistance during prolonged therapy (3-4 days)      -  Aztreonam: R >16      -  Cefazolin: R >16 Enterobacter, Klebsiella aerogenes, Citrobacter, and Serratia may develop resistance during prolonged therapy (3-4 days)      -  Cefepime: R >16      -  Cefoxitin: R >16      -  Ceftazidime/Avibactam: R >16      -  Ceftolozane/tazobactam: R >8      -  Ceftriaxone: R >32 Enterobacter, Klebsiella aerogenes, Citrobacter, and Serratia may develop resistance during prolonged therapy      -  Ciprofloxacin: R >2      -  Ertapenem: R >1      -  Gentamicin: R >8      -  Imipenem: R >8      -  Levofloxacin: R >4      -  Meropenem: R >8      -  Piperacillin/Tazobactam: R >64      -  Tigecycline: S <=2      -  Tobramycin: R >8      -  Trimethoprim/Sulfamethoxazole: R >2/38      Method Type: HERB  Organism: Blood Culture PCR (05-30-22 @ 10:50)      -  Carbapenem Resistance: Detec      -  CTX-M Resistance Marker: Detec      -  ESBL: Detec      -  Klebsiella pneumoniae: Detec      -  NDM Resistance Marker: Detec      Method Type: PCR    Culture - Urine (collected 05-16-22 @ 17:15)  Source: Clean Catch Clean Catch (Midstream)  Final Report (05-17-22 @ 21:05):    <10,000 CFU/mL Normal Urogenital Trinidad    Culture - Blood (collected 05-16-22 @ 12:28)  Source: .Blood Blood-Peripheral  Final Report (05-21-22 @ 23:00):    No Growth Final    Culture - Blood (collected 05-16-22 @ 12:09)  Source: .Blood Blood-Peripheral  Final Report (05-21-22 @ 23:00):    No Growth Final            INFECTIOUS DISEASES TESTING  COVID-19 PCR: NotDetec (06-05-22 @ 04:30)  Rapid RVP Result: NotDetec (05-27-22 @ 16:07)  Procalcitonin, Serum: 12.30 (05-27-22 @ 11:39)  COVID-19 PCR: NotDetec (05-24-22 @ 07:59)  MRSA PCR Result.: Negative (05-17-22 @ 10:10)  Procalcitonin, Serum: 0.03 (05-17-22 @ 07:37)  Rapid RVP Result: Detected (05-16-22 @ 12:09)      INFLAMMATORY MARKERS  Sedimentation Rate, Erythrocyte: 46 mm/Hr (05-27-22 @ 11:39)  Sedimentation Rate, Erythrocyte: 85 mm/Hr (05-17-22 @ 07:37)  C-Reactive Protein, Serum: 61 mg/L (05-17-22 @ 07:37)      RADIOLOGY & ADDITIONAL TESTS:  I have personally reviewed the last available Chest xray  CXR      CT      CARDIOLOGY TESTING  12 Lead ECG:   Ventricular Rate 123 BPM    Atrial Rate 123 BPM    P-R Interval 124 ms    QRS Duration 70 ms    Q-T Interval 316 ms    QTC Calculation(Bazett) 452 ms    P Axis 73 degrees    R Axis 66 degrees    T Axis 73 degrees    Diagnosis Line Sinus tachycardia  Otherwise normal ECG    Confirmed by Brett Obrien ([id number]) on 6/6/2022 8:19:08 AM (06-05-22 @ 15:55)  12 Lead ECG:   Ventricular Rate 169 BPM    Atrial Rate 169 BPM    P-R Interval 124 ms    QRS Duration 56 ms    Q-T Interval 274 ms    QTC Calculation(Bazett) 459 ms    P Axis 77 degrees    R Axis 17 degrees    T Axis 77 degrees    Diagnosis Line Sinus tachycardia  Otherwise normal ECG    Confirmed by Mike Medina ([id number]) on 5/28/2022 11:45:18 AM (05-26-22 @ 16:58)      MEDICATIONS  apixaban 5  bisacodyl 5  cefiderocol IVPB 2000  chlorhexidine 4% Liquid 1  cyanocobalamin 500  cyclobenzaprine Oral Tab/Cap - Peds 5  ferrous    sulfate Liquid 300  folic acid 1  metoprolol tartrate 100  midodrine 5  pantoprazole   Suspension 40  scopolamine 1 mG/72 Hr(s) Patch 1  thiamine  Oral Tab/Cap - Peds 100      WEIGHT  Weight (kg): 55.2 (05-19-22 @ 21:56)      ANTIBIOTICS:  cefiderocol IVPB 2000 milliGRAM(s) IV Intermittent every 8 hours      All available historical records have been reviewed

## 2022-06-06 NOTE — CHART NOTE - NSCHARTNOTEFT_GEN_A_CORE
Transfer from: 3A  Transfer to:  telemetry    Ms Moya is a 22 year old female known to have:  - From [place]   - History of Encephalitis post Tooth Abscess in 10/2021 s/p Tracheostomy (has an O2 tank per sister but unsure about flow) and G-tube placement  - SVC Thrombosis and Embolism on Eliquis 5mg BID  - GERD  - Iron Deficiency Anemia and History of Wernicke Encephalopathy  - Hypertension    She was brought to the ED from [place] on 05/16 for evaluation of a broken tracheostomy flange.  History obtained from chart and from sister over phone at [phone] since could not reach NH.  History goes back to today when the NH staff noticed that the tracheostomy flange was broken.  Per sister, she received the last update about her sister's condition last week.  She reports completion of an antibiotic course for a suspected infection (unsure if it was PNA versus UTI versus GI source and unsure about AB administered).  She notes that her sister hasn't been spiking fevers, requiring more O2, having more secretions, or diarrhea per NH staff updates.    No sick contacts: patients was visited by her cousin on Sunday (but cousin is not sick)   No recent travel or exposure to recent travelers.  Not vaccinated against COVID.      3A COURSE:  Pt's broken tracheostomy flange was replaced in ED. She was initially admitted for sepsis secondary to COVID s/p decadron and CHELSEA. Hospital course complicated by repeat sepsis secondary to MDR Klebsiella bacteremia and UTI, with procalc of 12.3 and so was restarted on IV abx. Trach cx growing Klebsiella, proteus ESBL. ID as been following and started on Fetroja 2g IV q8h on 5/28 and will complete course on 6/7. Course further complicated by persistent sinus tachycardia and metoprolol increased to 100mg BID for HR control. HR still uncontrolled and EP was consulted- recommended transfer to tele for further monitoring. In the meantime, continuing with daily ECGs.     To-Do:    [x] HR control   [x] f/u EP recs  [x] complete abx on 6/7      OBJECTIVE --  Vital Signs Last 24 Hrs  T(C): 36.2 (06 Jun 2022 05:00), Max: 36.2 (06 Jun 2022 05:00)  T(F): 97.1 (06 Jun 2022 05:00), Max: 97.1 (06 Jun 2022 05:00)  HR: 124 (06 Jun 2022 05:00) (96 - 126)  BP: 102/58 (06 Jun 2022 05:00) (88/51 - 108/56)  BP(mean): --  RR: 19 (06 Jun 2022 05:00) (19 - 20)  SpO2: --    I&O's Summary    05 Jun 2022 07:01  -  06 Jun 2022 07:00  --------------------------------------------------------  IN: 700 mL / OUT: 550 mL / NET: 150 mL        MEDICATIONS  (STANDING):  apixaban 5 milliGRAM(s) Enteral Tube two times a day  bisacodyl 5 milliGRAM(s) Oral daily  cefiderocol IVPB 2000 milliGRAM(s) IV Intermittent every 8 hours  chlorhexidine 4% Liquid 1 Application(s) Topical <User Schedule>  cyanocobalamin 500 MICROGram(s) Oral daily  cyclobenzaprine Oral Tab/Cap - Peds 5 milliGRAM(s) Oral three times a day  ferrous    sulfate Liquid 300 milliGRAM(s) Enteral Tube daily  folic acid 1 milliGRAM(s) Oral daily  metoprolol tartrate 100 milliGRAM(s) Oral two times a day  midodrine 5 milliGRAM(s) Oral every 8 hours  pantoprazole   Suspension 40 milliGRAM(s) Enteral Tube daily  scopolamine 1 mG/72 Hr(s) Patch 1 Patch Transdermal every 72 hours  thiamine  Oral Tab/Cap - Peds 100 milliGRAM(s) Oral two times a day    MEDICATIONS  (PRN):  acetaminophen     Tablet .. 650 milliGRAM(s) Oral every 6 hours PRN Temp greater or equal to 38C (100.4F), Mild Pain (1 - 3)  albuterol/ipratropium for Nebulization. 3 milliLiter(s) Nebulizer once PRN Shortness of Breath and/or Wheezing  metoclopramide   Syrup 10 milliGRAM(s) Oral every 6 hours PRN as needed        LABS                                            10.7                  Neurophils% (auto):   82.9   (06-06 @ 09:00):    14.27)-----------(414          Lymphocytes% (auto):  10.4                                          36.1                   Eosinphils% (auto):   2.0      Manual%: Neutrophils x    ; Lymphocytes x    ; Eosinophils x    ; Bands%: x    ; Blasts x                                    142    |  105    |  9                   Calcium: 9.4   / iCa: x      (06-06 @ 09:00)    ----------------------------<  182       Magnesium: x                                4.5     |  27     |  0.5              Phosphorous: x                  ASSESSMENT & PLAN:   23 y/o F with a PMHx of encephalitis s/p tooth abscess s/p tracheostomy and PEG tube placement, HTN, GERD, and SVC thrombosis who was brought to ED from [place] on 5/16 for evaluation of a broken tracheostomy flange. Admitted for sepsis 2/2 COVID. Initially placed on trach collar 2-3LPM now back on RA. S/p Decadron and IV abx. Hospital course complicated for repeat sepsis 2/2 bacteremia and UTI w procal 12.3 (neg on admission). Restarted on IV abx    #Sepsis 2/2 MDR Klebsiella bacteremia  #Leukocytosis  - Febrile 101.3 F, WBC 30, tachycardic, concern for sepsis again on 5/27, no fevers since  - S/p 1L LR bolus  - Procal 5/27: 12.3 (neg on admission)  - Repeat RVP: neg  - BCx 5/27 + 5/28: MDR Klebsiella  - BCx 5/29 + 5/30 + 5/31: NGTD  - ID recs appreciated  - Trach cx 6/3: Klebsiella, Proteus ESBL  - C/w Fetroja 2g IV q8h (started 5/28) -- to complete 10 d from cleared BCx (end 6/7)    #History of Hypertension  #Persistent sinus tachycardia  - BP on lower side here w persistent tachycardia 110s up to 140s-150s at times  - Less likely sepsis, as patient currently being tx w very broad spectrum abx and WBC downtrending  - Less likely agitation/pain as patient appears comfortable  - c/w 100 mg PO BID  - as per EP, transfer to tele for further monitoring  - daily ECGs  - transfer to tele    #Sepsis present on admission 2/2 COVID pneumonia -- resolved  #Broken tracheostomy flange - resolved  - Trach replaced in the ED  - Unvaccinated COVID per sister  - Sepsis on presentation  - COVID 5/16: (+)  - CXR with left pleural effusion  - D-dimer: 267  - Procal: 0.03, CRP 61, , ferritin 9  - BCx, UCx: neg   - MRSA negative   - ID following  - S/p Decadron, RDV  - COVID 5/24 + 5/27: (-)    #HO SVC Thrombosis and Embolism   - C/w Eliquis 5 mg PO BID    #History of Encephalitis post Tooth Abscess in 10/2021   s/p Tracheostomy (has an O2 tank per sister but unsure about flow) and G-tube placement  - Monitor SaO2 and O2 requirements: as above    #GERD  * Home med Omeprazole 40mg QD  - C/w Protonix 40 mg PO daily    #Iron Deficiency Anemia   #History of Wernicke Encephalopathy  #Suspected thiamine deficiency  #Suspected folic acid deficiency  * Home med iron replacement, thiamine, B12, and folic acid  * ED Hb 11.3, MCV 79.4  B12 and folate nl  - C/w home iron replacement, thiamine, B12, and folic acid  - Monitor for now    #DVT ppx: Eliquis 5 mg PO BID  #GI ppx: Protonix 40 mg PO daily  #Diet: NPO w G-tube feeds  #Activity: Functional quadriplegic  #Dispo: From [facility], acute  #Code status: Full code -- has MOLST confirming  #Pending: transfer to tele

## 2022-06-06 NOTE — PROGRESS NOTE ADULT - SUBJECTIVE AND OBJECTIVE BOX
CONSUELO CARO 22y Female  MRN#: [id number]   Hospital Day: 21d    SUBJECTIVE  Patient is a 22y old Female who presents with a chief complaint of Broken Tracheostomy Flange (05 Jun 2022 14:50)  Currently admitted to medicine with the primary diagnosis of Acute sepsis    INTERVAL HPI AND OVERNIGHT EVENTS:  Patient was examined and seen at bedside. This morning she is resting comfortably in bed.    OBJECTIVE  PAST MEDICAL & SURGICAL HISTORY    ALLERGIES:  Allergy Status Unknown    MEDICATIONS:  STANDING MEDICATIONS  apixaban 5 milliGRAM(s) Enteral Tube two times a day  bisacodyl 5 milliGRAM(s) Oral daily  cefiderocol IVPB 2000 milliGRAM(s) IV Intermittent every 8 hours  chlorhexidine 4% Liquid 1 Application(s) Topical <User Schedule>  cyanocobalamin 500 MICROGram(s) Oral daily  cyclobenzaprine Oral Tab/Cap - Peds 5 milliGRAM(s) Oral three times a day  ferrous    sulfate Liquid 300 milliGRAM(s) Enteral Tube daily  folic acid 1 milliGRAM(s) Oral daily  metoprolol tartrate 100 milliGRAM(s) Oral two times a day  midodrine 5 milliGRAM(s) Oral every 8 hours  pantoprazole   Suspension 40 milliGRAM(s) Enteral Tube daily  scopolamine 1 mG/72 Hr(s) Patch 1 Patch Transdermal every 72 hours  thiamine  Oral Tab/Cap - Peds 100 milliGRAM(s) Oral two times a day    PRN MEDICATIONS  acetaminophen     Tablet .. 650 milliGRAM(s) Oral every 6 hours PRN  albuterol/ipratropium for Nebulization. 3 milliLiter(s) Nebulizer once PRN  metoclopramide   Syrup 10 milliGRAM(s) Oral every 6 hours PRN      VITAL SIGNS: Last 24 Hours  T(C): 36.2 (06 Jun 2022 05:00), Max: 36.2 (06 Jun 2022 05:00)  T(F): 97.1 (06 Jun 2022 05:00), Max: 97.1 (06 Jun 2022 05:00)  HR: 124 (06 Jun 2022 05:00) (96 - 126)  BP: 102/58 (06 Jun 2022 05:00) (88/51 - 108/56)  BP(mean): --  RR: 19 (06 Jun 2022 05:00) (19 - 20)  SpO2: 99% (05 Jun 2022 08:44) (99% - 99%)    LABS:                        10.8   9.35  )-----------( 437      ( 05 Jun 2022 07:15 )             37.1     06-05    142  |  104  |  9<L>  ----------------------------<  83  4.4   |  27  |  <0.5<L>    Ca    8.9      05 Jun 2022 07:15    RADIOLOGY:    PHYSICAL EXAM:  CONSTITUTIONAL: No acute distress, trached  PULMONARY: Clear to auscultation bilaterally; no wheezes, rales, or rhonchi  CARDIOVASCULAR: regular rhythm, tachycardic  GASTROINTESTINAL: Soft, non-tender, non-distended; bowel sounds present  MUSCULOSKELETAL: 2+ peripheral pulses; no clubbing, no cyanosis, no edema. contracted extremities  NEUROLOGY: non-focal  SKIN: No rashes or lesions; warm and dry

## 2022-06-07 LAB
ALBUMIN SERPL ELPH-MCNC: 3.6 G/DL — SIGNIFICANT CHANGE UP (ref 3.5–5.2)
ALP SERPL-CCNC: 122 U/L — HIGH (ref 30–115)
ALT FLD-CCNC: 43 U/L — HIGH (ref 0–41)
ANION GAP SERPL CALC-SCNC: 12 MMOL/L — SIGNIFICANT CHANGE UP (ref 7–14)
AST SERPL-CCNC: 22 U/L — SIGNIFICANT CHANGE UP (ref 0–41)
BASOPHILS # BLD AUTO: 0.04 K/UL — SIGNIFICANT CHANGE UP (ref 0–0.2)
BASOPHILS NFR BLD AUTO: 0.3 % — SIGNIFICANT CHANGE UP (ref 0–1)
BILIRUB SERPL-MCNC: <0.2 MG/DL — SIGNIFICANT CHANGE UP (ref 0.2–1.2)
BUN SERPL-MCNC: 11 MG/DL — SIGNIFICANT CHANGE UP (ref 10–20)
CALCIUM SERPL-MCNC: 9.5 MG/DL — SIGNIFICANT CHANGE UP (ref 8.5–10.1)
CHLORIDE SERPL-SCNC: 105 MMOL/L — SIGNIFICANT CHANGE UP (ref 98–110)
CO2 SERPL-SCNC: 26 MMOL/L — SIGNIFICANT CHANGE UP (ref 17–32)
CREAT SERPL-MCNC: <0.5 MG/DL — LOW (ref 0.7–1.5)
D DIMER BLD IA.RAPID-MCNC: 203 NG/ML DDU — SIGNIFICANT CHANGE UP (ref 0–230)
EGFR: 143 ML/MIN/1.73M2 — SIGNIFICANT CHANGE UP
EOSINOPHIL # BLD AUTO: 0.27 K/UL — SIGNIFICANT CHANGE UP (ref 0–0.7)
EOSINOPHIL NFR BLD AUTO: 2.3 % — SIGNIFICANT CHANGE UP (ref 0–8)
GLUCOSE BLDC GLUCOMTR-MCNC: 78 MG/DL — SIGNIFICANT CHANGE UP (ref 70–99)
GLUCOSE BLDC GLUCOMTR-MCNC: 85 MG/DL — SIGNIFICANT CHANGE UP (ref 70–99)
GLUCOSE BLDC GLUCOMTR-MCNC: 91 MG/DL — SIGNIFICANT CHANGE UP (ref 70–99)
GLUCOSE SERPL-MCNC: 114 MG/DL — HIGH (ref 70–99)
HCT VFR BLD CALC: 37.8 % — SIGNIFICANT CHANGE UP (ref 37–47)
HGB BLD-MCNC: 11.2 G/DL — LOW (ref 12–16)
IMM GRANULOCYTES NFR BLD AUTO: 0.3 % — SIGNIFICANT CHANGE UP (ref 0.1–0.3)
LYMPHOCYTES # BLD AUTO: 1.23 K/UL — SIGNIFICANT CHANGE UP (ref 1.2–3.4)
LYMPHOCYTES # BLD AUTO: 10.4 % — LOW (ref 20.5–51.1)
MAGNESIUM SERPL-MCNC: 2 MG/DL — SIGNIFICANT CHANGE UP (ref 1.8–2.4)
MCHC RBC-ENTMCNC: 23.8 PG — LOW (ref 27–31)
MCHC RBC-ENTMCNC: 29.6 G/DL — LOW (ref 32–37)
MCV RBC AUTO: 80.3 FL — LOW (ref 81–99)
MONOCYTES # BLD AUTO: 0.43 K/UL — SIGNIFICANT CHANGE UP (ref 0.1–0.6)
MONOCYTES NFR BLD AUTO: 3.6 % — SIGNIFICANT CHANGE UP (ref 1.7–9.3)
NEUTROPHILS # BLD AUTO: 9.81 K/UL — HIGH (ref 1.4–6.5)
NEUTROPHILS NFR BLD AUTO: 83.1 % — HIGH (ref 42.2–75.2)
NRBC # BLD: 0 /100 WBCS — SIGNIFICANT CHANGE UP (ref 0–0)
PLATELET # BLD AUTO: 413 K/UL — HIGH (ref 130–400)
POTASSIUM SERPL-MCNC: 4.3 MMOL/L — SIGNIFICANT CHANGE UP (ref 3.5–5)
POTASSIUM SERPL-SCNC: 4.3 MMOL/L — SIGNIFICANT CHANGE UP (ref 3.5–5)
PROT SERPL-MCNC: 6.6 G/DL — SIGNIFICANT CHANGE UP (ref 6–8)
RBC # BLD: 4.71 M/UL — SIGNIFICANT CHANGE UP (ref 4.2–5.4)
RBC # FLD: 18.6 % — HIGH (ref 11.5–14.5)
SODIUM SERPL-SCNC: 143 MMOL/L — SIGNIFICANT CHANGE UP (ref 135–146)
WBC # BLD: 11.81 K/UL — HIGH (ref 4.8–10.8)
WBC # FLD AUTO: 11.81 K/UL — HIGH (ref 4.8–10.8)

## 2022-06-07 PROCEDURE — 99232 SBSQ HOSP IP/OBS MODERATE 35: CPT | Mod: GC

## 2022-06-07 PROCEDURE — 93010 ELECTROCARDIOGRAM REPORT: CPT

## 2022-06-07 RX ADMIN — CYCLOBENZAPRINE HYDROCHLORIDE 5 MILLIGRAM(S): 10 TABLET, FILM COATED ORAL at 06:24

## 2022-06-07 RX ADMIN — Medication 100 MILLIGRAM(S): at 17:03

## 2022-06-07 RX ADMIN — Medication 1 MILLIGRAM(S): at 12:52

## 2022-06-07 RX ADMIN — CYCLOBENZAPRINE HYDROCHLORIDE 5 MILLIGRAM(S): 10 TABLET, FILM COATED ORAL at 14:27

## 2022-06-07 RX ADMIN — PANTOPRAZOLE SODIUM 40 MILLIGRAM(S): 20 TABLET, DELAYED RELEASE ORAL at 12:53

## 2022-06-07 RX ADMIN — APIXABAN 5 MILLIGRAM(S): 2.5 TABLET, FILM COATED ORAL at 06:25

## 2022-06-07 RX ADMIN — CEFIDEROCOL SULFATE TOSYLATE 33.33 MILLIGRAM(S): 1 INJECTION, POWDER, FOR SOLUTION INTRAVENOUS at 06:23

## 2022-06-07 RX ADMIN — SCOPALAMINE 1 PATCH: 1 PATCH, EXTENDED RELEASE TRANSDERMAL at 06:25

## 2022-06-07 RX ADMIN — MIDODRINE HYDROCHLORIDE 5 MILLIGRAM(S): 2.5 TABLET ORAL at 14:27

## 2022-06-07 RX ADMIN — CEFIDEROCOL SULFATE TOSYLATE 33.33 MILLIGRAM(S): 1 INJECTION, POWDER, FOR SOLUTION INTRAVENOUS at 17:01

## 2022-06-07 RX ADMIN — APIXABAN 5 MILLIGRAM(S): 2.5 TABLET, FILM COATED ORAL at 17:03

## 2022-06-07 RX ADMIN — PREGABALIN 500 MICROGRAM(S): 225 CAPSULE ORAL at 12:52

## 2022-06-07 RX ADMIN — CEFIDEROCOL SULFATE TOSYLATE 33.33 MILLIGRAM(S): 1 INJECTION, POWDER, FOR SOLUTION INTRAVENOUS at 21:41

## 2022-06-07 RX ADMIN — Medication 5 MILLIGRAM(S): at 12:51

## 2022-06-07 RX ADMIN — Medication 300 MILLIGRAM(S): at 12:52

## 2022-06-07 RX ADMIN — CHLORHEXIDINE GLUCONATE 1 APPLICATION(S): 213 SOLUTION TOPICAL at 06:25

## 2022-06-07 RX ADMIN — CYCLOBENZAPRINE HYDROCHLORIDE 5 MILLIGRAM(S): 10 TABLET, FILM COATED ORAL at 21:41

## 2022-06-07 RX ADMIN — MIDODRINE HYDROCHLORIDE 5 MILLIGRAM(S): 2.5 TABLET ORAL at 21:41

## 2022-06-07 RX ADMIN — Medication 100 MILLIGRAM(S): at 17:02

## 2022-06-07 RX ADMIN — Medication 100 MILLIGRAM(S): at 06:24

## 2022-06-07 RX ADMIN — Medication 650 MILLIGRAM(S): at 12:54

## 2022-06-07 RX ADMIN — MIDODRINE HYDROCHLORIDE 5 MILLIGRAM(S): 2.5 TABLET ORAL at 06:25

## 2022-06-07 RX ADMIN — Medication 100 MILLIGRAM(S): at 06:25

## 2022-06-07 RX ADMIN — SCOPALAMINE 1 PATCH: 1 PATCH, EXTENDED RELEASE TRANSDERMAL at 19:38

## 2022-06-07 NOTE — PROGRESS NOTE ADULT - ASSESSMENT
23 y/o F with a PMHx of encephalitis s/p tooth abscess s/p tracheostomy and PEG tube placement, HTN, GERD, and SVC thrombosis who was brought to ED from [place] on 5/16 for evaluation of a broken tracheostomy flange. Admitted for sepsis 2/2 COVID. Initially placed on trach collar 2-3LPM now back on RA. S/p Decadron and IV abx. Hospital course complicated for repeat sepsis 2/2 bacteremia and UTI w procal 12.3 (neg on admission). Restarted on IV abx    #Sepsis 2/2 MDR Klebsiella bacteremia  #Leukocytosis  - Febrile 101.3 F, WBC 30, tachycardic, concern for sepsis again on 5/27, no fevers since  - S/p 1L LR bolus  - Procal 5/27: 12.3 (neg on admission)  - Repeat RVP: neg  - BCx 5/27 + 5/28: MDR Klebsiella  - BCx 5/29 + 5/30 + 5/31: NGTD  - ID recs appreciated  - Trach cx 6/3: Klebsiella, Proteus ESBL  - C/w Fetroja 2g IV q8h (started 5/28) -- to complete 10 d from cleared BCx (end 6/7)    #History of Hypertension  #Persistent sinus tachycardia  - BP on lower side here w persistent tachycardia 110s up to 140s-150s at times  - Less likely sepsis, as patient currently being tx w very broad spectrum abx and WBC downtrending  - Less likely agitation/pain as patient appears comfortable  - c/w 100 mg PO BID  - as per EP, transfer to tele for further monitoring  - daily ECGs  - transfer to tele    #Sepsis present on admission 2/2 COVID pneumonia -- resolved  #Broken tracheostomy flange - resolved  - Trach replaced in the ED  - Unvaccinated COVID per sister  - Sepsis on presentation  - COVID 5/16: (+)  - CXR with left pleural effusion  - D-dimer: 267  - Procal: 0.03, CRP 61, , ferritin 9  - BCx, UCx: neg   - MRSA negative   - ID following  - S/p Decadron, RDV  - COVID 5/24 + 5/27: (-)    #HO SVC Thrombosis and Embolism   - C/w Eliquis 5 mg PO BID    #History of Encephalitis post Tooth Abscess in 10/2021   s/p Tracheostomy (has an O2 tank per sister but unsure about flow) and G-tube placement  - Monitor SaO2 and O2 requirements: as above    #GERD  * Home med Omeprazole 40mg QD  - C/w Protonix 40 mg PO daily    #Iron Deficiency Anemia   #History of Wernicke Encephalopathy  #Suspected thiamine deficiency  #Suspected folic acid deficiency  * Home med iron replacement, thiamine, B12, and folic acid  * ED Hb 11.3, MCV 79.4  B12 and folate nl  - C/w home iron replacement, thiamine, B12, and folic acid  - Monitor for now    #DVT ppx: Eliquis 5 mg PO BID  #GI ppx: Protonix 40 mg PO daily  #Diet: NPO w G-tube feeds  #Activity: Functional quadriplegic  #Dispo: From [facility], acute  #Code status: Full code -- has MOLST confirming   21 y/o F with a PMHx of encephalitis s/p tooth abscess s/p tracheostomy and PEG tube placement, HTN, GERD, and SVC thrombosis who was brought to ED from [place] on 5/16 for evaluation of a broken tracheostomy flange. Admitted for sepsis 2/2 COVID. Initially placed on trach collar 2-3LPM now back on RA. S/p Decadron and IV abx. Hospital course complicated for repeat sepsis 2/2 bacteremia and UTI w procal 12.3 (neg on admission). Restarted on IV abx    #Sepsis 2/2 MDR Klebsiella bacteremia  #Leukocytosis  - Febrile 101.3 F, WBC 30, tachycardic, concern for sepsis again on 5/27, no fevers since  - S/p 1L LR bolus  - Procal 5/27: 12.3 (neg on admission)  - Repeat RVP: neg  - BCx 5/27 + 5/28: MDR Klebsiella  - BCx 5/29 + 5/30 + 5/31: NGTD  - ID recs appreciated  - Trach cx 6/3: Klebsiella, Proteus ESBL  - C/w Fetroja 2g IV q8h (started 5/28) -- to complete 10 d from cleared BCx (end 6/7)    #History of Hypertension  #Persistent sinus tachycardia  - c/w metoprolol 100 mg PO BID  - EP - if HR <130 being sinus tachy its acceptable      #Sepsis present on admission 2/2 COVID pneumonia -- resolved  #Broken tracheostomy flange - resolved  - Trach replaced in the ED  - Unvaccinated COVID per sister  - Sepsis on presentation  - COVID 5/16: (+)  - CXR with left pleural effusion  - D-dimer: 267  - Procal: 0.03, CRP 61, , ferritin 9  - BCx, UCx: neg   - MRSA negative   - ID following  - S/p Decadron, RDV  - COVID 5/24 + 5/27: (-)    #HO SVC Thrombosis and Embolism   - C/w Eliquis 5 mg PO BID    #History of Encephalitis post Tooth Abscess in 10/2021   s/p Tracheostomy (has an O2 tank per sister but unsure about flow) and G-tube placement  - Monitor SaO2 and O2 requirements: as above    #GERD  * Home med Omeprazole 40mg QD  - C/w Protonix 40 mg PO daily    #Iron Deficiency Anemia   #History of Wernicke Encephalopathy  #Suspected thiamine deficiency  #Suspected folic acid deficiency  * Home med iron replacement, thiamine, B12, and folic acid  * ED Hb 11.3, MCV 79.4  B12 and folate nl  - C/w home iron replacement, thiamine, B12, and folic acid  - Monitor for now    #DVT ppx: Eliquis 5 mg PO BID  #GI ppx: Protonix 40 mg PO daily  #Diet: NPO w G-tube feeds  #Activity: Functional quadriplegic  #Dispo: From [facility]  #Code status: Full code -- has MOLST confirming

## 2022-06-07 NOTE — PROGRESS NOTE ADULT - SUBJECTIVE AND OBJECTIVE BOX
SUBJECTIVE:    Patient is a 22y old Female who presents with a chief complaint of Broken Tracheostomy Flange (06 Jun 2022 08:58)    Currently admitted to medicine with the primary diagnosis of Acute sepsis       Today is hospital day 22d. This morning she is resting comfortably in bed and reports no new issues or overnight events.     PAST MEDICAL & SURGICAL HISTORY    SOCIAL HISTORY:  Negative for smoking/alcohol/drug use.     ALLERGIES:  Allergy Status Unknown    MEDICATIONS:  STANDING MEDICATIONS  apixaban 5 milliGRAM(s) Enteral Tube two times a day  bisacodyl 5 milliGRAM(s) Oral daily  cefiderocol IVPB 2000 milliGRAM(s) IV Intermittent every 8 hours  chlorhexidine 4% Liquid 1 Application(s) Topical <User Schedule>  cyanocobalamin 500 MICROGram(s) Oral daily  cyclobenzaprine Oral Tab/Cap - Peds 5 milliGRAM(s) Oral three times a day  ferrous    sulfate Liquid 300 milliGRAM(s) Enteral Tube daily  folic acid 1 milliGRAM(s) Oral daily  metoprolol tartrate 100 milliGRAM(s) Oral two times a day  midodrine 5 milliGRAM(s) Oral every 8 hours  pantoprazole   Suspension 40 milliGRAM(s) Enteral Tube daily  scopolamine 1 mG/72 Hr(s) Patch 1 Patch Transdermal every 72 hours  thiamine  Oral Tab/Cap - Peds 100 milliGRAM(s) Oral two times a day    PRN MEDICATIONS  acetaminophen     Tablet .. 650 milliGRAM(s) Oral every 6 hours PRN  albuterol/ipratropium for Nebulization. 3 milliLiter(s) Nebulizer once PRN  metoclopramide   Syrup 10 milliGRAM(s) Oral every 6 hours PRN    VITALS:   T(F): 98.7  HR: 120  BP: 109/56  RR: 18  SpO2: 100%    LABS:                        11.2   11.81 )-----------( 413      ( 07 Jun 2022 07:17 )             37.8     06-07    143  |  105  |  11  ----------------------------<  114<H>  4.3   |  26  |  <0.5<L>    Ca    9.5      07 Jun 2022 07:17  Mg     2.0     06-07    TPro  6.6  /  Alb  3.6  /  TBili  <0.2  /  DBili  x   /  AST  22  /  ALT  43<H>  /  AlkPhos  122<H>  06-07                  RADIOLOGY:    PHYSICAL EXAM:  CONSTITUTIONAL: No acute distress, trached  PULMONARY: Clear to auscultation bilaterally; no wheezes, rales, or rhonchi  CARDIOVASCULAR: regular rhythm, tachycardic  GASTROINTESTINAL: Soft, non-tender, non-distended; bowel sounds present  MUSCULOSKELETAL: 2+ peripheral pulses; no clubbing, no cyanosis, no edema. contracted extremities  NEUROLOGY: non-focal  SKIN: No rashes or lesions; warm and dry    Intravenous access:   NG tube:   Alejo Catheter:

## 2022-06-07 NOTE — PROGRESS NOTE ADULT - ATTENDING COMMENTS
23 y/o woman with PMH of history of encephalitis s/p tooth abscess, chronic respiratory failure s/p tracheostomy and PEG tube placement, HTN, GERD, and SVC thrombosis on apixaban was brought to the ED from [facility] on 05/16 for evaluation of a broken tracheostomy flange. Tracheostomy was replaced in the ED. She was found to be septic on presentation with leukocytosis and COVID positive.     1. Sepsis present on admission due to COVID 19  unvaccinated pt  ID consult and f/u appreciated  remdesivir x 5 days (Ends 5/22)  continue decadron 6mg IV daily  isolation per protocol  Ferritin, Serum: 99 ng/mL (05-17-22 @ 07:37)  C-Reactive Protein, Serum: 61 mg/L (05-17-22 @ 07:37)  D-Dimer Assay, Quantitative: 267 ng/mL DDU (05-17-22 @ 07:37)  Procalcitonin, Serum: 0.03 ng/mL (05-17-22 @ 07:37)  venous duplex negative for DVT  blood and urine cultures negative and MRSA negative - other abx discontinued  leukocytosis improving  guarded prognosis  5/20: 7 L NC. downtitrate as tolerated. off & on Sinus tachy - monitor.   521: Will decrease to 6L via trach collar (30%). Off & on sinus tachy with borderline BP noted. Start 1/2NS @ 75 x 24 hours .  5/22: c/w 1/2 NS @ 75 given borderline BP and tachycardia. Downtitrate O2 as tolerated. c/w Dexa    2. Chronic respiratory failure  s/p tracheostomy replacement in ED and tolerating trach collar - 35%  suction prn  on glycopyrrolate    3. SVC Thrombosis and Embolism - on Eliquis 5mg bid as outpt  on therapeutic lovenox as inpt    4. Microcytic anemia on iron, vitamin B12 and folate for suspected deficiencies    5. H/O Wernicke's encephalopathy on thiamine    6. HTN - on metoprolol 50mg bid as outpt - held due to sepsis  if SBP remains >100, restart at 25mg bid and titrate as tolerated    7. Functional quadriplegia    8. DVT prophylaxis - on therapeutic dose lovenox      PROGRESS NOTE HANDOFF    Pending: restart metoprolol if BP remains stable, labs in AM, pulse ox monitoring    Family discussion: medical staff updating sister    Disposition: [facility] .
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 Tracheostomy was replaced in the ED. She was found to be septic on presentation with leukocytosis and COVID positive.     1. Sepsis present on admission due to COVID 19  unvaccinated pt  ID consult and f/u appreciated  remdesivir x 5 days   continue decadron 6mg IV daily  isolation per protocol  Ferritin, Serum: 99 ng/mL (05-17-22 @ 07:37)  C-Reactive Protein, Serum: 61 mg/L (05-17-22 @ 07:37)  D-Dimer Assay, Quantitative: 267 ng/mL DDU (05-17-22 @ 07:37)  Procalcitonin, Serum: 0.03 ng/mL (05-17-22 @ 07:37)  venous duplex negative for DVT  blood and urine cultures negative and MRSA negative - other abx discontinued  leukocytosis improving  guarded prognosis  5/20: 7 L NC. downtitrate as tolerated. off & on Sinus tachy - monitor.     2. Chronic respiratory failure  s/p tracheostomy replacement in ED and tolerating trach collar - 35%  suction prn  on glycopyrrolate    3. SVC Thrombosis and Embolism - on Eliquis 5mg bid as outpt  on therapeutic lovenox as inpt    4. Microcytic anemia on iron, vitamin B12 and folate for suspected deficiencies    5. H/O Wernicke's encephalopathy on thiamine    6. HTN - on metoprolol 50mg bid as outpt - held due to sepsis  if SBP remains >100, restart at 25mg bid and titrate as tolerated    7. Functional quadriplegia    8. DVT prophylaxis - on therapeutic dose lovenox      PROGRESS NOTE HANDOFF    Pending: restart metoprolol if BP remains stable, labs in AM, pulse ox monitoring    Family discussion: medical staff updating sister    Disposition: [facility]
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 Tracheostomy was replaced in the ED. She was found to be septic on presentation with leukocytosis and COVID positive.     #Sepsis present on admission due to COVID 19  unvaccinated pt  ID consult and f/u appreciated  remdesivir x 5 days (Ended 5/22)  completed decadron 6mg IV daily (5/16 - 5/25)  isolation per protocol  venous duplex negative for DVT  blood and urine cultures negative and MRSA negative - other abx discontinued  -05/27- spiked fever on 5/26 pm, and now with leukocytosis- ? GI source - start on IV flagyl/IV Rocephin, obtain blood cxs, recent CXR- no infiltrates, repeat procal level, change echeverria and send u/a with urine cxs, IVF for next 24 hours  - daily CBC      # Persistent tachycardia- sinus tachy on 12 lead EKG- resumed on home dose of lopressor 50 mg via peg twice daily  -remains afebrile, with decreased leukocytosis, off steroids now   - Free T3- normal , T4 Levels- slightly above reference range  - most recent CXR- no acute changes, no hypoxia  -low ddimer level  -good urine output  - start on Laxatives with  constipation     Chronic respiratory failure  s/p tracheostomy replacement in ED and tolerating trach collar - 35%  suction prn  on glycopyrrolate & Scopolamine patch.     SVC Thrombosis and Embolism - on Eliquis 5mg bid as outpt  on therapeutic lovenox as inpt.,  changed to PO Eliquis .     Microcytic anemia on iron, vitamin B12 and folate for suspected deficiencies    H/O Wernicke's encephalopathy on thiamine    HTN - on metoprolol 50mg bid as outpt -restarted     Constipation: started on laxatives tx.       Chronic quadriplegia- continue freq. body position change, decub prevention tx.     DVT prophylaxis - on Eliquis tx.    Code status: full code     #Progress Note Handoff: started on IV abx, f/up cxs, daily CBC,  laxatives for constipation, monitor HR  Family discussion: yes, medical team Disposition: SNF  once medically stable     Total time spent to complete patient's bedside assessment, review medical chart, discuss medical plan of care with covering medical team was more than 35 minutes
Patient seen at bedside. Changes made within note as well. Discussed with medical team that includes resident(s), medical student(s), nursing staff, case management.    Case of a 21 yo female patient with a history of encephalitis s/p tooth abscess s/p tracheostomy and PEG tube placement, HTN, GERD, and SVC thrombosis who was brought to ED from [place] on 05/16 for evaluation of a broken tracheostomy flange, found to be septic on presentation with leukocytosis on labs, a positive RVP and COVID PCR on microbiologic testing, and evidence of left pleural effusion on imaging, to be admitted for further investigations, management and monitoring. Currently still tachycardic saturating well on 3LPM NC.  ID consulted. On cefepime and vancomycin. follow Blood cultures and urine cultures. as per ID:  if family consents- can obtain monoclonal Ab- please inform ID ASAP will need approval.   (I talked to sister today. She would like to talk to ID physician regarding monoclonal antibody. Please update ID in AM).
c/w cefiderocol. (Until 6/7?). f/u ID  If still tachycardic on 6/2, may increase BB
21 y/o F with a PMHx of encephalitis s/p tooth abscess s/p tracheostomy and PEG tube placement, HTN, GERD, and SVC thrombosis who was brought to ED from [place] on 5/16 for evaluation of a broken tracheostomy flange. Admitted for sepsis 2/2 COVID. Initially placed on trach collar 2-3LPM now back on RA. S/p Decadron and IV abx. Hospital course complicated for repeat sepsis 2/2 bacteremia and UTI w procal 12.3 (neg on admission). Restarted on IV abx    #Sepsis (POA) 2/2 Cystitis & Covid : s/p Abx. resolved.  #Septic shock again (5/27) 2/2 MDR Klebsiella bacteremia (likely from Pyelonephritis)  5/29-5/31 BCX NGTD     5/28 BCX  Klebsiella pneumoniae (Carbapenem Resistant)    5/27 BCx Klebsiella pneumoniae (Carbapenem Resistant)    5/27 UCx Klebsiella pneumoniae (Carbapenem Resistant)  - Procal 5/27: 12.3 (neg on admission)    - C/w Fetroja (Cefiderocol) 2g IV q8h (started 5/28) -- to complete 10 d from cleared BCx (6/7)  - Trach cx 6/3: MDR Klebsiella, Proteus --sensitivities reviewed with ID : c/w cefiderocol    6/6: BP acceptable. MAP borderline but good urine output.   Tachycardic @ 110-120s. Denies pain. Saturating ok. Euvolemic. Sepsis is being adequately treated with correct antibiotics, Afebrile.   Still some leukocytosis though.   Tachycardia may be primary, or secondary d/t sepsis.   Unable to control with just Metoprolol T 100 BID. EKG always showed sinus tachy.  transfer to tele per EP for further Tachycardia w/u. Can get tele monitoring for 24-48 hours.   MAy try introducing Diltiazem while on Tele     has h/o prior sinus tachycardia (was on metoprolol T 50 BID at home)    #COVID pneumonia ( on admission) (5/16 PCR +)-- resolved  #Broken tracheostomy flange - resolved  - Trach replaced in the ED  - Unvaccinated COVID per sister  - Sepsis on presentation      #HO SVC Thrombosis and Embolism   - C/w Eliquis 5 mg PO BID    #History of Encephalitis post Tooth Abscess in 10/2021   s/p Tracheostomy (has an O2 tank per sister but unsure about flow) and G-tube placement  - Monitor SaO2 and O2 requirements: as above    #GERD  * Home med Omeprazole 40mg QD  - C/w Protonix 40 mg PO daily    #Iron Deficiency Anemia   #History of Wernicke Encephalopathy  #Suspected thiamine deficiency  #Suspected folic acid deficiency  * Home med iron replacement, thiamine, B12, and folic acid  * ED Hb 11.3, MCV 79.4  B12 and folate nl  - C/w home iron replacement, thiamine, B12, and folic acid  - Monitor for now    #DVT ppx: Eliquis 5 mg PO BID  #GI ppx: Protonix 40 mg PO daily  #Diet: NPO w G-tube feeds  #Activity: Functional quadriplegic  #Dispo: From [facility], acute  #Code status: Full code -- has MOLST confirming  Will need repeat Covid PCR closer to d/c  TRansfer for tele monitoring x 24-48 hours
c/w cefiderocol. (Until 6/7?). f/u ID  Still septic  required 500cc boluses x 2 in last 24 hours to improve BP.  Urine output remains good. (her baseline BP may be low)  Midodrine started  still tachycardic on 6/3,  c/w  BB @ 75 BID.  Still lots of mouth secretions. CXR clear. Suction PRN. Scopolamine PATch. Add glycopyrrolate PRN
23 y/o F with a PMHx of encephalitis s/p tooth abscess s/p tracheostomy and PEG tube placement, HTN, GERD, and SVC thrombosis who was brought to ED from [place] on 5/16 for evaluation of a broken tracheostomy flange. Admitted for sepsis 2/2 COVID. Initially placed on trach collar 2-3LPM now back on RA. S/p Decadron and IV abx. Hospital course complicated for repeat sepsis 2/2 bacteremia and UTI w procal 12.3 (neg on admission). Restarted on IV abx    #Sepsis (POA) 2/2 Cystitis & Covid : s/p Abx. resolved.  #Septic shock again (5/27) 2/2 MDR Klebsiella bacteremia (likely from Pyelonephritis)  5/29-5/31 BCX NGTD     5/28 BCX  Klebsiella pneumoniae (Carbapenem Resistant)    5/27 BCx Klebsiella pneumoniae (Carbapenem Resistant)    5/27 UCx Klebsiella pneumoniae (Carbapenem Resistant)  - Procal 5/27: 12.3 (neg on admission)    - C/w Fetroja (Cefiderocol) 2g IV q8h (started 5/28) -- to complete 10 d from cleared BCx (6/7)  - Trach cx 6/3: MDR Klebsiella, Proteus --sensitivities reviewed with ID : c/w cefiderocol    6/7: BP acceptable. MAP borderline but good urine output.   Tachycardic @ 110-120s. Denies pain. Saturating ok. Euvolemic. Sepsis is being adequately treated with correct antibiotics, Afebrile.   Still some leukocytosis though.   Tachycardia may be primary, or secondary d/t sepsis.   Unable to control with just Metoprolol T 100 BID. EKG always showed sinus tachy.  transferred to tele (6/6) per EP for further Tachycardia monitoring.   Per EP on 6/7, as long as Sinus tachy < 120 may still be considered Physiologic and no need to add Diltiazem to the Metoprolol.   If no further tele events, can discontinue tele monitoring on 6/8 and start D/c planning     has h/o prior sinus tachycardia (was on metoprolol T 50 BID at home)    #COVID pneumonia ( on admission) (5/16 PCR +)-- resolved  #Broken tracheostomy flange - resolved  - Trach replaced in the ED  - Unvaccinated COVID per sister  - Sepsis on presentation      #HO SVC Thrombosis and Embolism   - C/w Eliquis 5 mg PO BID    #History of Encephalitis post Tooth Abscess in 10/2021   s/p Tracheostomy (has an O2 tank per sister but unsure about flow) and G-tube placement  - Monitor SaO2 and O2 requirements: as above    #GERD  * Home med Omeprazole 40mg QD  - C/w Protonix 40 mg PO daily    #Iron Deficiency Anemia   #History of Wernicke Encephalopathy  #Suspected thiamine deficiency  #Suspected folic acid deficiency  * Home med iron replacement, thiamine, B12, and folic acid  * ED Hb 11.3, MCV 79.4  B12 and folate nl  - C/w home iron replacement, thiamine, B12, and folic acid  - Monitor for now    #DVT ppx: Eliquis 5 mg PO BID  #GI ppx: Protonix 40 mg PO daily  #Diet: NPO w G-tube feeds  #Activity: Functional quadriplegic  #Dispo: From [facility], acute  #Code status: Full code -- has MOLST confirming  Will need repeat Covid PCR closer to d/c  If no further tele events, can discontinue tele monitoring on 6/8 and start D/c planning
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 Tracheostomy was replaced in the ED. She was found to be septic on presentation with leukocytosis and COVID positive.     # Recurrent sepsis during inpatient stay- bacteremia due to Klebsiella infection, suspected source is urine   - blood cxs 5/27- ESBL, carbapenem resistant klebsiella- AS per ID rec- from 5/28- started on Fetroja 2 grams IV every 8 hours x 10 days post cleared blood cxs.   -repeat blood cxs on 5/28- still positive, daily blood cxs  - echeverria was changed on 5/27  - f/up urine cxs- more than 3 organisms  -contact isolation  -repeat CBC    #Sepsis was present on admission due to COVID 19  unvaccinated pt  ID consult and f/u appreciated  remdesivir x 5 days (Ended 5/22)  completed decadron 6mg IV daily (5/16 - 5/25)  d/c isolation       # Persistent tachycardia- sinus tachy on 12 lead EKG- resumed on home dose of lopressor 50 mg via peg twice daily  - Free T3- normal , T4 Levels- slightly above reference range  - most recent CXR- no acute changes, no hypoxia  -low ddimer level  -good urine output      #Chronic respiratory failure  s/p tracheostomy replacement in ED and tolerating trach collar - 35%  suction prn  on  Scopolamine patch.     #SVC Thrombosis and Embolism - on Eliquis 5mg bid as outpt  on therapeutic lovenox as inpt.,  changed to PO Eliquis .     # Microcytic anemia on iron, vitamin B12 and folate for suspected deficiencies    #H/O Wernicke's encephalopathy on thiamine    #HTN - on metoprolol 50mg bid as outpt -restarted     # Constipation: started on laxatives prn tx.   -good bm's      Chronic quadriplegia- continue freq. body position change, decub prevention tx.     DVT prophylaxis - on Eliquis tx.    Code status: full code     #Progress Note Handoff: f/up repeated blood cxs, IV Fetroja abx tx,  Family discussion: yes, medical team Disposition: SNF  once medically stable     Total time spent to complete patient's bedside assessment, review medical chart, discuss medical plan of care with covering medical team was more than 35 minutes.
Patient is a 21 y/o woman with PMH of history of encephalitis s/p tooth abscess, chronic respiratory failure s/p tracheostomy and PEG tube placement, HTN, GERD, and SVC thrombosis on apixaban was brought to the ED from [facility] on 05/16 for evaluation of a broken tracheostomy flange. Tracheostomy was replaced in the ED. She was found to be septic on presentation with leukocytosis and COVID positive.     # Recurrent sepsis during inpatient stay- bacteremia due to Klebsiella infection, suspected source is urine   - blood cxs 5/27- ESBL, carbapenem resistant klebsiella- AS per ID rec- from 5/28- started on Fetroja 2 grams IV every 8 hours  -repeat blood cxs on 5/28  - echeverria was changed on 5/27  - f/up urine cxs  -contact isolation    #Sepsis was present on admission due to COVID 19  unvaccinated pt  ID consult and f/u appreciated  remdesivir x 5 days (Ended 5/22)  completed decadron 6mg IV daily (5/16 - 5/25)  isolation per protocol  venous duplex negative for DVT  blood and urine cultures negative and MRSA negative - other abx discontinued  -05/27- spiked fever on 5/26 pm, and now with leukocytosis- ? GI source - start on IV flagyl/IV Rocephin, obtain blood cxs, recent CXR- no infiltrates, repeat procal level, change echeverria and send u/a with urine cxs, IVF for next 24 hours  - daily CBC      # Persistent tachycardia- sinus tachy on 12 lead EKG- resumed on home dose of lopressor 50 mg via peg twice daily  - Free T3- normal , T4 Levels- slightly above reference range  - most recent CXR- no acute changes, no hypoxia  -low ddimer level  -good urine output      #Chronic respiratory failure  s/p tracheostomy replacement in ED and tolerating trach collar - 35%  suction prn  on glycopyrrolate & Scopolamine patch.     #SVC Thrombosis and Embolism - on Eliquis 5mg bid as outpt  on therapeutic lovenox as inpt.,  changed to PO Eliquis .     # Microcytic anemia on iron, vitamin B12 and folate for suspected deficiencies    #H/O Wernicke's encephalopathy on thiamine    #HTN - on metoprolol 50mg bid as outpt -restarted     # Constipation: started on laxatives prn tx.   -good bm's      Chronic quadriplegia- continue freq. body position change, decub prevention tx.     DVT prophylaxis - on Eliquis tx.    Code status: full code     #Progress Note Handoff: repeat blood cxs, ID f/up, start on Fetroja abx tx, monitor CBC  Family discussion: yes, medical team Disposition: SNF  once medically stable     Total time spent to complete patient's bedside assessment, review medical chart, discuss medical plan of care with covering medical team was more than 35 minutes.
c/w cefiderocol. (Until 6/7?). f/u ID  required 500cc boluses x 2 (6/3). Now BP acceptable.   Urine output remains good. (her baseline BP may be low)  c/w Midodrine    still tachycardic on 6/4: Increased to BB @ 100 BID (hold for Syst < 95 or Diastolic < 50). If remains tachy, transfer to [facility] and then EP can evaluate her.   Try morphine 2mg IV x 1 to make sure pain is not the etiology.   Still lots of mouth secretions. CXR clear. Suction PRN. Scopolamine PATch. Add glycopyrrolate PRN .  Tracheal aspirate growing Klebsiella & proteus.   Covid PCR closer to d/c
c/w cefiderocol. (Until 6/7?). f/u ID  still tachycardic on 6/2,  increased BB to 75 BID.  Still lots of mouth secretions. CXR clear. Suction PRN. Scopolamine PATch.   may consider glycopyrrolate if persists.

## 2022-06-08 LAB
ALBUMIN SERPL ELPH-MCNC: 3.5 G/DL — SIGNIFICANT CHANGE UP (ref 3.5–5.2)
ALP SERPL-CCNC: 118 U/L — HIGH (ref 30–115)
ALT FLD-CCNC: 36 U/L — SIGNIFICANT CHANGE UP (ref 0–41)
ANION GAP SERPL CALC-SCNC: 13 MMOL/L — SIGNIFICANT CHANGE UP (ref 7–14)
AST SERPL-CCNC: 20 U/L — SIGNIFICANT CHANGE UP (ref 0–41)
BASOPHILS # BLD AUTO: 0.05 K/UL — SIGNIFICANT CHANGE UP (ref 0–0.2)
BASOPHILS NFR BLD AUTO: 0.4 % — SIGNIFICANT CHANGE UP (ref 0–1)
BILIRUB SERPL-MCNC: <0.2 MG/DL — SIGNIFICANT CHANGE UP (ref 0.2–1.2)
BUN SERPL-MCNC: 12 MG/DL — SIGNIFICANT CHANGE UP (ref 10–20)
CALCIUM SERPL-MCNC: 9.2 MG/DL — SIGNIFICANT CHANGE UP (ref 8.5–10.1)
CHLORIDE SERPL-SCNC: 106 MMOL/L — SIGNIFICANT CHANGE UP (ref 98–110)
CO2 SERPL-SCNC: 28 MMOL/L — SIGNIFICANT CHANGE UP (ref 17–32)
CREAT SERPL-MCNC: <0.5 MG/DL — LOW (ref 0.7–1.5)
EGFR: 143 ML/MIN/1.73M2 — SIGNIFICANT CHANGE UP
EOSINOPHIL # BLD AUTO: 0.34 K/UL — SIGNIFICANT CHANGE UP (ref 0–0.7)
EOSINOPHIL NFR BLD AUTO: 2.9 % — SIGNIFICANT CHANGE UP (ref 0–8)
GLUCOSE SERPL-MCNC: 102 MG/DL — HIGH (ref 70–99)
HCT VFR BLD CALC: 36.4 % — LOW (ref 37–47)
HGB BLD-MCNC: 10.8 G/DL — LOW (ref 12–16)
IMM GRANULOCYTES NFR BLD AUTO: 0.2 % — SIGNIFICANT CHANGE UP (ref 0.1–0.3)
LYMPHOCYTES # BLD AUTO: 1.5 K/UL — SIGNIFICANT CHANGE UP (ref 1.2–3.4)
LYMPHOCYTES # BLD AUTO: 12.8 % — LOW (ref 20.5–51.1)
MAGNESIUM SERPL-MCNC: 2.2 MG/DL — SIGNIFICANT CHANGE UP (ref 1.8–2.4)
MCHC RBC-ENTMCNC: 23.7 PG — LOW (ref 27–31)
MCHC RBC-ENTMCNC: 29.7 G/DL — LOW (ref 32–37)
MCV RBC AUTO: 79.8 FL — LOW (ref 81–99)
MONOCYTES # BLD AUTO: 0.89 K/UL — HIGH (ref 0.1–0.6)
MONOCYTES NFR BLD AUTO: 7.6 % — SIGNIFICANT CHANGE UP (ref 1.7–9.3)
NEUTROPHILS # BLD AUTO: 8.88 K/UL — HIGH (ref 1.4–6.5)
NEUTROPHILS NFR BLD AUTO: 76.1 % — HIGH (ref 42.2–75.2)
NRBC # BLD: 0 /100 WBCS — SIGNIFICANT CHANGE UP (ref 0–0)
PLATELET # BLD AUTO: 435 K/UL — HIGH (ref 130–400)
POTASSIUM SERPL-MCNC: 4.5 MMOL/L — SIGNIFICANT CHANGE UP (ref 3.5–5)
POTASSIUM SERPL-SCNC: 4.5 MMOL/L — SIGNIFICANT CHANGE UP (ref 3.5–5)
PROT SERPL-MCNC: 6.8 G/DL — SIGNIFICANT CHANGE UP (ref 6–8)
RBC # BLD: 4.56 M/UL — SIGNIFICANT CHANGE UP (ref 4.2–5.4)
RBC # FLD: 18.6 % — HIGH (ref 11.5–14.5)
SARS-COV-2 RNA SPEC QL NAA+PROBE: SIGNIFICANT CHANGE UP
SODIUM SERPL-SCNC: 147 MMOL/L — HIGH (ref 135–146)
WBC # BLD: 11.68 K/UL — HIGH (ref 4.8–10.8)
WBC # FLD AUTO: 11.68 K/UL — HIGH (ref 4.8–10.8)

## 2022-06-08 PROCEDURE — 99233 SBSQ HOSP IP/OBS HIGH 50: CPT

## 2022-06-08 RX ORDER — MIDODRINE HYDROCHLORIDE 2.5 MG/1
1 TABLET ORAL
Qty: 0 | Refills: 0 | DISCHARGE
Start: 2022-06-08

## 2022-06-08 RX ORDER — METOPROLOL TARTRATE 50 MG
25 TABLET ORAL
Refills: 0 | Status: DISCONTINUED | OUTPATIENT
Start: 2022-06-08 | End: 2022-06-08

## 2022-06-08 RX ORDER — METOPROLOL TARTRATE 50 MG
50 TABLET ORAL
Refills: 0 | Status: DISCONTINUED | OUTPATIENT
Start: 2022-06-08 | End: 2022-06-09

## 2022-06-08 RX ADMIN — CYCLOBENZAPRINE HYDROCHLORIDE 5 MILLIGRAM(S): 10 TABLET, FILM COATED ORAL at 14:34

## 2022-06-08 RX ADMIN — MIDODRINE HYDROCHLORIDE 5 MILLIGRAM(S): 2.5 TABLET ORAL at 22:39

## 2022-06-08 RX ADMIN — Medication 1 MILLIGRAM(S): at 12:37

## 2022-06-08 RX ADMIN — CHLORHEXIDINE GLUCONATE 1 APPLICATION(S): 213 SOLUTION TOPICAL at 05:33

## 2022-06-08 RX ADMIN — MIDODRINE HYDROCHLORIDE 5 MILLIGRAM(S): 2.5 TABLET ORAL at 14:43

## 2022-06-08 RX ADMIN — CYCLOBENZAPRINE HYDROCHLORIDE 5 MILLIGRAM(S): 10 TABLET, FILM COATED ORAL at 05:34

## 2022-06-08 RX ADMIN — CEFIDEROCOL SULFATE TOSYLATE 33.33 MILLIGRAM(S): 1 INJECTION, POWDER, FOR SOLUTION INTRAVENOUS at 05:34

## 2022-06-08 RX ADMIN — Medication 100 MILLIGRAM(S): at 19:24

## 2022-06-08 RX ADMIN — APIXABAN 5 MILLIGRAM(S): 2.5 TABLET, FILM COATED ORAL at 19:24

## 2022-06-08 RX ADMIN — Medication 100 MILLIGRAM(S): at 05:34

## 2022-06-08 RX ADMIN — Medication 300 MILLIGRAM(S): at 12:38

## 2022-06-08 RX ADMIN — SCOPALAMINE 1 PATCH: 1 PATCH, EXTENDED RELEASE TRANSDERMAL at 19:50

## 2022-06-08 RX ADMIN — MIDODRINE HYDROCHLORIDE 5 MILLIGRAM(S): 2.5 TABLET ORAL at 05:34

## 2022-06-08 RX ADMIN — Medication 5 MILLIGRAM(S): at 12:38

## 2022-06-08 RX ADMIN — APIXABAN 5 MILLIGRAM(S): 2.5 TABLET, FILM COATED ORAL at 05:34

## 2022-06-08 RX ADMIN — CYCLOBENZAPRINE HYDROCHLORIDE 5 MILLIGRAM(S): 10 TABLET, FILM COATED ORAL at 22:39

## 2022-06-08 RX ADMIN — PREGABALIN 500 MICROGRAM(S): 225 CAPSULE ORAL at 12:36

## 2022-06-08 RX ADMIN — PANTOPRAZOLE SODIUM 40 MILLIGRAM(S): 20 TABLET, DELAYED RELEASE ORAL at 12:39

## 2022-06-08 RX ADMIN — Medication 50 MILLIGRAM(S): at 19:24

## 2022-06-08 NOTE — PROGRESS NOTE ADULT - ASSESSMENT
23 y/o F with a PMHx of encephalitis s/p tooth abscess s/p tracheostomy and PEG tube placement, HTN, GERD, and SVC thrombosis who was brought to ED from [place] on 5/16 for evaluation of a broken tracheostomy flange. Admitted for sepsis 2/2 COVID. Initially placed on trach collar 2-3LPM now back on RA. S/p Decadron and IV abx. Hospital course complicated for repeat sepsis 2/2 bacteremia and UTI w procal 12.3 (neg on admission). Restarted on IV abx    #Sepsis 2/2 MDR Klebsiella bacteremia  #Leukocytosis  - Febrile 101.3 F, WBC 30, tachycardic, concern for sepsis again on 5/27, no fevers since  - S/p 1L LR bolus  - Procal 5/27: 12.3 (neg on admission)  - Repeat RVP: neg  - BCx 5/27 + 5/28: MDR Klebsiella  - BCx 5/29 + 5/30 + 5/31: NGTD  - ID recs appreciated  - Trach cx 6/3: Klebsiella, Proteus ESBL  - C/w Fetroja 2g IV q8h (started 5/28) -- to complete 10 d from cleared BCx (end 6/7)    #History of Hypertension  #Persistent sinus tachycardia  - decreased from metoprolol 100 mg PO BID to 50mg PO BID  - EP - if HR <130 being sinus tachy its acceptable  - has h/o prior sinus tachycardia (was on metoprolol T 50 BID at home) - will discharge pt on same dose      #Sepsis present on admission 2/2 COVID pneumonia -- resolved  #Broken tracheostomy flange - resolved  - Trach replaced in the ED  - Unvaccinated COVID per sister  - Sepsis on presentation  - COVID 5/16: (+)  - CXR with left pleural effusion  - D-dimer: 267  - Procal: 0.03, CRP 61, , ferritin 9  - BCx, UCx: neg   - MRSA negative   - ID following  - S/p Decadron, RDV  - COVID 5/24 + 5/27: (-)    #HO SVC Thrombosis and Embolism   - C/w Eliquis 5 mg PO BID    #History of Encephalitis post Tooth Abscess in 10/2021   s/p Tracheostomy (has an O2 tank per sister but unsure about flow) and G-tube placement  - Monitor SaO2 and O2 requirements: as above    #GERD  * Home med Omeprazole 40mg QD  - C/w Protonix 40 mg PO daily    #Iron Deficiency Anemia   #History of Wernicke Encephalopathy  #Suspected thiamine deficiency  #Suspected folic acid deficiency  * Home med iron replacement, thiamine, B12, and folic acid  * ED Hb 11.3, MCV 79.4  B12 and folate nl  - C/w home iron replacement, thiamine, B12, and folic acid  - Monitor for now    #DVT ppx: Eliquis 5 mg PO BID  #GI ppx: Protonix 40 mg PO daily  #Diet: NPO w G-tube feeds  #Activity: Functional quadriplegic  #Dispo: From [facility]; d/c today  #Code status: Full code -- has MOLST confirming

## 2022-06-08 NOTE — PROGRESS NOTE ADULT - SUBJECTIVE AND OBJECTIVE BOX
SUBJECTIVE:    Patient is a 22y old Female who presents with a chief complaint of Broken Tracheostomy Flange (07 Jun 2022 12:57)    Currently admitted to medicine with the primary diagnosis of Acute sepsis       Today is hospital day 23d. This morning she is resting comfortably in bed and reports no new issues or overnight events.     PAST MEDICAL & SURGICAL HISTORY    SOCIAL HISTORY:  Negative for smoking/alcohol/drug use.     ALLERGIES:  Allergy Status Unknown    MEDICATIONS:  STANDING MEDICATIONS  apixaban 5 milliGRAM(s) Enteral Tube two times a day  bisacodyl 5 milliGRAM(s) Oral daily  chlorhexidine 4% Liquid 1 Application(s) Topical <User Schedule>  cyanocobalamin 500 MICROGram(s) Oral daily  cyclobenzaprine Oral Tab/Cap - Peds 5 milliGRAM(s) Oral three times a day  ferrous    sulfate Liquid 300 milliGRAM(s) Enteral Tube daily  folic acid 1 milliGRAM(s) Oral daily  metoprolol tartrate 50 milliGRAM(s) Oral two times a day  midodrine 5 milliGRAM(s) Oral every 8 hours  pantoprazole   Suspension 40 milliGRAM(s) Enteral Tube daily  scopolamine 1 mG/72 Hr(s) Patch 1 Patch Transdermal every 72 hours  thiamine  Oral Tab/Cap - Peds 100 milliGRAM(s) Oral two times a day    PRN MEDICATIONS  acetaminophen     Tablet .. 650 milliGRAM(s) Oral every 6 hours PRN  albuterol/ipratropium for Nebulization. 3 milliLiter(s) Nebulizer once PRN  metoclopramide   Syrup 10 milliGRAM(s) Oral every 6 hours PRN    VITALS:   T(F): 98.8  HR: 95  BP: 96/51  RR: 18  SpO2: 98%    LABS:                        10.8   11.68 )-----------( 435      ( 08 Jun 2022 07:09 )             36.4     06-08    147<H>  |  106  |  12  ----------------------------<  102<H>  4.5   |  28  |  <0.5<L>    Ca    9.2      08 Jun 2022 07:09  Mg     2.2     06-08    TPro  6.8  /  Alb  3.5  /  TBili  <0.2  /  DBili  x   /  AST  20  /  ALT  36  /  AlkPhos  118<H>  06-08                  RADIOLOGY:    PHYSICAL EXAM:  CONSTITUTIONAL: No acute distress, trached  PULMONARY: Clear to auscultation bilaterally; no wheezes, rales, or rhonchi  CARDIOVASCULAR: regular rhythm, tachycardic  GASTROINTESTINAL: Soft, non-tender, non-distended; bowel sounds present  MUSCULOSKELETAL: 2+ peripheral pulses; no clubbing, no cyanosis, no edema. contracted extremities  NEUROLOGY: non-focal  SKIN: No rashes or lesions; warm and dry      Intravenous access:   NG tube:   Alejo Catheter:

## 2022-06-08 NOTE — PROGRESS NOTE ADULT - ASSESSMENT
23 y/o F with a PMHx of encephalitis s/p tooth abscess s/p tracheostomy and PEG tube placement, HTN, GERD, and SVC thrombosis who was brought to ED from [place] on 5/16 for evaluation of a broken tracheostomy flange. Admitted for sepsis 2/2 COVID. Initially placed on trach collar 2-3LPM now back on RA. S/p Decadron and IV abx. Hospital course complicated for repeat sepsis 2/2 bacteremia and UTI w procal 12.3 (neg on admission). Restarted on IV abx    Sepsis on admission 5/16 due to COVID-19 infection.   Improved.   CXR showed no acute infiltrates.     Septic shock on (5/27) (s/p IV pressors)  Klebsiella Pneumoniae Bacteremia likely due to acute pyelonephritis.   Blood cx on 5/28 and 5/29 grew Klebsiella pneumoniae (Carbapenem Resistant)  Procalcitonin 5/27: 12.3 (0.03 on admission)  s/p Fetroja (Cefiderocol) 2g IV q8h (started 5/28) completed for 10 days.   Trach cx 6/3: MDR Klebsiella, Proteus --sensitivities reviewed with ID : c/w cefiderocol  BP improved, mild leukocytosis.     Sinus tachycardia:   on other arrhythmia. Likely from Sepsis.   EP consult appreciated. TSH normal, FT4 mildly elevated 2.2  Continue Metoprolol, reduce the dose to 25mg BID.     Broken tracheostomy flange - resolved  - Trach replaced in the ED    SVC Thrombosis and Embolism   Eliquis 5 mg PO BID    History of Encephalitis post Tooth Abscess in 10/2021   s/p Tracheostomy (has an O2 tank per sister but unsure about flow) and G-tube placement  Monitor SaO2 and O2 requirements: as above    #GERD Protonix 40 mg PO daily    Iron Deficiency Anemia   History of Wernicke Encephalopathy  Suspected thiamine deficiency  Suspected folic acid deficiency  Home med iron replacement, thiamine, B12, and folic acid  B12 and folate nl  Continue iron replacement, thiamine, B12, and folic acid      #DVT ppx: Eliquis 5 mg PO BID  #GI ppx: Protonix 40 mg PO daily  #Diet: NPO w G-tube feeds  #Activity: Functional quadriplegic  #Dispo: From [place] [facility], acute  #Code status: Full code -- has MOLST confirming

## 2022-06-08 NOTE — DISCHARGE NOTE NURSING/CASE MANAGEMENT/SOCIAL WORK - NSDCPEFALRISK_GEN_ALL_CORE
For information on Fall & Injury Prevention, visit: [email or web address] OR  [email or web address] OR  https://www.cdc.gov/steadi/patient.html

## 2022-06-08 NOTE — DISCHARGE NOTE NURSING/CASE MANAGEMENT/SOCIAL WORK - PATIENT PORTAL LINK FT
You can access the FollowMyHealth Patient Portal offered by [facility] by registering at the following website: [email or web address]. By joining [employer]’s FollowMyHealth portal, you will also be able to view your health information using other applications (apps) compatible with our system.

## 2022-06-08 NOTE — PROGRESS NOTE ADULT - SUBJECTIVE AND OBJECTIVE BOX
GORDO CONSUELO  22y  Female      Patient is a 22y old  Female who presents with a chief complaint of Broken Tracheostomy Flange (08 Jun 2022 12:39)      INTERVAL HPI/OVERNIGHT EVENTS:  She is non-verbal, tachycardia, no fever.   Vital Signs Last 24 Hrs  T(C): 37.1 (08 Jun 2022 05:30), Max: 37.1 (08 Jun 2022 05:30)  T(F): 98.8 (08 Jun 2022 05:30), Max: 98.8 (08 Jun 2022 05:30)  HR: 95 (08 Jun 2022 05:30) (95 - 126)  BP: 96/51 (08 Jun 2022 05:30) (96/51 - 104/59)  BP(mean): --  RR: 18 (08 Jun 2022 05:30) (18 - 18)  SpO2: 98% (08 Jun 2022 00:20) (98% - 98%)      06-07-22 @ 07:01  -  06-08-22 @ 07:00  --------------------------------------------------------  IN: 1260 mL / OUT: 750 mL / NET: 510 mL            Consultant(s) Notes Reviewed:  [x ] YES  [ ] NO          MEDICATIONS  (STANDING):  apixaban 5 milliGRAM(s) Enteral Tube two times a day  bisacodyl 5 milliGRAM(s) Oral daily  chlorhexidine 4% Liquid 1 Application(s) Topical <User Schedule>  cyanocobalamin 500 MICROGram(s) Oral daily  cyclobenzaprine Oral Tab/Cap - Peds 5 milliGRAM(s) Oral three times a day  ferrous    sulfate Liquid 300 milliGRAM(s) Enteral Tube daily  folic acid 1 milliGRAM(s) Oral daily  metoprolol tartrate 50 milliGRAM(s) Oral two times a day  midodrine 5 milliGRAM(s) Oral every 8 hours  pantoprazole   Suspension 40 milliGRAM(s) Enteral Tube daily  scopolamine 1 mG/72 Hr(s) Patch 1 Patch Transdermal every 72 hours  thiamine  Oral Tab/Cap - Peds 100 milliGRAM(s) Oral two times a day    MEDICATIONS  (PRN):  acetaminophen     Tablet .. 650 milliGRAM(s) Oral every 6 hours PRN Temp greater or equal to 38C (100.4F), Mild Pain (1 - 3)  albuterol/ipratropium for Nebulization. 3 milliLiter(s) Nebulizer once PRN Shortness of Breath and/or Wheezing  metoclopramide   Syrup 10 milliGRAM(s) Oral every 6 hours PRN as needed      LABS                          10.8   11.68 )-----------( 435      ( 08 Jun 2022 07:09 )             36.4     06-08    147<H>  |  106  |  12  ----------------------------<  102<H>  4.5   |  28  |  <0.5<L>    Ca    9.2      08 Jun 2022 07:09  Mg     2.2     06-08    TPro  6.8  /  Alb  3.5  /  TBili  <0.2  /  DBili  x   /  AST  20  /  ALT  36  /  AlkPhos  118<H>  06-08          Lactate Trend        CAPILLARY BLOOD GLUCOSE      POCT Blood Glucose.: 85 mg/dL (07 Jun 2022 23:43)      Culture - Sputum (collected 06-02-22 @ 11:50)  Source: Trach Asp Tracheal Aspirate  Gram Stain (06-03-22 @ 07:15):    Moderate polymorphonuclear leukocytes per low power field    Rare Squamous epithelial cells per low power field    Numerous Gram Negative Rods seen per oil power field    Numerous Gram Negative Coccobacilli seen per oil power field  Final Report (06-05-22 @ 17:39):    Numerous Klebsiella pneumoniae (Carbapenem Resistant)    Numerous Proteus mirabilis ESBL    Normal Respiratory Trinidad present  Organism: Klepne MDRO  Proteus mirabilis ESBL (06-05-22 @ 17:39)  Organism: Proteus mirabilis ESBL (06-05-22 @ 17:39)      -  Amikacin: S <=16      -  Amoxicillin/Clavulanic Acid: S <=8/4      -  Ampicillin: R >16 These ampicillin results predict results for amoxicillin      -  Ampicillin/Sulbactam: R 8/4 Enterobacter, Klebsiella aerogenes, Citrobacter, and Serratia may develop resistance during prolonged therapy (3-4 days)      -  Aztreonam: R <=4      -  Cefazolin: R >16 Enterobacter, Klebsiella aerogenes, Citrobacter, and Serratia may develop resistance during prolonged therapy (3-4 days)      -  Cefepime: R >16      -  Ceftriaxone: R >32 Enterobacter, Klebsiella aerogenes, Citrobacter, and Serratia may develop resistance during prolonged therapy      -  Ciprofloxacin: R >2      -  Ertapenem: S <=0.5      -  Gentamicin: S <=2      -  Levofloxacin: R >4      -  Meropenem: S <=1      -  Piperacillin/Tazobactam: R <=8      -  Tobramycin: S <=2      -  Trimethoprim/Sulfamethoxazole: S <=0.5/9.5      Method Type: HERB  Organism: Alexx HYLTONO (06-05-22 @ 17:39)      -  Amikacin: R >32      -  Amoxicillin/Clavulanic Acid: R >16/8      -  Ampicillin: R >16 These ampicillin results predict results for amoxicillin      -  Ampicillin/Sulbactam: R >16/8 Enterobacter, Klebsiella aerogenes, Citrobacter, and Serratia may develop resistance during prolonged therapy (3-4 days)      -  Aztreonam: R >16      -  Cefazolin: R >16 Enterobacter, Klebsiella aerogenes, Citrobacter, and Serratia may develop resistance during prolonged therapy (3-4 days)      -  Cefepime: R >16      -  Cefoxitin: R >16      -  Ceftazidime/Avibactam: R >16      -  Ceftolozane/tazobactam: R >8      -  Ceftriaxone: R >32 Enterobacter, Klebsiella aerogenes, Citrobacter, and Serratia may develop resistance during prolonged therapy      -  Ciprofloxacin: R >2      -  Ertapenem: R >1      -  Gentamicin: R >8      -  Imipenem: R >8      -  Levofloxacin: R >4      -  Meropenem: R >8      -  Piperacillin/Tazobactam: R >64      -  Tobramycin: R >8      -  Trimethoprim/Sulfamethoxazole: R >2/38      Method Type: HERB        RADIOLOGY & ADDITIONAL TESTS:    Imaging Personally Reviewed:  [ ] YES  [ ] NO    HEALTH ISSUES - PROBLEM Dx:          PHYSICAL EXAM:  GENERAL:  awake,   HEAD:  Atraumatic, Normocephalic.  EYES: EOMI, PERRLA, conjunctiva and sclera clear.  NECK: Supple, No JVD. Trach in place.   CHEST/LUNG: bilateral ronchi.   HEART: Regular rate and rhythm; S1 S2.   ABDOMEN: Soft, Nontender, Nondistended; Bowel sounds present. PEG tube in place.   EXTREMITIES:  2+ Peripheral Pulses, No clubbing, cyanosis, or edema.  PSYCH: awake, not following commands. Non-verbal.   NEUROLOGY: non-focal.  SKIN: No rashes or lesions.

## 2022-06-09 VITALS — HEART RATE: 116 BPM

## 2022-06-09 LAB
ALBUMIN SERPL ELPH-MCNC: 3.8 G/DL — SIGNIFICANT CHANGE UP (ref 3.5–5.2)
ALP SERPL-CCNC: 130 U/L — HIGH (ref 30–115)
ALT FLD-CCNC: 35 U/L — SIGNIFICANT CHANGE UP (ref 0–41)
ANION GAP SERPL CALC-SCNC: 14 MMOL/L — SIGNIFICANT CHANGE UP (ref 7–14)
AST SERPL-CCNC: 22 U/L — SIGNIFICANT CHANGE UP (ref 0–41)
BASOPHILS # BLD AUTO: 0.05 K/UL — SIGNIFICANT CHANGE UP (ref 0–0.2)
BASOPHILS NFR BLD AUTO: 0.4 % — SIGNIFICANT CHANGE UP (ref 0–1)
BILIRUB SERPL-MCNC: <0.2 MG/DL — SIGNIFICANT CHANGE UP (ref 0.2–1.2)
BUN SERPL-MCNC: 13 MG/DL — SIGNIFICANT CHANGE UP (ref 10–20)
CALCIUM SERPL-MCNC: 9.6 MG/DL — SIGNIFICANT CHANGE UP (ref 8.5–10.1)
CHLORIDE SERPL-SCNC: 105 MMOL/L — SIGNIFICANT CHANGE UP (ref 98–110)
CO2 SERPL-SCNC: 25 MMOL/L — SIGNIFICANT CHANGE UP (ref 17–32)
CREAT SERPL-MCNC: <0.5 MG/DL — LOW (ref 0.7–1.5)
EGFR: 143 ML/MIN/1.73M2 — SIGNIFICANT CHANGE UP
EOSINOPHIL # BLD AUTO: 0.35 K/UL — SIGNIFICANT CHANGE UP (ref 0–0.7)
EOSINOPHIL NFR BLD AUTO: 2.9 % — SIGNIFICANT CHANGE UP (ref 0–8)
GLUCOSE BLDC GLUCOMTR-MCNC: 73 MG/DL — SIGNIFICANT CHANGE UP (ref 70–99)
GLUCOSE BLDC GLUCOMTR-MCNC: 85 MG/DL — SIGNIFICANT CHANGE UP (ref 70–99)
GLUCOSE SERPL-MCNC: 117 MG/DL — HIGH (ref 70–99)
HCT VFR BLD CALC: 39.3 % — SIGNIFICANT CHANGE UP (ref 37–47)
HGB BLD-MCNC: 11.6 G/DL — LOW (ref 12–16)
IMM GRANULOCYTES NFR BLD AUTO: 0.3 % — SIGNIFICANT CHANGE UP (ref 0.1–0.3)
LYMPHOCYTES # BLD AUTO: 1.53 K/UL — SIGNIFICANT CHANGE UP (ref 1.2–3.4)
LYMPHOCYTES # BLD AUTO: 12.7 % — LOW (ref 20.5–51.1)
MAGNESIUM SERPL-MCNC: 2 MG/DL — SIGNIFICANT CHANGE UP (ref 1.8–2.4)
MCHC RBC-ENTMCNC: 23.7 PG — LOW (ref 27–31)
MCHC RBC-ENTMCNC: 29.5 G/DL — LOW (ref 32–37)
MCV RBC AUTO: 80.4 FL — LOW (ref 81–99)
MONOCYTES # BLD AUTO: 0.63 K/UL — HIGH (ref 0.1–0.6)
MONOCYTES NFR BLD AUTO: 5.2 % — SIGNIFICANT CHANGE UP (ref 1.7–9.3)
NEUTROPHILS # BLD AUTO: 9.44 K/UL — HIGH (ref 1.4–6.5)
NEUTROPHILS NFR BLD AUTO: 78.5 % — HIGH (ref 42.2–75.2)
NRBC # BLD: 0 /100 WBCS — SIGNIFICANT CHANGE UP (ref 0–0)
PLATELET # BLD AUTO: 408 K/UL — HIGH (ref 130–400)
POTASSIUM SERPL-MCNC: 4.9 MMOL/L — SIGNIFICANT CHANGE UP (ref 3.5–5)
POTASSIUM SERPL-SCNC: 4.9 MMOL/L — SIGNIFICANT CHANGE UP (ref 3.5–5)
PROT SERPL-MCNC: 7 G/DL — SIGNIFICANT CHANGE UP (ref 6–8)
RBC # BLD: 4.89 M/UL — SIGNIFICANT CHANGE UP (ref 4.2–5.4)
RBC # FLD: 18.4 % — HIGH (ref 11.5–14.5)
SODIUM SERPL-SCNC: 144 MMOL/L — SIGNIFICANT CHANGE UP (ref 135–146)
WBC # BLD: 12.04 K/UL — HIGH (ref 4.8–10.8)
WBC # FLD AUTO: 12.04 K/UL — HIGH (ref 4.8–10.8)

## 2022-06-09 PROCEDURE — 99239 HOSP IP/OBS DSCHRG MGMT >30: CPT

## 2022-06-09 RX ADMIN — Medication 100 MILLIGRAM(S): at 06:11

## 2022-06-09 RX ADMIN — SCOPALAMINE 1 PATCH: 1 PATCH, EXTENDED RELEASE TRANSDERMAL at 08:37

## 2022-06-09 RX ADMIN — APIXABAN 5 MILLIGRAM(S): 2.5 TABLET, FILM COATED ORAL at 06:11

## 2022-06-09 RX ADMIN — CHLORHEXIDINE GLUCONATE 1 APPLICATION(S): 213 SOLUTION TOPICAL at 06:12

## 2022-06-09 RX ADMIN — CYCLOBENZAPRINE HYDROCHLORIDE 5 MILLIGRAM(S): 10 TABLET, FILM COATED ORAL at 06:11

## 2022-06-09 RX ADMIN — MIDODRINE HYDROCHLORIDE 5 MILLIGRAM(S): 2.5 TABLET ORAL at 06:11

## 2022-06-09 RX ADMIN — Medication 50 MILLIGRAM(S): at 06:11

## 2022-06-09 NOTE — PROGRESS NOTE ADULT - REASON FOR ADMISSION
Broken Tracheostomy Flange

## 2022-06-09 NOTE — PROGRESS NOTE ADULT - ASSESSMENT
21 y/o F with a PMHx of encephalitis s/p tooth abscess s/p tracheostomy and PEG tube placement, HTN, GERD, and SVC thrombosis who was brought to ED from [place] on 5/16 for evaluation of a broken tracheostomy flange. Admitted for sepsis 2/2 COVID. Initially placed on trach collar 2-3LPM now back on RA. S/p Decadron and IV abx. Hospital course complicated for repeat sepsis 2/2 bacteremia and UTI w procal 12.3 (neg on admission). Restarted on IV abx    #Sepsis 2/2 MDR Klebsiella bacteremia  #Leukocytosis  - Febrile 101.3 F, WBC 30, tachycardic, concern for sepsis again on 5/27, no fevers since  - S/p 1L LR bolus  - Procal 5/27: 12.3 (neg on admission)  - Repeat RVP: neg  - BCx 5/27 + 5/28: MDR Klebsiella  - BCx 5/29 + 5/30 + 5/31: NGTD  - ID recs appreciated  - Trach cx 6/3: Klebsiella, Proteus ESBL  - C/w Fetroja 2g IV q8h (started 5/28) -- to complete 10 d from cleared BCx (end 6/7)    #History of Hypertension  #Persistent sinus tachycardia  - decreased from metoprolol 100 mg PO BID to 50mg PO BID  - EP - if HR <130 being sinus tachy its acceptable  - has h/o prior sinus tachycardia (was on metoprolol T 50 BID at home) - will discharge pt on same dose      #Sepsis present on admission 2/2 COVID pneumonia -- resolved  #Broken tracheostomy flange - resolved  - Trach replaced in the ED  - Unvaccinated COVID per sister  - Sepsis on presentation  - COVID 5/16: (+)  - CXR with left pleural effusion  - D-dimer: 267  - Procal: 0.03, CRP 61, , ferritin 9  - BCx, UCx: neg   - MRSA negative   - ID following  - S/p Decadron, RDV  - COVID 5/24 + 5/27: (-)    #HO SVC Thrombosis and Embolism   - C/w Eliquis 5 mg PO BID    #History of Encephalitis post Tooth Abscess in 10/2021   s/p Tracheostomy (has an O2 tank per sister but unsure about flow) and G-tube placement  - Monitor SaO2 and O2 requirements: as above    #GERD  * Home med Omeprazole 40mg QD  - C/w Protonix 40 mg PO daily    #Iron Deficiency Anemia   #History of Wernicke Encephalopathy  #Suspected thiamine deficiency  #Suspected folic acid deficiency  * Home med iron replacement, thiamine, B12, and folic acid  * ED Hb 11.3, MCV 79.4  B12 and folate nl  - C/w home iron replacement, thiamine, B12, and folic acid  - Monitor for now    #DVT ppx: Eliquis 5 mg PO BID  #GI ppx: Protonix 40 mg PO daily  #Diet: NPO w G-tube feeds  #Activity: Functional quadriplegic  #Dispo: From [facility]; d/c today  #Code status: Full code -- has MOLST confirming

## 2022-06-09 NOTE — PROGRESS NOTE ADULT - PROVIDER SPECIALTY LIST ADULT
Hospitalist
Infectious Disease
Internal Medicine
Internal Medicine
Hospitalist
Infectious Disease
Internal Medicine
Hospitalist
Hospitalist
Infectious Disease
Infectious Disease
Internal Medicine
Hospitalist
Infectious Disease
Internal Medicine
Internal Medicine

## 2022-06-09 NOTE — PROGRESS NOTE ADULT - ASSESSMENT
21 y/o F with a PMHx of encephalitis s/p tooth abscess s/p tracheostomy and PEG tube placement, HTN, GERD, and SVC thrombosis who was brought to ED from [place] on 5/16 for evaluation of a broken tracheostomy flange. Admitted for sepsis 2/2 COVID. Initially placed on trach collar 2-3LPM now back on RA. S/p Decadron and IV abx. Hospital course complicated for repeat sepsis 2/2 bacteremia and UTI w procal 12.3 (neg on admission). Restarted on IV abx    A/P:   Sepsis on admission 5/16 due to COVID-19 infection.   Improved.   CXR showed no acute infiltrates.     Septic shock on (5/27) (s/p IV pressors)  Klebsiella Pneumoniae Bacteremia likely due to acute pyelonephritis.   Blood cx on 5/28 and 5/29 grew Klebsiella pneumoniae (Carbapenem Resistant)  Procalcitonin 5/27: 12.3 (0.03 on admission)  s/p Fetroja (Cefiderocol) 2g IV q8h (started 5/28) completed for 10 days.   Trach cx 6/3: MDR Klebsiella, Proteus --sensitivities reviewed with ID : s/p treatment with cefiderocol  BP improved, mild leukocytosis.     Sinus tachycardia:   on other arrhythmia. Likely from Sepsis.   EP consult appreciated. TSH normal, FT4 mildly elevated 2.2  Continue Metoprolol, reduce the dose to 25mg BID.     Broken tracheostomy flange - resolved  Trach replaced in the ED    SVC Thrombosis and Embolism   Eliquis 5 mg PO BID    History of Encephalitis post Tooth Abscess in 10/2021   s/p Tracheostomy (has an O2 tank per sister but unsure about flow) and G-tube placement  Monitor SaO2 and O2 requirements: as above    GERD Protonix 40 mg PO daily    Iron Deficiency Anemia   History of Wernicke Encephalopathy  Suspected thiamine deficiency  Suspected folic acid deficiency  Home med iron replacement, thiamine, B12, and folic acid  B12 and folate nl  Continue iron replacement, thiamine, B12, and folic acid    DVT ppx: Eliquis 5 mg PO BID  GI ppx: Protonix 40 mg PO daily  Diet: NPO w G-tube feeds  Activity: Functional quadriplegic  Dispo: From [facility], discharge today.

## 2022-06-09 NOTE — PROGRESS NOTE ADULT - SUBJECTIVE AND OBJECTIVE BOX
SUBJECTIVE:    Patient is a 22y old Female who presents with a chief complaint of Broken Tracheostomy Flange (08 Jun 2022 14:00)    Currently admitted to medicine with the primary diagnosis of Acute sepsis       Today is hospital day 24d. This morning she is resting comfortably in bed and reports no new issues or overnight events.     PAST MEDICAL & SURGICAL HISTORY    SOCIAL HISTORY:  Negative for smoking/alcohol/drug use.     ALLERGIES:  Allergy Status Unknown    MEDICATIONS:  STANDING MEDICATIONS  apixaban 5 milliGRAM(s) Enteral Tube two times a day  bisacodyl 5 milliGRAM(s) Oral daily  chlorhexidine 4% Liquid 1 Application(s) Topical <User Schedule>  cyanocobalamin 500 MICROGram(s) Oral daily  cyclobenzaprine Oral Tab/Cap - Peds 5 milliGRAM(s) Oral three times a day  ferrous    sulfate Liquid 300 milliGRAM(s) Enteral Tube daily  folic acid 1 milliGRAM(s) Oral daily  metoprolol tartrate 50 milliGRAM(s) Oral two times a day  midodrine 5 milliGRAM(s) Oral every 8 hours  pantoprazole   Suspension 40 milliGRAM(s) Enteral Tube daily  scopolamine 1 mG/72 Hr(s) Patch 1 Patch Transdermal every 72 hours  thiamine  Oral Tab/Cap - Peds 100 milliGRAM(s) Oral two times a day    PRN MEDICATIONS  acetaminophen     Tablet .. 650 milliGRAM(s) Oral every 6 hours PRN  albuterol/ipratropium for Nebulization. 3 milliLiter(s) Nebulizer once PRN  metoclopramide   Syrup 10 milliGRAM(s) Oral every 6 hours PRN    VITALS:   T(F): 97.6  HR: 116  BP: 102/51  RR: 18  SpO2: 100%    LABS:                        11.6   12.04 )-----------( 408      ( 09 Jun 2022 07:55 )             39.3     06-09    144  |  105  |  13  ----------------------------<  117<H>  4.9   |  25  |  <0.5<L>    Ca    9.6      09 Jun 2022 07:55  Mg     2.0     06-09    TPro  7.0  /  Alb  3.8  /  TBili  <0.2  /  DBili  x   /  AST  22  /  ALT  35  /  AlkPhos  130<H>  06-09                  RADIOLOGY:    PHYSICAL EXAM:  CONSTITUTIONAL: No acute distress, trached  PULMONARY: Clear to auscultation bilaterally; no wheezes, rales, or rhonchi  CARDIOVASCULAR: regular rhythm, tachycardic  GASTROINTESTINAL: Soft, non-tender, non-distended; bowel sounds present  MUSCULOSKELETAL: 2+ peripheral pulses; no clubbing, no cyanosis, no edema. contracted extremities  NEUROLOGY: non-focal  SKIN: No rashes or lesions; warm and dry      Intravenous access:   NG tube:   Alejo Catheter:

## 2022-06-09 NOTE — PROGRESS NOTE ADULT - SUBJECTIVE AND OBJECTIVE BOX
GORDO CONSUELO  22y  Female      Patient is a 22y old  Female who presents with a chief complaint of Broken Tracheostomy Flange (09 Jun 2022 11:15)      INTERVAL HPI/OVERNIGHT EVENTS:  She is awake, no fever,   Vital Signs Last 24 Hrs  T(C): 36.4 (09 Jun 2022 05:28), Max: 37.7 (08 Jun 2022 14:42)  T(F): 97.6 (09 Jun 2022 05:28), Max: 99.9 (08 Jun 2022 14:42)  HR: 116 (09 Jun 2022 09:00) (72 - 131)  BP: 102/51 (09 Jun 2022 05:28) (97/57 - 104/56)  BP(mean): --  RR: 18 (09 Jun 2022 05:28) (18 - 18)  SpO2: 100% (09 Jun 2022 07:30) (97% - 100%)      06-08-22 @ 07:01  -  06-09-22 @ 07:00  --------------------------------------------------------  IN: 2060 mL / OUT: 450 mL / NET: 1610 mL            Consultant(s) Notes Reviewed:  [x ] YES  [ ] NO          MEDICATIONS  (STANDING):  apixaban 5 milliGRAM(s) Enteral Tube two times a day  bisacodyl 5 milliGRAM(s) Oral daily  chlorhexidine 4% Liquid 1 Application(s) Topical <User Schedule>  cyanocobalamin 500 MICROGram(s) Oral daily  cyclobenzaprine Oral Tab/Cap - Peds 5 milliGRAM(s) Oral three times a day  ferrous    sulfate Liquid 300 milliGRAM(s) Enteral Tube daily  folic acid 1 milliGRAM(s) Oral daily  metoprolol tartrate 50 milliGRAM(s) Oral two times a day  midodrine 5 milliGRAM(s) Oral every 8 hours  pantoprazole   Suspension 40 milliGRAM(s) Enteral Tube daily  scopolamine 1 mG/72 Hr(s) Patch 1 Patch Transdermal every 72 hours  thiamine  Oral Tab/Cap - Peds 100 milliGRAM(s) Oral two times a day    MEDICATIONS  (PRN):  acetaminophen     Tablet .. 650 milliGRAM(s) Oral every 6 hours PRN Temp greater or equal to 38C (100.4F), Mild Pain (1 - 3)  albuterol/ipratropium for Nebulization. 3 milliLiter(s) Nebulizer once PRN Shortness of Breath and/or Wheezing  metoclopramide   Syrup 10 milliGRAM(s) Oral every 6 hours PRN as needed      LABS                          11.6   12.04 )-----------( 408      ( 09 Jun 2022 07:55 )             39.3     06-09    144  |  105  |  13  ----------------------------<  117<H>  4.9   |  25  |  <0.5<L>    Ca    9.6      09 Jun 2022 07:55  Mg     2.0     06-09    TPro  7.0  /  Alb  3.8  /  TBili  <0.2  /  DBili  x   /  AST  22  /  ALT  35  /  AlkPhos  130<H>  06-09          Lactate Trend        CAPILLARY BLOOD GLUCOSE      POCT Blood Glucose.: 85 mg/dL (09 Jun 2022 06:23)        RADIOLOGY & ADDITIONAL TESTS:    Imaging Personally Reviewed:  [ ] YES  [ ] NO    HEALTH ISSUES - PROBLEM Dx:          PHYSICAL EXAM:  GENERAL:  awake,   HEAD:  Atraumatic, Normocephalic.  EYES: EOMI, PERRLA, conjunctiva and sclera clear.  NECK: Supple, No JVD. Trach in place.   CHEST/LUNG: bilateral ronchi.   HEART: Regular rate and rhythm; S1 S2.   ABDOMEN: Soft, Nontender, Nondistended; Bowel sounds present. PEG tube in place.   EXTREMITIES:  2+ Peripheral Pulses, No clubbing, cyanosis, or edema.  PSYCH: awake, not following commands. Non-verbal.   NEUROLOGY: non-focal.  SKIN: No rashes or lesions.

## 2022-06-14 DIAGNOSIS — A41.9 SEPSIS, UNSPECIFIED ORGANISM: ICD-10-CM

## 2022-06-14 DIAGNOSIS — A41.89 OTHER SPECIFIED SEPSIS: ICD-10-CM

## 2022-06-14 DIAGNOSIS — U07.1 COVID-19: ICD-10-CM

## 2022-06-14 DIAGNOSIS — E53.8 DEFICIENCY OF OTHER SPECIFIED B GROUP VITAMINS: ICD-10-CM

## 2022-06-14 DIAGNOSIS — K59.00 CONSTIPATION, UNSPECIFIED: ICD-10-CM

## 2022-06-14 DIAGNOSIS — K21.9 GASTRO-ESOPHAGEAL REFLUX DISEASE WITHOUT ESOPHAGITIS: ICD-10-CM

## 2022-06-14 DIAGNOSIS — R53.2 FUNCTIONAL QUADRIPLEGIA: ICD-10-CM

## 2022-06-14 DIAGNOSIS — Z93.1 GASTROSTOMY STATUS: ICD-10-CM

## 2022-06-14 DIAGNOSIS — I10 ESSENTIAL (PRIMARY) HYPERTENSION: ICD-10-CM

## 2022-06-14 DIAGNOSIS — J98.11 ATELECTASIS: ICD-10-CM

## 2022-06-14 DIAGNOSIS — Z28.310 UNVACCINATED FOR COVID-19: ICD-10-CM

## 2022-06-14 DIAGNOSIS — J96.10 CHRONIC RESPIRATORY FAILURE, UNSPECIFIED WHETHER WITH HYPOXIA OR HYPERCAPNIA: ICD-10-CM

## 2022-06-14 DIAGNOSIS — D50.9 IRON DEFICIENCY ANEMIA, UNSPECIFIED: ICD-10-CM

## 2022-06-14 DIAGNOSIS — I95.9 HYPOTENSION, UNSPECIFIED: ICD-10-CM

## 2022-06-14 DIAGNOSIS — J95.09 OTHER TRACHEOSTOMY COMPLICATION: ICD-10-CM

## 2022-06-14 DIAGNOSIS — J12.82 PNEUMONIA DUE TO CORONAVIRUS DISEASE 2019: ICD-10-CM

## 2022-06-14 DIAGNOSIS — E51.9 THIAMINE DEFICIENCY, UNSPECIFIED: ICD-10-CM

## 2022-07-09 ENCOUNTER — INPATIENT (INPATIENT)
Facility: HOSPITAL | Age: 22
LOS: 16 days | Discharge: NURSING, REHAB OR LONG-TERM CARE | End: 2022-07-26
Attending: INTERNAL MEDICINE | Admitting: INTERNAL MEDICINE

## 2022-07-09 VITALS
HEART RATE: 127 BPM | HEIGHT: 66 IN | RESPIRATION RATE: 18 BRPM | OXYGEN SATURATION: 96 % | SYSTOLIC BLOOD PRESSURE: 89 MMHG | TEMPERATURE: 99 F | DIASTOLIC BLOOD PRESSURE: 49 MMHG

## 2022-07-09 LAB
ALBUMIN SERPL ELPH-MCNC: 3.7 G/DL — SIGNIFICANT CHANGE UP (ref 3.5–5.2)
ALP SERPL-CCNC: 129 U/L — HIGH (ref 30–115)
ALT FLD-CCNC: 41 U/L — SIGNIFICANT CHANGE UP (ref 0–41)
ANION GAP SERPL CALC-SCNC: 12 MMOL/L — SIGNIFICANT CHANGE UP (ref 7–14)
APPEARANCE UR: ABNORMAL
APTT BLD: 25.8 SEC — LOW (ref 27–39.2)
AST SERPL-CCNC: 35 U/L — SIGNIFICANT CHANGE UP (ref 0–41)
BACTERIA # UR AUTO: NEGATIVE — SIGNIFICANT CHANGE UP
BASE EXCESS BLDV CALC-SCNC: 7.3 MMOL/L — HIGH (ref -2–3)
BASOPHILS # BLD AUTO: 0.05 K/UL — SIGNIFICANT CHANGE UP (ref 0–0.2)
BASOPHILS NFR BLD AUTO: 0.4 % — SIGNIFICANT CHANGE UP (ref 0–1)
BILIRUB SERPL-MCNC: <0.2 MG/DL — SIGNIFICANT CHANGE UP (ref 0.2–1.2)
BILIRUB UR-MCNC: NEGATIVE — SIGNIFICANT CHANGE UP
BUN SERPL-MCNC: 13 MG/DL — SIGNIFICANT CHANGE UP (ref 10–20)
CA-I SERPL-SCNC: 1.24 MMOL/L — SIGNIFICANT CHANGE UP (ref 1.15–1.33)
CALCIUM SERPL-MCNC: 9.4 MG/DL — SIGNIFICANT CHANGE UP (ref 8.5–10.1)
CHLORIDE SERPL-SCNC: 103 MMOL/L — SIGNIFICANT CHANGE UP (ref 98–110)
CK SERPL-CCNC: 176 U/L — SIGNIFICANT CHANGE UP (ref 0–225)
CO2 SERPL-SCNC: 28 MMOL/L — SIGNIFICANT CHANGE UP (ref 17–32)
COD CRY URNS QL: ABNORMAL
COLOR SPEC: YELLOW — SIGNIFICANT CHANGE UP
CREAT SERPL-MCNC: 0.5 MG/DL — LOW (ref 0.7–1.5)
CRP SERPL-MCNC: 73.5 MG/L — HIGH
D DIMER BLD IA.RAPID-MCNC: 584 NG/ML DDU — HIGH (ref 0–230)
DIFF PNL FLD: ABNORMAL
EGFR: 136 ML/MIN/1.73M2 — SIGNIFICANT CHANGE UP
EOSINOPHIL # BLD AUTO: 0.44 K/UL — SIGNIFICANT CHANGE UP (ref 0–0.7)
EOSINOPHIL NFR BLD AUTO: 3.9 % — SIGNIFICANT CHANGE UP (ref 0–8)
EPI CELLS # UR: 2 /HPF — SIGNIFICANT CHANGE UP (ref 0–5)
FIBRINOGEN PPP-MCNC: >700 MG/DL — HIGH (ref 204.4–570.6)
GAS PNL BLDV: 137 MMOL/L — SIGNIFICANT CHANGE UP (ref 136–145)
GAS PNL BLDV: SIGNIFICANT CHANGE UP
GLUCOSE SERPL-MCNC: 95 MG/DL — SIGNIFICANT CHANGE UP (ref 70–99)
GLUCOSE UR QL: NEGATIVE — SIGNIFICANT CHANGE UP
HCO3 BLDV-SCNC: 34 MMOL/L — HIGH (ref 22–29)
HCT VFR BLD CALC: 39 % — SIGNIFICANT CHANGE UP (ref 37–47)
HCT VFR BLDA CALC: 40 % — SIGNIFICANT CHANGE UP (ref 39–51)
HGB BLD CALC-MCNC: 13.4 G/DL — SIGNIFICANT CHANGE UP (ref 12.6–17.4)
HGB BLD-MCNC: 11.5 G/DL — LOW (ref 12–16)
HYALINE CASTS # UR AUTO: 2 /LPF — SIGNIFICANT CHANGE UP (ref 0–7)
IMM GRANULOCYTES NFR BLD AUTO: 0.4 % — HIGH (ref 0.1–0.3)
INR BLD: 1.72 RATIO — HIGH (ref 0.65–1.3)
KETONES UR-MCNC: SIGNIFICANT CHANGE UP
LACTATE BLDV-MCNC: 1.1 MMOL/L — SIGNIFICANT CHANGE UP (ref 0.5–2)
LEUKOCYTE ESTERASE UR-ACNC: ABNORMAL
LYMPHOCYTES # BLD AUTO: 1.05 K/UL — LOW (ref 1.2–3.4)
LYMPHOCYTES # BLD AUTO: 9.4 % — LOW (ref 20.5–51.1)
MCHC RBC-ENTMCNC: 23.5 PG — LOW (ref 27–31)
MCHC RBC-ENTMCNC: 29.5 G/DL — LOW (ref 32–37)
MCV RBC AUTO: 79.8 FL — LOW (ref 81–99)
MONOCYTES # BLD AUTO: 0.9 K/UL — HIGH (ref 0.1–0.6)
MONOCYTES NFR BLD AUTO: 8 % — SIGNIFICANT CHANGE UP (ref 1.7–9.3)
NEUTROPHILS # BLD AUTO: 8.72 K/UL — HIGH (ref 1.4–6.5)
NEUTROPHILS NFR BLD AUTO: 77.9 % — HIGH (ref 42.2–75.2)
NITRITE UR-MCNC: NEGATIVE — SIGNIFICANT CHANGE UP
NRBC # BLD: 0 /100 WBCS — SIGNIFICANT CHANGE UP (ref 0–0)
PCO2 BLDV: 58 MMHG — HIGH (ref 39–42)
PH BLDV: 7.38 — SIGNIFICANT CHANGE UP (ref 7.32–7.43)
PH UR: 6.5 — SIGNIFICANT CHANGE UP (ref 5–8)
PLATELET # BLD AUTO: 350 K/UL — SIGNIFICANT CHANGE UP (ref 130–400)
PO2 BLDV: 44 MMHG — SIGNIFICANT CHANGE UP
POTASSIUM BLDV-SCNC: 4.9 MMOL/L — SIGNIFICANT CHANGE UP (ref 3.5–5.1)
POTASSIUM SERPL-MCNC: 5 MMOL/L — SIGNIFICANT CHANGE UP (ref 3.5–5)
POTASSIUM SERPL-SCNC: 5 MMOL/L — SIGNIFICANT CHANGE UP (ref 3.5–5)
PROT SERPL-MCNC: 7.3 G/DL — SIGNIFICANT CHANGE UP (ref 6–8)
PROT UR-MCNC: ABNORMAL
PROTHROM AB SERPL-ACNC: 19.7 SEC — HIGH (ref 9.95–12.87)
RBC # BLD: 4.89 M/UL — SIGNIFICANT CHANGE UP (ref 4.2–5.4)
RBC # FLD: 16.3 % — HIGH (ref 11.5–14.5)
RBC CASTS # UR COMP ASSIST: 190 /HPF — HIGH (ref 0–4)
SAO2 % BLDV: 72.9 % — SIGNIFICANT CHANGE UP
SARS-COV-2 RNA SPEC QL NAA+PROBE: DETECTED
SODIUM SERPL-SCNC: 143 MMOL/L — SIGNIFICANT CHANGE UP (ref 135–146)
SP GR SPEC: 1.04 — HIGH (ref 1.01–1.03)
TROPONIN T SERPL-MCNC: <0.01 NG/ML — SIGNIFICANT CHANGE UP
UROBILINOGEN FLD QL: ABNORMAL
WBC # BLD: 11.2 K/UL — HIGH (ref 4.8–10.8)
WBC # FLD AUTO: 11.2 K/UL — HIGH (ref 4.8–10.8)
WBC UR QL: 9 /HPF — HIGH (ref 0–5)

## 2022-07-09 PROCEDURE — 71045 X-RAY EXAM CHEST 1 VIEW: CPT | Mod: 26

## 2022-07-09 PROCEDURE — 71275 CT ANGIOGRAPHY CHEST: CPT | Mod: 26,MA

## 2022-07-09 PROCEDURE — 99223 1ST HOSP IP/OBS HIGH 75: CPT

## 2022-07-09 PROCEDURE — 99291 CRITICAL CARE FIRST HOUR: CPT

## 2022-07-09 PROCEDURE — 93010 ELECTROCARDIOGRAM REPORT: CPT

## 2022-07-09 RX ORDER — THIAMINE MONONITRATE (VIT B1) 100 MG
100 TABLET ORAL
Refills: 0 | Status: DISCONTINUED | OUTPATIENT
Start: 2022-07-09 | End: 2022-07-26

## 2022-07-09 RX ORDER — ACETAMINOPHEN 500 MG
650 TABLET ORAL EVERY 6 HOURS
Refills: 0 | Status: DISCONTINUED | OUTPATIENT
Start: 2022-07-09 | End: 2022-07-26

## 2022-07-09 RX ORDER — PREGABALIN 225 MG/1
1000 CAPSULE ORAL DAILY
Refills: 0 | Status: DISCONTINUED | OUTPATIENT
Start: 2022-07-09 | End: 2022-07-26

## 2022-07-09 RX ORDER — METOPROLOL TARTRATE 50 MG
1 TABLET ORAL
Qty: 0 | Refills: 0 | DISCHARGE

## 2022-07-09 RX ORDER — IPRATROPIUM BROMIDE 0.2 MG/ML
2 SOLUTION, NON-ORAL INHALATION
Qty: 0 | Refills: 0 | DISCHARGE

## 2022-07-09 RX ORDER — CEFIDEROCOL SULFATE TOSYLATE 1 G/10ML
2000 INJECTION, POWDER, FOR SOLUTION INTRAVENOUS EVERY 8 HOURS
Refills: 0 | Status: DISCONTINUED | OUTPATIENT
Start: 2022-07-09 | End: 2022-07-10

## 2022-07-09 RX ORDER — SODIUM CHLORIDE 9 MG/ML
1000 INJECTION INTRAMUSCULAR; INTRAVENOUS; SUBCUTANEOUS ONCE
Refills: 0 | Status: COMPLETED | OUTPATIENT
Start: 2022-07-09 | End: 2022-07-09

## 2022-07-09 RX ORDER — OMEPRAZOLE 10 MG/1
1 CAPSULE, DELAYED RELEASE ORAL
Qty: 0 | Refills: 0 | DISCHARGE

## 2022-07-09 RX ORDER — ROBINUL 0.2 MG/ML
2 INJECTION INTRAMUSCULAR; INTRAVENOUS THREE TIMES A DAY
Refills: 0 | Status: DISCONTINUED | OUTPATIENT
Start: 2022-07-09 | End: 2022-07-23

## 2022-07-09 RX ORDER — ACETAMINOPHEN 500 MG
2 TABLET ORAL
Qty: 0 | Refills: 0 | DISCHARGE

## 2022-07-09 RX ORDER — SODIUM CHLORIDE 9 MG/ML
1000 INJECTION, SOLUTION INTRAVENOUS ONCE
Refills: 0 | Status: COMPLETED | OUTPATIENT
Start: 2022-07-09 | End: 2022-07-09

## 2022-07-09 RX ORDER — IPRATROPIUM BROMIDE 0.2 MG/ML
1 SOLUTION, NON-ORAL INHALATION EVERY 6 HOURS
Refills: 0 | Status: DISCONTINUED | OUTPATIENT
Start: 2022-07-09 | End: 2022-07-24

## 2022-07-09 RX ORDER — APIXABAN 2.5 MG/1
5 TABLET, FILM COATED ORAL
Refills: 0 | Status: DISCONTINUED | OUTPATIENT
Start: 2022-07-09 | End: 2022-07-26

## 2022-07-09 RX ORDER — CEFEPIME 1 G/1
2000 INJECTION, POWDER, FOR SOLUTION INTRAMUSCULAR; INTRAVENOUS ONCE
Refills: 0 | Status: COMPLETED | OUTPATIENT
Start: 2022-07-09 | End: 2022-07-09

## 2022-07-09 RX ORDER — CYCLOBENZAPRINE HYDROCHLORIDE 10 MG/1
5 TABLET, FILM COATED ORAL THREE TIMES A DAY
Refills: 0 | Status: DISCONTINUED | OUTPATIENT
Start: 2022-07-09 | End: 2022-07-23

## 2022-07-09 RX ORDER — FOLIC ACID 0.8 MG
1 TABLET ORAL
Qty: 0 | Refills: 0 | DISCHARGE

## 2022-07-09 RX ORDER — PANTOPRAZOLE SODIUM 20 MG/1
40 TABLET, DELAYED RELEASE ORAL
Refills: 0 | Status: DISCONTINUED | OUTPATIENT
Start: 2022-07-09 | End: 2022-07-26

## 2022-07-09 RX ORDER — MIDODRINE HYDROCHLORIDE 2.5 MG/1
5 TABLET ORAL EVERY 8 HOURS
Refills: 0 | Status: DISCONTINUED | OUTPATIENT
Start: 2022-07-09 | End: 2022-07-26

## 2022-07-09 RX ORDER — PREGABALIN 225 MG/1
1 CAPSULE ORAL
Qty: 0 | Refills: 0 | DISCHARGE

## 2022-07-09 RX ORDER — ALBUTEROL 90 UG/1
1 AEROSOL, METERED ORAL EVERY 4 HOURS
Refills: 0 | Status: DISCONTINUED | OUTPATIENT
Start: 2022-07-09 | End: 2022-07-23

## 2022-07-09 RX ORDER — ONDANSETRON 8 MG/1
4 TABLET, FILM COATED ORAL EVERY 8 HOURS
Refills: 0 | Status: DISCONTINUED | OUTPATIENT
Start: 2022-07-09 | End: 2022-07-26

## 2022-07-09 RX ORDER — ACETAMINOPHEN 500 MG
975 TABLET ORAL ONCE
Refills: 0 | Status: COMPLETED | OUTPATIENT
Start: 2022-07-09 | End: 2022-07-09

## 2022-07-09 RX ORDER — FERROUS SULFATE 325(65) MG
7.5 TABLET ORAL
Qty: 0 | Refills: 0 | DISCHARGE

## 2022-07-09 RX ORDER — METOCLOPRAMIDE HCL 10 MG
10 TABLET ORAL
Refills: 0 | Status: DISCONTINUED | OUTPATIENT
Start: 2022-07-09 | End: 2022-07-24

## 2022-07-09 RX ORDER — APIXABAN 2.5 MG/1
5 TABLET, FILM COATED ORAL
Qty: 0 | Refills: 0 | DISCHARGE

## 2022-07-09 RX ORDER — METOPROLOL TARTRATE 50 MG
50 TABLET ORAL
Refills: 0 | Status: DISCONTINUED | OUTPATIENT
Start: 2022-07-09 | End: 2022-07-10

## 2022-07-09 RX ORDER — VANCOMYCIN HCL 1 G
1000 VIAL (EA) INTRAVENOUS ONCE
Refills: 0 | Status: COMPLETED | OUTPATIENT
Start: 2022-07-09 | End: 2022-07-09

## 2022-07-09 RX ORDER — LANOLIN ALCOHOL/MO/W.PET/CERES
3 CREAM (GRAM) TOPICAL AT BEDTIME
Refills: 0 | Status: DISCONTINUED | OUTPATIENT
Start: 2022-07-09 | End: 2022-07-26

## 2022-07-09 RX ORDER — THIAMINE MONONITRATE (VIT B1) 100 MG
1 TABLET ORAL
Qty: 0 | Refills: 0 | DISCHARGE

## 2022-07-09 RX ORDER — FOLIC ACID 0.8 MG
1 TABLET ORAL DAILY
Refills: 0 | Status: DISCONTINUED | OUTPATIENT
Start: 2022-07-09 | End: 2022-07-26

## 2022-07-09 RX ORDER — FERROUS SULFATE 325(65) MG
300 TABLET ORAL DAILY
Refills: 0 | Status: DISCONTINUED | OUTPATIENT
Start: 2022-07-09 | End: 2022-07-26

## 2022-07-09 RX ORDER — SODIUM CHLORIDE 9 MG/ML
1000 INJECTION, SOLUTION INTRAVENOUS
Refills: 0 | Status: DISCONTINUED | OUTPATIENT
Start: 2022-07-09 | End: 2022-07-13

## 2022-07-09 RX ADMIN — SODIUM CHLORIDE 1000 MILLILITER(S): 9 INJECTION INTRAMUSCULAR; INTRAVENOUS; SUBCUTANEOUS at 14:16

## 2022-07-09 RX ADMIN — SODIUM CHLORIDE 1000 MILLILITER(S): 9 INJECTION, SOLUTION INTRAVENOUS at 23:18

## 2022-07-09 RX ADMIN — SODIUM CHLORIDE 1000 MILLILITER(S): 9 INJECTION INTRAMUSCULAR; INTRAVENOUS; SUBCUTANEOUS at 19:25

## 2022-07-09 RX ADMIN — Medication 975 MILLIGRAM(S): at 19:25

## 2022-07-09 RX ADMIN — Medication 250 MILLIGRAM(S): at 23:17

## 2022-07-09 RX ADMIN — CEFEPIME 100 MILLIGRAM(S): 1 INJECTION, POWDER, FOR SOLUTION INTRAMUSCULAR; INTRAVENOUS at 23:17

## 2022-07-09 RX ADMIN — Medication 975 MILLIGRAM(S): at 14:15

## 2022-07-09 NOTE — ED PROVIDER NOTE - PHYSICAL EXAMINATION
VITAL SIGNS: I have reviewed nursing notes and confirm.  CONSTITUTIONAL: chronically ill-appearing female in no acute distress  SKIN: Skin exam is warm and dry, no acute rash or ulceration noted.   HEAD: Normocephalic; atraumatic.  EYES: PERRL  ENT: trach in place without surrounding erythema or drainage  CARD: S1S2 tachycardic no murmurs appreciated  RESP: transmitted upper airway sounds noted b/l, no wheezing/rales/rhonchi appreciated  ABD: soft, NT/ND, G-tube in place without surrounding erythema or drainage  EXT: extremities are contracted with limited ROM.   NEURO: PERRL. Pt is awake, able to follow commands.   PSYCH: Cooperative, appropriate. VITAL SIGNS: I have reviewed nursing notes and confirm.  CONSTITUTIONAL: chronically ill-appearing female in no acute distress  SKIN: Skin exam is warm and dry, no acute rash or ulceration noted.   HEAD: Normocephalic; atraumatic.  EYES: PERRL  ENT: trach in place without surrounding erythema or drainage  CARD: S1S2 tachycardic no murmurs appreciated  RESP: transmitted upper airway sounds noted b/l, no wheezing/rales/rhonchi appreciated  ABD: soft, NT/ND, G-tube in place without surrounding erythema or drainage  EXT: extremities are contracted with limited ROM.   NEURO: PERRL. Pt is awake, able to follow commands.

## 2022-07-09 NOTE — ED PROVIDER NOTE - NS ED ROS FT
ROS (+) for fever, otherwise unable to obtain ROS as pt is non-verbal.    Constitutional: See HPI. (+)Fever  Respiratory: trach/vent dependent  GI: G-tube dependent  Endocrine: No history of thyroid disease or diabetes.

## 2022-07-09 NOTE — ED PROVIDER NOTE - SECONDARY DIAGNOSIS.
Tracheostomy present Pneumonia due to severe acute respiratory syndrome coronavirus 2 (SARS-CoV-2) Gastrostomy tube dependent

## 2022-07-09 NOTE — ED ADULT NURSE NOTE - NSFALLRSKHRMRISKTYPE_ED_ALL_ED
Thank you for choosing [facility][note 3[facility] for your healthcare needs.  It was our pleasure to take care of you today!  Please follow the instructions provided to you after your procedure.    YOUR CARE TEAM TODAY WAS: [facility] Outpatient [facility], Jamie Schofield RN-Recovery    HANDOUTS GIVEN:  · Overview of Your Child's Anesthesia  · Tips for Feeling Better After Child's Tonsil and Adenoid  Surgery  · Tonsil Diet     If you have any questions or concerns, please call the Outpatient Surgery unit at [phone] Monday through Friday 6 AM to 6 PM. If you are unable to reach someone, please call your physician's office.     You may receive a patient satisfaction survey in the mail or by email following your visit.  Please take the time to complete this, as your feedback is very important to us.  We strive to make your experience exceptional and your comments help us with that goal.  We look forward to hearing from you!  
other

## 2022-07-09 NOTE — H&P ADULT - ATTENDING COMMENTS
# SIRS due to sepsis  # Recent COVID infection  #HTN  # SVC thrombosis  #GERD  # Iron Deficiency Anemia   # History of Wernicke Encephalopathy  # Suspected thiamine and folate deficiency      Empiric antibiotic  ID consult  Continue home medications

## 2022-07-09 NOTE — ED PROVIDER NOTE - CARE PLAN
1 Principal Discharge DX:	2019 novel coronavirus disease (COVID-19)  Secondary Diagnosis:	Pneumonia due to severe acute respiratory syndrome coronavirus 2 (SARS-CoV-2)  Secondary Diagnosis:	Tracheostomy present  Secondary Diagnosis:	Gastrostomy tube dependent

## 2022-07-09 NOTE — H&P ADULT - NSHPLABSRESULTS_GEN_ALL_CORE
11.5   11.20 )-----------( 350      ( 2022 13:32 )             39.0           143  |  103  |  13  ----------------------------<  95  5.0   |  28  |  0.5<L>    Ca    9.4      2022 13:32    TPro  7.3  /  Alb  3.7  /  TBili  <0.2  /  DBili  x   /  AST  35  /  ALT  41  /  AlkPhos  129<H>                Urinalysis Basic - ( 2022 14:28 )    Color: Yellow / Appearance: Slightly Turbid / S.036 / pH: x  Gluc: x / Ketone: Trace  / Bili: Negative / Urobili: 3 mg/dL   Blood: x / Protein: 30 mg/dL / Nitrite: Negative   Leuk Esterase: Moderate / RBC: 190 /HPF / WBC 9 /HPF   Sq Epi: x / Non Sq Epi: 2 /HPF / Bacteria: Negative        PT/INR - ( 2022 13:32 )   PT: 19.70 sec;   INR: 1.72 ratio         PTT - ( 2022 13:32 )  PTT:25.8 sec    Lactate Trend      CARDIAC MARKERS ( 2022 13:32 )  x     / <0.01 ng/mL / 176 U/L / x     / x            CAPILLARY BLOOD GLUCOSE      < from: CT Angio Chest PE Protocol w/ IV Cont (22 @ 20:57) >    No CTA evidence of acute pulmonary embolus.    Airspace consolidations predominantly within the left upper and lower   lobes with enlarged left hilar lymph nodes, most consistent with   pneumonia.    < end of copied text >

## 2022-07-09 NOTE — H&P ADULT - HISTORY OF PRESENT ILLNESS
21 y/o F with a PMHx of encephalitis s/p tooth abscess s/p tracheostomy and PEG tube placement, HTN, GERD, and SVC thrombosis who was brought to ED recent admission for Covid19 PNA and pyelonephritis in June 2022, currently being treated with IV Vancomycin for PNA, BIBEMS from [facility] for fever this AM. Per NH notes, pt spiked temp of 102.6 today and was sent to ED. Pt is non-verbal, unable to contribute to history or ROS.     in the ED,pt's VS T(C): 37.3 (07-09-22 @ 19:56), Max: 37.9 (07-09-22 @ 13:53)  HR: 112 (07-09-22 @ 19:56) (112 - 127)  BP: 91/61 (07-09-22 @ 19:56) (89/49 - 104/60)  RR: 18 (07-09-22 @ 19:56) (18 - 18)  SpO2: 100% (07-09-22 @ 19:56) (96% - 100%), labs done showed   pt received   pt is admitted for workup/management  21 y/o F with a PMHx of encephalitis s/p tooth abscess s/p tracheostomy and PEG tube placement, HTN, GERD, and SVC thrombosis , recent admission for Covid19 PNA and pyelonephritis in June 2022, currently being treated with IV Vancomycin, cefepime for PNA since 7/8, BIBEMS from [facility] for fever this AM. Per NH notes, pt spiked temp of 102.6 today and was sent to ED. Pt is non-verbal, unable to contribute to history or ROS. pt has sputum around the trach area.     in the ED,pt's VS T(C): 37.3 (07-09-22 @ 19:56), Max: 37.9 (07-09-22 @ 13:53)  HR: 112 (07-09-22 @ 19:56) (112 - 127)  BP: 91/61 (07-09-22 @ 19:56) (89/49 - 104/60)  RR: 18 (07-09-22 @ 19:56) (18 - 18)  SpO2: 100% (07-09-22 @ 19:56) (96% - 100%), labs done showed WBC 11k, creat 0.5, CRP 73,covid is positive, positive UA < from: CT Angio Chest PE Protocol w/ IV Cont (07.09.22 @ 20:57) >  No CTA evidence of acute pulmonary embolus.  Airspace consolidations predominantly within the left upper and lower   lobes with enlarged left hilar lymph nodes, most consistent with   pneumonia.    pt received cefepime, vancomycin.   pt is admitted for workup/management

## 2022-07-09 NOTE — ED PROVIDER NOTE - PROGRESS NOTE DETAILS
TN - received sign out from Dr. Monroy and Dr. Ray; pt pending CTA r/o PE; dispo TN - pt still hypotensive and tachycardic; CTA unlikely for PE; will give abx and admit;

## 2022-07-09 NOTE — H&P ADULT - NSHPPHYSICALEXAM_GEN_ALL_CORE
T(C): 37.3 (07-09-22 @ 19:56), Max: 37.9 (07-09-22 @ 13:53)  HR: 112 (07-09-22 @ 19:56) (112 - 127)  BP: 91/61 (07-09-22 @ 19:56) (89/49 - 104/60)  RR: 18 (07-09-22 @ 19:56) (18 - 18)  SpO2: 100% (07-09-22 @ 19:56) (96% - 100%)    PHYSICAL EXAM:  GENERAL: pt has trach peg, has sputum production around trach site, PEG is clean, contracted  HEAD:  Atraumatic, Normocephalic  EYES: EOMI, PERRLA, conjunctiva and sclera clear  ENT:No nasal obstruction or discharge. No tonsillar exudate, swelling or erythema.  NECK: Supple, No JVD  CHEST/LUNG: Clear to auscultation bilaterally; No wheeze  HEART: Regular rate and rhythm; No murmurs, rubs, or gallops  ABDOMEN: Soft, Nontender, Nondistended; Bowel sounds present  EXTREMITIES:  2+ Peripheral Pulses, No clubbing, cyanosis, or edema  PSYCH: AAOx3  NEUROLOGY: non-focal  SKIN: No rashes or lesions

## 2022-07-09 NOTE — H&P ADULT - ASSESSMENT
21 y/o F with a PMHx of encephalitis s/p tooth abscess s/p tracheostomy and PEG tube placement, HTN, GERD, and SVC thrombosis , recent admission for Covid19 PNA and pyelonephritis in June 2022, currently being treated with IV Vancomycin, cefepime for PNA since 7/8, BIBEMS from [facility] for fever this AM. Per NH notes, pt spiked temp of 102.6 today and was sent to ED. Pt is non-verbal, unable to contribute to history or ROS. pt has sputum around the trach area.     #Fever:  #PNA  #Covid positive  #Sepsis on admission: tachycardic, hypotensive, febrile.   #UTI  - pt's first covid test was positive in 5/22, subsequent tests negative, positive again today. ? recurrence? vs persistent infection?  - hx of MDR Klebsiella bacteremia  - in the ED,pt's VS T Max: 37.9  BP 89/49  - labs done showed WBC 11k, creat 0.5, CRP 73,covid is positive  - CT Angio Chest PE Protocol w/ IV Cont (07.09.22 @ 20:57) >  No CTA evidence of acute pulmonary embolus.  Airspace consolidations predominantly within the left upper and lower   lobes with enlarged left hilar lymph nodes, most consistent with   pneumonia.  - pt was started on cefepime and vancomycin yesterday 7/8 at [facility].  - previous cultures grew Klebsiella, proteus ESBL, MDRO, CRE.   - pt was previously on Cefiderocol 2 g IV Q8 will continue for now  - fu blood cultures, sputum culture.   - echeverria inserted in ED  - ID consult.   - SDU  - IV fluids.   - Procal  - RVP panel    #History of Hypertension  #Persistent sinus tachycardia  - decreased from metoprolol 100 mg PO BID to 50mg PO BID  - EP - if HR <130 being sinus tachy its acceptable  - has h/o prior sinus tachycardia (was on metoprolol T 50 BID at home) - will discharge pt on same dose      #HO SVC Thrombosis and Embolism   - C/w Eliquis 5 mg PO BID    #History of Encephalitis post Tooth Abscess in 10/2021   s/p Tracheostomy (has an O2 tank per sister but unsure about flow) and G-tube placement  - Monitor SaO2 and O2 requirements: as above    #GERD  * Home med Omeprazole 40mg QD  - C/w Protonix 40 mg PO daily    #Iron Deficiency Anemia   #History of Wernicke Encephalopathy  #Suspected thiamine deficiency  #Suspected folic acid deficiency  * Home med iron replacement, thiamine, B12, and folic acid  - C/w home iron replacement, thiamine, B12, and folic acid  - Monitor for now    #DVT ppx: Eliquis 5 mg PO BID  #GI ppx: Protonix 40 mg PO daily  #Diet: NPO w G-tube feeds  #Activity: Functional quadriplegic  #Dispo: From [facility];  #Code status: Full code -- has MOLST confirming

## 2022-07-09 NOTE — ED ADULT NURSE NOTE - NSIMPLEMENTINTERV_GEN_ALL_ED
Implemented All Fall with Harm Risk Interventions:  [place] to call system. Call bell, personal items and telephone within reach. Instruct patient to call for assistance. Room bathroom lighting operational. Non-slip footwear when patient is off stretcher. Physically safe environment: no spills, clutter or unnecessary equipment. Stretcher in lowest position, wheels locked, appropriate side rails in place. Provide visual cue, wrist band, yellow gown, etc. Monitor gait and stability. Monitor for mental status changes and reorient to person, place, and time. Review medications for side effects contributing to fall risk. Reinforce activity limits and safety measures with patient and family. Provide visual clues: red socks.

## 2022-07-09 NOTE — ED PROVIDER NOTE - ATTENDING CONTRIBUTION TO CARE
22-year-old female to ED with fever cough URI and COVID-positive.  Patient [facility] and HPI ROS limited as she is nonverbal.In ED patient is febrile tachycardic and rapid respiratory rate. Records reviewed and nursing home documents reviewed.Exam as noted

## 2022-07-09 NOTE — H&P ADULT - NSICDXPASTMEDICALHX_GEN_ALL_CORE_FT
PAST MEDICAL HISTORY:  Acute deep vein thrombosis (DVT) of superior vena cava     Encephalitis     GERD (gastroesophageal reflux disease)     H/O tracheostomy     HTN (hypertension)     PEG (percutaneous endoscopic gastrostomy) status

## 2022-07-09 NOTE — ED PROVIDER NOTE - OBJECTIVE STATEMENT
23 y/o F with PMH of encephalitis after dental abscess in 10/2021 with subsequent decline, now trach/vent dependent, G-tube dependent, recent admission for Covid19 PNA and pyelonephritis in June 2022, currently being treated with IV Vancomycin for PNA, [facility]ANNA from [facility] for fever this AM. Per NH notes, pt spiked temp of 102.6 today and was sent to ED. Pt is non-verbal, unable to contribute to history or ROS.

## 2022-07-10 LAB
ALBUMIN SERPL ELPH-MCNC: 3.5 G/DL — SIGNIFICANT CHANGE UP (ref 3.5–5.2)
ALP SERPL-CCNC: 120 U/L — HIGH (ref 30–115)
ALT FLD-CCNC: 30 U/L — SIGNIFICANT CHANGE UP (ref 0–41)
ANION GAP SERPL CALC-SCNC: 12 MMOL/L — SIGNIFICANT CHANGE UP (ref 7–14)
AST SERPL-CCNC: 20 U/L — SIGNIFICANT CHANGE UP (ref 0–41)
BASOPHILS # BLD AUTO: 0.03 K/UL — SIGNIFICANT CHANGE UP (ref 0–0.2)
BASOPHILS NFR BLD AUTO: 0.2 % — SIGNIFICANT CHANGE UP (ref 0–1)
BILIRUB SERPL-MCNC: <0.2 MG/DL — SIGNIFICANT CHANGE UP (ref 0.2–1.2)
BUN SERPL-MCNC: 7 MG/DL — LOW (ref 10–20)
CALCIUM SERPL-MCNC: 9 MG/DL — SIGNIFICANT CHANGE UP (ref 8.5–10.1)
CHLORIDE SERPL-SCNC: 102 MMOL/L — SIGNIFICANT CHANGE UP (ref 98–110)
CO2 SERPL-SCNC: 28 MMOL/L — SIGNIFICANT CHANGE UP (ref 17–32)
CREAT SERPL-MCNC: <0.5 MG/DL — LOW (ref 0.7–1.5)
EGFR: 143 ML/MIN/1.73M2 — SIGNIFICANT CHANGE UP
EOSINOPHIL # BLD AUTO: 0.28 K/UL — SIGNIFICANT CHANGE UP (ref 0–0.7)
EOSINOPHIL NFR BLD AUTO: 1.9 % — SIGNIFICANT CHANGE UP (ref 0–8)
FERRITIN SERPL-MCNC: 94 NG/ML — SIGNIFICANT CHANGE UP (ref 15–150)
GLUCOSE SERPL-MCNC: 68 MG/DL — LOW (ref 70–99)
HCT VFR BLD CALC: 36.6 % — LOW (ref 37–47)
HGB BLD-MCNC: 10.9 G/DL — LOW (ref 12–16)
IMM GRANULOCYTES NFR BLD AUTO: 0.3 % — SIGNIFICANT CHANGE UP (ref 0.1–0.3)
LYMPHOCYTES # BLD AUTO: 0.96 K/UL — LOW (ref 1.2–3.4)
LYMPHOCYTES # BLD AUTO: 6.5 % — LOW (ref 20.5–51.1)
MCHC RBC-ENTMCNC: 23.7 PG — LOW (ref 27–31)
MCHC RBC-ENTMCNC: 29.8 G/DL — LOW (ref 32–37)
MCV RBC AUTO: 79.7 FL — LOW (ref 81–99)
MONOCYTES # BLD AUTO: 1.13 K/UL — HIGH (ref 0.1–0.6)
MONOCYTES NFR BLD AUTO: 7.6 % — SIGNIFICANT CHANGE UP (ref 1.7–9.3)
NEUTROPHILS # BLD AUTO: 12.4 K/UL — HIGH (ref 1.4–6.5)
NEUTROPHILS NFR BLD AUTO: 83.5 % — HIGH (ref 42.2–75.2)
NRBC # BLD: 0 /100 WBCS — SIGNIFICANT CHANGE UP (ref 0–0)
PLATELET # BLD AUTO: 342 K/UL — SIGNIFICANT CHANGE UP (ref 130–400)
POTASSIUM SERPL-MCNC: 4.2 MMOL/L — SIGNIFICANT CHANGE UP (ref 3.5–5)
POTASSIUM SERPL-SCNC: 4.2 MMOL/L — SIGNIFICANT CHANGE UP (ref 3.5–5)
PROCALCITONIN SERPL-MCNC: 0.11 NG/ML — HIGH (ref 0.02–0.1)
PROT SERPL-MCNC: 6.8 G/DL — SIGNIFICANT CHANGE UP (ref 6–8)
RBC # BLD: 4.59 M/UL — SIGNIFICANT CHANGE UP (ref 4.2–5.4)
RBC # FLD: 16.2 % — HIGH (ref 11.5–14.5)
SODIUM SERPL-SCNC: 142 MMOL/L — SIGNIFICANT CHANGE UP (ref 135–146)
WBC # BLD: 14.85 K/UL — HIGH (ref 4.8–10.8)
WBC # FLD AUTO: 14.85 K/UL — HIGH (ref 4.8–10.8)

## 2022-07-10 PROCEDURE — 99223 1ST HOSP IP/OBS HIGH 75: CPT

## 2022-07-10 PROCEDURE — 99233 SBSQ HOSP IP/OBS HIGH 50: CPT

## 2022-07-10 RX ORDER — METOPROLOL TARTRATE 50 MG
50 TABLET ORAL
Refills: 0 | Status: DISCONTINUED | OUTPATIENT
Start: 2022-07-10 | End: 2022-07-23

## 2022-07-10 RX ORDER — CEFIDEROCOL SULFATE TOSYLATE 1 G/10ML
2000 INJECTION, POWDER, FOR SOLUTION INTRAVENOUS EVERY 8 HOURS
Refills: 0 | Status: DISCONTINUED | OUTPATIENT
Start: 2022-07-10 | End: 2022-07-15

## 2022-07-10 RX ORDER — ACETAMINOPHEN 500 MG
1000 TABLET ORAL ONCE
Refills: 0 | Status: COMPLETED | OUTPATIENT
Start: 2022-07-10 | End: 2022-07-10

## 2022-07-10 RX ORDER — CEFEPIME 1 G/1
1000 INJECTION, POWDER, FOR SOLUTION INTRAMUSCULAR; INTRAVENOUS EVERY 8 HOURS
Refills: 0 | Status: DISCONTINUED | OUTPATIENT
Start: 2022-07-10 | End: 2022-07-10

## 2022-07-10 RX ORDER — METOPROLOL TARTRATE 50 MG
100 TABLET ORAL
Refills: 0 | Status: DISCONTINUED | OUTPATIENT
Start: 2022-07-10 | End: 2022-07-10

## 2022-07-10 RX ORDER — VANCOMYCIN HCL 1 G
1000 VIAL (EA) INTRAVENOUS EVERY 24 HOURS
Refills: 0 | Status: DISCONTINUED | OUTPATIENT
Start: 2022-07-10 | End: 2022-07-11

## 2022-07-10 RX ADMIN — APIXABAN 5 MILLIGRAM(S): 2.5 TABLET, FILM COATED ORAL at 06:30

## 2022-07-10 RX ADMIN — ROBINUL 2 MILLIGRAM(S): 0.2 INJECTION INTRAMUSCULAR; INTRAVENOUS at 23:00

## 2022-07-10 RX ADMIN — Medication 10 MILLIGRAM(S): at 07:45

## 2022-07-10 RX ADMIN — CEFIDEROCOL SULFATE TOSYLATE 33.33 MILLIGRAM(S): 1 INJECTION, POWDER, FOR SOLUTION INTRAVENOUS at 07:28

## 2022-07-10 RX ADMIN — ROBINUL 2 MILLIGRAM(S): 0.2 INJECTION INTRAMUSCULAR; INTRAVENOUS at 14:06

## 2022-07-10 RX ADMIN — MIDODRINE HYDROCHLORIDE 5 MILLIGRAM(S): 2.5 TABLET ORAL at 22:59

## 2022-07-10 RX ADMIN — Medication 400 MILLIGRAM(S): at 10:19

## 2022-07-10 RX ADMIN — CEFIDEROCOL SULFATE TOSYLATE 33.33 MILLIGRAM(S): 1 INJECTION, POWDER, FOR SOLUTION INTRAVENOUS at 14:05

## 2022-07-10 RX ADMIN — CYCLOBENZAPRINE HYDROCHLORIDE 5 MILLIGRAM(S): 10 TABLET, FILM COATED ORAL at 22:59

## 2022-07-10 RX ADMIN — PREGABALIN 1000 MICROGRAM(S): 225 CAPSULE ORAL at 14:05

## 2022-07-10 RX ADMIN — Medication 250 MILLIGRAM(S): at 19:13

## 2022-07-10 RX ADMIN — Medication 1 MILLIGRAM(S): at 11:07

## 2022-07-10 RX ADMIN — SODIUM CHLORIDE 100 MILLILITER(S): 9 INJECTION, SOLUTION INTRAVENOUS at 07:31

## 2022-07-10 RX ADMIN — MIDODRINE HYDROCHLORIDE 5 MILLIGRAM(S): 2.5 TABLET ORAL at 18:10

## 2022-07-10 RX ADMIN — CEFIDEROCOL SULFATE TOSYLATE 33.33 MILLIGRAM(S): 1 INJECTION, POWDER, FOR SOLUTION INTRAVENOUS at 23:06

## 2022-07-10 RX ADMIN — CYCLOBENZAPRINE HYDROCHLORIDE 5 MILLIGRAM(S): 10 TABLET, FILM COATED ORAL at 06:32

## 2022-07-10 RX ADMIN — Medication 10 MILLIGRAM(S): at 22:58

## 2022-07-10 RX ADMIN — APIXABAN 5 MILLIGRAM(S): 2.5 TABLET, FILM COATED ORAL at 18:10

## 2022-07-10 RX ADMIN — Medication 10 MILLIGRAM(S): at 18:10

## 2022-07-10 RX ADMIN — Medication 10 MILLIGRAM(S): at 11:06

## 2022-07-10 RX ADMIN — Medication 100 MILLIGRAM(S): at 18:10

## 2022-07-10 RX ADMIN — Medication 100 MILLIGRAM(S): at 06:35

## 2022-07-10 RX ADMIN — Medication 650 MILLIGRAM(S): at 07:28

## 2022-07-10 RX ADMIN — Medication 300 MILLIGRAM(S): at 14:06

## 2022-07-10 RX ADMIN — ROBINUL 2 MILLIGRAM(S): 0.2 INJECTION INTRAMUSCULAR; INTRAVENOUS at 06:32

## 2022-07-10 RX ADMIN — PANTOPRAZOLE SODIUM 40 MILLIGRAM(S): 20 TABLET, DELAYED RELEASE ORAL at 07:29

## 2022-07-10 RX ADMIN — CYCLOBENZAPRINE HYDROCHLORIDE 5 MILLIGRAM(S): 10 TABLET, FILM COATED ORAL at 13:53

## 2022-07-10 NOTE — PROGRESS NOTE ADULT - ASSESSMENT
23 y/o F with a PMHx of encephalitis s/p tooth abscess s/p tracheostomy and PEG tube placement, HTN, GERD, and SVC thrombosis who was brought to ED from [place] for fevers. Admitted for tx for sepsis due to PNA.    #Sepsis, POA  #Suspected GNR Pneumonia  CT Chest PE protocol (07/09): Airspace consolidations predominantly within the left upper and lower   lobes with enlarged left hilar lymph nodes, most consistent with   pneumonia. No evidence of PE  Previous sputum cx with carbapenem resistent kleb  - Dose amikacin today, once weight available  - Vancomycin 15mg/kg BID  - Cont cefiderocol 2g q8h  - DTA, BCx, UCx   - IVF    #Sinus tachycardia  d/t sepsis  On previous admission, pt was persistently tachycardic at baseline to 100-120s. EP eval was done, HR<130 was acceptable. Currently tachycardic due to ongoing sepsis and fevers  - Cont metoprolol 50 BID    #HO SVC Thrombosis and Embolism   - C/w Eliquis 5 mg PO BID    #History of Encephalitis post Tooth Abscess in 10/2021   s/p Tracheostomy (has an O2 tank per sister but unsure about flow) and G-tube placement  - Monitor SaO2 and O2 requirements: as above    #GERD  * Home med Omeprazole 40mg QD  - C/w Protonix 40 mg PO daily    #Iron Deficiency Anemia   #History of Wernicke Encephalopathy  #Suspected thiamine deficiency  #Suspected folic acid deficiency  * Home med iron replacement, thiamine, B12, and folic acid  - C/w home iron replacement, thiamine, B12, and folic acid  - Monitor for now    #DVT ppx: Eliquis 5 mg PO BID    #Progress Note Handoff  Pending (specify): BCx, cont IV abx, resolution of fevers, sputum cx  Family discussion: d/w pt's sister regarding management of PNA  Disposition: Home

## 2022-07-10 NOTE — CONSULT NOTE ADULT - ASSESSMENT
21 y/o F with a PMHx of encephalitis s/p tooth abscess s/p tracheostomy and PEG tube placement, HTN, GERD, and SVC thrombosis , recent admission for Covid19 PNA and pyelonephritis in June 2022, currently being treated with IV Vancomycin, cefepime for PNA since 7/8, BIBEMS from [facility] for fever this AM. Per NH notes, pt spiked temp of 102.6 today and was sent to ED.     IMPRESSION;   #Sepsis on admission T>101 P>90 WBC 14 secondary to suspected GNR PNA , also COVID19 +     Procalcitonin, Serum: 0.11 ng/mL (07-09-22 @ 13:32)    UA without significant pyuria     CT Airspace consolidations predominantly within the left upper and lower   lobes with enlarged left hilar lymph nodes, most consistent with   pneumonia.    6/2 trach  Numerous Klebsiella pneumoniae (Carbapenem Resistant)    Numerous Proteus mirabilis ESBL  #+COVID19, cannot rule out re-infection vs continues PCR positivity    +5/16/2022 COVID19 , unvaccinated  #5/2022 Recent Septic shock secondary to CRE Kleb     5/29-5/31 BCX NGTD     5/28 BCX  Klebsiella pneumoniae (Carbapenem Resistant)    5/27 BCx Klebsiella pneumoniae (Carbapenem Resistant)    5/27 UCx Klebsiella pneumoniae (Carbapenem Resistant)    Pyuria  #Trach/PEG  Creatinine, Serum: <0.5 mg/dL (07.10.22 @ 06:01)      RECOMMENDATIONS;  - dose amikacin 5mg/kg x1  - s/p vancomycin 1g 7/9-   - Please enter weight into [facility]- Continue Vanc 15 mg/kg q12h IV until BCX , DTA results  - Continue cefiderocol IVPB 2000 milliGRAM(s) IV Intermittent every 8 hours x 7 days   - f/u BCX  - Send trach cx  - Off loading to prevent pressure sores and preventive measures to avoid aspiration   - Colonized with MRDO, GOC , grave prognosis  - Trend WBC    If any questions, please call or send a message on [employer] Teams  Please continue to update ID with any pertinent new laboratory or radiographic findings  Spectra [id number]

## 2022-07-10 NOTE — GOALS OF CARE CONVERSATION - ADVANCED CARE PLANNING - CONVERSATION DETAILS
GOC discussed with patient's sister given sepsis due to MDRO. Informed of pt's poor prognosis as patient's bacterial infection are likely to become more resistant as we continue treating with specialized antibiotics. Sister would like patient to remain full code.

## 2022-07-10 NOTE — PROGRESS NOTE ADULT - SUBJECTIVE AND OBJECTIVE BOX
CONSUELO CARO  22y, Female  Allergy: Allergy Status Unknown    Hospital Day: 1d    Patient seen and examined. 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2   |  28  |  <0.5<L>    Ca    9.0      10 Jul 2022 06:01    TPro  6.8  /  Alb  3.5  /  TBili  <0.2  /  DBili  x   /  AST  20  /  ALT  30  /  AlkPhos  120<H>  0710    LIVER FUNCTIONS - ( 10 Jul 2022 06:01 )  Alb: 3.5 g/dL / Pro: 6.8 g/dL / ALK PHOS: 120 U/L / ALT: 30 U/L / AST: 20 U/L / GGT: x           CARDIAC MARKERS ( 2022 13:32 )  x     / <0.01 ng/mL / 176 U/L / x     / x            Urinalysis Basic - ( 2022 14:28 )    Color: Yellow / Appearance: Slightly Turbid / S.036 / pH: x  Gluc: x / Ketone: Trace  / Bili: Negative / Urobili: 3 mg/dL   Blood: x / Protein: 30 mg/dL / Nitrite: Negative   Leuk Esterase: Moderate / RBC: 190 /HPF / WBC 9 /HPF   Sq Epi: x / Non Sq Epi: 2 /HPF / Bacteria: Negative        RADIOLOGY & ADDITIONAL TESTS:    RECENT DIAGNOSTIC ORDERS:  MRSA/MSSA PCR: Routine (07-10-22 @ 12:09)  Magnesium, Serum: AM Sched. Collection: 2022 04:30 (07-10-22 @ 08:39)  Comprehensive Metabolic Panel: AM Sched. Collection: 2022 04:30 (07-10-22 @ 08:39)  Complete Blood Count: AM Sched. Collection: 2022 04:30 (07-10-22 @ 08:39)  MRSA/MSSA PCR: Routine (22 @ 23:15)  Culture - Sputum: Routine  Specimen Source: Sputum (22 @ 23:15)  Culture - Blood: AM Sched. Collection:10-Jul-2022 04:30  Specimen Source: Blood-Arterial (22 @ 23:15)  Procalcitonin, Serum: AM Sched. Collection: 10-Jul-2022 04:30 (22 @ 23:15)      MEDICATIONS:  MEDICATIONS  (STANDING):  ALBUTerol    90 MICROgram(s) HFA Inhaler 1 Puff(s) Inhalation every 4 hours  apixaban 5 milliGRAM(s) Oral two times a day  cefiderocol IVPB 2000 milliGRAM(s) IV Intermittent every 8 hours  cyanocobalamin 1000 MICROGram(s) Oral daily  cyclobenzaprine Oral Tab/Cap - Peds 5 milliGRAM(s) Oral three times a day  ferrous    sulfate Liquid 300 milliGRAM(s) Enteral Tube daily  folic acid  Oral Tab/Cap - Peds 1 milliGRAM(s) Oral daily  glycopyrrolate 2 milliGRAM(s) Oral three times a day  ipratropium 17 MICROgram(s) HFA Inhaler 1 Puff(s) Inhalation every 6 hours  lactated ringers. 1000 milliLiter(s) (100 mL/Hr) IV Continuous <Continuous>  levoFLOXacin IVPB      metoclopramide 10 milliGRAM(s) Oral Before meals and at bedtime  metoprolol tartrate 100 milliGRAM(s) Oral two times a day  midodrine 5 milliGRAM(s) Oral every 8 hours  pantoprazole    Tablet 40 milliGRAM(s) Oral before breakfast  thiamine  Oral Tab/Cap - Peds 100 milliGRAM(s) Oral two times a day  vancomycin  IVPB 1000 milliGRAM(s) IV Intermittent every 24 hours    MEDICATIONS  (PRN):  acetaminophen     Tablet .. 650 milliGRAM(s) Oral every 6 hours PRN Temp greater or equal to 38C (100.4F), Mild Pain (1 - 3)  aluminum hydroxide/magnesium hydroxide/simethicone Suspension 30 milliLiter(s) Oral every 4 hours PRN Dyspepsia  melatonin 3 milliGRAM(s) Oral at bedtime PRN Insomnia  ondansetron Injectable 4 milliGRAM(s) IV Push every 8 hours PRN Nausea and/or Vomiting      HOME MEDICATIONS:  albuterol 90 mcg/inh inhalation aerosol with adapter ()  cyclobenzaprine 5 mg oral tablet ()  Eliquis ()  ferrous sulfate 220 mg/5 mL (44 mg/5 mL elemental iron) oral elixir ()  folic acid 1 mg oral tablet ()  glycopyrrolate 2 mg oral tablet ()  ipratropium 18 mcg/inh inhalation aerosol ()  Lopressor 50 mg oral tablet ()  metoclopramide 10 mg oral tablet ()  midodrine 5 mg oral tablet ()  omeprazole 40 mg oral delayed release capsule ()  scopolamine 0.4 mg oral tablet ()  Tylenol 325 mg oral capsule ()  Vitamin B1 100 mg oral tablet ()  Vitamin B12 500 mcg oral tablet ()      REVIEW OF SYSTEMS:  All other review of systems is negative unless indicated above.     PHYSICAL EXAM:  PHYSICAL EXAM:  GENERAL: NAD, well-developed  HEAD:  Atraumatic, Normocephalic  NECK: Supple, No JVD  CHEST/LUNG: Clear to auscultation bilaterally; No wheeze  HEART: Regular rate and rhythm; No murmurs, rubs, or gallops  ABDOMEN: Soft, Nontender, Nondistended; Bowel sounds present  EXTREMITIES:  2+ Peripheral Pulses, No clubbing, cyanosis, or edema  SKIN: No rashes or lesions

## 2022-07-10 NOTE — CONSULT NOTE ADULT - ASSESSMENT
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 HOB @ 45 degrees. Aspiration precautions     CARDIOVASCULAR: Keep I=O. D/c fluids if feeding. Obtain echo.    GI: Feeding as tolerated through PEG. Bowel regimen     RENAL: Follow up lytes. Correct as needed    INFECTIOUS DISEASE: Abx per ID: On cefiderocol, amikacin, vacomycin. Obtain RVP, DTA, blood cultures, MRSA.    HEMATOLOGICAL: DVT prophylaxis: Lovenox    ENDOCRINE:  Follow up FS.  Insulin protocol if needed.    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 HOB @ 45 degrees. Aspiration precautions. CT shows a small L basilar opacity/consolidation which can represent pneumonia.     CARDIOVASCULAR: Keep I=O. D/c fluids if feeding. Obtain echo.    GI: Feeding as tolerated through PEG. Bowel regimen     RENAL: Follow up lytes. Correct as needed    INFECTIOUS DISEASE: Abx per ID: On cefiderocol, amikacin, vacomycin. Obtain RVP, DTA cx, blood cultures, nasal MRSA. De-escalate abx according to culture results.     HEMATOLOGICAL: DVT prophylaxis: Lovenox    ENDOCRINE:  Follow up FS.  Insulin protocol if needed.    MUSCULOSKELETAL: bed rest    Dispo: SDU for now

## 2022-07-11 LAB
ALBUMIN SERPL ELPH-MCNC: 3 G/DL — LOW (ref 3.5–5.2)
ALP SERPL-CCNC: 107 U/L — SIGNIFICANT CHANGE UP (ref 30–115)
ALT FLD-CCNC: 22 U/L — SIGNIFICANT CHANGE UP (ref 0–41)
ANION GAP SERPL CALC-SCNC: 14 MMOL/L — SIGNIFICANT CHANGE UP (ref 7–14)
AST SERPL-CCNC: 18 U/L — SIGNIFICANT CHANGE UP (ref 0–41)
BILIRUB SERPL-MCNC: 0.2 MG/DL — SIGNIFICANT CHANGE UP (ref 0.2–1.2)
BUN SERPL-MCNC: 6 MG/DL — LOW (ref 10–20)
CALCIUM SERPL-MCNC: 8.9 MG/DL — SIGNIFICANT CHANGE UP (ref 8.5–10.1)
CHLORIDE SERPL-SCNC: 99 MMOL/L — SIGNIFICANT CHANGE UP (ref 98–110)
CO2 SERPL-SCNC: 28 MMOL/L — SIGNIFICANT CHANGE UP (ref 17–32)
CREAT SERPL-MCNC: <0.5 MG/DL — LOW (ref 0.7–1.5)
EGFR: 143 ML/MIN/1.73M2 — SIGNIFICANT CHANGE UP
GLUCOSE BLDC GLUCOMTR-MCNC: 158 MG/DL — HIGH (ref 70–99)
GLUCOSE BLDC GLUCOMTR-MCNC: 66 MG/DL — LOW (ref 70–99)
GLUCOSE BLDC GLUCOMTR-MCNC: 68 MG/DL — LOW (ref 70–99)
GLUCOSE SERPL-MCNC: 50 MG/DL — CRITICAL LOW (ref 70–99)
GRAM STN FLD: SIGNIFICANT CHANGE UP
HCT VFR BLD CALC: 32.4 % — LOW (ref 37–47)
HGB BLD-MCNC: 9.6 G/DL — LOW (ref 12–16)
MAGNESIUM SERPL-MCNC: 1.7 MG/DL — LOW (ref 1.8–2.4)
MCHC RBC-ENTMCNC: 23.5 PG — LOW (ref 27–31)
MCHC RBC-ENTMCNC: 29.6 G/DL — LOW (ref 32–37)
MCV RBC AUTO: 79.2 FL — LOW (ref 81–99)
MRSA PCR RESULT.: NEGATIVE — SIGNIFICANT CHANGE UP
NRBC # BLD: 0 /100 WBCS — SIGNIFICANT CHANGE UP (ref 0–0)
PLATELET # BLD AUTO: 319 K/UL — SIGNIFICANT CHANGE UP (ref 130–400)
POTASSIUM SERPL-MCNC: 3.8 MMOL/L — SIGNIFICANT CHANGE UP (ref 3.5–5)
POTASSIUM SERPL-SCNC: 3.8 MMOL/L — SIGNIFICANT CHANGE UP (ref 3.5–5)
PROCALCITONIN SERPL-MCNC: 0.07 NG/ML — SIGNIFICANT CHANGE UP (ref 0.02–0.1)
PROT SERPL-MCNC: 5.8 G/DL — LOW (ref 6–8)
RBC # BLD: 4.09 M/UL — LOW (ref 4.2–5.4)
RBC # FLD: 15.9 % — HIGH (ref 11.5–14.5)
SODIUM SERPL-SCNC: 141 MMOL/L — SIGNIFICANT CHANGE UP (ref 135–146)
SPECIMEN SOURCE: SIGNIFICANT CHANGE UP
WBC # BLD: 12.77 K/UL — HIGH (ref 4.8–10.8)
WBC # FLD AUTO: 12.77 K/UL — HIGH (ref 4.8–10.8)

## 2022-07-11 PROCEDURE — L9981: CPT

## 2022-07-11 PROCEDURE — 76942 ECHO GUIDE FOR BIOPSY: CPT | Mod: 26

## 2022-07-11 PROCEDURE — 36000 PLACE NEEDLE IN VEIN: CPT

## 2022-07-11 PROCEDURE — 99233 SBSQ HOSP IP/OBS HIGH 50: CPT

## 2022-07-11 RX ORDER — DEXTROSE 50 % IN WATER 50 %
25 SYRINGE (ML) INTRAVENOUS ONCE
Refills: 0 | Status: DISCONTINUED | OUTPATIENT
Start: 2022-07-11 | End: 2022-07-26

## 2022-07-11 RX ORDER — VANCOMYCIN HCL 1 G
VIAL (EA) INTRAVENOUS
Refills: 0 | Status: DISCONTINUED | OUTPATIENT
Start: 2022-07-11 | End: 2022-07-12

## 2022-07-11 RX ORDER — VANCOMYCIN HCL 1 G
1000 VIAL (EA) INTRAVENOUS ONCE
Refills: 0 | Status: COMPLETED | OUTPATIENT
Start: 2022-07-11 | End: 2022-07-11

## 2022-07-11 RX ORDER — VANCOMYCIN HCL 1 G
1000 VIAL (EA) INTRAVENOUS EVERY 12 HOURS
Refills: 0 | Status: DISCONTINUED | OUTPATIENT
Start: 2022-07-11 | End: 2022-07-12

## 2022-07-11 RX ORDER — AMIKACIN SULFATE 250 MG/ML
350 INJECTION, SOLUTION INTRAMUSCULAR; INTRAVENOUS ONCE
Refills: 0 | Status: COMPLETED | OUTPATIENT
Start: 2022-07-11 | End: 2022-07-11

## 2022-07-11 RX ORDER — MAGNESIUM SULFATE 500 MG/ML
2 VIAL (ML) INJECTION ONCE
Refills: 0 | Status: COMPLETED | OUTPATIENT
Start: 2022-07-11 | End: 2022-07-11

## 2022-07-11 RX ADMIN — PREGABALIN 1000 MICROGRAM(S): 225 CAPSULE ORAL at 12:38

## 2022-07-11 RX ADMIN — CYCLOBENZAPRINE HYDROCHLORIDE 5 MILLIGRAM(S): 10 TABLET, FILM COATED ORAL at 12:39

## 2022-07-11 RX ADMIN — CEFIDEROCOL SULFATE TOSYLATE 33.33 MILLIGRAM(S): 1 INJECTION, POWDER, FOR SOLUTION INTRAVENOUS at 16:05

## 2022-07-11 RX ADMIN — MIDODRINE HYDROCHLORIDE 5 MILLIGRAM(S): 2.5 TABLET ORAL at 06:17

## 2022-07-11 RX ADMIN — Medication 10 MILLIGRAM(S): at 16:05

## 2022-07-11 RX ADMIN — Medication 500 MILLIGRAM(S): at 10:24

## 2022-07-11 RX ADMIN — ROBINUL 2 MILLIGRAM(S): 0.2 INJECTION INTRAMUSCULAR; INTRAVENOUS at 16:05

## 2022-07-11 RX ADMIN — MIDODRINE HYDROCHLORIDE 5 MILLIGRAM(S): 2.5 TABLET ORAL at 23:47

## 2022-07-11 RX ADMIN — Medication 1 MILLIGRAM(S): at 12:37

## 2022-07-11 RX ADMIN — Medication 10 MILLIGRAM(S): at 06:18

## 2022-07-11 RX ADMIN — Medication 100 MILLIGRAM(S): at 06:17

## 2022-07-11 RX ADMIN — AMIKACIN SULFATE 101.4 MILLIGRAM(S): 250 INJECTION, SOLUTION INTRAMUSCULAR; INTRAVENOUS at 10:24

## 2022-07-11 RX ADMIN — ROBINUL 2 MILLIGRAM(S): 0.2 INJECTION INTRAMUSCULAR; INTRAVENOUS at 06:18

## 2022-07-11 RX ADMIN — SODIUM CHLORIDE 100 MILLILITER(S): 9 INJECTION, SOLUTION INTRAVENOUS at 06:15

## 2022-07-11 RX ADMIN — CYCLOBENZAPRINE HYDROCHLORIDE 5 MILLIGRAM(S): 10 TABLET, FILM COATED ORAL at 06:17

## 2022-07-11 RX ADMIN — MIDODRINE HYDROCHLORIDE 5 MILLIGRAM(S): 2.5 TABLET ORAL at 12:39

## 2022-07-11 RX ADMIN — Medication 10 MILLIGRAM(S): at 23:47

## 2022-07-11 RX ADMIN — Medication 100 MILLIGRAM(S): at 17:57

## 2022-07-11 RX ADMIN — PANTOPRAZOLE SODIUM 40 MILLIGRAM(S): 20 TABLET, DELAYED RELEASE ORAL at 06:18

## 2022-07-11 RX ADMIN — Medication 10 MILLIGRAM(S): at 12:38

## 2022-07-11 RX ADMIN — APIXABAN 5 MILLIGRAM(S): 2.5 TABLET, FILM COATED ORAL at 06:16

## 2022-07-11 RX ADMIN — Medication 500 MILLIGRAM(S): at 17:57

## 2022-07-11 RX ADMIN — Medication 300 MILLIGRAM(S): at 12:37

## 2022-07-11 RX ADMIN — APIXABAN 5 MILLIGRAM(S): 2.5 TABLET, FILM COATED ORAL at 17:56

## 2022-07-11 RX ADMIN — CYCLOBENZAPRINE HYDROCHLORIDE 5 MILLIGRAM(S): 10 TABLET, FILM COATED ORAL at 23:47

## 2022-07-11 RX ADMIN — Medication 50 MILLIGRAM(S): at 17:57

## 2022-07-11 RX ADMIN — CEFIDEROCOL SULFATE TOSYLATE 33.33 MILLIGRAM(S): 1 INJECTION, POWDER, FOR SOLUTION INTRAVENOUS at 06:28

## 2022-07-11 RX ADMIN — Medication 25 GRAM(S): at 14:18

## 2022-07-11 NOTE — PROGRESS NOTE ADULT - SUBJECTIVE AND OBJECTIVE BOX
Patient is a 22y old  Female who presents with a chief complaint of fever (10 Jul 2022 14:18)        Over Night Events:        ROS:     All ROS are negative except HPI         PHYSICAL EXAM    ICU Vital Signs Last 24 Hrs  T(C): 37.1 (:30), Max: 39.4 (10 Jul 2022 09:19)  T(F): 98.7 (2022 01:30), Max: 103 (10 Jul 2022 09:19)  HR: 101 (30) (101 - 150)  BP: 108/57 (:30) (108/57 - 120/56)  BP(mean): 77 (:) (77 - 77)  ABP: --  ABP(mean): --  RR: 18 (:30) (18 - 20)  SpO2: 100% (:30) (97% - 100%)    O2 Parameters below as of :30  Patient On (Oxygen Delivery Method): T-piece            CONSTITUTIONAL:  Well nourished.  NAD    ENT:   Airway patent,   Mouth with normal mucosa.   No thrush    EYES:   Pupils equal,   Round and reactive to light.    CARDIAC:   Normal rate,   Regular rhythm.    No edema      Vascular:  Normal systolic impulse  No Carotid bruits    RESPIRATORY:   No wheezing  Bilateral BS  Normal chest expansion  Not tachypneic,  No use of accessory muscles    GASTROINTESTINAL:  Abdomen soft,   Non-tender,   No guarding,   + BS    MUSCULOSKELETAL:   Range of motion is not limited,  No clubbing, cyanosis    NEUROLOGICAL:   Alert and oriented   No motor  deficits.    SKIN:   Skin normal color for race,   Warm and dry and intact.   No evidence of rash.    PSYCHIATRIC:   Normal mood and affect.   No apparent risk to self or others.    HEMATOLOGICAL:  No cervical  lymphadenopathy.  no inguinal lymphadenopathy        LABS:                            10.9   14.85 )-----------( 342      ( 10 Jul 2022 06:01 )             36.6                                               07-10    142  |  102  |  7<L>  ----------------------------<  68<L>  4.2   |  28  |  <0.5<L>    Ca    9.0      10 Jul 2022 06:01    TPro  6.8  /  Alb  3.5  /  TBili  <0.2  /  DBili  x   /  AST  20  /  ALT  30  /  AlkPhos  120<H>  07-10      PT/INR - ( 2022 13:32 )   PT: 19.70 sec;   INR: 1.72 ratio         PTT - ( 2022 13:32 )  PTT:25.8 sec                                       Urinalysis Basic - ( 2022 14:28 )    Color: Yellow / Appearance: Slightly Turbid / S.036 / pH: x  Gluc: x / Ketone: Trace  / Bili: Negative / Urobili: 3 mg/dL   Blood: x / Protein: 30 mg/dL / Nitrite: Negative   Leuk Esterase: Moderate / RBC: 190 /HPF / WBC 9 /HPF   Sq Epi: x / Non Sq Epi: 2 /HPF / Bacteria: Negative        CARDIAC MARKERS ( 2022 13:32 )  x     / <0.01 ng/mL / 176 U/L / x     / x                                                LIVER FUNCTIONS - ( 10 Jul 2022 06:01 )  Alb: 3.5 g/dL / Pro: 6.8 g/dL / ALK PHOS: 120 U/L / ALT: 30 U/L / AST: 20 U/L / GGT: x                                                  Culture - Sputum (collected 10 Jul 2022 17:14)  Source: Trach Asp Tracheal Aspirate  Gram Stain (2022 07:31):    Few polymorphonuclear leukocytes per low power field    Few Squamous epithelial cells per low power field    Moderate Gram Negative Rods seen per oil power field    Moderate Gram Positive Rods seen per oil power field    Moderate Gram positive cocci in pairs seen per oil power field    Culture - Blood (collected 2022 13:32)  Source: .Blood Blood-Peripheral  Preliminary Report (2022 02:02):    No growth to date.    Culture - Blood (collected 2022 13:32)  Source: .Blood Blood-Peripheral  Preliminary Report (2022 02:02):    No growth to date.                                                                                           MEDICATIONS  (STANDING):  ALBUTerol    90 MICROgram(s) HFA Inhaler 1 Puff(s) Inhalation every 4 hours  apixaban 5 milliGRAM(s) Oral two times a day  cefiderocol IVPB 2000 milliGRAM(s) IV Intermittent every 8 hours  cyanocobalamin 1000 MICROGram(s) Oral daily  cyclobenzaprine Oral Tab/Cap - Peds 5 milliGRAM(s) Oral three times a day  ferrous    sulfate Liquid 300 milliGRAM(s) Enteral Tube daily  folic acid  Oral Tab/Cap - Peds 1 milliGRAM(s) Oral daily  glycopyrrolate 2 milliGRAM(s) Oral three times a day  ipratropium 17 MICROgram(s) HFA Inhaler 1 Puff(s) Inhalation every 6 hours  lactated ringers. 1000 milliLiter(s) (100 mL/Hr) IV Continuous <Continuous>  metoclopramide 10 milliGRAM(s) Oral Before meals and at bedtime  metoprolol tartrate 50 milliGRAM(s) Oral two times a day  midodrine 5 milliGRAM(s) Oral every 8 hours  pantoprazole    Tablet 40 milliGRAM(s) Oral before breakfast  thiamine  Oral Tab/Cap - Peds 100 milliGRAM(s) Oral two times a day  vancomycin  IVPB 1000 milliGRAM(s) IV Intermittent every 24 hours    MEDICATIONS  (PRN):  acetaminophen     Tablet .. 650 milliGRAM(s) Oral every 6 hours PRN Temp greater or equal to 38C (100.4F), Mild Pain (1 - 3)  aluminum hydroxide/magnesium hydroxide/simethicone Suspension 30 milliLiter(s) Oral every 4 hours PRN Dyspepsia  melatonin 3 milliGRAM(s) Oral at bedtime PRN Insomnia  ondansetron Injectable 4 milliGRAM(s) IV Push every 8 hours PRN Nausea and/or Vomiting      New X-rays reviewed:                                                                                  ECHO    CXR interpreted by me:       Patient is a 22y old  Female who presents with a chief complaint of fever (10 Jul 2022 14:18)        Over Night Events:  Off pressors.  No significant         ROS:     All ROS are negative except HPI         PHYSICAL EXAM    ICU Vital Signs Last 24 Hrs  T(C): 37.1 (2022 01:30), Max: 39.4 (10 Jul 2022 09:19)  T(F): 98.7 (:30), Max: 103 (10 Jul 2022 09:19)  HR: 101 (:30) (101 - 150)  BP: 108/57 (:30) (108/57 - 120/56)  BP(mean): 77 (:) (77 - 77)  ABP: --  ABP(mean): --  RR: 18 (:30) (18 - 20)  SpO2: 100% (:30) (97% - 100%)    O2 Parameters below as of :30  Patient On (Oxygen Delivery Method): T-piece            CONSTITUTIONAL:  In NAD    ENT:   Airway patent,   Mouth with normal mucosa.   Trach     EYES:   Pupils equal,   Round and reactive to light.    CARDIAC:   Normal rate,   Regular rhythm.        RESPIRATORY:   No wheezing  Bilateral crackles   Normal chest expansion  Not tachypneic,  No use of accessory muscles    GASTROINTESTINAL:  Abdomen soft,   Non-tender,   No guarding,   + BS    MUSCULOSKELETAL:   No clubbing, cyanosis    NEUROLOGICAL:   Alert     SKIN:   Skin normal color for race,   No evidence of rash.    PSYCHIATRIC:   No apparent risk to self or others.          LABS:                            10.9   14.85 )-----------( 342      ( 10 Jul 2022 06:01 )             36.6                                               07-10    142  |  102  |  7<L>  ----------------------------<  68<L>  4.2   |  28  |  <0.5<L>    Ca    9.0      10 Jul 2022 06:01    TPro  6.8  /  Alb  3.5  /  TBili  <0.2  /  DBili  x   /  AST  20  /  ALT  30  /  AlkPhos  120<H>  07-10      PT/INR - ( 2022 13:32 )   PT: 19.70 sec;   INR: 1.72 ratio         PTT - ( 2022 13:32 )  PTT:25.8 sec                                       Urinalysis Basic - ( 2022 14:28 )    Color: Yellow / Appearance: Slightly Turbid / S.036 / pH: x  Gluc: x / Ketone: Trace  / Bili: Negative / Urobili: 3 mg/dL   Blood: x / Protein: 30 mg/dL / Nitrite: Negative   Leuk Esterase: Moderate / RBC: 190 /HPF / WBC 9 /HPF   Sq Epi: x / Non Sq Epi: 2 /HPF / Bacteria: Negative        CARDIAC MARKERS ( 2022 13:32 )  x     / <0.01 ng/mL / 176 U/L / x     / x                                                LIVER FUNCTIONS - ( 10 Jul 2022 06:01 )  Alb: 3.5 g/dL / Pro: 6.8 g/dL / ALK PHOS: 120 U/L / ALT: 30 U/L / AST: 20 U/L / GGT: x                                                  Culture - Sputum (collected 10 Jul 2022 17:14)  Source: Trach Asp Tracheal Aspirate  Gram Stain (2022 07:31):    Few polymorphonuclear leukocytes per low power field    Few Squamous epithelial cells per low power field    Moderate Gram Negative Rods seen per oil power field    Moderate Gram Positive Rods seen per oil power field    Moderate Gram positive cocci in pairs seen per oil power field    Culture - Blood (collected 2022 13:32)  Source: .Blood Blood-Peripheral  Preliminary Report (2022 02:02):    No growth to date.    Culture - Blood (collected 2022 13:32)  Source: .Blood Blood-Peripheral  Preliminary Report (2022 02:02):    No growth to date.                                                                                           MEDICATIONS  (STANDING):  ALBUTerol    90 MICROgram(s) HFA Inhaler 1 Puff(s) Inhalation every 4 hours  apixaban 5 milliGRAM(s) Oral two times a day  cefiderocol IVPB 2000 milliGRAM(s) IV Intermittent every 8 hours  cyanocobalamin 1000 MICROGram(s) Oral daily  cyclobenzaprine Oral Tab/Cap - Peds 5 milliGRAM(s) Oral three times a day  ferrous    sulfate Liquid 300 milliGRAM(s) Enteral Tube daily  folic acid  Oral Tab/Cap - Peds 1 milliGRAM(s) Oral daily  glycopyrrolate 2 milliGRAM(s) Oral three times a day  ipratropium 17 MICROgram(s) HFA Inhaler 1 Puff(s) Inhalation every 6 hours  lactated ringers. 1000 milliLiter(s) (100 mL/Hr) IV Continuous <Continuous>  metoclopramide 10 milliGRAM(s) Oral Before meals and at bedtime  metoprolol tartrate 50 milliGRAM(s) Oral two times a day  midodrine 5 milliGRAM(s) Oral every 8 hours  pantoprazole    Tablet 40 milliGRAM(s) Oral before breakfast  thiamine  Oral Tab/Cap - Peds 100 milliGRAM(s) Oral two times a day  vancomycin  IVPB 1000 milliGRAM(s) IV Intermittent every 24 hours    MEDICATIONS  (PRN):  acetaminophen     Tablet .. 650 milliGRAM(s) Oral every 6 hours PRN Temp greater or equal to 38C (100.4F), Mild Pain (1 - 3)  aluminum hydroxide/magnesium hydroxide/simethicone Suspension 30 milliLiter(s) Oral every 4 hours PRN Dyspepsia  melatonin 3 milliGRAM(s) Oral at bedtime PRN Insomnia  ondansetron Injectable 4 milliGRAM(s) IV Push every 8 hours PRN Nausea and/or Vomiting      New X-rays reviewed:                                                                                  ECHO

## 2022-07-11 NOTE — CHART NOTE - NSCHARTNOTEFT_GEN_A_CORE
23 y/o F with a PMHx of encephalitis s/p tooth abscess s/p tracheostomy and PEG tube placement, HTN, GERD, and SVC thrombosis , recent admission for Covid19 PNA and pyelonephritis in June 2022, currently being treated with IV Vancomycin, cefepime for PNA since 7/8, BIBEMS from [facility] for fever this AM. Per NH notes, pt spiked temp of 102.6 today and was sent to ED. Pt is non-verbal, unable to contribute to history or ROS. Pt has sputum around the trach area.     In the ED,pt's VS T(C): 37.3 (07-09-22 @ 19:56), Max: 37.9 (07-09-22 @ 13:53)  HR: 112 (07-09-22 @ 19:56) (112 - 127)  BP: 91/61 (07-09-22 @ 19:56) (89/49 - 104/60)  RR: 18 (07-09-22 @ 19:56) (18 - 18)  SpO2: 100% (07-09-22 @ 19:56) (96% - 100%), labs done showed WBC 11k, creat 0.5, CRP 73,covid is positive, positive UA < from: CT Angio Chest PE Protocol w/ IV Cont (07.09.22 @ 20:57) >    No CTA evidence of acute pulmonary embolus. Airspace consolidations predominantly within the left upper and lower lobes with enlarged left hilar lymph nodes, most consistent with pneumonia.    Sputum culture - Few polymorphonuclear leukocytes per low power field   Few Squamous epithelial cells per low power field   Moderate Gram Negative Rods seen per oil power field   Moderate Gram Positive Rods seen per oil power field   Moderate Gram positive cocci in pairs seen per oil power field (07.10.22 @ 17:14)     Blood culture - 7/9 no growth to date    Per ID,   Colonized with MRDO, GOC , grave prognosis  start patient dose amikacin 5mg/kg x1 (pending weight)  Continue Vanc, cefiderocol IVPB, f/u BCX  Trach cx collected  Off loading to prevent pressure sores and preventive measures to avoid aspiration   Trend WBC    Transfer to med surg

## 2022-07-11 NOTE — PROGRESS NOTE ADULT - SUBJECTIVE AND OBJECTIVE BOX
CONSUELO CARO  22y, Female  Allergy: Allergy Status Unknown    Hospital Day: 2d    Patient seen and examined. 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      HTN (hypertension)      GERD (gastroesophageal reflux disease)          VITALS:  T(F): 97.7 (22 @ 08:00), Max: 99.5 (07-10-22 @ 16:00)  HR: 88 (22 @ 08:00)  BP: 109/61 (22 @ 08:00) (108/57 - 119/62)  RR: 18 (22 @ 08:00)  SpO2: 100% (22 @ 08:00)    TESTS & MEASUREMENTS:  Weight (Kg): 68.039 (07-10-22 @ 15:50)  BMI (kg/m2): 24.2 (07-10)    22 @ 07:01  -  22 @ 12:47  --------------------------------------------------------  IN: 0 mL / OUT: 1300 mL / NET: -1300 mL                            9.6    12.77 )-----------( 319      ( 2022 07:39 )             32.4     PT/INR - ( 2022 13:32 )   PT: 19.70 sec;   INR: 1.72 ratio         PTT - ( 2022 13:32 )  PTT:25.8 sec      141  |  99  |  6<L>  ----------------------------<  50<LL>  3.8   |  28  |  <0.5<L>    Ca    8.9      2022 07:39  Mg     1.7         TPro  5.8<L>  /  Alb  3.0<L>  /  TBili  0.2  /  DBili  x   /  AST  18  /  ALT  22  /  AlkPhos  107  11    LIVER FUNCTIONS - ( 2022 07:39 )  Alb: 3.0 g/dL / Pro: 5.8 g/dL / ALK PHOS: 107 U/L / ALT: 22 U/L / AST: 18 U/L / GGT: x           CARDIAC MARKERS ( 2022 13:32 )  x     / <0.01 ng/mL / 176 U/L / x     / x            Culture - Sputum (collected 07-10-22 @ 17:14)  Source: Trach Asp Tracheal Aspirate  Gram Stain (22 @ 07:31):    Few polymorphonuclear leukocytes per low power field    Few Squamous epithelial cells per low power field    Moderate Gram Negative Rods seen per oil power field    Moderate Gram Positive Rods seen per oil power field    Moderate Gram positive cocci in pairs seen per oil power field    Culture - Blood (collected 22 @ 13:32)  Source: .Blood Blood-Peripheral  Preliminary Report (22 @ 02:02):    No growth to date.    Culture - Blood (collected 22 @ 13:32)  Source: .Blood Blood-Peripheral  Preliminary Report (22 @ 02:02):    No growth to date.      Urinalysis Basic - ( 2022 14:28 )    Color: Yellow / Appearance: Slightly Turbid / S.036 / pH: x  Gluc: x / Ketone: Trace  / Bili: Negative / Urobili: 3 mg/dL   Blood: x / Protein: 30 mg/dL / Nitrite: Negative   Leuk Esterase: Moderate / RBC: 190 /HPF / WBC 9 /HPF   Sq Epi: x / Non Sq Epi: 2 /HPF / Bacteria: Negative        RADIOLOGY & ADDITIONAL TESTS:    RECENT DIAGNOSTIC ORDERS:  Diet, NPO with Tube Feed:   Tube Feeding Modality: Gastrostomy  Jevity 1.2 Gilberto  Total Volume for 24 Hours (mL): 1440  Bolus  Total Volume of Bolus (mL):  480  Tube Feed Frequency: Every 8 hours   Tube Feed Start Time: 11:00  Bolus Feed Rate (mL per Hour): 480   Bolus Feed Duration (in Hours): 1  Bolus   Total Volume per Flush (mL): 100   Frequency: Every 4 Hours (22 @ 10:48)  Magnesium, Serum: AM Sched. Collection: 2022 04:30 (22 @ 07:54)  Phosphorus Level, Serum: AM Sched. Collection: 2022 04:30 (07-11-22 @ 07:54)  Basic Metabolic Panel: AM Sched. Collection: 2022 04:30 (22 @ 07:54)  Complete Blood Count + Automated Diff: AM Sched. Collection: 2022 04:30 (22 @ 07:54)      MEDICATIONS:  MEDICATIONS  (STANDING):  ALBUTerol    90 MICROgram(s) HFA Inhaler 1 Puff(s) Inhalation every 4 hours  apixaban 5 milliGRAM(s) Oral two times a day  cefiderocol IVPB 2000 milliGRAM(s) IV Intermittent every 8 hours  cyanocobalamin 1000 MICROGram(s) Oral daily  cyclobenzaprine Oral Tab/Cap - Peds 5 milliGRAM(s) Oral three times a day  dextrose 50% Injectable 25 Gram(s) IV Push once  ferrous    sulfate Liquid 300 milliGRAM(s) Enteral Tube daily  folic acid  Oral Tab/Cap - Peds 1 milliGRAM(s) Oral daily  glycopyrrolate 2 milliGRAM(s) Oral three times a day  ipratropium 17 MICROgram(s) HFA Inhaler 1 Puff(s) Inhalation every 6 hours  lactated ringers. 1000 milliLiter(s) (100 mL/Hr) IV Continuous <Continuous>  magnesium sulfate  IVPB 2 Gram(s) IV Intermittent once  metoclopramide 10 milliGRAM(s) Oral Before meals and at bedtime  metoprolol tartrate 50 milliGRAM(s) Oral two times a day  midodrine 5 milliGRAM(s) Oral every 8 hours  pantoprazole    Tablet 40 milliGRAM(s) Oral before breakfast  thiamine  Oral Tab/Cap - Peds 100 milliGRAM(s) Oral two times a day  vancomycin  IVPB      vancomycin  IVPB 1000 milliGRAM(s) IV Intermittent every 12 hours    MEDICATIONS  (PRN):  acetaminophen     Tablet .. 650 milliGRAM(s) Oral every 6 hours PRN Temp greater or equal to 38C (100.4F), Mild Pain (1 - 3)  aluminum hydroxide/magnesium hydroxide/simethicone Suspension 30 milliLiter(s) Oral every 4 hours PRN Dyspepsia  melatonin 3 milliGRAM(s) Oral at bedtime PRN Insomnia  ondansetron Injectable 4 milliGRAM(s) IV Push every 8 hours PRN Nausea and/or Vomiting      HOME MEDICATIONS:  albuterol 90 mcg/inh inhalation aerosol with adapter ()  cyclobenzaprine 5 mg oral tablet ()  Eliquis ()  ferrous sulfate 220 mg/5 mL (44 mg/5 mL elemental iron) oral elixir ()  folic acid 1 mg oral tablet ()  glycopyrrolate 2 mg oral tablet ()  ipratropium 18 mcg/inh inhalation aerosol ()  Lopressor 50 mg oral tablet ()  metoclopramide 10 mg oral tablet ()  midodrine 5 mg oral tablet ()  omeprazole 40 mg oral delayed release capsule ()  scopolamine 0.4 mg oral tablet ()  Tylenol 325 mg oral capsule ()  Vitamin B1 100 mg oral tablet ()  Vitamin B12 500 mcg oral tablet ()      REVIEW OF SYSTEMS:  All other review of systems is negative unless indicated above.     PHYSICAL EXAM:  PHYSICAL EXAM:  GENERAL: NAD, well-developed  HEAD:  Atraumatic, Normocephalic  NECK: Supple, No JVD  CHEST/LUNG: Clear to auscultation bilaterally; No wheeze  HEART: Regular rate and rhythm; No murmurs, rubs, or gallops  ABDOMEN: Soft, Nontender, Nondistended; Bowel sounds present  EXTREMITIES:  2+ Peripheral Pulses, No clubbing, cyanosis, or edema  SKIN: No rashes or lesions

## 2022-07-11 NOTE — PROGRESS NOTE ADULT - ASSESSMENT
23 y/o F with a PMHx of encephalitis s/p tooth abscess s/p tracheostomy and PEG tube placement, HTN, GERD, and SVC thrombosis who was brought to ED from [place] for fevers. Admitted for tx for sepsis due to PNA.    #Sepsis, POA  #Suspected GNR Pneumonia  CT Chest PE protocol (07/09): Airspace consolidations predominantly within the left upper and lower   lobes with enlarged left hilar lymph nodes, most consistent with   pneumonia. No evidence of PE  Previous sputum cx with carbapenem resistent kleb  Fevers and tachycardia improved with IV abx  BCX 07/09: NGTD  - Dose amikacin today 5mg/kg  - Vancomycin 1g BID  - Cont cefiderocol 2g q8h  - f/u DTA cx (so far growing GNR, GPR, GPC)   - IVF  - Pt downgraded to floor per crit team    #Sinus tachycardia  d/t sepsis  On previous admission, pt was persistently tachycardic at baseline to 100-120s. EP eval was done, HR<130 was acceptable. Currently tachycardic due to ongoing sepsis and fevers  - Cont metoprolol 50 BID    #HO SVC Thrombosis and Embolism   - C/w Eliquis 5 mg PO BID    #History of Encephalitis post Tooth Abscess in 10/2021   s/p Tracheostomy (has an O2 tank per sister but unsure about flow) and G-tube placement  - Monitor SaO2 and O2 requirements: as above    #GERD  * Home med Omeprazole 40mg QD  - C/w Protonix 40 mg PO daily    #Iron Deficiency Anemia   #History of Wernicke Encephalopathy  #Suspected thiamine deficiency  #Suspected folic acid deficiency  * Home med iron replacement, thiamine, B12, and folic acid  - C/w home iron replacement, thiamine, B12, and folic acid  - Monitor for now    #DVT ppx: Eliquis 5 mg PO BID    #Progress Note Handoff  Pending (specify): BCx, cont IV abx, sputum cx  Family discussion: d/w pt's sister regarding management of PNA  Disposition: Home

## 2022-07-11 NOTE — PROGRESS NOTE ADULT - ASSESSMENT
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 HOB @ 45 degrees. Aspiration precautions. CT shows a small L basilar opacity/consolidation which can represent pneumonia.     CARDIOVASCULAR: Keep I=O. D/c fluids if feeding. Obtain echo.    GI: Feeding as tolerated through PEG. Bowel regimen     RENAL: Follow up lytes. Correct as needed    INFECTIOUS DISEASE: Abx per ID: On cefiderocol, amikacin, vacomycin. Obtain RVP, DTA cx, blood cultures, nasal MRSA. De-escalate abx according to culture results.     HEMATOLOGICAL: DVT prophylaxis: Lovenox    ENDOCRINE:  Follow up FS.  Insulin protocol if needed.    MUSCULOSKELETAL: bed rest    Dispo: SDU for now         IMPRESSION:    Acute on chronic hypoxemic respiratory failure  Septic on admission  Pneumonia possible aspiration  HO CR Klebsiella   COVID 19 positive.  New infection VS persistent positive.    HO tracheostomy and PEG secondary to encephalitis.   H/O SVC thrombus      PLAN:    CNS: Avoid sedation.      HEENT: Oral and tracheostomy care    PULMONARY: Titrate O2 to >91%. HOB @ 45 degrees. Aspiration precautions.  Pulmonary toilet.      CARDIOVASCULAR: Avoid fluid overload     GI: Feeding as tolerated through PEG. Bowel regimen     RENAL: Follow up lytes. Correct as needed    INFECTIOUS DISEASE: ABX per ID.  FU levels.  Nasal MRSA.      HEMATOLOGICAL: DVT prophylaxis.  On Eliquis     ENDOCRINE:  Follow up FS.  Insulin protocol if needed.    MUSCULOSKELETAL: Bed rest    Dispo: Downgrade to med surg.

## 2022-07-12 LAB
-  AMIKACIN: SIGNIFICANT CHANGE UP
-  AMOXICILLIN/CLAVULANIC ACID: SIGNIFICANT CHANGE UP
-  AMPICILLIN/SULBACTAM: SIGNIFICANT CHANGE UP
-  AMPICILLIN: SIGNIFICANT CHANGE UP
-  AZTREONAM: SIGNIFICANT CHANGE UP
-  CEFAZOLIN: SIGNIFICANT CHANGE UP
-  CEFEPIME: SIGNIFICANT CHANGE UP
-  CEFOXITIN: SIGNIFICANT CHANGE UP
-  CEFTAZIDIME/AVIBACTAM: SIGNIFICANT CHANGE UP
-  CEFTOLOZANE/TAZOBACTAM: SIGNIFICANT CHANGE UP
-  CEFTRIAXONE: SIGNIFICANT CHANGE UP
-  CIPROFLOXACIN: SIGNIFICANT CHANGE UP
-  ERTAPENEM: SIGNIFICANT CHANGE UP
-  GENTAMICIN: SIGNIFICANT CHANGE UP
-  IMIPENEM: SIGNIFICANT CHANGE UP
-  LEVOFLOXACIN: SIGNIFICANT CHANGE UP
-  MEROPENEM: SIGNIFICANT CHANGE UP
-  MINOCYCLINE: SIGNIFICANT CHANGE UP
-  PIPERACILLIN/TAZOBACTAM: SIGNIFICANT CHANGE UP
-  TETRACYCLINE: SIGNIFICANT CHANGE UP
-  TIGECYCLINE: SIGNIFICANT CHANGE UP
-  TOBRAMYCIN: SIGNIFICANT CHANGE UP
-  TRIMETHOPRIM/SULFAMETHOXAZOLE: SIGNIFICANT CHANGE UP
ALBUMIN SERPL ELPH-MCNC: 3.2 G/DL — LOW (ref 3.5–5.2)
ALP SERPL-CCNC: 104 U/L — SIGNIFICANT CHANGE UP (ref 30–115)
ALT FLD-CCNC: 18 U/L — SIGNIFICANT CHANGE UP (ref 0–41)
ANION GAP SERPL CALC-SCNC: 10 MMOL/L — SIGNIFICANT CHANGE UP (ref 7–14)
AST SERPL-CCNC: 12 U/L — SIGNIFICANT CHANGE UP (ref 0–41)
BASOPHILS # BLD AUTO: 0.02 K/UL — SIGNIFICANT CHANGE UP (ref 0–0.2)
BASOPHILS NFR BLD AUTO: 0.2 % — SIGNIFICANT CHANGE UP (ref 0–1)
BILIRUB SERPL-MCNC: <0.2 MG/DL — SIGNIFICANT CHANGE UP (ref 0.2–1.2)
BUN SERPL-MCNC: 4 MG/DL — LOW (ref 10–20)
CALCIUM SERPL-MCNC: 8.5 MG/DL — SIGNIFICANT CHANGE UP (ref 8.5–10.1)
CHLORIDE SERPL-SCNC: 109 MMOL/L — SIGNIFICANT CHANGE UP (ref 98–110)
CO2 SERPL-SCNC: 33 MMOL/L — HIGH (ref 17–32)
CREAT SERPL-MCNC: <0.5 MG/DL — LOW (ref 0.7–1.5)
CULTURE RESULTS: SIGNIFICANT CHANGE UP
EGFR: 143 ML/MIN/1.73M2 — SIGNIFICANT CHANGE UP
EOSINOPHIL # BLD AUTO: 0.43 K/UL — SIGNIFICANT CHANGE UP (ref 0–0.7)
EOSINOPHIL NFR BLD AUTO: 3.9 % — SIGNIFICANT CHANGE UP (ref 0–8)
GLUCOSE SERPL-MCNC: 152 MG/DL — HIGH (ref 70–99)
HCT VFR BLD CALC: 32.3 % — LOW (ref 37–47)
HGB BLD-MCNC: 9.7 G/DL — LOW (ref 12–16)
IMM GRANULOCYTES NFR BLD AUTO: 0.4 % — HIGH (ref 0.1–0.3)
LYMPHOCYTES # BLD AUTO: 1.39 K/UL — SIGNIFICANT CHANGE UP (ref 1.2–3.4)
LYMPHOCYTES # BLD AUTO: 12.7 % — LOW (ref 20.5–51.1)
MAGNESIUM SERPL-MCNC: 2 MG/DL — SIGNIFICANT CHANGE UP (ref 1.8–2.4)
MCHC RBC-ENTMCNC: 23.4 PG — LOW (ref 27–31)
MCHC RBC-ENTMCNC: 30 G/DL — LOW (ref 32–37)
MCV RBC AUTO: 78 FL — LOW (ref 81–99)
METHOD TYPE: SIGNIFICANT CHANGE UP
MONOCYTES # BLD AUTO: 1.09 K/UL — HIGH (ref 0.1–0.6)
MONOCYTES NFR BLD AUTO: 10 % — HIGH (ref 1.7–9.3)
NEUTROPHILS # BLD AUTO: 7.95 K/UL — HIGH (ref 1.4–6.5)
NEUTROPHILS NFR BLD AUTO: 72.8 % — SIGNIFICANT CHANGE UP (ref 42.2–75.2)
NRBC # BLD: 0 /100 WBCS — SIGNIFICANT CHANGE UP (ref 0–0)
ORGANISM # SPEC MICROSCOPIC CNT: SIGNIFICANT CHANGE UP
ORGANISM # SPEC MICROSCOPIC CNT: SIGNIFICANT CHANGE UP
PHOSPHATE SERPL-MCNC: 2.4 MG/DL — SIGNIFICANT CHANGE UP (ref 2.1–4.9)
PLATELET # BLD AUTO: 390 K/UL — SIGNIFICANT CHANGE UP (ref 130–400)
POTASSIUM SERPL-MCNC: 3.9 MMOL/L — SIGNIFICANT CHANGE UP (ref 3.5–5)
POTASSIUM SERPL-SCNC: 3.9 MMOL/L — SIGNIFICANT CHANGE UP (ref 3.5–5)
PROT SERPL-MCNC: 6 G/DL — SIGNIFICANT CHANGE UP (ref 6–8)
RBC # BLD: 4.14 M/UL — LOW (ref 4.2–5.4)
RBC # FLD: 15.9 % — HIGH (ref 11.5–14.5)
SODIUM SERPL-SCNC: 152 MMOL/L — HIGH (ref 135–146)
SPECIMEN SOURCE: SIGNIFICANT CHANGE UP
WBC # BLD: 10.92 K/UL — HIGH (ref 4.8–10.8)
WBC # FLD AUTO: 10.92 K/UL — HIGH (ref 4.8–10.8)

## 2022-07-12 PROCEDURE — 99233 SBSQ HOSP IP/OBS HIGH 50: CPT

## 2022-07-12 RX ADMIN — MIDODRINE HYDROCHLORIDE 5 MILLIGRAM(S): 2.5 TABLET ORAL at 13:40

## 2022-07-12 RX ADMIN — CEFIDEROCOL SULFATE TOSYLATE 33.33 MILLIGRAM(S): 1 INJECTION, POWDER, FOR SOLUTION INTRAVENOUS at 18:21

## 2022-07-12 RX ADMIN — MIDODRINE HYDROCHLORIDE 5 MILLIGRAM(S): 2.5 TABLET ORAL at 05:39

## 2022-07-12 RX ADMIN — CYCLOBENZAPRINE HYDROCHLORIDE 5 MILLIGRAM(S): 10 TABLET, FILM COATED ORAL at 05:39

## 2022-07-12 RX ADMIN — CYCLOBENZAPRINE HYDROCHLORIDE 5 MILLIGRAM(S): 10 TABLET, FILM COATED ORAL at 22:04

## 2022-07-12 RX ADMIN — ROBINUL 2 MILLIGRAM(S): 0.2 INJECTION INTRAMUSCULAR; INTRAVENOUS at 13:42

## 2022-07-12 RX ADMIN — Medication 10 MILLIGRAM(S): at 06:32

## 2022-07-12 RX ADMIN — PREGABALIN 1000 MICROGRAM(S): 225 CAPSULE ORAL at 13:40

## 2022-07-12 RX ADMIN — APIXABAN 5 MILLIGRAM(S): 2.5 TABLET, FILM COATED ORAL at 05:39

## 2022-07-12 RX ADMIN — CEFIDEROCOL SULFATE TOSYLATE 33.33 MILLIGRAM(S): 1 INJECTION, POWDER, FOR SOLUTION INTRAVENOUS at 22:03

## 2022-07-12 RX ADMIN — Medication 100 MILLIGRAM(S): at 18:22

## 2022-07-12 RX ADMIN — CEFIDEROCOL SULFATE TOSYLATE 33.33 MILLIGRAM(S): 1 INJECTION, POWDER, FOR SOLUTION INTRAVENOUS at 06:38

## 2022-07-12 RX ADMIN — PANTOPRAZOLE SODIUM 40 MILLIGRAM(S): 20 TABLET, DELAYED RELEASE ORAL at 06:32

## 2022-07-12 RX ADMIN — Medication 500 MILLIGRAM(S): at 05:38

## 2022-07-12 RX ADMIN — Medication 300 MILLIGRAM(S): at 13:39

## 2022-07-12 RX ADMIN — ROBINUL 2 MILLIGRAM(S): 0.2 INJECTION INTRAMUSCULAR; INTRAVENOUS at 00:40

## 2022-07-12 RX ADMIN — ROBINUL 2 MILLIGRAM(S): 0.2 INJECTION INTRAMUSCULAR; INTRAVENOUS at 06:31

## 2022-07-12 RX ADMIN — Medication 10 MILLIGRAM(S): at 22:04

## 2022-07-12 RX ADMIN — Medication 1000 MILLIGRAM(S): at 07:11

## 2022-07-12 RX ADMIN — Medication 100 MILLIGRAM(S): at 05:39

## 2022-07-12 RX ADMIN — Medication 650 MILLIGRAM(S): at 07:11

## 2022-07-12 RX ADMIN — APIXABAN 5 MILLIGRAM(S): 2.5 TABLET, FILM COATED ORAL at 18:22

## 2022-07-12 RX ADMIN — Medication 10 MILLIGRAM(S): at 13:40

## 2022-07-12 RX ADMIN — ROBINUL 2 MILLIGRAM(S): 0.2 INJECTION INTRAMUSCULAR; INTRAVENOUS at 22:04

## 2022-07-12 RX ADMIN — MIDODRINE HYDROCHLORIDE 5 MILLIGRAM(S): 2.5 TABLET ORAL at 22:04

## 2022-07-12 RX ADMIN — CEFIDEROCOL SULFATE TOSYLATE 33.33 MILLIGRAM(S): 1 INJECTION, POWDER, FOR SOLUTION INTRAVENOUS at 00:40

## 2022-07-12 RX ADMIN — Medication 1 MILLIGRAM(S): at 13:40

## 2022-07-12 RX ADMIN — Medication 50 MILLIGRAM(S): at 18:21

## 2022-07-12 RX ADMIN — CYCLOBENZAPRINE HYDROCHLORIDE 5 MILLIGRAM(S): 10 TABLET, FILM COATED ORAL at 13:40

## 2022-07-12 RX ADMIN — Medication 50 MILLIGRAM(S): at 05:39

## 2022-07-12 NOTE — PROGRESS NOTE ADULT - ASSESSMENT
21 y/o F with a PMHx of encephalitis s/p tooth abscess s/p tracheostomy and PEG tube placement, HTN, GERD, and SVC thrombosis who was brought to ED from [place] for fevers. Admitted for tx for sepsis due to PNA.    #Sepsis, POA  #Suspected GNR Pneumonia  CT Chest PE protocol (07/09): Airspace consolidations predominantly within the left upper and lower   lobes with enlarged left hilar lymph nodes, most consistent with   pneumonia. No evidence of PE  Previous sputum cx with carbapenem resistent kleb  Fevers and tachycardia improved with IV abx  BCX 07/09, 07/10: NGTD  s/p dose of amikacin 5mg/kg 07/11  Trach cultures 07/10: Klebsiellae pneumonia  - DC Vancomycin 1g BID  - Cont cefiderocol 2g q8h  - f/u sensitivites of trach cultures  - IVF  - Pt downgraded to floor per crit team    #Sinus tachycardia  d/t sepsis  On previous admission, pt was persistently tachycardic at baseline to 100-120s. EP eval was done, HR<130 was acceptable.   Initially on admission, pt tachycardic to 150-160s, but improved now to baseline 90-120s with IV abx  - Cont metoprolol 50 BID    #HO SVC Thrombosis and Embolism   - C/w Eliquis 5 mg PO BID    #History of Encephalitis post Tooth Abscess in 10/2021   s/p Tracheostomy (has an O2 tank per sister but unsure about flow) and G-tube placement  - Monitor SaO2 and O2 requirements: as above    #GERD  * Home med Omeprazole 40mg QD  - C/w Protonix 40 mg PO daily    #Iron Deficiency Anemia   #History of Wernicke Encephalopathy  #Suspected thiamine deficiency  #Suspected folic acid deficiency  * Home med iron replacement, thiamine, B12, and folic acid  - C/w home iron replacement, thiamine, B12, and folic acid  - Monitor for now    #DVT ppx: Eliquis 5 mg PO BID    #Progress Note Handoff  Pending (specify): Sputum cx final results, cont IV abx  Family discussion: d/w pt's sister regarding management of PNA  Disposition: Home

## 2022-07-12 NOTE — PROGRESS NOTE ADULT - ASSESSMENT
21 y/o F with a PMHx of encephalitis s/p tooth abscess s/p tracheostomy and PEG tube placement, HTN, GERD, and SVC thrombosis , recent admission for Covid19 PNA and pyelonephritis in June 2022, currently being treated with IV Vancomycin, cefepime for PNA since 7/8, BIBEMS from [facility] for fever this AM. Per NH notes, pt spiked temp of 102.6 today and was sent to ED.     IMPRESSION;   #Sepsis on admission T>101 P>90 WBC 14 secondary to suspected GNR PNA , also COVID19 +     7/10 sputum   Numerous Klebsiella pneumoniae    7/10 BCX NGTD     7/9 BCX NGTD     Procalcitonin, Serum: 0.11 ng/mL (07-09-22 @ 13:32)    UA without significant pyuria     CT Airspace consolidations predominantly within the left upper and lower lobes with enlarged left hilar lymph nodes, most consistent with pneumonia.    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     5/29-5/31 BCX NGTD     5/28 BCX  Klebsiella pneumoniae (Carbapenem Resistant)    5/27 BCx Klebsiella pneumoniae (Carbapenem Resistant)    5/27 UCx Klebsiella pneumoniae (Carbapenem Resistant)    Pyuria  #Trach/PEG  Creatinine, Serum: <0.5 mg/dL (07.10.22 @ 06:01)      RECOMMENDATIONS;  - D/C VANC  - Continue cefiderocol IVPB 2000 milliGRAM(s) IV Intermittent every 8 hours x 7 days   - f/u Kleb sensitivities in trach, hx MDR  - Off loading to prevent pressure sores and preventive measures to avoid aspiration   - Colonized with MRDO, GOC , grave prognosis  - Trend WBC    If any questions, please call or send a message on [employer]  Please continue to update ID with any pertinent new laboratory or radiographic findings  Spectra [phone]

## 2022-07-12 NOTE — PROGRESS NOTE ADULT - SUBJECTIVE AND OBJECTIVE BOX
CONSUELO CRAO  22y, Female  Allergy: Allergy Status Unknown    Hospital Day: 3d    Patient seen and examined. 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      HTN (hypertension)      GERD (gastroesophageal reflux disease)          VITALS:  T(F): 97 (07-12-22 @ 07:30), Max: 99.3 (07-12-22 @ 03:53)  HR: 111 (07-12-22 @ 09:30)  BP: 102/62 (07-12-22 @ 09:30) (95/51 - 102/62)  RR: 16 (07-12-22 @ 09:30)  SpO2: 95% (07-12-22 @ 09:30)    TESTS & MEASUREMENTS:  Weight (Kg): 68.039 (07-10-22 @ 15:50)  BMI (kg/m2): 24.2 (07-10)    07-11-22 @ 07:01  -  07-12-22 @ 07:00  --------------------------------------------------------  IN: 1420 mL / OUT: 2300 mL / NET: -880 mL    07-12-22 @ 07:01  -  07-12-22 @ 12:52  --------------------------------------------------------  IN: 830 mL / OUT: 1300 mL / NET: -470 mL                            9.7    10.92 )-----------( 390      ( 12 Jul 2022 07:58 )             32.3       07-12    152<H>  |  109  |  4<L>  ----------------------------<  152<H>  3.9   |  33<H>  |  <0.5<L>    Ca    8.5      12 Jul 2022 07:58  Phos  2.4     07-12  Mg     2.0     07-12    TPro  6.0  /  Alb  3.2<L>  /  TBili  <0.2  /  DBili  x   /  AST  12  /  ALT  18  /  AlkPhos  104  07-12    LIVER FUNCTIONS - ( 12 Jul 2022 07:58 )  Alb: 3.2 g/dL / Pro: 6.0 g/dL / ALK PHOS: 104 U/L / ALT: 18 U/L / AST: 12 U/L / GGT: x                 Culture - Sputum (collected 07-10-22 @ 17:14)  Source: Trach Asp Tracheal Aspirate  Gram Stain (07-11-22 @ 07:31):    Few polymorphonuclear leukocytes per low power field    Few Squamous epithelial cells per low power field    Moderate Gram Negative Rods seen per oil power field    Moderate Gram Positive Rods seen per oil power field    Moderate Gram positive cocci in pairs seen per oil power field  Preliminary Report (07-11-22 @ 18:21):    Numerous Klebsiella pneumoniae    Normal Respiratory Trinidad absent    Culture - Blood (collected 07-10-22 @ 06:01)  Source: .Blood None  Preliminary Report (07-11-22 @ 13:01):    No growth to date.    Culture - Blood (collected 07-09-22 @ 13:32)  Source: .Blood Blood-Peripheral  Preliminary Report (07-11-22 @ 02:02):    No growth to date.    Culture - Blood (collected 07-09-22 @ 13:32)  Source: .Blood Blood-Peripheral  Preliminary Report (07-11-22 @ 02:02):    No growth to date.          RADIOLOGY & ADDITIONAL TESTS:    RECENT DIAGNOSTIC ORDERS:  Diet, NPO with Tube Feed:   Tube Feeding Modality: Gastrostomy  Jevity 1.2 Gilberto  Total Volume for 24 Hours (mL): 1440  Bolus  Total Volume of Bolus (mL):  480  Tube Feed Frequency: Every 8 hours   Tube Feed Start Time: 11:00  Bolus Feed Rate (mL per Hour): 480   Bolus Feed Duration (in Hours): 1  Bolus   Total Volume per Flush (mL): 350   Frequency: Every 6 Hours (07-12-22 @ 10:10)  Phosphorus Level, Serum: AM Sched. Collection: 13-Jul-2022 04:30 (07-12-22 @ 07:51)  Magnesium, Serum: AM Sched. Collection: 13-Jul-2022 04:30 (07-12-22 @ 07:51)  Basic Metabolic Panel: AM Sched. Collection: 13-Jul-2022 04:30 (07-12-22 @ 07:51)  Complete Blood Count + Automated Diff: AM Sched. Collection: 13-Jul-2022 04:30 (07-12-22 @ 07:51)  Magnesium, Serum: Repeat From: 11-Jul-2022 15:48 To: 16-Jul-2022 04:30, Every 1 day(s) (07-11-22 @ 15:48)  Complete Blood Count: Repeat From: 11-Jul-2022 15:48 To: 16-Jul-2022 04:30, Every 1 day(s) (07-11-22 @ 15:48)  Comprehensive Metabolic Panel: Repeat From: 11-Jul-2022 15:48 To: 16-Jul-2022 04:30, Every 1 day(s) (07-11-22 @ 15:48)      MEDICATIONS:  MEDICATIONS  (STANDING):  ALBUTerol    90 MICROgram(s) HFA Inhaler 1 Puff(s) Inhalation every 4 hours  apixaban 5 milliGRAM(s) Oral two times a day  cefiderocol IVPB 2000 milliGRAM(s) IV Intermittent every 8 hours  cyanocobalamin 1000 MICROGram(s) Oral daily  cyclobenzaprine Oral Tab/Cap - Peds 5 milliGRAM(s) Oral three times a day  dextrose 50% Injectable 25 Gram(s) IV Push once  ferrous    sulfate Liquid 300 milliGRAM(s) Enteral Tube daily  folic acid  Oral Tab/Cap - Peds 1 milliGRAM(s) Oral daily  glycopyrrolate 2 milliGRAM(s) Oral three times a day  ipratropium 17 MICROgram(s) HFA Inhaler 1 Puff(s) Inhalation every 6 hours  lactated ringers. 1000 milliLiter(s) (100 mL/Hr) IV Continuous <Continuous>  metoclopramide 10 milliGRAM(s) Oral Before meals and at bedtime  metoprolol tartrate 50 milliGRAM(s) Oral two times a day  midodrine 5 milliGRAM(s) Oral every 8 hours  pantoprazole    Tablet 40 milliGRAM(s) Oral before breakfast  thiamine  Oral Tab/Cap - Peds 100 milliGRAM(s) Oral two times a day    MEDICATIONS  (PRN):  acetaminophen     Tablet .. 650 milliGRAM(s) Oral every 6 hours PRN Temp greater or equal to 38C (100.4F), Mild Pain (1 - 3)  aluminum hydroxide/magnesium hydroxide/simethicone Suspension 30 milliLiter(s) Oral every 4 hours PRN Dyspepsia  melatonin 3 milliGRAM(s) Oral at bedtime PRN Insomnia  ondansetron Injectable 4 milliGRAM(s) IV Push every 8 hours PRN Nausea and/or Vomiting      HOME MEDICATIONS:  albuterol 90 mcg/inh inhalation aerosol with adapter (07-09)  cyclobenzaprine 5 mg oral tablet (07-09)  Eliquis (07-09)  ferrous sulfate 220 mg/5 mL (44 mg/5 mL elemental iron) oral elixir (07-09)  folic acid 1 mg oral tablet (07-09)  glycopyrrolate 2 mg oral tablet (07-09)  ipratropium 18 mcg/inh inhalation aerosol (07-09)  Lopressor 50 mg oral tablet (07-09)  metoclopramide 10 mg oral tablet (07-09)  midodrine 5 mg oral tablet (07-09)  omeprazole 40 mg oral delayed release capsule (07-09)  scopolamine 0.4 mg oral tablet (07-09)  Tylenol 325 mg oral capsule (07-09)  Vitamin B1 100 mg oral tablet (07-09)  Vitamin B12 500 mcg oral tablet (07-09)      REVIEW OF SYSTEMS:  All other review of systems is negative unless indicated above.     PHYSICAL EXAM:  PHYSICAL EXAM:  GENERAL: NAD, well-developed  HEAD:  Atraumatic, Normocephalic  NECK: Supple, No JVD  CHEST/LUNG: Clear to auscultation bilaterally; No wheeze  HEART: Regular rate and rhythm; No murmurs, rubs, or gallops  ABDOMEN: Soft, Nontender, Nondistended; Bowel sounds present  EXTREMITIES:  2+ Peripheral Pulses, No clubbing, cyanosis, or edema  SKIN: No rashes or lesions

## 2022-07-12 NOTE — PROGRESS NOTE ADULT - SUBJECTIVE AND OBJECTIVE BOX
CONSUELO CARO  22y, Female  Allergy: Allergy Status Unknown      LOS  3d    CHIEF COMPLAINT: fever (11 Jul 2022 12:47)      INTERVAL EVENTS/HPI  - T(F): , Max: 99.3 (07-12-22 @ 03:53)  - WBC Count: 12.77 (07-11-22 @ 07:39)  WBC Count: 14.85 (07-10-22 @ 06:01)     - Creatinine, Serum: <0.5 (07-11-22 @ 07:39)     -   -   -     ROS  cannot obtain secondary to patient's sedation and/or mental status    VITALS:  T(F): 99.3, Max: 99.3 (07-12-22 @ 03:53)  HR: 114  BP: 98/59  RR: 18Vital Signs Last 24 Hrs  T(C): 37.4 (12 Jul 2022 03:53), Max: 37.4 (12 Jul 2022 03:53)  T(F): 99.3 (12 Jul 2022 03:53), Max: 99.3 (12 Jul 2022 03:53)  HR: 114 (12 Jul 2022 05:56) (98 - 115)  BP: 98/59 (12 Jul 2022 05:56) (95/51 - 99/53)  BP(mean): --  RR: 18 (11 Jul 2022 16:07) (18 - 18)  SpO2: 100% (12 Jul 2022 07:59) (97% - 100%)    Parameters below as of 12 Jul 2022 07:59  Patient On (Oxygen Delivery Method): T-piece        PHYSICAL EXAM:  Gen: trach/ vent  CV: RRR  Lungs: Decreased BS at bases  Abd: Soft  Neuro: does not follow commands  Skin: no rash   Lines clean, no phlebitis     FH: Non-contributory  Social Hx: Non-contributory    TESTS & MEASUREMENTS:                        9.6    12.77 )-----------( 319      ( 11 Jul 2022 07:39 )             32.4     07-11    141  |  99  |  6<L>  ----------------------------<  50<LL>  3.8   |  28  |  <0.5<L>    Ca    8.9      11 Jul 2022 07:39  Mg     1.7     07-11    TPro  5.8<L>  /  Alb  3.0<L>  /  TBili  0.2  /  DBili  x   /  AST  18  /  ALT  22  /  AlkPhos  107  07-11      LIVER FUNCTIONS - ( 11 Jul 2022 07:39 )  Alb: 3.0 g/dL / Pro: 5.8 g/dL / ALK PHOS: 107 U/L / ALT: 22 U/L / AST: 18 U/L / GGT: x               Culture - Sputum (collected 07-10-22 @ 17:14)  Source: Trach Asp Tracheal Aspirate  Gram Stain (07-11-22 @ 07:31):    Few polymorphonuclear leukocytes per low power field    Few Squamous epithelial cells per low power field    Moderate Gram Negative Rods seen per oil power field    Moderate Gram Positive Rods seen per oil power field    Moderate Gram positive cocci in pairs seen per oil power field  Preliminary Report (07-11-22 @ 18:21):    Numerous Klebsiella pneumoniae    Normal Respiratory Trinidad absent    Culture - Blood (collected 07-10-22 @ 06:01)  Source: .Blood None  Preliminary Report (07-11-22 @ 13:01):    No growth to date.    Culture - Blood (collected 07-09-22 @ 13:32)  Source: .Blood Blood-Peripheral  Preliminary Report (07-11-22 @ 02:02):    No growth to date.    Culture - Blood (collected 07-09-22 @ 13:32)  Source: .Blood Blood-Peripheral  Preliminary Report (07-11-22 @ 02:02):    No growth to date.        Blood Gas Venous - Lactate: 1.10 mmol/L (07-09-22 @ 13:42)      INFECTIOUS DISEASES TESTING  MRSA PCR Result.: Negative (07-11-22 @ 09:40)  Procalcitonin, Serum: 0.07 (07-10-22 @ 06:01)  COVID-19 PCR: Detected (07-09-22 @ 14:28)  Procalcitonin, Serum: 0.11 (07-09-22 @ 13:32)  COVID-19 PCR: NotDetec (06-08-22 @ 14:00)  COVID-19 PCR: NotDetec (06-05-22 @ 04:30)  Rapid RVP Result: NotDetec (05-27-22 @ 16:07)  Procalcitonin, Serum: 12.30 (05-27-22 @ 11:39)  COVID-19 PCR: NotDetec (05-24-22 @ 07:59)  MRSA PCR Result.: Negative (05-17-22 @ 10:10)  Procalcitonin, Serum: 0.03 (05-17-22 @ 07:37)  Rapid RVP Result: Detected (05-16-22 @ 12:09)      INFLAMMATORY MARKERS  C-Reactive Protein, Serum: 73.5 mg/L (07-09-22 @ 13:32)  Sedimentation Rate, Erythrocyte: 46 mm/Hr (05-27-22 @ 11:39)  Sedimentation Rate, Erythrocyte: 85 mm/Hr (05-17-22 @ 07:37)  C-Reactive Protein, Serum: 61 mg/L (05-17-22 @ 07:37)      RADIOLOGY & ADDITIONAL TESTS:  I have personally reviewed the last available Chest xray  CXR      CT  CT Angio Chest PE Protocol w/ IV Cont:   ACC: [id number] EXAM:  CT ANGIO CHEST PULM [facility]                          PROCEDURE DATE:  07/09/2022          INTERPRETATION:  CLINICAL HISTORY / REASON FOR EXAM: Shortness of breath    TECHNIQUE: Multislice helical sections were obtained from the thoracic   inlet to the lung bases during rapid administration of 65 mL Omnipaque   350 intravenous contrast using a CTA pulmonary embolism protocol. Thin   sections were reconstructed through the pulmonary vasculature. Coronal,   sagittal and 3D/MIP reformatted images are also submitted.    COMPARISON CT: None.    OTHER STUDIES USED FOR CORRELATION: Chest radiograph from July 9, 2022      FINDINGS:    PULMONARY EMBOLUS: No central or segmental pulmonary embolus.    TUBES/LINES: Tracheostomy tube within the mid trachea.    LUNGS, PLEURA, AIRWAYS: Patchy airspace consolidations, predominantly   within the left lower lobe. Additional airspace consolidations in the   left upper and right lower lobes. Central airways patent. No pneumothorax.    THORACIC NODES: Multiple prominent left hilar lymph nodes.    MEDIASTINUM/GREAT VESSELS: No pericardial effusion. Heart size   unremarkable. No aneurysmal dilation of the thoracic aorta.    VISUALIZED UPPER ABDOMEN: Unremarkable.    BONES/SOFT TISSUES: No acute osseous abnormality.      IMPRESSION:    No CTA evidence of acute pulmonary embolus.    Airspace consolidations predominantly within the left upper and lower   lobes with enlarged left hilar lymph nodes, most consistent with   pneumonia.    --- End of Report ---            JADE MARTINEZ MD; Attending Radiologist  This document has been electronically signed. Jul 9 2022  9:11PM (07-09-22 @ 20:57)      CARDIOLOGY TESTING  12 Lead ECG:   Ventricular Rate 128 BPM    Atrial Rate 128 BPM    P-R Interval 112 ms    QRS Duration 66 ms    Q-T Interval 308 ms    QTC Calculation(Bazett) 449 ms    P Axis 78 degrees    R Axis 98 degrees    T Axis 38 degrees    Diagnosis Line Sinus tachycardia  Rightward axis  Borderline ECG    Confirmed by Brett Obrien ([id number]) on 7/9/2022 1:59:41 PM (07-09-22 @ 13:35)      MEDICATIONS  ALBUTerol    90 MICROgram(s) HFA Inhaler 1  apixaban 5  cefiderocol IVPB 2000  cyanocobalamin 1000  cyclobenzaprine Oral Tab/Cap - Peds 5  dextrose 50% Injectable 25  ferrous    sulfate Liquid 300  folic acid  Oral Tab/Cap - Peds 1  glycopyrrolate 2  ipratropium 17 MICROgram(s) HFA Inhaler 1  lactated ringers. 1000  metoclopramide 10  metoprolol tartrate 50  midodrine 5  pantoprazole    Tablet 40  thiamine  Oral Tab/Cap - Peds 100  vancomycin  IVPB   vancomycin  IVPB 1000      WEIGHT  Weight (kg): 68.039 (07-10-22 @ 15:50)      ANTIBIOTICS:  cefiderocol IVPB 2000 milliGRAM(s) IV Intermittent every 8 hours  vancomycin  IVPB      vancomycin  IVPB 1000 milliGRAM(s) IV Intermittent every 12 hours      All available historical records have been reviewed

## 2022-07-12 NOTE — ED ADULT NURSE REASSESSMENT NOTE - NSIMPLEMENTINTERV_GEN_ALL_ED
Implemented All Fall with Harm Risk Interventions:  [place] to call system. Call bell, personal items and telephone within reach. Instruct patient to call for assistance. Room bathroom lighting operational. Non-slip footwear when patient is off stretcher. Physically safe environment: no spills, clutter or unnecessary equipment. Stretcher in lowest position, wheels locked, appropriate side rails in place. Provide visual cue, wrist band, yellow gown, etc. Monitor gait and stability. Monitor for mental status changes and reorient to person, place, and time. Review medications for side effects contributing to fall risk. Reinforce activity limits and safety measures with patient and family. Provide visual clues: red socks.

## 2022-07-13 LAB
ALBUMIN SERPL ELPH-MCNC: 3.4 G/DL — LOW (ref 3.5–5.2)
ALP SERPL-CCNC: 106 U/L — SIGNIFICANT CHANGE UP (ref 30–115)
ALT FLD-CCNC: 19 U/L — SIGNIFICANT CHANGE UP (ref 0–41)
ANION GAP SERPL CALC-SCNC: 14 MMOL/L — SIGNIFICANT CHANGE UP (ref 7–14)
AST SERPL-CCNC: 18 U/L — SIGNIFICANT CHANGE UP (ref 0–41)
BASOPHILS # BLD AUTO: 0.03 K/UL — SIGNIFICANT CHANGE UP (ref 0–0.2)
BASOPHILS NFR BLD AUTO: 0.2 % — SIGNIFICANT CHANGE UP (ref 0–1)
BILIRUB SERPL-MCNC: <0.2 MG/DL — SIGNIFICANT CHANGE UP (ref 0.2–1.2)
BUN SERPL-MCNC: 4 MG/DL — LOW (ref 10–20)
CALCIUM SERPL-MCNC: 9.1 MG/DL — SIGNIFICANT CHANGE UP (ref 8.5–10.1)
CHLORIDE SERPL-SCNC: 106 MMOL/L — SIGNIFICANT CHANGE UP (ref 98–110)
CO2 SERPL-SCNC: 31 MMOL/L — SIGNIFICANT CHANGE UP (ref 17–32)
CREAT SERPL-MCNC: 0.5 MG/DL — LOW (ref 0.7–1.5)
EGFR: 136 ML/MIN/1.73M2 — SIGNIFICANT CHANGE UP
EOSINOPHIL # BLD AUTO: 0.34 K/UL — SIGNIFICANT CHANGE UP (ref 0–0.7)
EOSINOPHIL NFR BLD AUTO: 2.7 % — SIGNIFICANT CHANGE UP (ref 0–8)
GLUCOSE BLDC GLUCOMTR-MCNC: 105 MG/DL — HIGH (ref 70–99)
GLUCOSE BLDC GLUCOMTR-MCNC: 108 MG/DL — HIGH (ref 70–99)
GLUCOSE SERPL-MCNC: 86 MG/DL — SIGNIFICANT CHANGE UP (ref 70–99)
HCT VFR BLD CALC: 36.3 % — LOW (ref 37–47)
HGB BLD-MCNC: 10.9 G/DL — LOW (ref 12–16)
IMM GRANULOCYTES NFR BLD AUTO: 0.4 % — HIGH (ref 0.1–0.3)
LYMPHOCYTES # BLD AUTO: 1.48 K/UL — SIGNIFICANT CHANGE UP (ref 1.2–3.4)
LYMPHOCYTES # BLD AUTO: 11.7 % — LOW (ref 20.5–51.1)
MAGNESIUM SERPL-MCNC: 2 MG/DL — SIGNIFICANT CHANGE UP (ref 1.8–2.4)
MCHC RBC-ENTMCNC: 23.7 PG — LOW (ref 27–31)
MCHC RBC-ENTMCNC: 30 G/DL — LOW (ref 32–37)
MCV RBC AUTO: 78.9 FL — LOW (ref 81–99)
MONOCYTES # BLD AUTO: 0.88 K/UL — HIGH (ref 0.1–0.6)
MONOCYTES NFR BLD AUTO: 7 % — SIGNIFICANT CHANGE UP (ref 1.7–9.3)
NEUTROPHILS # BLD AUTO: 9.82 K/UL — HIGH (ref 1.4–6.5)
NEUTROPHILS NFR BLD AUTO: 78 % — HIGH (ref 42.2–75.2)
NRBC # BLD: 0 /100 WBCS — SIGNIFICANT CHANGE UP (ref 0–0)
PHOSPHATE SERPL-MCNC: 3.3 MG/DL — SIGNIFICANT CHANGE UP (ref 2.1–4.9)
PLATELET # BLD AUTO: 427 K/UL — HIGH (ref 130–400)
POTASSIUM SERPL-MCNC: 4.3 MMOL/L — SIGNIFICANT CHANGE UP (ref 3.5–5)
POTASSIUM SERPL-SCNC: 4.3 MMOL/L — SIGNIFICANT CHANGE UP (ref 3.5–5)
PROT SERPL-MCNC: 6.5 G/DL — SIGNIFICANT CHANGE UP (ref 6–8)
RBC # BLD: 4.6 M/UL — SIGNIFICANT CHANGE UP (ref 4.2–5.4)
RBC # FLD: 16.3 % — HIGH (ref 11.5–14.5)
SODIUM SERPL-SCNC: 151 MMOL/L — HIGH (ref 135–146)
WBC # BLD: 12.6 K/UL — HIGH (ref 4.8–10.8)
WBC # FLD AUTO: 12.6 K/UL — HIGH (ref 4.8–10.8)

## 2022-07-13 PROCEDURE — 99233 SBSQ HOSP IP/OBS HIGH 50: CPT

## 2022-07-13 RX ORDER — SODIUM CHLORIDE 9 MG/ML
1000 INJECTION, SOLUTION INTRAVENOUS
Refills: 0 | Status: DISCONTINUED | OUTPATIENT
Start: 2022-07-13 | End: 2022-07-14

## 2022-07-13 RX ADMIN — Medication 50 MILLIGRAM(S): at 06:25

## 2022-07-13 RX ADMIN — ALBUTEROL 1 PUFF(S): 90 AEROSOL, METERED ORAL at 15:58

## 2022-07-13 RX ADMIN — MIDODRINE HYDROCHLORIDE 5 MILLIGRAM(S): 2.5 TABLET ORAL at 21:33

## 2022-07-13 RX ADMIN — SODIUM CHLORIDE 75 MILLILITER(S): 9 INJECTION, SOLUTION INTRAVENOUS at 13:07

## 2022-07-13 RX ADMIN — CYCLOBENZAPRINE HYDROCHLORIDE 5 MILLIGRAM(S): 10 TABLET, FILM COATED ORAL at 15:47

## 2022-07-13 RX ADMIN — APIXABAN 5 MILLIGRAM(S): 2.5 TABLET, FILM COATED ORAL at 06:25

## 2022-07-13 RX ADMIN — SODIUM CHLORIDE 100 MILLILITER(S): 9 INJECTION, SOLUTION INTRAVENOUS at 06:27

## 2022-07-13 RX ADMIN — Medication 10 MILLIGRAM(S): at 22:18

## 2022-07-13 RX ADMIN — PREGABALIN 1000 MICROGRAM(S): 225 CAPSULE ORAL at 13:07

## 2022-07-13 RX ADMIN — Medication 100 MILLIGRAM(S): at 06:25

## 2022-07-13 RX ADMIN — ROBINUL 2 MILLIGRAM(S): 0.2 INJECTION INTRAMUSCULAR; INTRAVENOUS at 21:29

## 2022-07-13 RX ADMIN — Medication 1 PUFF(S): at 15:59

## 2022-07-13 RX ADMIN — MIDODRINE HYDROCHLORIDE 5 MILLIGRAM(S): 2.5 TABLET ORAL at 06:25

## 2022-07-13 RX ADMIN — Medication 1 MILLIGRAM(S): at 13:08

## 2022-07-13 RX ADMIN — CEFIDEROCOL SULFATE TOSYLATE 33.33 MILLIGRAM(S): 1 INJECTION, POWDER, FOR SOLUTION INTRAVENOUS at 21:28

## 2022-07-13 RX ADMIN — Medication 1 PUFF(S): at 07:26

## 2022-07-13 RX ADMIN — Medication 10 MILLIGRAM(S): at 06:26

## 2022-07-13 RX ADMIN — PANTOPRAZOLE SODIUM 40 MILLIGRAM(S): 20 TABLET, DELAYED RELEASE ORAL at 06:25

## 2022-07-13 RX ADMIN — CYCLOBENZAPRINE HYDROCHLORIDE 5 MILLIGRAM(S): 10 TABLET, FILM COATED ORAL at 06:25

## 2022-07-13 RX ADMIN — CYCLOBENZAPRINE HYDROCHLORIDE 5 MILLIGRAM(S): 10 TABLET, FILM COATED ORAL at 22:18

## 2022-07-13 RX ADMIN — CEFIDEROCOL SULFATE TOSYLATE 33.33 MILLIGRAM(S): 1 INJECTION, POWDER, FOR SOLUTION INTRAVENOUS at 08:15

## 2022-07-13 RX ADMIN — ROBINUL 2 MILLIGRAM(S): 0.2 INJECTION INTRAMUSCULAR; INTRAVENOUS at 06:26

## 2022-07-13 RX ADMIN — Medication 10 MILLIGRAM(S): at 15:46

## 2022-07-13 RX ADMIN — ALBUTEROL 1 PUFF(S): 90 AEROSOL, METERED ORAL at 07:25

## 2022-07-13 RX ADMIN — ROBINUL 2 MILLIGRAM(S): 0.2 INJECTION INTRAMUSCULAR; INTRAVENOUS at 15:58

## 2022-07-13 RX ADMIN — MIDODRINE HYDROCHLORIDE 5 MILLIGRAM(S): 2.5 TABLET ORAL at 15:46

## 2022-07-13 RX ADMIN — Medication 650 MILLIGRAM(S): at 08:52

## 2022-07-13 RX ADMIN — Medication 300 MILLIGRAM(S): at 13:07

## 2022-07-13 NOTE — DIETITIAN INITIAL EVALUATION ADULT - ADD RECOMMEND
CONTINUE:   pantoprazole    Tablet 40 milliGRAM(s) Oral before breakfast  thiamine  Oral Tab/Cap - Peds 100 milliGRAM(s) Oral two times a day  cyanocobalamin 1000 MICROGram(s) Oral daily  ferrous    sulfate Liquid 300 milliGRAM(s) Enteral Tube daily  folic acid  Oral Tab/Cap - Peds 1 milliGRAM(s) Oral daily  zofran PRN CONTINUE:   pantoprazole    Tablet 40 milliGRAM(s) Oral before breakfast  thiamine  Oral Tab/Cap - Peds 100 milliGRAM(s) Oral two times a day  cyanocobalamin 1000 MICROGram(s) Oral daily  ferrous    sulfate Liquid 300 milliGRAM(s) Enteral Tube daily  folic acid  Oral Tab/Cap - Peds 1 milliGRAM(s) Oral daily  zofran PRN  HIGH nutrition risk follow up x4days

## 2022-07-13 NOTE — DIETITIAN INITIAL EVALUATION ADULT - PERTINENT MEDS FT
MEDICATIONS  (STANDING):  ALBUTerol    90 MICROgram(s) HFA Inhaler 1 Puff(s) Inhalation every 4 hours  apixaban 5 milliGRAM(s) Oral two times a day  cefiderocol IVPB 2000 milliGRAM(s) IV Intermittent every 8 hours  cyclobenzaprine Oral Tab/Cap - Peds 5 milliGRAM(s) Oral three times a day  dextrose 5%. 1000 milliLiter(s) (75 mL/Hr) IV Continuous <Continuous>  dextrose 50% Injectable 25 Gram(s) IV Push once  ferrous    sulfate Liquid 300 milliGRAM(s) Enteral Tube daily  folic acid  Oral Tab/Cap - Peds 1 milliGRAM(s) Oral daily  glycopyrrolate 2 milliGRAM(s) Oral three times a day  ipratropium 17 MICROgram(s) HFA Inhaler 1 Puff(s) Inhalation every 6 hours  metoclopramide 10 milliGRAM(s) Oral Before meals and at bedtime  metoprolol tartrate 50 milliGRAM(s) Oral two times a day  midodrine 5 milliGRAM(s) Oral every 8 hours  pantoprazole    Tablet 40 milliGRAM(s) Oral before breakfast  thiamine  Oral Tab/Cap - Peds 100 milliGRAM(s) Oral two times a day  cyanocobalamin 1000 MICROGram(s) Oral daily  ferrous    sulfate Liquid 300 milliGRAM(s) Enteral Tube daily  folic acid  Oral Tab/Cap - Peds 1 milliGRAM(s) Oral daily    MEDICATIONS  (PRN):  acetaminophen     Tablet .. 650 milliGRAM(s) Oral every 6 hours PRN Temp greater or equal to 38C (100.4F), Mild Pain (1 - 3)  aluminum hydroxide/magnesium hydroxide/simethicone Suspension 30 milliLiter(s) Oral every 4 hours PRN Dyspepsia  melatonin 3 milliGRAM(s) Oral at bedtime PRN Insomnia  ondansetron Injectable 4 milliGRAM(s) IV Push every 8 hours PRN Nausea and/or Vomiting

## 2022-07-13 NOTE — PATIENT PROFILE ADULT - FALL HARM RISK - HARM RISK INTERVENTIONS

## 2022-07-13 NOTE — DIETITIAN INITIAL EVALUATION ADULT - ENERGY INTAKE
current regimen jevity 1.2 @480 Q8hr (3x) provides total formula 1440mL, 1728kcal, 80g protein, 1166mL free water   + 350mL free water Q6hr (+ 1400mL total water in addition)  TOTAL water 2566mL

## 2022-07-13 NOTE — DIETITIAN INITIAL EVALUATION ADULT - ENTERAL
CONTINUE: via PEG Jevity 1.2 jevity 1.2, change to @345 Q6hr (4x) provides total formula 1380mL, 1656kcal, 76g protein, 1118mL free water   + free water flushes per medical team @350mL free water Q6hr (+ 1400mL total water in addition); TOTAL water 2518mL

## 2022-07-13 NOTE — PROGRESS NOTE ADULT - ASSESSMENT
23 y/o F with a PMHx of encephalitis s/p tooth abscess s/p tracheostomy and PEG tube placement, HTN, GERD, and SVC thrombosis , recent admission for Covid19 PNA and pyelonephritis in June 2022, currently being treated with IV Vancomycin, cefepime for PNA since 7/8, BIBEMS from [facility] for fever this AM. Per NH notes, pt spiked temp of 102.6 today and was sent to ED.     IMPRESSION;   #Sepsis on admission T>101 P>90 WBC 14 secondary to suspected GNR PNA , also COVID19 +     7/10 sputum     Numerous Klebsiella pneumoniae (Carbapenem Resistant)    7/10 BCX NGTD     7/9 BCX NGTD     Procalcitonin, Serum: 0.11 ng/mL (07-09-22 @ 13:32)    UA without significant pyuria     CT Airspace consolidations predominantly within the left upper and lower lobes with enlarged left hilar lymph nodes, most consistent with pneumonia.    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     5/29-5/31 BCX NGTD     5/28 BCX  Klebsiella pneumoniae (Carbapenem Resistant)    5/27 BCx Klebsiella pneumoniae (Carbapenem Resistant)    5/27 UCx Klebsiella pneumoniae (Carbapenem Resistant)    Pyuria  #Trach/PEG  Creatinine, Serum: <0.5 mg/dL (07.10.22 @ 06:01)      RECOMMENDATIONS;  - Continue cefiderocol IVPB 2000 milliGRAM(s) IV Intermittent every 8 hours x 7 days   - Please call micro [phone] #1 to add on sensitivities for cefiderocol to Kleb CRE  - If spikes or rising WBC- add tigecycline 100mg x1 then 50mg q12h IV   - Off loading to prevent pressure sores and preventive measures to avoid aspiration   - Colonized with MRDO, GOC , grave prognosis  - Trend WBC    If any questions, please call or send a message on [employer] Teams  Please continue to update ID with any pertinent new laboratory or radiographic findings  Spectra [phone]

## 2022-07-13 NOTE — PROGRESS NOTE ADULT - SUBJECTIVE AND OBJECTIVE BOX
CONSUELO CARO  22y, Female  Allergy: Allergy Status Unknown      LOS  4d    CHIEF COMPLAINT: DIAGNOSIS: 2019 NOVEL CORONAVIRUS DISEASE (COVID-19); PNEUMONIA DUE TO SEV     (13 Jul 2022 13:05)      INTERVAL EVENTS/HPI  - T(F): , Max: 100.6 (07-13-22 @ 08:23)  - WBC Count: 12.60 (07-13-22 @ 06:09)  WBC Count: 10.92 (07-12-22 @ 07:58)  - Creatinine, Serum: 0.5 (07-13-22 @ 06:09)  Creatinine, Serum: <0.5 (07-12-22 @ 07:58)     -   -   -     ROS  cannot obtain secondary to patient's sedation and/or mental status    VITALS:  T(F): 100.3, Max: 100.6 (07-13-22 @ 08:23)  HR: 90  BP: 106/51  RR: 20Vital Signs Last 24 Hrs  T(C): 37.9 (13 Jul 2022 11:00), Max: 38.1 (13 Jul 2022 08:23)  T(F): 100.3 (13 Jul 2022 11:00), Max: 100.6 (13 Jul 2022 08:23)  HR: 90 (13 Jul 2022 11:00) (90 - 132)  BP: 106/51 (13 Jul 2022 11:00) (99/53 - 110/53)  BP(mean): 74 (12 Jul 2022 20:02) (74 - 74)  RR: 20 (13 Jul 2022 11:00) (20 - 21)  SpO2: 97% (13 Jul 2022 11:00) (97% - 100%)    Parameters below as of 13 Jul 2022 11:00  Patient On (Oxygen Delivery Method): T-piece    O2 Concentration (%): 30    PHYSICAL EXAM:  Gen: trach/ vent  CV: RRR  Lungs: Decreased BS at bases  Abd: Soft  Neuro: does not follow commands  Skin: no rash   Lines clean, no phlebitis   FH: Non-contributory  Social Hx: Non-contributory    TESTS & MEASUREMENTS:                        10.9   12.60 )-----------( 427      ( 13 Jul 2022 06:09 )             36.3     07-13    151<H>  |  106  |  4<L>  ----------------------------<  86  4.3   |  31  |  0.5<L>    Ca    9.1      13 Jul 2022 06:09  Phos  3.3     07-13  Mg     2.0     07-13    TPro  6.5  /  Alb  3.4<L>  /  TBili  <0.2  /  DBili  x   /  AST  18  /  ALT  19  /  AlkPhos  106  07-13      LIVER FUNCTIONS - ( 13 Jul 2022 06:09 )  Alb: 3.4 g/dL / Pro: 6.5 g/dL / ALK PHOS: 106 U/L / ALT: 19 U/L / AST: 18 U/L / GGT: x               Culture - Sputum (collected 07-10-22 @ 17:14)  Source: Trach Asp Tracheal Aspirate  Gram Stain (07-11-22 @ 07:31):    Few polymorphonuclear leukocytes per low power field    Few Squamous epithelial cells per low power field    Moderate Gram Negative Rods seen per oil power field    Moderate Gram Positive Rods seen per oil power field    Moderate Gram positive cocci in pairs seen per oil power field  Final Report (07-12-22 @ 16:06):    Numerous Klebsiella pneumoniae (Carbapenem Resistant)    Normal Respiratory Trinidad absent  Organism: Klepne MDRO (07-12-22 @ 16:06)  Organism: Klepne MDRO (07-12-22 @ 16:06)      -  Amikacin: R >32      -  Amoxicillin/Clavulanic Acid: R >16/8      -  Ampicillin: R >16 These ampicillin results predict results for amoxicillin      -  Ampicillin/Sulbactam: R >16/8 Enterobacter, Klebsiella aerogenes, Citrobacter, and Serratia may develop resistance during prolonged therapy (3-4 days)      -  Aztreonam: R >16      -  Cefazolin: R >16 Enterobacter, Klebsiella aerogenes, Citrobacter, and Serratia may develop resistance during prolonged therapy (3-4 days)      -  Cefepime: R >16      -  Cefoxitin: R >16      -  Ceftazidime/Avibactam: R >16      -  Ceftolozane/tazobactam: R >8      -  Ceftriaxone: R >32 Enterobacter, Klebsiella aerogenes, Citrobacter, and Serratia may develop resistance during prolonged therapy      -  Ciprofloxacin: R >2      -  Ertapenem: R >1      -  Gentamicin: R >8      -  Imipenem: R >8      -  Levofloxacin: R >4      -  Meropenem: R >8      -  Minocycline: S <=4      -  Piperacillin/Tazobactam: R >64      -  Tetra/Doxy: S <=4      -  Tigecycline: S <=2      -  Tobramycin: R >8      -  Trimethoprim/Sulfamethoxazole: R >2/38      Method Type: HERB    Culture - Blood (collected 07-10-22 @ 06:01)  Source: .Blood None  Preliminary Report (07-11-22 @ 13:01):    No growth to date.    Culture - Blood (collected 07-09-22 @ 13:32)  Source: .Blood Blood-Peripheral  Preliminary Report (07-11-22 @ 02:02):    No growth to date.    Culture - Blood (collected 07-09-22 @ 13:32)  Source: .Blood Blood-Peripheral  Preliminary Report (07-11-22 @ 02:02):    No growth to date.        Blood Gas Venous - Lactate: 1.10 mmol/L (07-09-22 @ 13:42)      INFECTIOUS DISEASES TESTING  MRSA PCR Result.: Negative (07-11-22 @ 09:40)  Procalcitonin, Serum: 0.07 (07-10-22 @ 06:01)  COVID-19 PCR: Detected (07-09-22 @ 14:28)  Procalcitonin, Serum: 0.11 (07-09-22 @ 13:32)  COVID-19 PCR: NotDetec (06-08-22 @ 14:00)  COVID-19 PCR: NotDetec (06-05-22 @ 04:30)  Rapid RVP Result: NotDetec (05-27-22 @ 16:07)  Procalcitonin, Serum: 12.30 (05-27-22 @ 11:39)  COVID-19 PCR: NotDetec (05-24-22 @ 07:59)  MRSA PCR Result.: Negative (05-17-22 @ 10:10)  Procalcitonin, Serum: 0.03 (05-17-22 @ 07:37)  Rapid RVP Result: Detected (05-16-22 @ 12:09)      INFLAMMATORY MARKERS  C-Reactive Protein, Serum: 73.5 mg/L (07-09-22 @ 13:32)  Sedimentation Rate, Erythrocyte: 46 mm/Hr (05-27-22 @ 11:39)  Sedimentation Rate, Erythrocyte: 85 mm/Hr (05-17-22 @ 07:37)  C-Reactive Protein, Serum: 61 mg/L (05-17-22 @ 07:37)      RADIOLOGY & ADDITIONAL TESTS:  I have personally reviewed the last available Chest xray  CXR      CT      CARDIOLOGY TESTING  12 Lead ECG:   Ventricular Rate 128 BPM    Atrial Rate 128 BPM    P-R Interval 112 ms    QRS Duration 66 ms    Q-T Interval 308 ms    QTC Calculation(Bazett) 449 ms    P Axis 78 degrees    R Axis 98 degrees    T Axis 38 degrees    Diagnosis Line Sinus tachycardia  Rightward axis  Borderline ECG    Confirmed by Brett Obrien ([id number]) on 7/9/2022 1:59:41 PM (07-09-22 @ 13:35)      MEDICATIONS  ALBUTerol    90 MICROgram(s) HFA Inhaler 1  apixaban 5  cefiderocol IVPB 2000  cyanocobalamin 1000  cyclobenzaprine Oral Tab/Cap - Peds 5  dextrose 5%. 1000  dextrose 50% Injectable 25  ferrous    sulfate Liquid 300  folic acid  Oral Tab/Cap - Peds 1  glycopyrrolate 2  ipratropium 17 MICROgram(s) HFA Inhaler 1  metoclopramide 10  metoprolol tartrate 50  midodrine 5  pantoprazole    Tablet 40  thiamine  Oral Tab/Cap - Peds 100      WEIGHT  Weight (kg): 68.039 (07-10-22 @ 15:50)  Creatinine, Serum: 0.5 mg/dL (07-13-22 @ 06:09)      ANTIBIOTICS:  cefiderocol IVPB 2000 milliGRAM(s) IV Intermittent every 8 hours      All available historical records have been reviewed

## 2022-07-13 NOTE — DIETITIAN INITIAL EVALUATION ADULT - OTHER CALCULATIONS
using IBW 59kg, current weight in chart likely not accurate?? **with consideration for age, weight, BMI, sepsis demand**  ENERGY: 1476-1772kcal/day (25-30g/kg of IBW)  PROTEIN: 65-71g/day (1.1-1.2g/kg)  FLUIDS: per medical team

## 2022-07-13 NOTE — DIETITIAN INITIAL EVALUATION ADULT - OTHER INFO
medical:  23 y/o F with a PMHx of encephalitis s/p tooth abscess s/p tracheostomy and PEG tube placement, HTN, GERD, and SVC thrombosis who was brought to ED from [place] for fevers. Admitted for tx for sepsis due to PNA.  #Sepsis, POA  #Suspected GNR Pneumonia

## 2022-07-13 NOTE — ED ADULT NURSE REASSESSMENT NOTE - NS ED NURSE REASSESS COMMENT FT1
Patient received from previous RN in NAD. Pt awake and alert, non verbal baseline. Patient trach collar with T piece in place. CCM in progress. SpO2 100%. Pt appears comfortable in stretcher.  Pending bed in SDU.
Received from prior RN. alert/nonverbal baseline. Cardiac monitor continued. Safety and comfort maintained. Fall precautions in place. No s/s of emergent distress noted. Will continue to monitor.
Spoke with Pharmacy regarding late Fetroja IV dose. Per pharmacy Priyanka is upstairs picking up the medication now as it is done being mixed. Will administer once received.
Received report from prior RN. Pt alert/nonverbal baseline. Pt on Tele/O2 monitor. Comfort measures offered. Fall precautions maintained, BA in place. Iso precautions maintained. No s/s of emergent distress noted. Will f/u.

## 2022-07-13 NOTE — DIETITIAN INITIAL EVALUATION ADULT - NS FNS DIET ORDER
Diet, NPO with Tube Feed:   Tube Feeding Modality: Gastrostomy  Jevity 1.2 Gilberto  Total Volume for 24 Hours (mL): 1440  Bolus  Total Volume of Bolus (mL):  480  Tube Feed Frequency: Every 8 hours   Tube Feed Start Time: 11:00  Bolus Feed Rate (mL per Hour): 480   Bolus Feed Duration (in Hours): 1  Bolus   Total Volume per Flush (mL): 350   Frequency: Every 6 Hours (07-12-22 @ 10:10) [Active]

## 2022-07-13 NOTE — PROGRESS NOTE ADULT - ASSESSMENT
23 y/o F with a PMHx of encephalitis s/p tooth abscess s/p tracheostomy and PEG tube placement, HTN, GERD, and SVC thrombosis who was brought to ED from [place] for fevers. Admitted for tx for sepsis due to PNA.    # Sepsis 2/2 multi drug resistant pneumonia, sputum growing Klebsiella pneumoniae carbapenem resistant  # Acute on chronic respiratory failure 2/2 MDR pneumonia  # h/o encephalitis from tooth abscess  s/p trach and PEG, bedbound, non-verbal   # Recent COVID-19 infection, MDR colonization on respiratory tract, inability to clear secretion  - c/w IV antibiotic cefiderocol 2 mg IV q8h   - called micro to add on sensitivities for cefiderocol to Kleb CRE as per ID recommendation  - as per ID, if rising WBC or spikes of fever to add tigecycline 100 mg x 1 then 50 mg IV q12h   - c/w oxygen support via trach  - aspiration precaution, dietary evaluation for feeding regimen to avoid aspiration, may consider low volume and frequent feed  - respiratory toilet/suction as per respiratory team    # h/o SVC Thrombosis and Embolism   - c/w Eliquis 5 mg PO BID    # GERD  - c/w Protonix 40 mg PO daily    # Iron Deficiency Anemia, stable   - monitor CBC  - c/w home iron replacement, thiamine, B12, and folic acid    # DVT prophlaxis   - on eliquis 5 mg PO BID    Dispo: [facility]   23 y/o F with a PMHx of encephalitis s/p tooth abscess s/p tracheostomy and PEG tube placement, HTN, GERD, and SVC thrombosis who was brought to ED from [place] for fevers. Admitted for tx for sepsis due to PNA.    # Sepsis 2/2 multi drug resistant pneumonia, sputum growing Klebsiella pneumoniae carbapenem resistant  # Acute on chronic respiratory failure 2/2 MDR pneumonia  # h/o encephalitis from tooth abscess  s/p trach and PEG, bedbound, non-verbal   # Recent COVID-19 infection, MDR colonization on respiratory tract, inability to clear secretion  - c/w IV antibiotic cefiderocol 2 mg IV q8h   - called micro to add on sensitivities for cefiderocol to Kleb CRE as per ID recommendation  - as per ID, if rising WBC or spikes of fever to add tigecycline 100 mg x 1 then 50 mg IV q12h   - c/w oxygen support via trach  - aspiration precaution, dietary evaluation for feeding regimen to avoid aspiration, may consider low volume and frequent feed  - respiratory toilet/suction as per respiratory team    # Hypernatremia  - c/w D5 75cc/hr  - monitor BMP, fsg    # h/o SVC Thrombosis and Embolism   - c/w Eliquis 5 mg PO BID    # GERD  - c/w Protonix 40 mg PO daily    # Iron Deficiency Anemia, stable   - monitor CBC  - c/w home iron replacement, thiamine, B12, and folic acid    # DVT prophlaxis   - on eliquis 5 mg PO BID    Dispo: [facility] prognosis

## 2022-07-13 NOTE — DIETITIAN INITIAL EVALUATION ADULT - NS FNS REASON FOR WEIGHT CHANG
UBW is 61.27 KG (134.8#); height is 5'5" -- last checked on 4/7/22 per RN at NH    5/18/22 weight 56.7 KG per emr UBW is 61.27 KG (134.8#); height is 5'5" -- last checked on 4/7/22 per RN at NH    5/18/22 weight 56.7 KG per emr  current weight: 68kg though suspect scale errors? vs fluid retention?     *at time of visit, bed did not have scale**

## 2022-07-13 NOTE — DIETITIAN INITIAL EVALUATION ADULT - PERTINENT LABORATORY DATA
07-13    151<H>  |  106  |  4<L>  ----------------------------<  86  4.3   |  31  |  0.5<L>    Ca    9.1      13 Jul 2022 06:09  Phos  3.3     07-13  Mg     2.0     07-13    TPro  6.5  /  Alb  3.4<L>  /  TBili  <0.2  /  DBili  x   /  AST  18  /  ALT  19  /  AlkPhos  106  07-13  A1C with Estimated Average Glucose Result: 4.9 % (05-17-22 @ 07:37)

## 2022-07-13 NOTE — DIETITIAN INITIAL EVALUATION ADULT - PHYSCIAL ASSESSMENT
cognition: trached, + PEG, nonverbal cognition: trached, + PEG, nonverbal  mild muscle loss likely due to bedbound status/well nourished

## 2022-07-13 NOTE — PROGRESS NOTE ADULT - SUBJECTIVE AND OBJECTIVE BOX
CONSUELO CARO  22y Female    Patient is a 22y old  Female who presents with a chief complaint of fever    HPI:   23 y/o F with a PMHx of encephalitis s/p tooth abscess s/p tracheostomy and PEG tube placement, HTN, GERD, and SVC thrombosis , recent admission for Covid19 PNA and pyelonephritis in June 2022, currently being treated with IV Vancomycin, cefepime for PNA since 7/8, BIBEMS from [facility] for fever this AM. Per NH notes, pt spiked temp of 102.6 today and was sent to ED. Pt is non-verbal, unable to contribute to history or ROS. pt has sputum around the trach area.     in the ED,pt's VS T(C): 37.3 (07-09-22 @ 19:56), Max: 37.9 (07-09-22 @ 13:53)  HR: 112 (07-09-22 @ 19:56) (112 - 127)  BP: 91/61 (07-09-22 @ 19:56) (89/49 - 104/60)  RR: 18 (07-09-22 @ 19:56) (18 - 18)  SpO2: 100% (07-09-22 @ 19:56) (96% - 100%), labs done showed WBC 11k, creat 0.5, CRP 73,covid is positive, positive UA < from: CT Angio Chest PE Protocol w/ IV Cont (07.09.22 @ 20:57) >  No CTA evidence of acute pulmonary embolus.  Airspace consolidations predominantly within the left upper and lower   lobes with enlarged left hilar lymph nodes, most consistent with   pneumonia.    pt received cefepime, vancomycin.   pt is admitted for workup/management (09 Jul 2022 22:19)      INTERVAL HPI/OVERNIGHT EVENTS:    ROS: Pt is trached and h/o encephalitis, bedbound and non-verbal at this time, and unable to any history or ROS.    Overnight events: No acute events overnight.    Vital Signs Last 24 Hrs  T(C): 37 (13 Jul 2022 15:38), Max: 38.1 (13 Jul 2022 08:23)  T(F): 98.6 (13 Jul 2022 15:38), Max: 100.6 (13 Jul 2022 08:23)  HR: 86 (13 Jul 2022 15:38) (86 - 120)  BP: 109/57 (13 Jul 2022 15:38) (99/53 - 109/57)  BP(mean): 74 (12 Jul 2022 20:02) (74 - 74)  RR: 19 (13 Jul 2022 15:38) (19 - 20)  SpO2: 100% (13 Jul 2022 15:38) (97% - 100%)    Parameters below as of 13 Jul 2022 15:38  Patient On (Oxygen Delivery Method): T-piece    O2 Concentration (%): 30    Allergy Status Unknown      MEDICATIONS  (STANDING):  ALBUTerol    90 MICROgram(s) HFA Inhaler 1 Puff(s) Inhalation every 4 hours  apixaban 5 milliGRAM(s) Oral two times a day  cefiderocol IVPB 2000 milliGRAM(s) IV Intermittent every 8 hours  cyanocobalamin 1000 MICROGram(s) Oral daily  cyclobenzaprine Oral Tab/Cap - Peds 5 milliGRAM(s) Oral three times a day  dextrose 5%. 1000 milliLiter(s) (75 mL/Hr) IV Continuous <Continuous>  dextrose 50% Injectable 25 Gram(s) IV Push once  ferrous    sulfate Liquid 300 milliGRAM(s) Enteral Tube daily  folic acid  Oral Tab/Cap - Peds 1 milliGRAM(s) Oral daily  glycopyrrolate 2 milliGRAM(s) Oral three times a day  ipratropium 17 MICROgram(s) HFA Inhaler 1 Puff(s) Inhalation every 6 hours  metoclopramide 10 milliGRAM(s) Oral Before meals and at bedtime  metoprolol tartrate 50 milliGRAM(s) Oral two times a day  midodrine 5 milliGRAM(s) Oral every 8 hours  pantoprazole    Tablet 40 milliGRAM(s) Oral before breakfast  thiamine  Oral Tab/Cap - Peds 100 milliGRAM(s) Oral two times a day    MEDICATIONS  (PRN):  acetaminophen     Tablet .. 650 milliGRAM(s) Oral every 6 hours PRN Temp greater or equal to 38C (100.4F), Mild Pain (1 - 3)  aluminum hydroxide/magnesium hydroxide/simethicone Suspension 30 milliLiter(s) Oral every 4 hours PRN Dyspepsia  melatonin 3 milliGRAM(s) Oral at bedtime PRN Insomnia  ondansetron Injectable 4 milliGRAM(s) IV Push every 8 hours PRN Nausea and/or Vomiting      PHYSICAL EXAM:  General Appearance: NAD, non-verbal, with trach T piece noted yellow green secretion	  Neck: Normal JVP, no bruit.   Eyes: Conjunctiva clear, No sclera icterus.  Cardiovascular: Regular rate and rhythm S1 S2, No JVD, No murmurs.  Respiratory: Bilateral diffuse rhonchi  Psychiatry: Alert and oriented x 3, Mood & affect appropriate.  Gastrointestinal:  Soft, Non-tender, Non-distended. PEG present.  Neurologic: Unable to assess, bedbound, flexed bilateral UE and LE  Musculoskeletal/ extremities: No joint swelling. No clubbing, cyanosis or edema. Muscular atrophy of bilateral UE and LE.  Vascular: Peripheral pulses palpable bilaterally.  Skin/Integumen: No rashes, No ecchymoses, No cyanosis	.    LABS:                        10.9   12.60 )-----------( 427      ( 13 Jul 2022 06:09 )             36.3     07-13    151<H>  |  106  |  4<L>  ----------------------------<  86  4.3   |  31  |  0.5<L>    Ca    9.1      13 Jul 2022 06:09  Phos  3.3     07-13  Mg     2.0     07-13    TPro  6.5  /  Alb  3.4<L>  /  TBili  <0.2  /  DBili  x   /  AST  18  /  ALT  19  /  AlkPhos  106  07-13      Culture - Sputum . (07.10.22 @ 17:14)    -  Minocycline: S <=4    -  Ceftolozane/tazobactam: R >8    -  Ceftazidime/Avibactam: R >16    -  Ciprofloxacin: R >2    -  Ertapenem: R >1    -  Gentamicin: R >8    -  Imipenem: R >8    -  Levofloxacin: R >4    -  Meropenem: R >8    -  Piperacillin/Tazobactam: R >64    -  Tetra/Doxy: S <=4    -  Tigecycline: S <=2    -  Tobramycin: R >8    -  Trimethoprim/Sulfamethoxazole: R >2/38    Gram Stain:   Few polymorphonuclear leukocytes per low power field  Few Squamous epithelial cells per low power field  Moderate Gram Negative Rods seen per oil power field  Moderate Gram Positive Rods seen per oil power field  Moderate Gram positive cocci in pairs seen per oil power field    -  Amikacin: R >32    -  Amoxicillin/Clavulanic Acid: R >16/8    -  Ampicillin: R >16 These ampicillin results predict results for amoxicillin    -  Ampicillin/Sulbactam: R >16/8 Enterobacter, Klebsiella aerogenes, Citrobacter, and Serratia may develop resistance during prolonged therapy (3-4 days)    -  Aztreonam: R >16    -  Cefazolin: R >16 Enterobacter, Klebsiella aerogenes, Citrobacter, and Serratia may develop resistance during prolonged therapy (3-4 days)    -  Cefepime: R >16    -  Cefoxitin: R >16    -  Ceftriaxone: R >32 Enterobacter, Klebsiella aerogenes, Citrobacter, and Serratia may develop resistance during prolonged therapy    Specimen Source: Trach Asp Tracheal Aspirate    Culture Results:   Numerous Klebsiella pneumoniae (Carbapenem Resistant)  Normal Respiratory Trinidad absent    Organism Identification: Klepne MDRO    Organism: Klepne MDRO    Method Type: HERB    Culture - Blood in AM (07.10.22 @ 06:01)    Specimen Source: .Blood None    Culture Results:   No growth to date.

## 2022-07-13 NOTE — DIETITIAN INITIAL EVALUATION ADULT - ORAL INTAKE PTA/DIET HISTORY
seen by nutrition in 05/2022  Pt unable to participate in nutrition assessment d/t mental status; pt also has a trach. Spoke to RN at [facility] [phone] today. Per RN, pt was receiving Jevity 1.2 Gilberto, 1000 mL/24hrs with a goal rate of 65 mL/hr - continuous feed, 800 mL free water per 24 hrs. Pt took MVI, B12, and VIT D daily. DENFA Pt unable to participate in nutrition assessment d/t mental status; pt also has a trach. paper chart from [facility] [phone] pt was receiving Jevity 1.2 Gilberto, 1000 mL/24hrs with a goal rate of 65 mL/hr - continuous feed, + 800 mL free water per 24 hrs  provides: 1200kcal, 55g pro, 810mL free water   total water 1610mL with additional

## 2022-07-14 DIAGNOSIS — A41.4 SEPSIS DUE TO ANAEROBES: ICD-10-CM

## 2022-07-14 LAB
-  CEFIDEROCOL: SIGNIFICANT CHANGE UP
ALBUMIN SERPL ELPH-MCNC: 3.2 G/DL — LOW (ref 3.5–5.2)
ALP SERPL-CCNC: 172 U/L — HIGH (ref 30–115)
ALT FLD-CCNC: 160 U/L — HIGH (ref 0–41)
ANION GAP SERPL CALC-SCNC: 8 MMOL/L — SIGNIFICANT CHANGE UP (ref 7–14)
AST SERPL-CCNC: 240 U/L — HIGH (ref 0–41)
BASOPHILS # BLD AUTO: 0.03 K/UL — SIGNIFICANT CHANGE UP (ref 0–0.2)
BASOPHILS NFR BLD AUTO: 0.2 % — SIGNIFICANT CHANGE UP (ref 0–1)
BILIRUB SERPL-MCNC: 0.5 MG/DL — SIGNIFICANT CHANGE UP (ref 0.2–1.2)
BUN SERPL-MCNC: 4 MG/DL — LOW (ref 10–20)
CALCIUM SERPL-MCNC: 9.2 MG/DL — SIGNIFICANT CHANGE UP (ref 8.5–10.1)
CHLORIDE SERPL-SCNC: 100 MMOL/L — SIGNIFICANT CHANGE UP (ref 98–110)
CO2 SERPL-SCNC: 33 MMOL/L — HIGH (ref 17–32)
CREAT SERPL-MCNC: <0.5 MG/DL — LOW (ref 0.7–1.5)
EGFR: 143 ML/MIN/1.73M2 — SIGNIFICANT CHANGE UP
EOSINOPHIL # BLD AUTO: 0.29 K/UL — SIGNIFICANT CHANGE UP (ref 0–0.7)
EOSINOPHIL NFR BLD AUTO: 2.3 % — SIGNIFICANT CHANGE UP (ref 0–8)
GLUCOSE BLDC GLUCOMTR-MCNC: 112 MG/DL — HIGH (ref 70–99)
GLUCOSE BLDC GLUCOMTR-MCNC: 137 MG/DL — HIGH (ref 70–99)
GLUCOSE BLDC GLUCOMTR-MCNC: 150 MG/DL — HIGH (ref 70–99)
GLUCOSE BLDC GLUCOMTR-MCNC: 155 MG/DL — HIGH (ref 70–99)
GLUCOSE SERPL-MCNC: 160 MG/DL — HIGH (ref 70–99)
HCT VFR BLD CALC: 34.9 % — LOW (ref 37–47)
HGB BLD-MCNC: 10.5 G/DL — LOW (ref 12–16)
IMM GRANULOCYTES NFR BLD AUTO: 0.7 % — HIGH (ref 0.1–0.3)
LYMPHOCYTES # BLD AUTO: 1.42 K/UL — SIGNIFICANT CHANGE UP (ref 1.2–3.4)
LYMPHOCYTES # BLD AUTO: 11.3 % — LOW (ref 20.5–51.1)
MAGNESIUM SERPL-MCNC: 1.8 MG/DL — SIGNIFICANT CHANGE UP (ref 1.8–2.4)
MCHC RBC-ENTMCNC: 23.4 PG — LOW (ref 27–31)
MCHC RBC-ENTMCNC: 30.1 G/DL — LOW (ref 32–37)
MCV RBC AUTO: 77.7 FL — LOW (ref 81–99)
METHOD TYPE: SIGNIFICANT CHANGE UP
MONOCYTES # BLD AUTO: 0.86 K/UL — HIGH (ref 0.1–0.6)
MONOCYTES NFR BLD AUTO: 6.9 % — SIGNIFICANT CHANGE UP (ref 1.7–9.3)
NEUTROPHILS # BLD AUTO: 9.83 K/UL — HIGH (ref 1.4–6.5)
NEUTROPHILS NFR BLD AUTO: 78.6 % — HIGH (ref 42.2–75.2)
NRBC # BLD: 0 /100 WBCS — SIGNIFICANT CHANGE UP (ref 0–0)
PLATELET # BLD AUTO: 407 K/UL — HIGH (ref 130–400)
POTASSIUM SERPL-MCNC: 4.1 MMOL/L — SIGNIFICANT CHANGE UP (ref 3.5–5)
POTASSIUM SERPL-SCNC: 4.1 MMOL/L — SIGNIFICANT CHANGE UP (ref 3.5–5)
PROT SERPL-MCNC: 6.1 G/DL — SIGNIFICANT CHANGE UP (ref 6–8)
RBC # BLD: 4.49 M/UL — SIGNIFICANT CHANGE UP (ref 4.2–5.4)
RBC # FLD: 15.9 % — HIGH (ref 11.5–14.5)
SODIUM SERPL-SCNC: 141 MMOL/L — SIGNIFICANT CHANGE UP (ref 135–146)
WBC # BLD: 12.52 K/UL — HIGH (ref 4.8–10.8)
WBC # FLD AUTO: 12.52 K/UL — HIGH (ref 4.8–10.8)

## 2022-07-14 PROCEDURE — 99233 SBSQ HOSP IP/OBS HIGH 50: CPT

## 2022-07-14 PROCEDURE — 76705 ECHO EXAM OF ABDOMEN: CPT | Mod: 26

## 2022-07-14 RX ADMIN — CEFIDEROCOL SULFATE TOSYLATE 33.33 MILLIGRAM(S): 1 INJECTION, POWDER, FOR SOLUTION INTRAVENOUS at 22:12

## 2022-07-14 RX ADMIN — PREGABALIN 1000 MICROGRAM(S): 225 CAPSULE ORAL at 13:47

## 2022-07-14 RX ADMIN — Medication 10 MILLIGRAM(S): at 12:02

## 2022-07-14 RX ADMIN — Medication 1 MILLIGRAM(S): at 12:02

## 2022-07-14 RX ADMIN — MIDODRINE HYDROCHLORIDE 5 MILLIGRAM(S): 2.5 TABLET ORAL at 13:45

## 2022-07-14 RX ADMIN — Medication 100 MILLIGRAM(S): at 17:55

## 2022-07-14 RX ADMIN — PANTOPRAZOLE SODIUM 40 MILLIGRAM(S): 20 TABLET, DELAYED RELEASE ORAL at 05:14

## 2022-07-14 RX ADMIN — SODIUM CHLORIDE 75 MILLILITER(S): 9 INJECTION, SOLUTION INTRAVENOUS at 02:30

## 2022-07-14 RX ADMIN — ROBINUL 2 MILLIGRAM(S): 0.2 INJECTION INTRAMUSCULAR; INTRAVENOUS at 05:15

## 2022-07-14 RX ADMIN — APIXABAN 5 MILLIGRAM(S): 2.5 TABLET, FILM COATED ORAL at 17:54

## 2022-07-14 RX ADMIN — Medication 300 MILLIGRAM(S): at 13:46

## 2022-07-14 RX ADMIN — CYCLOBENZAPRINE HYDROCHLORIDE 5 MILLIGRAM(S): 10 TABLET, FILM COATED ORAL at 21:56

## 2022-07-14 RX ADMIN — CYCLOBENZAPRINE HYDROCHLORIDE 5 MILLIGRAM(S): 10 TABLET, FILM COATED ORAL at 13:45

## 2022-07-14 RX ADMIN — ROBINUL 2 MILLIGRAM(S): 0.2 INJECTION INTRAMUSCULAR; INTRAVENOUS at 13:53

## 2022-07-14 RX ADMIN — CEFIDEROCOL SULFATE TOSYLATE 33.33 MILLIGRAM(S): 1 INJECTION, POWDER, FOR SOLUTION INTRAVENOUS at 16:05

## 2022-07-14 RX ADMIN — Medication 100 MILLIGRAM(S): at 05:13

## 2022-07-14 RX ADMIN — APIXABAN 5 MILLIGRAM(S): 2.5 TABLET, FILM COATED ORAL at 05:13

## 2022-07-14 RX ADMIN — Medication 10 MILLIGRAM(S): at 21:56

## 2022-07-14 RX ADMIN — CEFIDEROCOL SULFATE TOSYLATE 33.33 MILLIGRAM(S): 1 INJECTION, POWDER, FOR SOLUTION INTRAVENOUS at 05:13

## 2022-07-14 RX ADMIN — ROBINUL 2 MILLIGRAM(S): 0.2 INJECTION INTRAMUSCULAR; INTRAVENOUS at 21:55

## 2022-07-14 RX ADMIN — Medication 10 MILLIGRAM(S): at 05:13

## 2022-07-14 RX ADMIN — MIDODRINE HYDROCHLORIDE 5 MILLIGRAM(S): 2.5 TABLET ORAL at 05:13

## 2022-07-14 RX ADMIN — MIDODRINE HYDROCHLORIDE 5 MILLIGRAM(S): 2.5 TABLET ORAL at 21:55

## 2022-07-14 RX ADMIN — Medication 10 MILLIGRAM(S): at 16:05

## 2022-07-14 RX ADMIN — Medication 50 MILLIGRAM(S): at 17:54

## 2022-07-14 RX ADMIN — CYCLOBENZAPRINE HYDROCHLORIDE 5 MILLIGRAM(S): 10 TABLET, FILM COATED ORAL at 05:16

## 2022-07-14 RX ADMIN — Medication 50 MILLIGRAM(S): at 05:55

## 2022-07-14 NOTE — PROCEDURE NOTE - NSPOSTCAREGUIDE_GEN_A_CORE
Verbal/written post procedure instructions were given to patient/caregiver/Care for catheter as per unit/ICU protocols
Keep the cast/splint/dressing clean and dry/Care for catheter as per unit/ICU protocols

## 2022-07-14 NOTE — PROCEDURE NOTE - NSICDXPROCEDURE_GEN_ALL_CORE_FT
PROCEDURES:  Insertion, midline catheter 14-Jul-2022 01:33:49 Midline IV placed for venous administration od medications Harley Grant

## 2022-07-14 NOTE — PROGRESS NOTE ADULT - SUBJECTIVE AND OBJECTIVE BOX
CONSUELO CARO 22y Female  MRN#: [id number]     Hospital Day: 5d    Pt is currently admitted with the primary diagnosis of sepsis 2/2 MDR PNA    SUBJECTIVE  23 y/o F with a PMHx of encephalitis s/p tooth abscess s/p tracheostomy and PEG tube placement, HTN, GERD, and SVC thrombosis , recent admission for Covid19 PNA and pyelonephritis in June 2022, currently being treated with IV Vancomycin, cefepime for PNA since 7/8, BIBEMS from [facility] for fever this AM. Per NH notes, pt spiked temp of 102.6 today and was sent to ED. Pt is non-verbal, unable to contribute to history or ROS. pt has sputum around the trach area.     In the ED, Tmax 37.9, tachy to 127, BP 89/49, WBC 11k, creat 0.5, CRP 73,covid is positive, positive UA, CT Angio Chest PE Protocol w/ IV Cont No CTA evidence of acute pulmonary embolus. Airspace consolidations predominantly within the left upper and lower lobes with enlarged left hilar lymph nodes, most consistent with pneumonia. Pt received Cefepime and Vancomycin and was admitted for further workup and management.     7/14: Patient seen and examined at bedside. Patient is non-verbal. HR 90s this AM      Present Today:   - Alejo:  No [  ], Yes [ x  ] : Indication:     - Type of IV Access:       .. CVC/Piccline:  No [  ], Yes [   ] : Indication:       .. 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    HTN (hypertension)    GERD (gastroesophageal reflux disease)                                              -----------------------------------------------------------  ALLERGIES:  Allergy Status Unknown                                            ------------------------------------------------------------    HOME MEDICATIONS  Home Medications:  albuterol 90 mcg/inh inhalation aerosol with adapter: 1 puff(s) inhaled every 4 hours (09 Jul 2022 22:48)  cyclobenzaprine 5 mg oral tablet: 1 tab(s) orally 3 times a day (09 Jul 2022 22:48)  Eliquis: 5 milligram(s) orally every 12 hours (09 Jul 2022 22:48)  ferrous sulfate 220 mg/5 mL (44 mg/5 mL elemental iron) oral elixir: 7.5 milliliter(s) orally once a day (09 Jul 2022 22:48)  folic acid 1 mg oral tablet: 1 tab(s) orally once a day (09 Jul 2022 22:48)  glycopyrrolate 2 mg oral tablet: 1 tab(s) orally 3 times a day (09 Jul 2022 22:48)  ipratropium 18 mcg/inh inhalation aerosol: 2 puff(s) inhaled every 6 hours (09 Jul 2022 22:48)  Lopressor 50 mg oral tablet: 1 tab(s) orally 2 times a day (09 Jul 2022 22:48)  metoclopramide 10 mg oral tablet: 1 tab(s) orally 4 times a day (before meals and at bedtime) (09 Jul 2022 22:48)  midodrine 5 mg oral tablet: 1 tab(s) orally every 8 hours (09 Jul 2022 22:48)  omeprazole 40 mg oral delayed release capsule: 1 cap(s) orally once a day (09 Jul 2022 22:48)  scopolamine 0.4 mg oral tablet: 1 milligram(s) orally every 72 hours (09 Jul 2022 22:48)  Tylenol 325 mg oral capsule: 2 cap(s) orally 3 times a day, As Needed (09 Jul 2022 22:48)  Vitamin B1 100 mg oral tablet: 1 tab(s) orally 2 times a day (09 Jul 2022 22:48)  Vitamin B12 500 mcg oral tablet: 1 tab(s) orally once a day (09 Jul 2022 22:48)                           MEDICATIONS:  STANDING MEDICATIONS  ALBUTerol    90 MICROgram(s) HFA Inhaler 1 Puff(s) Inhalation every 4 hours  apixaban 5 milliGRAM(s) Oral two times a day  cefiderocol IVPB 2000 milliGRAM(s) IV Intermittent every 8 hours  cyanocobalamin 1000 MICROGram(s) Oral daily  cyclobenzaprine Oral Tab/Cap - Peds 5 milliGRAM(s) Oral three times a day  dextrose 5%. 1000 milliLiter(s) IV Continuous <Continuous>  dextrose 50% Injectable 25 Gram(s) IV Push once  ferrous    sulfate Liquid 300 milliGRAM(s) Enteral Tube daily  folic acid  Oral Tab/Cap - Peds 1 milliGRAM(s) Oral daily  glycopyrrolate 2 milliGRAM(s) Oral three times a day  ipratropium 17 MICROgram(s) HFA Inhaler 1 Puff(s) Inhalation every 6 hours  metoclopramide 10 milliGRAM(s) Oral Before meals and at bedtime  metoprolol tartrate 50 milliGRAM(s) Oral two times a day  midodrine 5 milliGRAM(s) Oral every 8 hours  pantoprazole    Tablet 40 milliGRAM(s) Oral before breakfast  thiamine  Oral Tab/Cap - Peds 100 milliGRAM(s) Oral two times a day    PRN MEDICATIONS  acetaminophen     Tablet .. 650 milliGRAM(s) Oral every 6 hours PRN  aluminum hydroxide/magnesium hydroxide/simethicone Suspension 30 milliLiter(s) Oral every 4 hours PRN  melatonin 3 milliGRAM(s) Oral at bedtime PRN  ondansetron Injectable 4 milliGRAM(s) IV Push every 8 hours PRN                                            ------------------------------------------------------------  VITAL SIGNS: Last 24 Hours  T(C): 36.4 (14 Jul 2022 05:00), Max: 37.9 (13 Jul 2022 11:00)  T(F): 97.5 (14 Jul 2022 05:00), Max: 100.3 (13 Jul 2022 11:00)  HR: 103 (14 Jul 2022 05:00) (86 - 113)  BP: 109/69 (14 Jul 2022 05:00) (105/57 - 109/69)  BP(mean): --  RR: 20 (14 Jul 2022 05:00) (19 - 20)  SpO2: 98% (14 Jul 2022 05:00) (97% - 100%)      07-13-22 @ 07:01  -  07-14-22 @ 07:00  --------------------------------------------------------  IN: 800 mL / OUT: 900 mL / NET: -100 mL                                             --------------------------------------------------------------  LABS:                        10.5   12.52 )-----------( 407      ( 14 Jul 2022 08:30 )             34.9     07-14    141  |  100  |  4<L>  ----------------------------<  160<H>  4.1   |  33<H>  |  <0.5<L>    Ca    9.2      14 Jul 2022 08:30  Phos  3.3     07-13  Mg     1.8     07-14    TPro  6.1  /  Alb  3.2<L>  /  TBili  0.5  /  DBili  x   /  AST  240<H>  /  ALT  160<H>  /  AlkPhos  172<H>  07-14                                                  -------------------------------------------------------------  RADIOLOGY:                                            --------------------------------------------------------------    PHYSICAL EXAM:  GENERAL: Pt has Trach and PEG   CHEST/LUNG: Clear to auscultation bilaterally; no wheezing  HEART: Regular rate and rhythm; No murmurs, rubs, or gallops  ABDOMEN: Soft, Nontender, Nondistended; Bowel sounds present  EXTREMITIES:  2+ Peripheral Pulses, No clubbing, cyanosis, or edema                                           --------------------------------------------------------------    ASSESSMENT & PLAN    23 y/o F with a PMHx of encephalitis s/p tooth abscess s/p tracheostomy and PEG tube placement, HTN, GERD, and SVC thrombosis , recent admission for Covid19 PNA and pyelonephritis in June 2022, currently being treated with IV Vancomycin, cefepime for PNA since 7/8, BIBEMS from [facility] for fever this AM. Per NH notes, pt spiked temp of 102.6 today and was sent to ED. Pt is non-verbal, unable to contribute to history or ROS. pt has sputum around the trach area.     # sepsis 2/2 MDR PNA  # Acute on chronic respiratory failure  - pt's first covid test was positive in 5/22, subsequent tests negative, positive again today. ? recurrence? vs persistent infection?  - hx of MDR Klebsiella bacteremia  - in the ED,pt's VS T Max: 37.9  BP 89/49  - labs done showed WBC 11k, creat 0.5, CRP 73,covid is positive  - CT Angio Chest PE Protocol w/ IV Cont > NO PE, consistent with pneumonia  - pt was started on cefepime and vancomycin 7/8 at [facility]  - previous cultures grew Klebsiella, proteus ESBL, MDRO, CRE  - sputum cx with carbapenem resistent Klebsiella  - ID recs: Cefiderocol 2g q8h started   - as per ID, add Tigecycline if rising WBC or fever   - f/u sensitivities of trach cultures   - blood cultures NGTD  - c/w oxygen support via trach    #Persistent sinus tachycardia  - pt was persistently tachycardic on previous admission baseline 100-120s  - EP eval, HR <130 acceptable  - on admission pt was tachycardic to 150-160s, now at baseline 90-120s s/p ABx  - c/w Metoprolol 50 BID      #HO SVC Thrombosis and Embolism   - C/w Eliquis 5 mg PO BID    #History of Encephalitis post Tooth Abscess in 10/2021   - s/p Tracheostomy (has an O2 tank per sister but unsure about flow) and G-tube placement  - aspiration precautions, respiratory toilet/suction as per resp team     #GERD  * Home med Omeprazole 40mg QD  - C/w Protonix 40 mg PO daily    #Iron Deficiency Anemia   #History of Wernicke Encephalopathy  #Suspected thiamine deficiency  #Suspected folic acid deficiency  * Home med iron replacement, thiamine, B12, and folic acid  - C/w home iron replacement, thiamine, B12, and folic acid  - Monitor for now    #DVT ppx: Eliquis 5 mg PO BID  #GI ppx: Protonix 40 mg PO daily  #Diet: NPO w G-tube feeds  #Activity: Functional quadriplegic  #Dispo: From [facility];  #Code status: Full code -- has MOLST confirming                                                                                                              ----------------------------------------------------  # DVT prophylaxis     # GI prophylaxis     # Diet     # Activity Score (AM-PAC)    # Code status     # Disposition                                                                              --------------------------------------------------------    # Handoff

## 2022-07-14 NOTE — PROCEDURE NOTE - NSPROCDETAILS_GEN_ALL_CORE
supine position/ultrasound guidance
location identified, draped/prepped, sterile technique used/sterile dressing applied/sterile technique, catheter placed/supine position/ultrasound guidance

## 2022-07-14 NOTE — PROGRESS NOTE ADULT - ATTENDING COMMENTS
23 y/o F with a PMHx of encephalitis s/p tooth abscess s/p tracheostomy and PEG tube placement, HTN, GERD, and SVC thrombosis who was brought to ED from [place] for fevers. Admitted for tx for sepsis due to PNA.    # Sepsis 2/2 multi drug resistant pneumonia, sputum growing Klebsiella pneumoniae carbapenem resistant  # Acute on chronic respiratory failure 2/2 MDR pneumonia  # h/o encephalitis from tooth abscess  s/p trach and PEG, bedbound, non-verbal   # Recent COVID-19 infection, MDR colonization on respiratory tract, inability to clear secretion  - c/w IV antibiotic cefiderocol 2 mg IV q8h   - called micro to add on sensitivities for cefiderocol to Kleb CRE as per ID recommendation  - as per ID, if rising WBC or spikes of fever to add tigecycline 100 mg x 1 then 50 mg IV q12h   - c/w oxygen support via trach  - aspiration precaution, dietary evaluation for feeding regimen to avoid aspiration, may consider low volume and frequent feed  - respiratory toilet/suction as per respiratory team    #Worsening LFTs (as of 7/14):   Might be 2/2 Abx. Monitor. f/u ID  RUQ US    # Hypernatremia  Na 151 (7/13) > 141 (7/14).   D/c D5 75cc/hr. Repeat at 6PM.   - monitor BMP, fsg    # h/o SVC Thrombosis and Embolism   - c/w Eliquis 5 mg PO BID    # GERD  - c/w Protonix 40 mg PO daily    # Iron Deficiency Anemia, stable   - monitor CBC  - c/w home iron replacement, thiamine, B12, and folic acid    # DVT prophlaxis   - on eliquis 5 mg PO BID    Dispo: [facility] 23 y/o F with a PMHx of encephalitis s/p tooth abscess s/p tracheostomy and PEG tube placement, HTN, GERD, and SVC thrombosis who was brought to ED from [place] for fevers. Admitted for tx for sepsis due to PNA.    # Sepsis 2/2 multi drug resistant pneumonia, sputum growing Klebsiella pneumoniae carbapenem resistant  # Acute on chronic respiratory failure 2/2 MDR pneumonia  # h/o encephalitis from tooth abscess  s/p trach and PEG, bedbound, non-verbal   # Recent COVID-19 infection, MDR colonization on respiratory tract, inability to clear secretion  - c/w IV antibiotic cefiderocol 2 mg IV q8h   - called micro to add on sensitivities for cefiderocol to Kleb CRE as per ID recommendation  - as per ID, if rising WBC or spikes of fever to add tigecycline 100 mg x 1 then 50 mg IV q12h   - c/w oxygen support via trach  - aspiration precaution, dietary evaluation for feeding regimen to avoid aspiration, may consider low volume and frequent feed  - respiratory toilet/suction as per respiratory team    #Worsening LFTs (as of 7/14):   Might be 2/2 Abx. Monitor. f/u ID  RUQ US    # Hypernatremia  Na 151 (7/13) > 141 (7/14).   D/c D5 75cc/hr. Repeat at 6PM.   - monitor BMP, fsg    # h/o SVC Thrombosis and Embolism   - c/w Eliquis 5 mg PO BID    # GERD  - c/w Protonix 40 mg PO daily    # Iron Deficiency Anemia, stable   - monitor CBC  - c/w home iron replacement, thiamine, B12, and folic acid    # DVT prophlaxis   - on eliquis 5 mg PO BID    #Functional Quadruparesis  s/p Trach/PEG    Dispo: [facility]

## 2022-07-14 NOTE — PROGRESS NOTE ADULT - SUBJECTIVE AND OBJECTIVE BOX
CONSUELO CARO  22y, Female  Allergy: Allergy Status Unknown      LOS  5d    CHIEF COMPLAINT: fever (14 Jul 2022 10:13)      INTERVAL EVENTS/HPI  - T(F): , Max: 98.6 (07-13-22 @ 15:38)  - WBC Count: 12.52 (07-14-22 @ 08:30)  WBC Count: 12.60 (07-13-22 @ 06:09)     - Creatinine, Serum: <0.5 (07-14-22 @ 08:30)  Creatinine, Serum: 0.5 (07-13-22 @ 06:09)     -   -   -     ROS  cannot obtain secondary to patient's sedation and/or mental status    VITALS:  T(F): 97.3, Max: 98.6 (07-13-22 @ 15:38)  HR: 100  BP: 108/67  RR: 20Vital Signs Last 24 Hrs  T(C): 36.3 (14 Jul 2022 12:12), Max: 37 (13 Jul 2022 15:38)  T(F): 97.3 (14 Jul 2022 12:12), Max: 98.6 (13 Jul 2022 15:38)  HR: 100 (14 Jul 2022 12:12) (86 - 113)  BP: 108/67 (14 Jul 2022 12:12) (105/57 - 109/69)  BP(mean): --  RR: 20 (14 Jul 2022 12:12) (19 - 20)  SpO2: 98% (14 Jul 2022 05:00) (97% - 100%)    Parameters below as of 14 Jul 2022 05:00  Patient On (Oxygen Delivery Method): T-piece        PHYSICAL EXAM:  Gen: Intubated  CV: RRR  Lungs: Decreased BS at bases  Abd: Soft  Neuro: Sedated  Lines clean, no phlebitis    FH: Non-contributory  Social Hx: Non-contributory    TESTS & MEASUREMENTS:                        10.5   12.52 )-----------( 407      ( 14 Jul 2022 08:30 )             34.9     07-14    141  |  100  |  4<L>  ----------------------------<  160<H>  4.1   |  33<H>  |  <0.5<L>    Ca    9.2      14 Jul 2022 08:30  Phos  3.3     07-13  Mg     1.8     07-14    TPro  6.1  /  Alb  3.2<L>  /  TBili  0.5  /  DBili  x   /  AST  240<H>  /  ALT  160<H>  /  AlkPhos  172<H>  07-14      LIVER FUNCTIONS - ( 14 Jul 2022 08:30 )  Alb: 3.2 g/dL / Pro: 6.1 g/dL / ALK PHOS: 172 U/L / ALT: 160 U/L / AST: 240 U/L / GGT: x               Culture - Sputum (collected 07-10-22 @ 17:14)  Source: Trach Asp Tracheal Aspirate  Gram Stain (07-11-22 @ 07:31):    Few polymorphonuclear leukocytes per low power field    Few Squamous epithelial cells per low power field    Moderate Gram Negative Rods seen per oil power field    Moderate Gram Positive Rods seen per oil power field    Moderate Gram positive cocci in pairs seen per oil power field  Final Report (07-12-22 @ 16:06):    Numerous Klebsiella pneumoniae (Carbapenem Resistant)    Normal Respiratory Trinidad absent  Organism: Klepne MDRO (07-12-22 @ 16:06)  Organism: Klepne MDRO (07-12-22 @ 16:06)      -  Amikacin: R >32      -  Amoxicillin/Clavulanic Acid: R >16/8      -  Ampicillin: R >16 These ampicillin results predict results for amoxicillin      -  Ampicillin/Sulbactam: R >16/8 Enterobacter, Klebsiella aerogenes, Citrobacter, and Serratia may develop resistance during prolonged therapy (3-4 days)      -  Aztreonam: R >16      -  Cefazolin: R >16 Enterobacter, Klebsiella aerogenes, Citrobacter, and Serratia may develop resistance during prolonged therapy (3-4 days)      -  Cefepime: R >16      -  Cefoxitin: R >16      -  Ceftazidime/Avibactam: R >16      -  Ceftolozane/tazobactam: R >8      -  Ceftriaxone: R >32 Enterobacter, Klebsiella aerogenes, Citrobacter, and Serratia may develop resistance during prolonged therapy      -  Ciprofloxacin: R >2      -  Ertapenem: R >1      -  Gentamicin: R >8      -  Imipenem: R >8      -  Levofloxacin: R >4      -  Meropenem: R >8      -  Minocycline: S <=4      -  Piperacillin/Tazobactam: R >64      -  Tetra/Doxy: S <=4      -  Tigecycline: S <=2      -  Tobramycin: R >8      -  Trimethoprim/Sulfamethoxazole: R >2/38      Method Type: HERB    Culture - Blood (collected 07-10-22 @ 06:01)  Source: .Blood None  Preliminary Report (07-11-22 @ 13:01):    No growth to date.    Culture - Blood (collected 07-09-22 @ 13:32)  Source: .Blood Blood-Peripheral  Preliminary Report (07-11-22 @ 02:02):    No growth to date.    Culture - Blood (collected 07-09-22 @ 13:32)  Source: .Blood Blood-Peripheral  Preliminary Report (07-11-22 @ 02:02):    No growth to date.            INFECTIOUS DISEASES TESTING  MRSA PCR Result.: Negative (07-11-22 @ 09:40)  Procalcitonin, Serum: 0.07 (07-10-22 @ 06:01)  COVID-19 PCR: Detected (07-09-22 @ 14:28)  Procalcitonin, Serum: 0.11 (07-09-22 @ 13:32)  COVID-19 PCR: NotDetec (06-08-22 @ 14:00)  COVID-19 PCR: NotDetec (06-05-22 @ 04:30)  Rapid RVP Result: NotDetec (05-27-22 @ 16:07)  Procalcitonin, Serum: 12.30 (05-27-22 @ 11:39)  COVID-19 PCR: NotDetec (05-24-22 @ 07:59)  MRSA PCR Result.: Negative (05-17-22 @ 10:10)  Procalcitonin, Serum: 0.03 (05-17-22 @ 07:37)  Rapid RVP Result: Detected (05-16-22 @ 12:09)      INFLAMMATORY MARKERS  C-Reactive Protein, Serum: 73.5 mg/L (07-09-22 @ 13:32)  Sedimentation Rate, Erythrocyte: 46 mm/Hr (05-27-22 @ 11:39)  Sedimentation Rate, Erythrocyte: 85 mm/Hr (05-17-22 @ 07:37)  C-Reactive Protein, Serum: 61 mg/L (05-17-22 @ 07:37)      RADIOLOGY & ADDITIONAL TESTS:  I have personally reviewed the last available Chest xray  CXR      CT      CARDIOLOGY TESTING  12 Lead ECG:   Ventricular Rate 128 BPM    Atrial Rate 128 BPM    P-R Interval 112 ms    QRS Duration 66 ms    Q-T Interval 308 ms    QTC Calculation(Bazett) 449 ms    P Axis 78 degrees    R Axis 98 degrees    T Axis 38 degrees    Diagnosis Line Sinus tachycardia  Rightward axis  Borderline ECG    Confirmed by Brett Obrien ([id number]) on 7/9/2022 1:59:41 PM (07-09-22 @ 13:35)      MEDICATIONS  ALBUTerol    90 MICROgram(s) HFA Inhaler 1  apixaban 5  cefiderocol IVPB 2000  cyanocobalamin 1000  cyclobenzaprine Oral Tab/Cap - Peds 5  dextrose 50% Injectable 25  ferrous    sulfate Liquid 300  folic acid  Oral Tab/Cap - Peds 1  glycopyrrolate 2  ipratropium 17 MICROgram(s) HFA Inhaler 1  metoclopramide 10  metoprolol tartrate 50  midodrine 5  pantoprazole    Tablet 40  thiamine  Oral Tab/Cap - Peds 100      WEIGHT  Weight (kg): 63.049 (07-13-22 @ 20:36)  Creatinine, Serum: <0.5 mg/dL (07-14-22 @ 08:30)      ANTIBIOTICS:  cefiderocol IVPB 2000 milliGRAM(s) IV Intermittent every 8 hours      All available historical records have been reviewed   CONSUELO CARO  22y, Female  Allergy: Allergy Status Unknown      LOS  5d    CHIEF COMPLAINT: fever (14 Jul 2022 10:13)      INTERVAL EVENTS/HPI  - T(F): , Max: 98.6 (07-13-22 @ 15:38)- rising LFTs  - WBC Count: 12.52 (07-14-22 @ 08:30)  WBC Count: 12.60 (07-13-22 @ 06:09)     - Creatinine, Serum: <0.5 (07-14-22 @ 08:30)  Creatinine, Serum: 0.5 (07-13-22 @ 06:09)     -   -   -     ROS  cannot obtain secondary to patient's sedation and/or mental status    VITALS:  T(F): 97.3, Max: 98.6 (07-13-22 @ 15:38)  HR: 100  BP: 108/67  RR: 20Vital Signs Last 24 Hrs  T(C): 36.3 (14 Jul 2022 12:12), Max: 37 (13 Jul 2022 15:38)  T(F): 97.3 (14 Jul 2022 12:12), Max: 98.6 (13 Jul 2022 15:38)  HR: 100 (14 Jul 2022 12:12) (86 - 113)  BP: 108/67 (14 Jul 2022 12:12) (105/57 - 109/69)  BP(mean): --  RR: 20 (14 Jul 2022 12:12) (19 - 20)  SpO2: 98% (14 Jul 2022 05:00) (97% - 100%)    Parameters below as of 14 Jul 2022 05:00  Patient On (Oxygen Delivery Method): T-piece        PHYSICAL EXAM:  Gen: Intubated  CV: RRR  Lungs: Decreased BS at bases  Abd: Soft  Neuro: Sedated  Lines clean, no phlebitis    FH: Non-contributory  Social Hx: Non-contributory    TESTS & MEASUREMENTS:                        10.5   12.52 )-----------( 407      ( 14 Jul 2022 08:30 )             34.9     07-14    141  |  100  |  4<L>  ----------------------------<  160<H>  4.1   |  33<H>  |  <0.5<L>    Ca    9.2      14 Jul 2022 08:30  Phos  3.3     07-13  Mg     1.8     07-14    TPro  6.1  /  Alb  3.2<L>  /  TBili  0.5  /  DBili  x   /  AST  240<H>  /  ALT  160<H>  /  AlkPhos  172<H>  07-14      LIVER FUNCTIONS - ( 14 Jul 2022 08:30 )  Alb: 3.2 g/dL / Pro: 6.1 g/dL / ALK PHOS: 172 U/L / ALT: 160 U/L / AST: 240 U/L / GGT: x               Culture - Sputum (collected 07-10-22 @ 17:14)  Source: Trach Asp Tracheal Aspirate  Gram Stain (07-11-22 @ 07:31):    Few polymorphonuclear leukocytes per low power field    Few Squamous epithelial cells per low power field    Moderate Gram Negative Rods seen per oil power field    Moderate Gram Positive Rods seen per oil power field    Moderate Gram positive cocci in pairs seen per oil power field  Final Report (07-12-22 @ 16:06):    Numerous Klebsiella pneumoniae (Carbapenem Resistant)    Normal Respiratory Trinidad absent  Organism: Klepne MDRO (07-12-22 @ 16:06)  Organism: Klepne MDRO (07-12-22 @ 16:06)      -  Amikacin: R >32      -  Amoxicillin/Clavulanic Acid: R >16/8      -  Ampicillin: R >16 These ampicillin results predict results for amoxicillin      -  Ampicillin/Sulbactam: R >16/8 Enterobacter, Klebsiella aerogenes, Citrobacter, and Serratia may develop resistance during prolonged therapy (3-4 days)      -  Aztreonam: R >16      -  Cefazolin: R >16 Enterobacter, Klebsiella aerogenes, Citrobacter, and Serratia may develop resistance during prolonged therapy (3-4 days)      -  Cefepime: R >16      -  Cefoxitin: R >16      -  Ceftazidime/Avibactam: R >16      -  Ceftolozane/tazobactam: R >8      -  Ceftriaxone: R >32 Enterobacter, Klebsiella aerogenes, Citrobacter, and Serratia may develop resistance during prolonged therapy      -  Ciprofloxacin: R >2      -  Ertapenem: R >1      -  Gentamicin: R >8      -  Imipenem: R >8      -  Levofloxacin: R >4      -  Meropenem: R >8      -  Minocycline: S <=4      -  Piperacillin/Tazobactam: R >64      -  Tetra/Doxy: S <=4      -  Tigecycline: S <=2      -  Tobramycin: R >8      -  Trimethoprim/Sulfamethoxazole: R >2/38      Method Type: HERB    Culture - Blood (collected 07-10-22 @ 06:01)  Source: .Blood None  Preliminary Report (07-11-22 @ 13:01):    No growth to date.    Culture - Blood (collected 07-09-22 @ 13:32)  Source: .Blood Blood-Peripheral  Preliminary Report (07-11-22 @ 02:02):    No growth to date.    Culture - Blood (collected 07-09-22 @ 13:32)  Source: .Blood Blood-Peripheral  Preliminary Report (07-11-22 @ 02:02):    No growth to date.            INFECTIOUS DISEASES TESTING  MRSA PCR Result.: Negative (07-11-22 @ 09:40)  Procalcitonin, Serum: 0.07 (07-10-22 @ 06:01)  COVID-19 PCR: Detected (07-09-22 @ 14:28)  Procalcitonin, Serum: 0.11 (07-09-22 @ 13:32)  COVID-19 PCR: NotDetec (06-08-22 @ 14:00)  COVID-19 PCR: NotDetec (06-05-22 @ 04:30)  Rapid RVP Result: NotDetec (05-27-22 @ 16:07)  Procalcitonin, Serum: 12.30 (05-27-22 @ 11:39)  COVID-19 PCR: NotDetec (05-24-22 @ 07:59)  MRSA PCR Result.: Negative (05-17-22 @ 10:10)  Procalcitonin, Serum: 0.03 (05-17-22 @ 07:37)  Rapid RVP Result: Detected (05-16-22 @ 12:09)      INFLAMMATORY MARKERS  C-Reactive Protein, Serum: 73.5 mg/L (07-09-22 @ 13:32)  Sedimentation Rate, Erythrocyte: 46 mm/Hr (05-27-22 @ 11:39)  Sedimentation Rate, Erythrocyte: 85 mm/Hr (05-17-22 @ 07:37)  C-Reactive Protein, Serum: 61 mg/L (05-17-22 @ 07:37)      RADIOLOGY & ADDITIONAL TESTS:  I have personally reviewed the last available Chest xray  CXR      CT      CARDIOLOGY TESTING  12 Lead ECG:   Ventricular Rate 128 BPM    Atrial Rate 128 BPM    P-R Interval 112 ms    QRS Duration 66 ms    Q-T Interval 308 ms    QTC Calculation(Bazett) 449 ms    P Axis 78 degrees    R Axis 98 degrees    T Axis 38 degrees    Diagnosis Line Sinus tachycardia  Rightward axis  Borderline ECG    Confirmed by Brett Obrien ([id number]) on 7/9/2022 1:59:41 PM (07-09-22 @ 13:35)      MEDICATIONS  ALBUTerol    90 MICROgram(s) HFA Inhaler 1  apixaban 5  cefiderocol IVPB 2000  cyanocobalamin 1000  cyclobenzaprine Oral Tab/Cap - Peds 5  dextrose 50% Injectable 25  ferrous    sulfate Liquid 300  folic acid  Oral Tab/Cap - Peds 1  glycopyrrolate 2  ipratropium 17 MICROgram(s) HFA Inhaler 1  metoclopramide 10  metoprolol tartrate 50  midodrine 5  pantoprazole    Tablet 40  thiamine  Oral Tab/Cap - Peds 100      WEIGHT  Weight (kg): 63.049 (07-13-22 @ 20:36)  Creatinine, Serum: <0.5 mg/dL (07-14-22 @ 08:30)      ANTIBIOTICS:  cefiderocol IVPB 2000 milliGRAM(s) IV Intermittent every 8 hours      All available historical records have been reviewed

## 2022-07-14 NOTE — PROGRESS NOTE ADULT - ASSESSMENT
23 y/o F with a PMHx of encephalitis s/p tooth abscess s/p tracheostomy and PEG tube placement, HTN, GERD, and SVC thrombosis , recent admission for Covid19 PNA and pyelonephritis in June 2022, currently being treated with IV Vancomycin, cefepime for PNA since 7/8, BIBEMS from [facility] for fever this AM. Per NH notes, pt spiked temp of 102.6 today and was sent to ED.     IMPRESSION;   #Sepsis on admission T>101 P>90 WBC 14 secondary to suspected GNR PNA , also COVID19 +     7/10 sputum     Numerous Klebsiella pneumoniae (Carbapenem Resistant)    7/10 BCX NGTD     7/9 BCX NGTD     Procalcitonin, Serum: 0.11 ng/mL (07-09-22 @ 13:32)    UA without significant pyuria     CT Airspace consolidations predominantly within the left upper and lower lobes with enlarged left hilar lymph nodes, most consistent with pneumonia.    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     5/29-5/31 BCX NGTD     5/28 BCX  Klebsiella pneumoniae (Carbapenem Resistant)    5/27 BCx Klebsiella pneumoniae (Carbapenem Resistant)    5/27 UCx Klebsiella pneumoniae (Carbapenem Resistant)    Pyuria  #Trach/PEG  Creatinine, Serum: <0.5 mg/dL (07.10.22 @ 06:01)      RECOMMENDATIONS;  - Continue cefiderocol IVPB 2000 milliGRAM(s) IV Intermittent every 8 hours x 7 days   - Please call micro [phone] #1 to add on sensitivities for cefiderocol to Kleb CRE  - If spikes or rising WBC- add tigecycline 100mg x1 then 50mg q12h IV   - Off loading to prevent pressure sores and preventive measures to avoid aspiration   - Colonized with MRDO, GOC , grave prognosis  - Trend WBC    If any questions, please call or send a message on e[employer] Teams  Please continue to update ID with any pertinent new laboratory or radiographic findings  Spectra [id number]   21 y/o F with a PMHx of encephalitis s/p tooth abscess s/p tracheostomy and PEG tube placement, HTN, GERD, and SVC thrombosis , recent admission for Covid19 PNA and pyelonephritis in June 2022, currently being treated with IV Vancomycin, cefepime for PNA since 7/8, BIBEMS from [facility] for fever this AM. Per NH notes, pt spiked temp of 102.6 today and was sent to ED.     IMPRESSION;   #Transaminitis  #Sepsis on admission T>101 P>90 WBC 14 secondary to suspected GNR PNA , also COVID19 +     7/10 sputum     Numerous Klebsiella pneumoniae (Carbapenem Resistant)    7/10 BCX NGTD     7/9 BCX NGTD     Procalcitonin, Serum: 0.11 ng/mL (07-09-22 @ 13:32)    UA without significant pyuria     CT Airspace consolidations predominantly within the left upper and lower lobes with enlarged left hilar lymph nodes, most consistent with pneumonia.    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     5/29-5/31 BCX NGTD     5/28 BCX  Klebsiella pneumoniae (Carbapenem Resistant)    5/27 BCx Klebsiella pneumoniae (Carbapenem Resistant)    5/27 UCx Klebsiella pneumoniae (Carbapenem Resistant)    Pyuria  #Trach/PEG  Creatinine, Serum: <0.5 mg/dL (07.10.22 @ 06:01)      RECOMMENDATIONS;  - Continue cefiderocol IVPB 2000 milliGRAM(s) IV Intermittent every 8 hours x 7 days end 7/15  - Please call micro [phone] #1 to add on sensitivities for cefiderocol to Kleb CRE  - If spikes or rising WBC or LFTS D/C cefiderocol- start Sally 2g q8h IV over extended infusion of 4 hours and add tigecycline 100mg x1 then 50mg q12h IV (need to monitor Alk Ph on Tigecycline)   - Off loading to prevent pressure sores and preventive measures to avoid aspiration   - Colonized with MRDO, GOC , grave prognosis  - Trend WBC    If any questions, please call or send a message on [employer] Teams  Please continue to update ID with any pertinent new laboratory or radiographic findings  Spectra [phone]

## 2022-07-15 LAB
ALBUMIN SERPL ELPH-MCNC: 3 G/DL — LOW (ref 3.5–5.2)
ALP SERPL-CCNC: 298 U/L — HIGH (ref 30–115)
ALT FLD-CCNC: 413 U/L — HIGH (ref 0–41)
ANION GAP SERPL CALC-SCNC: 8 MMOL/L — SIGNIFICANT CHANGE UP (ref 7–14)
AST SERPL-CCNC: 327 U/L — HIGH (ref 0–41)
BASOPHILS # BLD AUTO: 0.04 K/UL — SIGNIFICANT CHANGE UP (ref 0–0.2)
BASOPHILS NFR BLD AUTO: 0.3 % — SIGNIFICANT CHANGE UP (ref 0–1)
BILIRUB SERPL-MCNC: 0.8 MG/DL — SIGNIFICANT CHANGE UP (ref 0.2–1.2)
BUN SERPL-MCNC: 6 MG/DL — LOW (ref 10–20)
CALCIUM SERPL-MCNC: 9 MG/DL — SIGNIFICANT CHANGE UP (ref 8.5–10.1)
CHLORIDE SERPL-SCNC: 108 MMOL/L — SIGNIFICANT CHANGE UP (ref 98–110)
CO2 SERPL-SCNC: 31 MMOL/L — SIGNIFICANT CHANGE UP (ref 17–32)
CREAT SERPL-MCNC: <0.5 MG/DL — LOW (ref 0.7–1.5)
CULTURE RESULTS: SIGNIFICANT CHANGE UP
EGFR: 143 ML/MIN/1.73M2 — SIGNIFICANT CHANGE UP
EOSINOPHIL # BLD AUTO: 0.52 K/UL — SIGNIFICANT CHANGE UP (ref 0–0.7)
EOSINOPHIL NFR BLD AUTO: 4.4 % — SIGNIFICANT CHANGE UP (ref 0–8)
GLUCOSE BLDC GLUCOMTR-MCNC: 103 MG/DL — HIGH (ref 70–99)
GLUCOSE BLDC GLUCOMTR-MCNC: 109 MG/DL — HIGH (ref 70–99)
GLUCOSE BLDC GLUCOMTR-MCNC: 98 MG/DL — SIGNIFICANT CHANGE UP (ref 70–99)
GLUCOSE SERPL-MCNC: 111 MG/DL — HIGH (ref 70–99)
HAV IGM SER-ACNC: SIGNIFICANT CHANGE UP
HBV CORE IGM SER-ACNC: SIGNIFICANT CHANGE UP
HBV SURFACE AG SER-ACNC: SIGNIFICANT CHANGE UP
HCT VFR BLD CALC: 36.8 % — LOW (ref 37–47)
HCV AB S/CO SERPL IA: 0.13 S/CO — SIGNIFICANT CHANGE UP (ref 0–0.99)
HCV AB SERPL-IMP: SIGNIFICANT CHANGE UP
HGB BLD-MCNC: 10.9 G/DL — LOW (ref 12–16)
IMM GRANULOCYTES NFR BLD AUTO: 0.9 % — HIGH (ref 0.1–0.3)
LYMPHOCYTES # BLD AUTO: 17.2 % — LOW (ref 20.5–51.1)
LYMPHOCYTES # BLD AUTO: 2.02 K/UL — SIGNIFICANT CHANGE UP (ref 1.2–3.4)
MAGNESIUM SERPL-MCNC: 2.1 MG/DL — SIGNIFICANT CHANGE UP (ref 1.8–2.4)
MCHC RBC-ENTMCNC: 23.4 PG — LOW (ref 27–31)
MCHC RBC-ENTMCNC: 29.6 G/DL — LOW (ref 32–37)
MCV RBC AUTO: 79 FL — LOW (ref 81–99)
MONOCYTES # BLD AUTO: 1.13 K/UL — HIGH (ref 0.1–0.6)
MONOCYTES NFR BLD AUTO: 9.6 % — HIGH (ref 1.7–9.3)
MRSA PCR RESULT.: NEGATIVE — SIGNIFICANT CHANGE UP
NEUTROPHILS # BLD AUTO: 7.93 K/UL — HIGH (ref 1.4–6.5)
NEUTROPHILS NFR BLD AUTO: 67.6 % — SIGNIFICANT CHANGE UP (ref 42.2–75.2)
NRBC # BLD: 0 /100 WBCS — SIGNIFICANT CHANGE UP (ref 0–0)
PLATELET # BLD AUTO: 455 K/UL — HIGH (ref 130–400)
POTASSIUM SERPL-MCNC: 4.8 MMOL/L — SIGNIFICANT CHANGE UP (ref 3.5–5)
POTASSIUM SERPL-SCNC: 4.8 MMOL/L — SIGNIFICANT CHANGE UP (ref 3.5–5)
PROT SERPL-MCNC: 6.3 G/DL — SIGNIFICANT CHANGE UP (ref 6–8)
RBC # BLD: 4.66 M/UL — SIGNIFICANT CHANGE UP (ref 4.2–5.4)
RBC # FLD: 16.3 % — HIGH (ref 11.5–14.5)
SODIUM SERPL-SCNC: 147 MMOL/L — HIGH (ref 135–146)
SPECIMEN SOURCE: SIGNIFICANT CHANGE UP
WBC # BLD: 11.75 K/UL — HIGH (ref 4.8–10.8)
WBC # FLD AUTO: 11.75 K/UL — HIGH (ref 4.8–10.8)

## 2022-07-15 PROCEDURE — 99222 1ST HOSP IP/OBS MODERATE 55: CPT

## 2022-07-15 PROCEDURE — 99233 SBSQ HOSP IP/OBS HIGH 50: CPT

## 2022-07-15 RX ORDER — TIGECYCLINE 50 MG/5ML
100 INJECTION, POWDER, LYOPHILIZED, FOR SOLUTION INTRAVENOUS ONCE
Refills: 0 | Status: COMPLETED | OUTPATIENT
Start: 2022-07-15 | End: 2022-07-15

## 2022-07-15 RX ORDER — TIGECYCLINE 50 MG/5ML
INJECTION, POWDER, LYOPHILIZED, FOR SOLUTION INTRAVENOUS
Refills: 0 | Status: DISCONTINUED | OUTPATIENT
Start: 2022-07-15 | End: 2022-07-23

## 2022-07-15 RX ORDER — TIGECYCLINE 50 MG/5ML
50 INJECTION, POWDER, LYOPHILIZED, FOR SOLUTION INTRAVENOUS EVERY 12 HOURS
Refills: 0 | Status: DISCONTINUED | OUTPATIENT
Start: 2022-07-16 | End: 2022-07-23

## 2022-07-15 RX ORDER — MEROPENEM 1 G/30ML
2000 INJECTION INTRAVENOUS EVERY 8 HOURS
Refills: 0 | Status: DISCONTINUED | OUTPATIENT
Start: 2022-07-15 | End: 2022-07-23

## 2022-07-15 RX ADMIN — Medication 10 MILLIGRAM(S): at 21:20

## 2022-07-15 RX ADMIN — CYCLOBENZAPRINE HYDROCHLORIDE 5 MILLIGRAM(S): 10 TABLET, FILM COATED ORAL at 21:19

## 2022-07-15 RX ADMIN — Medication 300 MILLIGRAM(S): at 13:21

## 2022-07-15 RX ADMIN — Medication 10 MILLIGRAM(S): at 13:18

## 2022-07-15 RX ADMIN — CYCLOBENZAPRINE HYDROCHLORIDE 5 MILLIGRAM(S): 10 TABLET, FILM COATED ORAL at 05:32

## 2022-07-15 RX ADMIN — Medication 10 MILLIGRAM(S): at 18:29

## 2022-07-15 RX ADMIN — Medication 1 PUFF(S): at 13:41

## 2022-07-15 RX ADMIN — CYCLOBENZAPRINE HYDROCHLORIDE 5 MILLIGRAM(S): 10 TABLET, FILM COATED ORAL at 13:18

## 2022-07-15 RX ADMIN — Medication 50 MILLIGRAM(S): at 05:41

## 2022-07-15 RX ADMIN — MIDODRINE HYDROCHLORIDE 5 MILLIGRAM(S): 2.5 TABLET ORAL at 13:18

## 2022-07-15 RX ADMIN — ROBINUL 2 MILLIGRAM(S): 0.2 INJECTION INTRAMUSCULAR; INTRAVENOUS at 13:40

## 2022-07-15 RX ADMIN — ROBINUL 2 MILLIGRAM(S): 0.2 INJECTION INTRAMUSCULAR; INTRAVENOUS at 21:20

## 2022-07-15 RX ADMIN — Medication 100 MILLIGRAM(S): at 05:33

## 2022-07-15 RX ADMIN — Medication 10 MILLIGRAM(S): at 05:32

## 2022-07-15 RX ADMIN — Medication 100 MILLIGRAM(S): at 18:29

## 2022-07-15 RX ADMIN — PREGABALIN 1000 MICROGRAM(S): 225 CAPSULE ORAL at 13:18

## 2022-07-15 RX ADMIN — PANTOPRAZOLE SODIUM 40 MILLIGRAM(S): 20 TABLET, DELAYED RELEASE ORAL at 05:34

## 2022-07-15 RX ADMIN — APIXABAN 5 MILLIGRAM(S): 2.5 TABLET, FILM COATED ORAL at 05:34

## 2022-07-15 RX ADMIN — MIDODRINE HYDROCHLORIDE 5 MILLIGRAM(S): 2.5 TABLET ORAL at 21:19

## 2022-07-15 RX ADMIN — CEFIDEROCOL SULFATE TOSYLATE 33.33 MILLIGRAM(S): 1 INJECTION, POWDER, FOR SOLUTION INTRAVENOUS at 05:34

## 2022-07-15 RX ADMIN — MEROPENEM 35 MILLIGRAM(S): 1 INJECTION INTRAVENOUS at 21:17

## 2022-07-15 RX ADMIN — TIGECYCLINE 110 MILLIGRAM(S): 50 INJECTION, POWDER, LYOPHILIZED, FOR SOLUTION INTRAVENOUS at 18:27

## 2022-07-15 RX ADMIN — MIDODRINE HYDROCHLORIDE 5 MILLIGRAM(S): 2.5 TABLET ORAL at 05:32

## 2022-07-15 RX ADMIN — Medication 1 MILLIGRAM(S): at 13:18

## 2022-07-15 RX ADMIN — ALBUTEROL 1 PUFF(S): 90 AEROSOL, METERED ORAL at 13:41

## 2022-07-15 RX ADMIN — ROBINUL 2 MILLIGRAM(S): 0.2 INJECTION INTRAMUSCULAR; INTRAVENOUS at 05:33

## 2022-07-15 RX ADMIN — ALBUTEROL 1 PUFF(S): 90 AEROSOL, METERED ORAL at 15:05

## 2022-07-15 RX ADMIN — Medication 50 MILLIGRAM(S): at 18:33

## 2022-07-15 NOTE — CONSULT NOTE ADULT - ASSESSMENT
23 y/o F with a PMHx of encephalitis s/p tooth abscess s/p tracheostomy and PEG tube placement, HTN, GERD, and SVC thrombosis , recent admission for Covid19 PNA and pyelonephritis in June 2022, currently being treated with IV Vancomycin, cefepime for PNA since 7/8, BIBEMS from [facility] for fever this AM. Per NH notes, pt spiked temp of 102.6 today and was sent to ED.  hepatology was consulted for elevated LFT.     #)Elevated LFT hepatocellular pattern likely due to DILI  -Normal LFT 0.2, 106, 18, 19 on July 13  -LFT on 7/14 0.5/172/240/160, LFT today 0.8/298/327/413  -normal T.bili and ALP   -Received vanc, cefepime,levaquin 7/9 amikacin on 7/11, on cefiderocol since 7/9   -US abdomen CBD 4mm, normal   -Acute hepatitis panel negative     Recs:   Trend LFT, INR  Avoid hepatotoxic medications   21 y/o F with a PMHx of encephalitis s/p tooth abscess s/p tracheostomy and PEG tube placement, HTN, GERD, and SVC thrombosis , recent admission for Covid19 PNA and pyelonephritis in June 2022, currently being treated with IV Vancomycin, cefepime for PNA since 7/8, BIBEMS from [facility] for fever this AM. Per NH notes, pt spiked temp of 102.6 today and was sent to ED.  hepatology was consulted for elevated LFT.     #)Elevated LFT hepatocellular pattern likely due to DILI  -Normal LFT 0.2, 106, 18, 19 on July 13  -LFT on 7/14 0.5/172/240/160, LFT today 0.8/298/327/413  -normal T.bili and ALP   -Received vanc, cefepime,levaquin 7/9 amikacin on 7/11, on cefiderocol since 7/9   -US abdomen CBD 4mm, normal   -Acute hepatitis panel negative   Livertox: Cephalosporins can cause minor inc in aminotransferase levels and also ALP in 11% of patients, more than >5% elevations seen in only less than 1% of patients.    Recs:   Trend LFT, INR  check EBV, CMV, herpes   Avoid hepatotoxic medications

## 2022-07-15 NOTE — CONSULT NOTE ADULT - ATTENDING COMMENTS
22 year old AAF with encephalitis bedbound post PEG and tracheostomy on mechanical ventilation admitted with fever and treated with multiple antibiotics for pneumonia.  COVID+  ? 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 Vitamin K if elevated   Check EBV CMV herpes hepatitis E serologies

## 2022-07-15 NOTE — CONSULT NOTE ADULT - SUBJECTIVE AND OBJECTIVE BOX
Gastroenterology Consultation:    Patient is a 22y old  Female who presents with a chief complaint of fever (14 Jul 2022 14:36)      Admitted on: 07-09-22  HPI:  21 y/o F with a PMHx of encephalitis s/p tooth abscess s/p tracheostomy and PEG tube placement, HTN, GERD, and SVC thrombosis , recent admission for Covid19 PNA and pyelonephritis in June 2022, currently being treated with IV Vancomycin, cefepime for PNA since 7/8, BIBEMS from [facility] for fever this AM. Per NH notes, pt spiked temp of 102.6 today and was sent to ED. Pt is non-verbal, unable to contribute to history or ROS. pt has sputum around the trach area. No CTA evidence of acute pulmonary embolus.Airspace consolidations predominantly within the left upper and lower lobes with enlarged left hilar lymph nodes, most consistent with pneumonia. Pt received cefepime, vancomycin. pt is admitted for workup/management. hepatology was consulted for elevated LFT.       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      HTN (hypertension)      GERD (gastroesophageal reflux disease)          FAMILY HISTORY:  no family h/o Gi cancers     Social History:  couldn't obtain     Home Medications:  albuterol 90 mcg/inh inhalation aerosol with adapter: 1 puff(s) inhaled every 4 hours (09 Jul 2022 22:48)  cyclobenzaprine 5 mg oral tablet: 1 tab(s) orally 3 times a day (09 Jul 2022 22:48)  Eliquis: 5 milligram(s) orally every 12 hours (09 Jul 2022 22:48)  ferrous sulfate 220 mg/5 mL (44 mg/5 mL elemental iron) oral elixir: 7.5 milliliter(s) orally once a day (09 Jul 2022 22:48)  folic acid 1 mg oral tablet: 1 tab(s) orally once a day (09 Jul 2022 22:48)  glycopyrrolate 2 mg oral tablet: 1 tab(s) orally 3 times a day (09 Jul 2022 22:48)  ipratropium 18 mcg/inh inhalation aerosol: 2 puff(s) inhaled every 6 hours (09 Jul 2022 22:48)  Lopressor 50 mg oral tablet: 1 tab(s) orally 2 times a day (09 Jul 2022 22:48)  metoclopramide 10 mg oral tablet: 1 tab(s) orally 4 times a day (before meals and at bedtime) (09 Jul 2022 22:48)  midodrine 5 mg oral tablet: 1 tab(s) orally every 8 hours (09 Jul 2022 22:48)  omeprazole 40 mg oral delayed release capsule: 1 cap(s) orally once a day (09 Jul 2022 22:48)  scopolamine 0.4 mg oral tablet: 1 milligram(s) orally every 72 hours (09 Jul 2022 22:48)  Tylenol 325 mg oral capsule: 2 cap(s) orally 3 times a day, As Needed (09 Jul 2022 22:48)  Vitamin B1 100 mg oral tablet: 1 tab(s) orally 2 times a day (09 Jul 2022 22:48)  Vitamin B12 500 mcg oral tablet: 1 tab(s) orally once a day (09 Jul 2022 22:48)    MEDICATIONS  (STANDING):  ALBUTerol    90 MICROgram(s) HFA Inhaler 1 Puff(s) Inhalation every 4 hours  apixaban 5 milliGRAM(s) Oral two times a day  cefiderocol IVPB 2000 milliGRAM(s) IV Intermittent every 8 hours  cyanocobalamin 1000 MICROGram(s) Oral daily  cyclobenzaprine Oral Tab/Cap - Peds 5 milliGRAM(s) Oral three times a day  dextrose 50% Injectable 25 Gram(s) IV Push once  ferrous    sulfate Liquid 300 milliGRAM(s) Enteral Tube daily  folic acid  Oral Tab/Cap - Peds 1 milliGRAM(s) Oral daily  glycopyrrolate 2 milliGRAM(s) Oral three times a day  ipratropium 17 MICROgram(s) HFA Inhaler 1 Puff(s) Inhalation every 6 hours  metoclopramide 10 milliGRAM(s) Oral Before meals and at bedtime  metoprolol tartrate 50 milliGRAM(s) Oral two times a day  midodrine 5 milliGRAM(s) Oral every 8 hours  pantoprazole    Tablet 40 milliGRAM(s) Oral before breakfast  thiamine  Oral Tab/Cap - Peds 100 milliGRAM(s) Oral two times a day    MEDICATIONS  (PRN):  acetaminophen     Tablet .. 650 milliGRAM(s) Oral every 6 hours PRN Temp greater or equal to 38C (100.4F), Mild Pain (1 - 3)  aluminum hydroxide/magnesium hydroxide/simethicone Suspension 30 milliLiter(s) Oral every 4 hours PRN Dyspepsia  melatonin 3 milliGRAM(s) Oral at bedtime PRN Insomnia  ondansetron Injectable 4 milliGRAM(s) IV Push every 8 hours PRN Nausea and/or Vomiting      Allergies  Allergy Status Unknown      Review of Systems:   couldn't obtain     Physical Examination:  T(C): 36.2 (07-15-22 @ 05:00), Max: 37.2 (07-14-22 @ 20:33)  HR: 97 (07-15-22 @ 05:00) (96 - 97)  BP: 104/59 (07-15-22 @ 05:00) (104/51 - 104/59)  RR: 16 (07-15-22 @ 05:00) (16 - 20)  SpO2: 98% (07-15-22 @ 05:00) (97% - 98%)      07-13-22 @ 07:01  -  07-14-22 @ 07:00  --------------------------------------------------------  IN: 800 mL / OUT: 900 mL / NET: -100 mL    07-14-22 @ 07:01  -  07-15-22 @ 07:00  --------------------------------------------------------  IN: 1815 mL / OUT: 2150 mL / NET: -335 mL        Constitutional: No acute distress.  Eyes:. Conjunctivae are clear, Sclera is non-icteric.  Ears Nose and Throat: The external ears are normal appearing,  Oral mucosa is pink and moist.  Respiratory:  No signs of respiratory distress. Lung sounds are clear bilaterally.  Cardiovascular:  S1 S2, Regular rate and rhythm.  GI: Abdomen is soft, symmetric, and non-tender without distention. There are no visible lesions or scars. Bowel sounds are present and normoactive in all four quadrants. No masses, hepatomegaly, or splenomegaly are noted.   Neuro: No Tremor, No involuntary movements  Skin: No rashes, No Jaundice.          Data:                        10.9   11.75 )-----------( 455      ( 15 Jul 2022 08:33 )             36.8     Hgb Trend:  10.9  07-15-22 @ 08:33  10.5  07-14-22 @ 08:30  10.9  07-13-22 @ 06:09        07-15    147<H>  |  108  |  6<L>  ----------------------------<  111<H>  4.8   |  31  |  <0.5<L>    Ca    9.0      15 Jul 2022 08:33  Mg     2.1     07-15    TPro  6.3  /  Alb  3.0<L>  /  TBili  0.8  /  DBili  x   /  AST  327<H>  /  ALT  413<H>  /  AlkPhos  298<H>  07-15    Liver panel trend:  TBili 0.8   /      /      /   AlkP 298   /   Tptn 6.3   /   Alb 3.0    /   DBili --      07-15  TBili 0.5   /      /      /   AlkP 172   /   Tptn 6.1   /   Alb 3.2    /   DBili --      07-14  TBili <0.2   /   AST 18   /   ALT 19   /   AlkP 106   /   Tptn 6.5   /   Alb 3.4    /   DBili --      07-13  TBili <0.2   /   AST 12   /   ALT 18   /   AlkP 104   /   Tptn 6.0   /   Alb 3.2    /   DBili --      07-12  TBili 0.2   /   AST 18   /   ALT 22   /   AlkP 107   /   Tptn 5.8   /   Alb 3.0    /   DBili --      07-11  TBili <0.2   /   AST 20   /   ALT 30   /   AlkP 120   /   Tptn 6.8   /   Alb 3.5    /   DBili --      07-10  TBili <0.2   /   AST 35   /   ALT 41   /   AlkP 129   /   Tptn 7.3   /   Alb 3.7    /   DBili --      07-09              Radiology:    US Abdomen Upper Quadrant Right:   ACC: [id number] EXAM:  US ABDOMEN RT UPR QUADRANT                          PROCEDURE DATE:  07/14/2022          INTERPRETATION:  CLINICAL INFORMATION: Elevated LFTs.    COMPARISON: No relevant comparison studies listed.    TECHNIQUE: Sonography of the right upper quadrant.    FINDINGS:  Liver: Within normal limits.  Bile ducts: Normal caliber. Common bile duct measures 4 mm.  Gallbladder: Within normal limits.  Pancreas: Visualized portions are within normal limits.  Right kidney: 9 cm. No hydronephrosis.  Ascites: None.  IVC: Visualized portions are within normal limits.    IMPRESSION:  Normal right upper quadrant abdominal ultrasound.        --- End of Report ---          JAMEL OROZCO MD; Resident Radiologist  This document has been electronically signed.  JACOBO MIRANDA MD; Attending Radiologist  This document has been electronically signed. Jul 14 2022  5:37PM (07-14-22 @ 16:49)    
CONSUELO CARO  22y, Female  Allergy: Allergy Status Unknown      CHIEF COMPLAINT:   fever (2022 22:19)      LOS  1d    HPI  HPI:   23 y/o F with a PMHx of encephalitis s/p tooth abscess s/p tracheostomy and PEG tube placement, HTN, GERD, and SVC thrombosis , recent admission for Covid19 PNA and pyelonephritis in 2022, currently being treated with IV Vancomycin, cefepime for PNA since , BIBEMS from [facility] for fever this AM. Per NH notes, pt spiked temp of 102.6 today and was sent to ED. Pt is non-verbal, unable to contribute to history or ROS. pt has sputum around the trach area.     in the ED,pt's VS T(C): 37.3 (22 @ 19:56), Max: 37.9 (22 @ 13:53)  HR: 112 (22 @ 19:56) (112 - 127)  BP: 91/61 (22 @ 19:56) (89/49 - 104/60)  RR: 18 (22 @ 19:56) (18 - 18)  SpO2: 100% (22 @ 19:56) (96% - 100%), labs done showed WBC 11k, creat 0.5, CRP 73,covid is positive, positive UA < from: CT Angio Chest PE Protocol w/ IV Cont (22 @ 20:57) >  No CTA evidence of acute pulmonary embolus.  Airspace consolidations predominantly within the left upper and lower   lobes with enlarged left hilar lymph nodes, most consistent with   pneumonia.    pt received cefepime, vancomycin.   pt is admitted for workup/management (2022 22:19)      INFECTIOUS DISEASE HISTORY:  ID consulted for sepsis  hx CRE Kleb in [facility]      PMH  PAST MEDICAL & SURGICAL HISTORY:  Encephalitis      H/O tracheostomy      PEG (percutaneous endoscopic gastrostomy) status      Acute deep vein thrombosis (DVT) of superior vena cava      HTN (hypertension)      GERD (gastroesophageal reflux disease)          FAMILY HISTORY  non-contributory     SOCIAL HISTORY  Social History:  Please refer to above for more details (16 May 2022 21:14)        ROS  unable to obtain history secondary to patient's mental status and/or sedation     VITALS:  T(F): 103, Max: 103 (07-10-22 @ 09:19)  HR: 150  BP: 120/56  RR: 20Vital Signs Last 24 Hrs  T(C): 39.4 (10 Jul 2022 09:19), Max: 39.4 (10 Jul 2022 09:19)  T(F): 103 (10 Jul 2022 09:19), Max: 103 (10 Jul 2022 09:19)  HR: 150 (10 Jul 2022 09:19) (112 - 150)  BP: 120/56 (10 Jul 2022 09:19) (89/49 - 120/56)  BP(mean): --  RR: 20 (10 Jul 2022 09:19) (18 - 20)  SpO2: 98% (10 Jul 2022 09:19) (96% - 100%)    Parameters below as of 10 Jul 2022 09:19  Patient On (Oxygen Delivery Method): blow-by,humidifed   O2 Flow (L/min): 5  O2 Concentration (%): 30    PHYSICAL EXAM:  Gen: trach/ vent  CV: RRR  Lungs: Decreased BS at bases  Abd: Soft  Neuro: does not follow commands  Skin: no rash   Lines clean, no phlebitis     TESTS & MEASUREMENTS:                        10.9   14.85 )-----------( 342      ( 10 Jul 2022 06:01 )             36.6     07-10    142  |  102  |  7<L>  ----------------------------<  68<L>  4.2   |  28  |  <0.5<L>    Ca    9.0      10 Jul 2022 06:01    TPro  6.8  /  Alb  3.5  /  TBili  <0.2  /  DBili  x   /  AST  20  /  ALT  30  /  AlkPhos  120<H>  07-10      LIVER FUNCTIONS - ( 10 Jul 2022 06:01 )  Alb: 3.5 g/dL / Pro: 6.8 g/dL / ALK PHOS: 120 U/L / ALT: 30 U/L / AST: 20 U/L / GGT: x           Urinalysis Basic - ( 2022 14:28 )    Color: Yellow / Appearance: Slightly Turbid / S.036 / pH: x  Gluc: x / Ketone: Trace  / Bili: Negative / Urobili: 3 mg/dL   Blood: x / Protein: 30 mg/dL / Nitrite: Negative   Leuk Esterase: Moderate / RBC: 190 /HPF / WBC 9 /HPF   Sq Epi: x / Non Sq Epi: 2 /HPF / Bacteria: Negative        Culture - Sputum (collected 22 @ 11:50)  Source: Trach Asp Tracheal Aspirate  Gram Stain (22 @ 07:15):    Moderate polymorphonuclear leukocytes per low power field    Rare Squamous epithelial cells per low power field    Numerous Gram Negative Rods seen per oil power field    Numerous Gram Negative Coccobacilli seen per oil power field  Final Report (22 @ 17:39):    Numerous Klebsiella pneumoniae (Carbapenem Resistant)    Numerous Proteus mirabilis ESBL    Normal Respiratory Trinidad present  Organism: Klepne MDRO  Proteus mirabilis ESBL (22 @ 17:39)  Organism: Proteus mirabilis ESBL (22 @ 17:39)      -  Amikacin: S <=16      -  Amoxicillin/Clavulanic Acid: S <=8/4      -  Ampicillin: R >16 These ampicillin results predict results for amoxicillin      -  Ampicillin/Sulbactam: R 8/4 Enterobacter, Klebsiella aerogenes, Citrobacter, and Serratia may develop resistance during prolonged therapy (3-4 days)      -  Aztreonam: R <=4      -  Cefazolin: R >16 Enterobacter, Klebsiella aerogenes, Citrobacter, and Serratia may develop resistance during prolonged therapy (3-4 days)      -  Cefepime: R >16      -  Ceftriaxone: R >32 Enterobacter, Klebsiella aerogenes, Citrobacter, and Serratia may develop resistance during prolonged therapy      -  Ciprofloxacin: R >2      -  Ertapenem: S <=0.5      -  Gentamicin: S <=2      -  Levofloxacin: R >4      -  Meropenem: S <=1      -  Piperacillin/Tazobactam: R <=8      -  Tobramycin: S <=2      -  Trimethoprim/Sulfamethoxazole: S <=0.5/9.5      Method Type: HERB  Organism: Chintanpne MDRO (22 @ 17:39)      -  Amikacin: R >32      -  Amoxicillin/Clavulanic Acid: R >16/8      -  Ampicillin: R >16 These ampicillin results predict results for amoxicillin      -  Ampicillin/Sulbactam: R >16/8 Enterobacter, Klebsiella aerogenes, Citrobacter, and Serratia may develop resistance during prolonged therapy (3-4 days)      -  Aztreonam: R >16      -  Cefazolin: R >16 Enterobacter, Klebsiella aerogenes, Citrobacter, and Serratia may develop resistance during prolonged therapy (3-4 days)      -  Cefepime: R >16      -  Cefoxitin: R >16      -  Ceftazidime/Avibactam: R >16      -  Ceftolozane/tazobactam: R >8      -  Ceftriaxone: R >32 Enterobacter, Klebsiella aerogenes, Citrobacter, and Serratia may develop resistance during prolonged therapy      -  Ciprofloxacin: R >2      -  Ertapenem: R >1      -  Gentamicin: R >8      -  Imipenem: R >8      -  Levofloxacin: R >4      -  Meropenem: R >8      -  Piperacillin/Tazobactam: R >64      -  Tobramycin: R >8      -  Trimethoprim/Sulfamethoxazole: R >2/38      Method Type: HERB    Culture - Blood (collected 22 @ 07:49)  Source: .Blood None  Final Report (22 @ 20:00):    No Growth Final    Culture - Blood (collected 22 @ 07:38)  Source: .Blood None  Final Report (22 @ 17:01):    No Growth Final    Culture - Blood (collected 22 @ 17:58)  Source: .Blood None  Final Report (22 @ 01:00):    No Growth Final    Culture - Blood (collected 22 @ 11:30)  Source: .Blood None  Gram Stain (22 @ 08:16):    Growth in anaerobic bottle: Gram Negative Rods  Final Report (22 @ 12:49):    Growth in anaerobic bottle: Klebsiella pneumoniae (Carbapenem Resistant)    See previous culture [id number]    Culture - Urine (collected 22 @ 16:11)  Source: Clean Catch Clean Catch (Midstream)  Final Report (22 @ 11:29):    >=3 organisms. Probable collection contamination.    Culture - Blood (collected 22 @ 09:05)  Source: .Blood Blood  Gram Stain (22 @ 06:19):    Growth in aerobic bottle: Gram Negative Rods  Final Report (22 @ 10:50):    Growth in aerobic bottle: Klebsiella pneumoniae (Carbapenem Resistant)    ***Blood Panel PCR results on this specimen are available    approximately 3 hours after the Gram stain result.***    Gram stain, PCR, and/or culture results may not always    correspond due to difference in methodologies.    ************************************************************    This PCR assay was performed by multiplex PCR. This    Assay tests for 66 bacterial and resistance gene targets.    Please refer to the [facility] test directory    at [email or web address] for details.  Organism: Blood Culture PCR  Klepne MDRO (22 @ 10:50)  Organism: Klepne MDRO (22 @ 10:50)      -  Amikacin: R >32      -  Ampicillin: R >16 These ampicillin results predict results for amoxicillin      -  Ampicillin/Sulbactam: R >16/8 Enterobacter, Klebsiella aerogenes, Citrobacter, and Serratia may develop resistance during prolonged therapy (3-4 days)      -  Aztreonam: R >16      -  Cefazolin: R >16 Enterobacter, Klebsiella aerogenes, Citrobacter, and Serratia may develop resistance during prolonged therapy (3-4 days)      -  Cefepime: R >16      -  Cefoxitin: R >16      -  Ceftazidime/Avibactam: R >16      -  Ceftolozane/tazobactam: R >8      -  Ceftriaxone: R >32 Enterobacter, Klebsiella aerogenes, Citrobacter, and Serratia may develop resistance during prolonged therapy      -  Ciprofloxacin: R >2      -  Ertapenem: R >1      -  Gentamicin: R >8      -  Imipenem: R >8      -  Levofloxacin: R >4      -  Meropenem: R >8      -  Piperacillin/Tazobactam: R >64      -  Tigecycline: S <=2      -  Tobramycin: R >8      -  Trimethoprim/Sulfamethoxazole: R >2/38      Method Type: HERB  Organism: Blood Culture PCR (22 @ 10:50)      -  Carbapenem Resistance: Detec      -  CTX-M Resistance Marker: Detec      -  ESBL: Detec      -  Klebsiella pneumoniae: Detec      -  NDM Resistance Marker: Detec      Method Type: PCR    Culture - Urine (collected 22 @ 17:15)  Source: Clean Catch Clean Catch (Midstream)  Final Report (22 @ 21:05):    <10,000 CFU/mL Normal Urogenital Trinidad    Culture - Blood (collected 22 @ 12:28)  Source: .Blood Blood-Peripheral  Final Report (22 @ 23:00):    No Growth Final    Culture - Blood (collected 22 @ 12:09)  Source: .Blood Blood-Peripheral  Final Report (22 @ 23:00):    No Growth Final        Blood Gas Venous - Lactate: 1.10 mmol/L (22 @ 13:42)      INFECTIOUS DISEASES TESTING  COVID-19 PCR: Detected (22 @ 14:28)  Procalcitonin, Serum: 0.11 ng/mL (22 @ 13:32)  COVID-19 PCR: NotDetec (22 @ 14:00)  COVID-19 PCR: NotDetec (22 @ 04:30)  Rapid RVP Result: NotDetec (22 @ 16:07)  Procalcitonin, Serum: 12.30 ng/mL (22 @ 11:39)  COVID-19 PCR: NotDetec (22 @ 07:59)  MRSA PCR Result.: Negative (22 @ 10:10)  Procalcitonin, Serum: 0.03 ng/mL (22 @ 07:37)  Rapid RVP Result: Detected (22 @ 12:09)      INFLAMMATORY MARKERS  C-Reactive Protein, Serum: 73.5 mg/L (22 @ 13:32)      RADIOLOGY & ADDITIONAL TESTS:  I have personally reviewed the last Chest xray  CXR      CT  CT Angio Chest PE Protocol w/ IV Cont:   ACC: [id number] EXAM:  CT ANGIO CHEST PULM [facility]                          PROCEDURE DATE:  2022          INTERPRETATION:  CLINICAL HISTORY / REASON FOR EXAM: Shortness of breath    TECHNIQUE: Multislice helical sections were obtained from the thoracic   inlet to the lung bases during rapid administration of 65 mL Omnipaque   350 intravenous contrast using a CTA pulmonary embolism protocol. Thin   sections were reconstructed through the pulmonary vasculature. Coronal,   sagittal and 3D/MIP reformatted images are also submitted.    COMPARISON CT: None.    OTHER STUDIES USED FOR CORRELATION: Chest radiograph from 2022      FINDINGS:    PULMONARY EMBOLUS: No central or segmental pulmonary embolus.    TUBES/LINES: Tracheostomy tube within the mid trachea.    LUNGS, PLEURA, AIRWAYS: Patchy airspace consolidations, predominantly   within the left lower lobe. Additional airspace consolidations in the   left upper and right lower lobes. Central airways patent. No pneumothorax.    THORACIC NODES: Multiple prominent left hilar lymph nodes.    MEDIASTINUM/GREAT VESSELS: No pericardial effusion. Heart size   unremarkable. No aneurysmal dilation of the thoracic aorta.    VISUALIZED UPPER ABDOMEN: Unremarkable.    BONES/SOFT TISSUES: No acute osseous abnormality.      IMPRESSION:    No CTA evidence of acute pulmonary embolus.    Airspace consolidations predominantly within the left upper and lower   lobes with enlarged left hilar lymph nodes, most consistent with   pneumonia.    --- End of Report ---            JADE MARTINEZ MD; Attending Radiologist  This document has been electronically signed. 2022  9:11PM (22 @ 20:57)      CARDIOLOGY TESTING  12 Lead ECG:   Ventricular Rate 128 BPM    Atrial Rate 128 BPM    P-R Interval 112 ms    QRS Duration 66 ms    Q-T Interval 308 ms    QTC Calculation(Bazett) 449 ms    P Axis 78 degrees    R Axis 98 degrees    T Axis 38 degrees    Diagnosis Line Sinus tachycardia  Rightward axis  Borderline ECG    Confirmed by Brett Obrien ([id number]) on 2022 1:59:41 PM (22 @ 13:35)      MEDICATIONS  acetaminophen   IVPB .. 1000 IV Intermittent once  ALBUTerol    90 MICROgram(s) HFA Inhaler 1 Inhalation every 4 hours  apixaban 5 Oral two times a day  cefiderocol IVPB 2000 IV Intermittent every 8 hours  cyanocobalamin 1000 Oral daily  cyclobenzaprine Oral Tab/Cap - Peds 5 Oral three times a day  ferrous    sulfate Liquid 300 Enteral Tube daily  folic acid  Oral Tab/Cap - Peds 1 Oral daily  glycopyrrolate 2 Oral three times a day  ipratropium 17 MICROgram(s) HFA Inhaler 1 Inhalation every 6 hours  lactated ringers. 1000 IV Continuous <Continuous>  metoclopramide 10 Oral Before meals and at bedtime  metoprolol tartrate 50 Oral two times a day  midodrine 5 Oral every 8 hours  pantoprazole    Tablet 40 Oral before breakfast  thiamine  Oral Tab/Cap - Peds 100 Oral two times a day        ANTIBIOTICS:  cefiderocol IVPB 2000 milliGRAM(s) IV Intermittent every 8 hours      ALLERGIES:  Allergy Status Unknown      
Patient is a 22y old  Female who presents with a chief complaint of fever (10 Jul 2022 10:15)      HPI:   21 y/o F with a PMHx of encephalitis s/p tooth abscess s/p tracheostomy and PEG tube placement, HTN, GERD, and SVC thrombosis , recent admission for Covid19 PNA and pyelonephritis in 2022, currently being treated with IV Vancomycin, cefepime for PNA since , BIBEMS from [facility] for fever this AM. Per NH notes, pt spiked temp of 102.6 today and was sent to ED. Pt is non-verbal, unable to contribute to history or ROS. pt has sputum around the trach area.     in the ED,pt's VS T(C): 37.3 (22 @ 19:56), Max: 37.9 (22 @ 13:53)  HR: 112 (22 @ 19:56) (112 - 127)  BP: 91/61 (22 @ 19:56) (89/49 - 104/60)  RR: 18 (22 @ 19:56) (18 - 18)  SpO2: 100% (22 @ 19:56) (96% - 100%), labs done showed WBC 11k, creat 0.5, CRP 73,covid is positive, positive UA < from: CT Angio Chest PE Protocol w/ IV Cont (22 @ 20:57) >  No CTA evidence of acute pulmonary embolus.  Airspace consolidations predominantly within the left upper and lower   lobes with enlarged left hilar lymph nodes, most consistent with   pneumonia.    pt received cefepime, vancomycin.   pt is admitted for workup/management (2022 22:19)      PAST MEDICAL & SURGICAL HISTORY:  Encephalitis      H/O tracheostomy      PEG (percutaneous endoscopic gastrostomy) status      Acute deep vein thrombosis (DVT) of superior vena cava      HTN (hypertension)      GERD (gastroesophageal reflux disease)          SOCIAL HX:   Smoking                         ETOH                            Other    FAMILY HISTORY:  :  No known cardiovacular family hisotry     Review Of Systems:     All ROS are negative except per HPI       Allergies    Allergy Status Unknown    Intolerances          PHYSICAL EXAM    ICU Vital Signs Last 24 Hrs  T(C): 38.4 (10 Jul 2022 12:26), Max: 39.4 (10 Jul 2022 09:19)  T(F): 101.1 (10 Jul 2022 12:26), Max: 103 (10 Jul 2022 09:19)  HR: 150 (10 Jul 2022 09:19) (112 - 150)  BP: 120/56 (10 Jul 2022 09:19) (89/49 - 120/56)  BP(mean): --  ABP: --  ABP(mean): --  RR: 20 (10 Jul 2022 09:19) (18 - 20)  SpO2: 98% (10 Jul 2022 09:19) (96% - 100%)    O2 Parameters below as of 10 Jul 2022 09:19  Patient On (Oxygen Delivery Method): blow-by,humidifed   O2 Flow (L/min): 5  O2 Concentration (%): 30        CONSTITUTIONAL:  Well nourished.  NAD    ENT:   Airway patent,   Mouth with normal mucosa.   No thrush    EYES:   pupils equal,   round and reactive to light.    CARDIAC:   Normal rate,   Regular rhythm.    Heart sounds S1, S2.   No edema    Vascular:   normal systolic impulse  no bruits    RESPIRATORY:   B/l crackles  No wheezing   Normal chest expansion  No use of accessory muscles    GASTROINTESTINAL:  Abdomen soft   Non-tender,   No guarding,   + BS    GENITOURINARY  normal genitalia for sex  no edema    MUSCULOSKELETAL:   Range of motion is not limited,  Nno clubbing, cyanosis    NEUROLOGICAL:   Alert and oriented   No motor or sensory deficits.  Pertinent DTRs normal    SKIN:   Skin normal color for race,   Warm and dry  No evidence of rash.    PSYCHIATRIC:   Normal mood and affect.   No apparent risk to self or others.    HEME LYMPH:   No cervical  lymphadenopathy.  No inguinal lymphadenopathy              LABS:                          10.9   14.85 )-----------( 342      ( 10 Jul 2022 06:01 )             36.6                                               07-10    142  |  102  |  7<L>  ----------------------------<  68<L>  4.2   |  28  |  <0.5<L>    Ca    9.0      10 Jul 2022 06:01    TPro  6.8  /  Alb  3.5  /  TBili  <0.2  /  DBili  x   /  AST  20  /  ALT  30  /  AlkPhos  120<H>  07-10      PT/INR - ( 2022 13:32 )   PT: 19.70 sec;   INR: 1.72 ratio         PTT - ( 2022 13:32 )  PTT:25.8 sec                                       Urinalysis Basic - ( 2022 14:28 )    Color: Yellow / Appearance: Slightly Turbid / S.036 / pH: x  Gluc: x / Ketone: Trace  / Bili: Negative / Urobili: 3 mg/dL   Blood: x / Protein: 30 mg/dL / Nitrite: Negative   Leuk Esterase: Moderate / RBC: 190 /HPF / WBC 9 /HPF   Sq Epi: x / Non Sq Epi: 2 /HPF / Bacteria: Negative        CARDIAC MARKERS ( 2022 13:32 )  x     / <0.01 ng/mL / 176 U/L / x     / x                                                LIVER FUNCTIONS - ( 10 Jul 2022 06:01 )  Alb: 3.5 g/dL / Pro: 6.8 g/dL / ALK PHOS: 120 U/L / ALT: 30 U/L / AST: 20 U/L / GGT: x                                                                                                                                       X-Rays reviewed:                                                                                    ECHO    CXR interpreted by me:    MEDICATIONS  (STANDING):  ALBUTerol    90 MICROgram(s) HFA Inhaler 1 Puff(s) Inhalation every 4 hours  apixaban 5 milliGRAM(s) Oral two times a day  cefiderocol IVPB 2000 milliGRAM(s) IV Intermittent every 8 hours  cyanocobalamin 1000 MICROGram(s) Oral daily  cyclobenzaprine Oral Tab/Cap - Peds 5 milliGRAM(s) Oral three times a day  ferrous    sulfate Liquid 300 milliGRAM(s) Enteral Tube daily  folic acid  Oral Tab/Cap - Peds 1 milliGRAM(s) Oral daily  glycopyrrolate 2 milliGRAM(s) Oral three times a day  ipratropium 17 MICROgram(s) HFA Inhaler 1 Puff(s) Inhalation every 6 hours  lactated ringers. 1000 milliLiter(s) (100 mL/Hr) IV Continuous <Continuous>  levoFLOXacin IVPB      levoFLOXacin IVPB 500 milliGRAM(s) IV Intermittent once  metoclopramide 10 milliGRAM(s) Oral Before meals and at bedtime  metoprolol tartrate 100 milliGRAM(s) Oral two times a day  midodrine 5 milliGRAM(s) Oral every 8 hours  pantoprazole    Tablet 40 milliGRAM(s) Oral before breakfast  thiamine  Oral Tab/Cap - Peds 100 milliGRAM(s) Oral two times a day  vancomycin  IVPB 1000 milliGRAM(s) IV Intermittent every 24 hours    MEDICATIONS  (PRN):  acetaminophen     Tablet .. 650 milliGRAM(s) Oral every 6 hours PRN Temp greater or equal to 38C (100.4F), Mild Pain (1 - 3)  aluminum hydroxide/magnesium hydroxide/simethicone Suspension 30 milliLiter(s) Oral every 4 hours PRN Dyspepsia  melatonin 3 milliGRAM(s) Oral at bedtime PRN Insomnia  ondansetron Injectable 4 milliGRAM(s) IV Push every 8 hours PRN Nausea and/or Vomiting

## 2022-07-15 NOTE — PROGRESS NOTE ADULT - SUBJECTIVE AND OBJECTIVE BOX
CONSUELO CARO 22y Female  MRN#: [id number]     Hospital Day: 6d    Pt is currently admitted with the primary diagnosis of sepsis 2/2 MDR PNA    SUBJECTIVE  21 y/o F with a PMHx of encephalitis s/p tooth abscess s/p tracheostomy and PEG tube placement, HTN, GERD, and SVC thrombosis , recent admission for Covid19 PNA and pyelonephritis in June 2022, currently being treated with IV Vancomycin, cefepime for PNA since 7/8, BIBEMS from [facility] for fever this AM. Per NH notes, pt spiked temp of 102.6 today and was sent to ED. Pt is non-verbal, unable to contribute to history or ROS. pt has sputum around the trach area.     In the ED, Tmax 37.9, tachy to 127, BP 89/49, WBC 11k, creat 0.5, CRP 73,covid is positive, positive UA, CT Angio Chest PE Protocol w/ IV Cont No CTA evidence of acute pulmonary embolus. Airspace consolidations predominantly within the left upper and lower lobes with enlarged left hilar lymph nodes, most consistent with pneumonia. Pt received Cefepime and Vancomycin and was admitted for further workup and management.     7/14: Patient seen and examined at bedside. Patient is non-verbal      Present Today:   - Alejo:  No [  ], Yes [ x  ] : Indication:     - Type of IV Access:       .. CVC/Piccline:  No [  ], Yes [   ] : Indication:       .. 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    HTN (hypertension)    GERD (gastroesophageal reflux disease)                                              -----------------------------------------------------------  ALLERGIES:  Allergy Status Unknown                                            ------------------------------------------------------------    HOME MEDICATIONS  Home Medications:  albuterol 90 mcg/inh inhalation aerosol with adapter: 1 puff(s) inhaled every 4 hours (09 Jul 2022 22:48)  cyclobenzaprine 5 mg oral tablet: 1 tab(s) orally 3 times a day (09 Jul 2022 22:48)  Eliquis: 5 milligram(s) orally every 12 hours (09 Jul 2022 22:48)  ferrous sulfate 220 mg/5 mL (44 mg/5 mL elemental iron) oral elixir: 7.5 milliliter(s) orally once a day (09 Jul 2022 22:48)  folic acid 1 mg oral tablet: 1 tab(s) orally once a day (09 Jul 2022 22:48)  glycopyrrolate 2 mg oral tablet: 1 tab(s) orally 3 times a day (09 Jul 2022 22:48)  ipratropium 18 mcg/inh inhalation aerosol: 2 puff(s) inhaled every 6 hours (09 Jul 2022 22:48)  Lopressor 50 mg oral tablet: 1 tab(s) orally 2 times a day (09 Jul 2022 22:48)  metoclopramide 10 mg oral tablet: 1 tab(s) orally 4 times a day (before meals and at bedtime) (09 Jul 2022 22:48)  midodrine 5 mg oral tablet: 1 tab(s) orally every 8 hours (09 Jul 2022 22:48)  omeprazole 40 mg oral delayed release capsule: 1 cap(s) orally once a day (09 Jul 2022 22:48)  scopolamine 0.4 mg oral tablet: 1 milligram(s) orally every 72 hours (09 Jul 2022 22:48)  Tylenol 325 mg oral capsule: 2 cap(s) orally 3 times a day, As Needed (09 Jul 2022 22:48)  Vitamin B1 100 mg oral tablet: 1 tab(s) orally 2 times a day (09 Jul 2022 22:48)  Vitamin B12 500 mcg oral tablet: 1 tab(s) orally once a day (09 Jul 2022 22:48)                           MEDICATIONS:  STANDING MEDICATIONS  ALBUTerol    90 MICROgram(s) HFA Inhaler 1 Puff(s) Inhalation every 4 hours  apixaban 5 milliGRAM(s) Oral two times a day  cefiderocol IVPB 2000 milliGRAM(s) IV Intermittent every 8 hours  cyanocobalamin 1000 MICROGram(s) Oral daily  cyclobenzaprine Oral Tab/Cap - Peds 5 milliGRAM(s) Oral three times a day  dextrose 5%. 1000 milliLiter(s) IV Continuous <Continuous>  dextrose 50% Injectable 25 Gram(s) IV Push once  ferrous    sulfate Liquid 300 milliGRAM(s) Enteral Tube daily  folic acid  Oral Tab/Cap - Peds 1 milliGRAM(s) Oral daily  glycopyrrolate 2 milliGRAM(s) Oral three times a day  ipratropium 17 MICROgram(s) HFA Inhaler 1 Puff(s) Inhalation every 6 hours  metoclopramide 10 milliGRAM(s) Oral Before meals and at bedtime  metoprolol tartrate 50 milliGRAM(s) Oral two times a day  midodrine 5 milliGRAM(s) Oral every 8 hours  pantoprazole    Tablet 40 milliGRAM(s) Oral before breakfast  thiamine  Oral Tab/Cap - Peds 100 milliGRAM(s) Oral two times a day    PRN MEDICATIONS  acetaminophen     Tablet .. 650 milliGRAM(s) Oral every 6 hours PRN  aluminum hydroxide/magnesium hydroxide/simethicone Suspension 30 milliLiter(s) Oral every 4 hours PRN  melatonin 3 milliGRAM(s) Oral at bedtime PRN  ondansetron Injectable 4 milliGRAM(s) IV Push every 8 hours PRN                                            ------------------------------------------------------------  VITAL SIGNS: Last 24 Hours  Vital Signs Last 24 Hrs  T(C): 36.2 (15 Jul 2022 05:00), Max: 37.2 (14 Jul 2022 20:33)  T(F): 97.1 (15 Jul 2022 05:00), Max: 99 (14 Jul 2022 20:33)  HR: 97 (15 Jul 2022 05:00) (96 - 97)  BP: 104/59 (15 Jul 2022 05:00) (104/51 - 104/59)  BP(mean): --  RR: 16 (15 Jul 2022 05:00) (16 - 20)  SpO2: 98% (15 Jul 2022 05:00) (97% - 98%)    Parameters below as of 15 Jul 2022 05:00  Patient On (Oxygen Delivery Method): ventilator    I&O's Detail    14 Jul 2022 07:01  -  15 Jul 2022 07:00  --------------------------------------------------------  IN:    dextrose 5%: 375 mL    Jevity 1.2: 1440 mL  Total IN: 1815 mL    OUT:    Indwelling Catheter - Urethral (mL): 2150 mL  Total OUT: 2150 mL    Total NET: -335 mL                                               --------------------------------------------------------------  LABS:               LABS:                        10.9   11.75 )-----------( 455      ( 15 Jul 2022 08:33 )             36.8     07-15    147<H>  |  108  |  6<L>  ----------------------------<  111<H>  4.8   |  31  |  <0.5<L>    Ca    9.0      15 Jul 2022 08:33  Mg     2.1     07-15    TPro  6.3  /  Alb  3.0<L>  /  TBili  0.8  /  DBili  x   /  AST  327<H>  /  ALT  413<H>  /  AlkPhos  298<H>  07-15                                                    -------------------------------------------------------------  RADIOLOGY:                                            --------------------------------------------------------------    PHYSICAL EXAM:  GENERAL: Pt has Trach and PEG   CHEST/LUNG: Clear to auscultation bilaterally; no wheezing  HEART: Regular rate and rhythm; No murmurs, rubs, or gallops  ABDOMEN: Soft, Nontender, Nondistended; Bowel sounds present  EXTREMITIES:  2+ Peripheral Pulses, No clubbing, cyanosis, or edema                                           --------------------------------------------------------------    ASSESSMENT & PLAN    21 y/o F with a PMHx of encephalitis s/p tooth abscess s/p tracheostomy and PEG tube placement, HTN, GERD, and SVC thrombosis , recent admission for Covid19 PNA and pyelonephritis in June 2022, currently being treated with IV Vancomycin, cefepime for PNA since 7/8, BIBEMS from [facility] for fever this AM. Per NH notes, pt spiked temp of 102.6 today and was sent to ED. Pt is non-verbal, unable to contribute to history or ROS. pt has sputum around the trach area.     # sepsis 2/2 MDR PNA  # Acute on chronic respiratory failure  - pt's first covid test was positive in 5/22, subsequent tests negative, positive again today. ? recurrence? vs persistent infection?  - hx of MDR Klebsiella bacteremia  - in the ED,pt's VS T Max: 37.9  BP 89/49  - labs done showed WBC 11k, creat 0.5, CRP 73,covid is positive  - CT Angio Chest PE Protocol w/ IV Cont > NO PE, consistent with pneumonia  - pt was started on cefepime and vancomycin 7/8 at [facility]  - previous cultures grew Klebsiella, proteus ESBL, MDRO, CRE  - sputum cx with carbapenem resistent Klebsiella  - ID recs: Cefiderocol 2g q8h started   - as per ID, add Tigecycline if rising WBC or fever   - blood cultures NGTD  - c/w oxygen support via trach    # Elevated LFTs  - likely due to Cefiderocol  - RUQ U/S WNL, hep panel NR  - hepatology recs: trend LFT and INR  - as per ID: Cefiderocol d/c'ed   - 7/15 Tigecycline and Meropenem started    # Hypernatremia  - Na 151 > 141 >147  - D5 d/c'ed  - monitor BMP  - increase H20 in peg feeds    #Persistent sinus tachycardia  - pt was persistently tachycardic on previous admission baseline 100-120s  - EP eval, HR <130 acceptable  - on admission pt was tachycardic to 150-160s, now at baseline 90-120s s/p ABx  - c/w Metoprolol 50 BID    #HO SVC Thrombosis and Embolism   - C/w Eliquis 5 mg PO BID    #History of Encephalitis post Tooth Abscess in 10/2021   - s/p Tracheostomy (has an O2 tank per sister but unsure about flow) and G-tube placement  - aspiration precautions, respiratory toilet/suction as per resp team     #GERD  * Home med Omeprazole 40mg QD  - C/w Protonix 40 mg PO daily    #Iron Deficiency Anemia   #History of Wernicke Encephalopathy  #Suspected thiamine deficiency  #Suspected folic acid deficiency  * Home med iron replacement, thiamine, B12, and folic acid  - C/w home iron replacement, thiamine, B12, and folic acid  - Monitor for now    #DVT ppx: Eliquis 5 mg PO BID  #GI ppx: Protonix 40 mg PO daily  #Diet: NPO w G-tube feeds  #Activity: Functional quadriplegic  #Dispo: From [facility];  #Code status: Full code -- has MOLST confirming                                                                                                              ----------------------------------------------------  # DVT prophylaxis     # GI prophylaxis     # Diet     # Activity Score (AM-PAC)    # Code status     # Disposition                                                                              --------------------------------------------------------    # Handoff      CONSUELO CARO 22y Female  MRN#: [id number]     Hospital Day: 6d    Pt is currently admitted with the primary diagnosis of sepsis 2/2 MDR PNA    SUBJECTIVE  21 y/o F with a PMHx of encephalitis s/p tooth abscess s/p tracheostomy and PEG tube placement, HTN, GERD, and SVC thrombosis , recent admission for Covid19 PNA and pyelonephritis in June 2022, currently being treated with IV Vancomycin, cefepime for PNA since 7/8, BIBEMS from [facility] for fever this AM. Per NH notes, pt spiked temp of 102.6 today and was sent to ED. Pt is non-verbal, unable to contribute to history or ROS. pt has sputum around the trach area.     In the ED, Tmax 37.9, tachy to 127, BP 89/49, WBC 11k, creat 0.5, CRP 73,covid is positive, positive UA, CT Angio Chest PE Protocol w/ IV Cont No CTA evidence of acute pulmonary embolus. Airspace consolidations predominantly within the left upper and lower lobes with enlarged left hilar lymph nodes, most consistent with pneumonia. Pt received Cefepime and Vancomycin and was admitted for further workup and management.     7/14: Patient seen and examined at bedside. Patient is non-verbal      Present Today:   - Alejo:  No [  ], Yes [ x  ] : Indication:     - Type of IV Access:       .. CVC/Piccline:  No [  ], Yes [   ] : Indication:       .. 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    HTN (hypertension)    GERD (gastroesophageal reflux disease)                                              -----------------------------------------------------------  ALLERGIES:  Allergy Status Unknown                                            ------------------------------------------------------------    HOME MEDICATIONS  Home Medications:  albuterol 90 mcg/inh inhalation aerosol with adapter: 1 puff(s) inhaled every 4 hours (09 Jul 2022 22:48)  cyclobenzaprine 5 mg oral tablet: 1 tab(s) orally 3 times a day (09 Jul 2022 22:48)  Eliquis: 5 milligram(s) orally every 12 hours (09 Jul 2022 22:48)  ferrous sulfate 220 mg/5 mL (44 mg/5 mL elemental iron) oral elixir: 7.5 milliliter(s) orally once a day (09 Jul 2022 22:48)  folic acid 1 mg oral tablet: 1 tab(s) orally once a day (09 Jul 2022 22:48)  glycopyrrolate 2 mg oral tablet: 1 tab(s) orally 3 times a day (09 Jul 2022 22:48)  ipratropium 18 mcg/inh inhalation aerosol: 2 puff(s) inhaled every 6 hours (09 Jul 2022 22:48)  Lopressor 50 mg oral tablet: 1 tab(s) orally 2 times a day (09 Jul 2022 22:48)  metoclopramide 10 mg oral tablet: 1 tab(s) orally 4 times a day (before meals and at bedtime) (09 Jul 2022 22:48)  midodrine 5 mg oral tablet: 1 tab(s) orally every 8 hours (09 Jul 2022 22:48)  omeprazole 40 mg oral delayed release capsule: 1 cap(s) orally once a day (09 Jul 2022 22:48)  scopolamine 0.4 mg oral tablet: 1 milligram(s) orally every 72 hours (09 Jul 2022 22:48)  Tylenol 325 mg oral capsule: 2 cap(s) orally 3 times a day, As Needed (09 Jul 2022 22:48)  Vitamin B1 100 mg oral tablet: 1 tab(s) orally 2 times a day (09 Jul 2022 22:48)  Vitamin B12 500 mcg oral tablet: 1 tab(s) orally once a day (09 Jul 2022 22:48)                           MEDICATIONS:  STANDING MEDICATIONS  ALBUTerol    90 MICROgram(s) HFA Inhaler 1 Puff(s) Inhalation every 4 hours  apixaban 5 milliGRAM(s) Oral two times a day  cefiderocol IVPB 2000 milliGRAM(s) IV Intermittent every 8 hours  cyanocobalamin 1000 MICROGram(s) Oral daily  cyclobenzaprine Oral Tab/Cap - Peds 5 milliGRAM(s) Oral three times a day  dextrose 5%. 1000 milliLiter(s) IV Continuous <Continuous>  dextrose 50% Injectable 25 Gram(s) IV Push once  ferrous    sulfate Liquid 300 milliGRAM(s) Enteral Tube daily  folic acid  Oral Tab/Cap - Peds 1 milliGRAM(s) Oral daily  glycopyrrolate 2 milliGRAM(s) Oral three times a day  ipratropium 17 MICROgram(s) HFA Inhaler 1 Puff(s) Inhalation every 6 hours  metoclopramide 10 milliGRAM(s) Oral Before meals and at bedtime  metoprolol tartrate 50 milliGRAM(s) Oral two times a day  midodrine 5 milliGRAM(s) Oral every 8 hours  pantoprazole    Tablet 40 milliGRAM(s) Oral before breakfast  thiamine  Oral Tab/Cap - Peds 100 milliGRAM(s) Oral two times a day    PRN MEDICATIONS  acetaminophen     Tablet .. 650 milliGRAM(s) Oral every 6 hours PRN  aluminum hydroxide/magnesium hydroxide/simethicone Suspension 30 milliLiter(s) Oral every 4 hours PRN  melatonin 3 milliGRAM(s) Oral at bedtime PRN  ondansetron Injectable 4 milliGRAM(s) IV Push every 8 hours PRN                                            ------------------------------------------------------------  VITAL SIGNS: Last 24 Hours  Vital Signs Last 24 Hrs  T(C): 36.2 (15 Jul 2022 05:00), Max: 37.2 (14 Jul 2022 20:33)  T(F): 97.1 (15 Jul 2022 05:00), Max: 99 (14 Jul 2022 20:33)  HR: 97 (15 Jul 2022 05:00) (96 - 97)  BP: 104/59 (15 Jul 2022 05:00) (104/51 - 104/59)  BP(mean): --  RR: 16 (15 Jul 2022 05:00) (16 - 20)  SpO2: 98% (15 Jul 2022 05:00) (97% - 98%)    Parameters below as of 15 Jul 2022 05:00  Patient On (Oxygen Delivery Method): ventilator    I&O's Detail    14 Jul 2022 07:01  -  15 Jul 2022 07:00  --------------------------------------------------------  IN:    dextrose 5%: 375 mL    Jevity 1.2: 1440 mL  Total IN: 1815 mL    OUT:    Indwelling Catheter - Urethral (mL): 2150 mL  Total OUT: 2150 mL    Total NET: -335 mL                                               --------------------------------------------------------------  LABS:               LABS:                        10.9   11.75 )-----------( 455      ( 15 Jul 2022 08:33 )             36.8     07-15    147<H>  |  108  |  6<L>  ----------------------------<  111<H>  4.8   |  31  |  <0.5<L>    Ca    9.0      15 Jul 2022 08:33  Mg     2.1     07-15    TPro  6.3  /  Alb  3.0<L>  /  TBili  0.8  /  DBili  x   /  AST  327<H>  /  ALT  413<H>  /  AlkPhos  298<H>  07-15                                                    -------------------------------------------------------------  RADIOLOGY:                                            --------------------------------------------------------------    PHYSICAL EXAM:  GENERAL: Pt has Trach and PEG   CHEST/LUNG: Clear to auscultation bilaterally; no wheezing  HEART: Regular rate and rhythm; No murmurs, rubs, or gallops  ABDOMEN: Soft, Nontender, Nondistended; Bowel sounds present  EXTREMITIES:  2+ Peripheral Pulses, No clubbing, cyanosis, or edema                                           --------------------------------------------------------------    ASSESSMENT & PLAN    21 y/o F with a PMHx of encephalitis s/p tooth abscess s/p tracheostomy and PEG tube placement, HTN, GERD, and SVC thrombosis , recent admission for Covid19 PNA and pyelonephritis in June 2022, currently being treated with IV Vancomycin, cefepime for PNA since 7/8, BIBEMS from [facility] for fever this AM. Per NH notes, pt spiked temp of 102.6 today and was sent to ED. Pt is non-verbal, unable to contribute to history or ROS. pt has sputum around the trach area.     # sepsis 2/2 MDR PNA  # Acute on chronic respiratory failure  - pt's first covid test was positive in 5/22, subsequent tests negative, positive again today. ? recurrence? vs persistent infection?  - hx of MDR Klebsiella bacteremia  - in the ED,pt's VS T Max: 37.9  BP 89/49  - labs done showed WBC 11k, creat 0.5, CRP 73,covid is positive  - CT Angio Chest PE Protocol w/ IV Cont > NO PE, consistent with pneumonia  - pt was started on cefepime and vancomycin 7/8 at [facility]  - previous cultures grew Klebsiella, proteus ESBL, MDRO, CRE  - sputum cx with carbapenem resistent Klebsiella  - ID recs: Cefiderocol 2g q8h started   - as per ID, add Tigecycline if rising WBC or fever   - blood cultures NGTD  - c/w oxygen support via trach    # Elevated LFTs  - likely due to Cefiderocol  - RUQ U/S WNL, hep panel NR  - hepatology recs: trend LFT and INR, check EBV, CMV, herpes, Avoid hepatotoxic medications  - as per ID: Cefiderocol d/c'ed   - 7/15 Tigecycline and Meropenem started    # Hypernatremia  - Na 151 > 141 >147  - D5 d/c'ed  - monitor BMP  - increase H20 in peg feeds    #Persistent sinus tachycardia  - pt was persistently tachycardic on previous admission baseline 100-120s  - EP eval, HR <130 acceptable  - on admission pt was tachycardic to 150-160s, now at baseline 90-120s s/p ABx  - c/w Metoprolol 50 BID    #HO SVC Thrombosis and Embolism   - C/w Eliquis 5 mg PO BID    #History of Encephalitis post Tooth Abscess in 10/2021   - s/p Tracheostomy (has an O2 tank per sister but unsure about flow) and G-tube placement  - aspiration precautions, respiratory toilet/suction as per resp team     #GERD  * Home med Omeprazole 40mg QD  - C/w Protonix 40 mg PO daily    #Iron Deficiency Anemia   #History of Wernicke Encephalopathy  #Suspected thiamine deficiency  #Suspected folic acid deficiency  * Home med iron replacement, thiamine, B12, and folic acid  - C/w home iron replacement, thiamine, B12, and folic acid  - Monitor for now    #DVT ppx: Eliquis 5 mg PO BID  #GI ppx: Protonix 40 mg PO daily  #Diet: NPO w G-tube feeds  #Activity: Functional quadriplegic  #Dispo: From [facility];  #Code status: Full code -- has MOLST confirming                                                                                                              ----------------------------------------------------  # DVT prophylaxis     # GI prophylaxis     # Diet     # Activity Score (AM-PAC)    # Code status     # Disposition                                                                              --------------------------------------------------------    # Handoff

## 2022-07-15 NOTE — PROGRESS NOTE ADULT - ATTENDING COMMENTS
23 y/o F with a PMHx of encephalitis s/p tooth abscess s/p tracheostomy and PEG tube placement, HTN, GERD, and SVC thrombosis who was brought to ED from [facility] for fevers. Admitted for tx for sepsis due to PNA.    # Sepsis 2/2 multi drug resistant pneumonia, sputum growing Klebsiella pneumoniae carbapenem resistant  # Acute on chronic respiratory failure 2/2 MDR pneumonia  # h/o encephalitis from tooth abscess  s/p trach and PEG, bedbound, non-verbal   # Recent COVID-19 infection, MDR colonization on respiratory tract, inability to clear secretion  - Change IV antibiotic from cefiderocol to Meropenem, Tigecycline (given the LFTs elevation)    - called micro to add on sensitivities for cefiderocol to Kleb CRE as per ID recommendation  - c/w oxygen support via trach  - aspiration precaution, dietary evaluation for feeding regimen to avoid aspiration, may consider low volume and frequent feed  - respiratory toilet/suction as per respiratory team    #Worsening LFTs (as of 7/14):   Might be 2/2 Abx. Changed as above. Monitor.   RUQ US noted  Check  EBV CMV herpes hepatitis E serologies per hepatology.     # Hypernatremia  Na 151 (7/13) > 141 (7/14) > 147 (7/15).   Increase FW flushes in PEG to 450 Q6H    - monitor BMP    # h/o SVC Thrombosis and Embolism   - c/w Eliquis 5 mg PO BID    # GERD  - c/w Protonix 40 mg PO daily    # Iron Deficiency Anemia, stable   - monitor CBC  - c/w home iron replacement, thiamine, B12, and folic acid    # DVT prophlaxis   - on eliquis 5 mg PO BID    #Functional Quadruparesis  s/p Trach/PEG    Dispo: [facility].

## 2022-07-16 LAB
ALBUMIN SERPL ELPH-MCNC: 3.3 G/DL — LOW (ref 3.5–5.2)
ALP SERPL-CCNC: 343 U/L — HIGH (ref 30–115)
ALT FLD-CCNC: 349 U/L — HIGH (ref 0–41)
ANION GAP SERPL CALC-SCNC: 10 MMOL/L — SIGNIFICANT CHANGE UP (ref 7–14)
APTT BLD: 37.2 SEC — SIGNIFICANT CHANGE UP (ref 27–39.2)
AST SERPL-CCNC: 75 U/L — HIGH (ref 0–41)
BASOPHILS # BLD AUTO: 0.06 K/UL — SIGNIFICANT CHANGE UP (ref 0–0.2)
BASOPHILS NFR BLD AUTO: 0.3 % — SIGNIFICANT CHANGE UP (ref 0–1)
BILIRUB SERPL-MCNC: 0.2 MG/DL — SIGNIFICANT CHANGE UP (ref 0.2–1.2)
BUN SERPL-MCNC: 13 MG/DL — SIGNIFICANT CHANGE UP (ref 10–20)
CALCIUM SERPL-MCNC: 9.5 MG/DL — SIGNIFICANT CHANGE UP (ref 8.5–10.1)
CHLORIDE SERPL-SCNC: 103 MMOL/L — SIGNIFICANT CHANGE UP (ref 98–110)
CO2 SERPL-SCNC: 28 MMOL/L — SIGNIFICANT CHANGE UP (ref 17–32)
CREAT SERPL-MCNC: 0.5 MG/DL — LOW (ref 0.7–1.5)
EBV EA AB SER IA-ACNC: 18.8 U/ML — HIGH
EBV EA AB TITR SER IF: POSITIVE
EBV EA IGG SER-ACNC: POSITIVE
EBV NA IGG SER IA-ACNC: >600 U/ML — HIGH
EBV PATRN SPEC IB-IMP: SIGNIFICANT CHANGE UP
EBV VCA IGG AVIDITY SER QL IA: POSITIVE
EBV VCA IGM SER IA-ACNC: <10 U/ML — SIGNIFICANT CHANGE UP
EBV VCA IGM SER IA-ACNC: >750 U/ML — HIGH
EBV VCA IGM TITR FLD: NEGATIVE — SIGNIFICANT CHANGE UP
EGFR: 136 ML/MIN/1.73M2 — SIGNIFICANT CHANGE UP
EOSINOPHIL # BLD AUTO: 0.91 K/UL — HIGH (ref 0–0.7)
EOSINOPHIL NFR BLD AUTO: 4.9 % — SIGNIFICANT CHANGE UP (ref 0–8)
GLUCOSE BLDC GLUCOMTR-MCNC: 115 MG/DL — HIGH (ref 70–99)
GLUCOSE BLDC GLUCOMTR-MCNC: 128 MG/DL — HIGH (ref 70–99)
GLUCOSE SERPL-MCNC: 97 MG/DL — SIGNIFICANT CHANGE UP (ref 70–99)
HCT VFR BLD CALC: 40.2 % — SIGNIFICANT CHANGE UP (ref 37–47)
HGB BLD-MCNC: 11.8 G/DL — LOW (ref 12–16)
HSV DNA1: SIGNIFICANT CHANGE UP
HSV DNA2: SIGNIFICANT CHANGE UP
HSV1 DNA BLD QL NAA+PROBE: SIGNIFICANT CHANGE UP
HSV2 DNA BLD QL NAA+PROBE: SIGNIFICANT CHANGE UP
IMM GRANULOCYTES NFR BLD AUTO: 1.2 % — HIGH (ref 0.1–0.3)
INR BLD: 1.66 RATIO — HIGH (ref 0.65–1.3)
LYMPHOCYTES # BLD AUTO: 14 % — LOW (ref 20.5–51.1)
LYMPHOCYTES # BLD AUTO: 2.6 K/UL — SIGNIFICANT CHANGE UP (ref 1.2–3.4)
MAGNESIUM SERPL-MCNC: 1.9 MG/DL — SIGNIFICANT CHANGE UP (ref 1.8–2.4)
MCHC RBC-ENTMCNC: 23.1 PG — LOW (ref 27–31)
MCHC RBC-ENTMCNC: 29.4 G/DL — LOW (ref 32–37)
MCV RBC AUTO: 78.7 FL — LOW (ref 81–99)
MONOCYTES # BLD AUTO: 1.37 K/UL — HIGH (ref 0.1–0.6)
MONOCYTES NFR BLD AUTO: 7.4 % — SIGNIFICANT CHANGE UP (ref 1.7–9.3)
NEUTROPHILS # BLD AUTO: 13.4 K/UL — HIGH (ref 1.4–6.5)
NEUTROPHILS NFR BLD AUTO: 72.2 % — SIGNIFICANT CHANGE UP (ref 42.2–75.2)
NRBC # BLD: 0 /100 WBCS — SIGNIFICANT CHANGE UP (ref 0–0)
PLATELET # BLD AUTO: 614 K/UL — HIGH (ref 130–400)
POTASSIUM SERPL-MCNC: 4.8 MMOL/L — SIGNIFICANT CHANGE UP (ref 3.5–5)
POTASSIUM SERPL-SCNC: 4.8 MMOL/L — SIGNIFICANT CHANGE UP (ref 3.5–5)
PROT SERPL-MCNC: 7.2 G/DL — SIGNIFICANT CHANGE UP (ref 6–8)
PROTHROM AB SERPL-ACNC: 19 SEC — HIGH (ref 9.95–12.87)
RBC # BLD: 5.11 M/UL — SIGNIFICANT CHANGE UP (ref 4.2–5.4)
RBC # FLD: 16.7 % — HIGH (ref 11.5–14.5)
SODIUM SERPL-SCNC: 141 MMOL/L — SIGNIFICANT CHANGE UP (ref 135–146)
WBC # BLD: 18.56 K/UL — HIGH (ref 4.8–10.8)
WBC # FLD AUTO: 18.56 K/UL — HIGH (ref 4.8–10.8)

## 2022-07-16 PROCEDURE — 99232 SBSQ HOSP IP/OBS MODERATE 35: CPT

## 2022-07-16 RX ORDER — SCOPALAMINE 1 MG/3D
1 PATCH, EXTENDED RELEASE TRANSDERMAL
Refills: 0 | Status: DISCONTINUED | OUTPATIENT
Start: 2022-07-16 | End: 2022-07-22

## 2022-07-16 RX ADMIN — TIGECYCLINE 105 MILLIGRAM(S): 50 INJECTION, POWDER, LYOPHILIZED, FOR SOLUTION INTRAVENOUS at 17:18

## 2022-07-16 RX ADMIN — CYCLOBENZAPRINE HYDROCHLORIDE 5 MILLIGRAM(S): 10 TABLET, FILM COATED ORAL at 05:27

## 2022-07-16 RX ADMIN — Medication 100 MILLIGRAM(S): at 17:11

## 2022-07-16 RX ADMIN — Medication 1 MILLIGRAM(S): at 11:44

## 2022-07-16 RX ADMIN — Medication 1 PUFF(S): at 08:08

## 2022-07-16 RX ADMIN — Medication 10 MILLIGRAM(S): at 05:28

## 2022-07-16 RX ADMIN — Medication 1 PUFF(S): at 21:00

## 2022-07-16 RX ADMIN — ALBUTEROL 1 PUFF(S): 90 AEROSOL, METERED ORAL at 08:08

## 2022-07-16 RX ADMIN — CYCLOBENZAPRINE HYDROCHLORIDE 5 MILLIGRAM(S): 10 TABLET, FILM COATED ORAL at 21:55

## 2022-07-16 RX ADMIN — ROBINUL 2 MILLIGRAM(S): 0.2 INJECTION INTRAMUSCULAR; INTRAVENOUS at 05:29

## 2022-07-16 RX ADMIN — MIDODRINE HYDROCHLORIDE 5 MILLIGRAM(S): 2.5 TABLET ORAL at 05:29

## 2022-07-16 RX ADMIN — APIXABAN 5 MILLIGRAM(S): 2.5 TABLET, FILM COATED ORAL at 05:28

## 2022-07-16 RX ADMIN — APIXABAN 5 MILLIGRAM(S): 2.5 TABLET, FILM COATED ORAL at 17:11

## 2022-07-16 RX ADMIN — MEROPENEM 35 MILLIGRAM(S): 1 INJECTION INTRAVENOUS at 23:00

## 2022-07-16 RX ADMIN — Medication 50 MILLIGRAM(S): at 05:30

## 2022-07-16 RX ADMIN — PREGABALIN 1000 MICROGRAM(S): 225 CAPSULE ORAL at 11:44

## 2022-07-16 RX ADMIN — CYCLOBENZAPRINE HYDROCHLORIDE 5 MILLIGRAM(S): 10 TABLET, FILM COATED ORAL at 13:37

## 2022-07-16 RX ADMIN — PANTOPRAZOLE SODIUM 40 MILLIGRAM(S): 20 TABLET, DELAYED RELEASE ORAL at 05:29

## 2022-07-16 RX ADMIN — SCOPALAMINE 1 PATCH: 1 PATCH, EXTENDED RELEASE TRANSDERMAL at 17:12

## 2022-07-16 RX ADMIN — MEROPENEM 35 MILLIGRAM(S): 1 INJECTION INTRAVENOUS at 05:27

## 2022-07-16 RX ADMIN — ROBINUL 2 MILLIGRAM(S): 0.2 INJECTION INTRAMUSCULAR; INTRAVENOUS at 13:37

## 2022-07-16 RX ADMIN — Medication 300 MILLIGRAM(S): at 11:44

## 2022-07-16 RX ADMIN — Medication 50 MILLIGRAM(S): at 17:18

## 2022-07-16 RX ADMIN — ALBUTEROL 1 PUFF(S): 90 AEROSOL, METERED ORAL at 20:59

## 2022-07-16 RX ADMIN — ALBUTEROL 1 PUFF(S): 90 AEROSOL, METERED ORAL at 00:12

## 2022-07-16 RX ADMIN — MIDODRINE HYDROCHLORIDE 5 MILLIGRAM(S): 2.5 TABLET ORAL at 13:38

## 2022-07-16 RX ADMIN — Medication 10 MILLIGRAM(S): at 17:11

## 2022-07-16 RX ADMIN — Medication 100 MILLIGRAM(S): at 05:29

## 2022-07-16 RX ADMIN — TIGECYCLINE 105 MILLIGRAM(S): 50 INJECTION, POWDER, LYOPHILIZED, FOR SOLUTION INTRAVENOUS at 05:30

## 2022-07-16 RX ADMIN — Medication 1 PUFF(S): at 02:54

## 2022-07-16 RX ADMIN — MEROPENEM 35 MILLIGRAM(S): 1 INJECTION INTRAVENOUS at 13:40

## 2022-07-16 RX ADMIN — ROBINUL 2 MILLIGRAM(S): 0.2 INJECTION INTRAMUSCULAR; INTRAVENOUS at 21:56

## 2022-07-16 RX ADMIN — Medication 10 MILLIGRAM(S): at 21:55

## 2022-07-16 RX ADMIN — MIDODRINE HYDROCHLORIDE 5 MILLIGRAM(S): 2.5 TABLET ORAL at 21:55

## 2022-07-16 RX ADMIN — ALBUTEROL 1 PUFF(S): 90 AEROSOL, METERED ORAL at 05:20

## 2022-07-16 RX ADMIN — Medication 10 MILLIGRAM(S): at 11:45

## 2022-07-16 NOTE — PROGRESS NOTE ADULT - SUBJECTIVE AND OBJECTIVE BOX
CONSUELO CARO 22y Female  MRN#: [id number]     Hospital Day: 7d    Pt is currently admitted with the primary diagnosis of sepsis 2/2 MDR PNA    SUBJECTIVE  21 y/o F with a PMHx of encephalitis s/p tooth abscess s/p tracheostomy and PEG tube placement, HTN, GERD, and SVC thrombosis , recent admission for Covid19 PNA and pyelonephritis in June 2022, currently being treated with IV Vancomycin, cefepime for PNA since 7/8, BIBEMS from [facility] for fever this AM. Per NH notes, pt spiked temp of 102.6 today and was sent to ED. Pt is non-verbal, unable to contribute to history or ROS. pt has sputum around the trach area.     In the ED, Tmax 37.9, tachy to 127, BP 89/49, WBC 11k, creat 0.5, CRP 73,covid is positive, positive UA, CT Angio Chest PE Protocol w/ IV Cont No CTA evidence of acute pulmonary embolus. Airspace consolidations predominantly within the left upper and lower lobes with enlarged left hilar lymph nodes, most consistent with pneumonia. Pt received Cefepime and Vancomycin and was admitted for further workup and management.     7/16: Patient seen and examined at bedside. Patient is non-verbal      Present Today:   - Alejo:  No [  ], Yes [ x  ] : Indication:     - Type of IV Access:       .. CVC/Piccline:  No [  ], Yes [   ] : Indication:       .. 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    HTN (hypertension)    GERD (gastroesophageal reflux disease)                                              -----------------------------------------------------------  ALLERGIES:  Allergy Status Unknown                                            ------------------------------------------------------------    HOME MEDICATIONS  Home Medications:  albuterol 90 mcg/inh inhalation aerosol with adapter: 1 puff(s) inhaled every 4 hours (09 Jul 2022 22:48)  cyclobenzaprine 5 mg oral tablet: 1 tab(s) orally 3 times a day (09 Jul 2022 22:48)  Eliquis: 5 milligram(s) orally every 12 hours (09 Jul 2022 22:48)  ferrous sulfate 220 mg/5 mL (44 mg/5 mL elemental iron) oral elixir: 7.5 milliliter(s) orally once a day (09 Jul 2022 22:48)  folic acid 1 mg oral tablet: 1 tab(s) orally once a day (09 Jul 2022 22:48)  glycopyrrolate 2 mg oral tablet: 1 tab(s) orally 3 times a day (09 Jul 2022 22:48)  ipratropium 18 mcg/inh inhalation aerosol: 2 puff(s) inhaled every 6 hours (09 Jul 2022 22:48)  Lopressor 50 mg oral tablet: 1 tab(s) orally 2 times a day (09 Jul 2022 22:48)  metoclopramide 10 mg oral tablet: 1 tab(s) orally 4 times a day (before meals and at bedtime) (09 Jul 2022 22:48)  midodrine 5 mg oral tablet: 1 tab(s) orally every 8 hours (09 Jul 2022 22:48)  omeprazole 40 mg oral delayed release capsule: 1 cap(s) orally once a day (09 Jul 2022 22:48)  scopolamine 0.4 mg oral tablet: 1 milligram(s) orally every 72 hours (09 Jul 2022 22:48)  Tylenol 325 mg oral capsule: 2 cap(s) orally 3 times a day, As Needed (09 Jul 2022 22:48)  Vitamin B1 100 mg oral tablet: 1 tab(s) orally 2 times a day (09 Jul 2022 22:48)  Vitamin B12 500 mcg oral tablet: 1 tab(s) orally once a day (09 Jul 2022 22:48)                           MEDICATIONS:  STANDING MEDICATIONS  ALBUTerol    90 MICROgram(s) HFA Inhaler 1 Puff(s) Inhalation every 4 hours  apixaban 5 milliGRAM(s) Oral two times a day  cefiderocol IVPB 2000 milliGRAM(s) IV Intermittent every 8 hours  cyanocobalamin 1000 MICROGram(s) Oral daily  cyclobenzaprine Oral Tab/Cap - Peds 5 milliGRAM(s) Oral three times a day  dextrose 5%. 1000 milliLiter(s) IV Continuous <Continuous>  dextrose 50% Injectable 25 Gram(s) IV Push once  ferrous    sulfate Liquid 300 milliGRAM(s) Enteral Tube daily  folic acid  Oral Tab/Cap - Peds 1 milliGRAM(s) Oral daily  glycopyrrolate 2 milliGRAM(s) Oral three times a day  ipratropium 17 MICROgram(s) HFA Inhaler 1 Puff(s) Inhalation every 6 hours  metoclopramide 10 milliGRAM(s) Oral Before meals and at bedtime  metoprolol tartrate 50 milliGRAM(s) Oral two times a day  midodrine 5 milliGRAM(s) Oral every 8 hours  pantoprazole    Tablet 40 milliGRAM(s) Oral before breakfast  thiamine  Oral Tab/Cap - Peds 100 milliGRAM(s) Oral two times a day    PRN MEDICATIONS  acetaminophen     Tablet .. 650 milliGRAM(s) Oral every 6 hours PRN  aluminum hydroxide/magnesium hydroxide/simethicone Suspension 30 milliLiter(s) Oral every 4 hours PRN  melatonin 3 milliGRAM(s) Oral at bedtime PRN  ondansetron Injectable 4 milliGRAM(s) IV Push every 8 hours PRN                                            ------------------------------------------------------------  VITAL SIGNS: Last 24 Hours  Vital Signs Last 24 Hrs  T(C): 37.3 (16 Jul 2022 05:00), Max: 37.3 (16 Jul 2022 05:00)  T(F): 99.1 (16 Jul 2022 05:00), Max: 99.1 (16 Jul 2022 05:00)  HR: 113 (16 Jul 2022 05:00) (97 - 113)  BP: 96/55 (16 Jul 2022 05:00) (96/55 - 109/50)  BP(mean): --  RR: 16 (16 Jul 2022 05:00) (16 - 16)  SpO2: 97% (16 Jul 2022 05:00) (97% - 98%)    Parameters below as of 16 Jul 2022 05:00  Patient On (Oxygen Delivery Method): ventilator        I&O's Detail    14 Jul 2022 07:01  -  15 Jul 2022 07:00  --------------------------------------------------------  IN:    dextrose 5%: 375 mL    Jevity 1.2: 1440 mL  Total IN: 1815 mL    OUT:    Indwelling Catheter - Urethral (mL): 2150 mL  Total OUT: 2150 mL    Total NET: -335 mL                                               --------------------------------------------------------------  LABS:               LABS:                        10.9   11.75 )-----------( 455      ( 15 Jul 2022 08:33 )             36.8     07-15    147<H>  |  108  |  6<L>  ----------------------------<  111<H>  4.8   |  31  |  <0.5<L>    Ca    9.0      15 Jul 2022 08:33  Mg     2.1     07-15    TPro  6.3  /  Alb  3.0<L>  /  TBili  0.8  /  DBili  x   /  AST  327<H>  /  ALT  413<H>  /  AlkPhos  298<H>  07-15                                                      -------------------------------------------------------------  RADIOLOGY:                                            --------------------------------------------------------------    PHYSICAL EXAM:  GENERAL: Pt has Trach and PEG   CHEST/LUNG: Clear to auscultation bilaterally; no wheezing  HEART: Regular rate and rhythm; No murmurs, rubs, or gallops  ABDOMEN: Soft, Nontender, Nondistended; Bowel sounds present  EXTREMITIES:  2+ Peripheral Pulses, No clubbing, cyanosis, or edema                                           --------------------------------------------------------------    ASSESSMENT & PLAN    21 y/o F with a PMHx of encephalitis s/p tooth abscess s/p tracheostomy and PEG tube placement, HTN, GERD, and SVC thrombosis , recent admission for Covid19 PNA and pyelonephritis in June 2022, currently being treated with IV Vancomycin, cefepime for PNA since 7/8, BIBEMS from [facility] for fever this AM. Per NH notes, pt spiked temp of 102.6 today and was sent to ED. Pt is non-verbal, unable to contribute to history or ROS. pt has sputum around the trach area.     # sepsis 2/2 MDR PNA  # Acute on chronic respiratory failure  - pt's first covid test was positive in 5/22, subsequent tests negative, positive again today. ? recurrence? vs persistent infection?  - hx of MDR Klebsiella bacteremia  - in the ED,pt's VS T Max: 37.9  BP 89/49  - labs done showed WBC 11k, creat 0.5, CRP 73,covid is positive  - CT Angio Chest PE Protocol w/ IV Cont > NO PE, consistent with pneumonia  - pt was started on cefepime and vancomycin 7/8 at [facility]  - previous cultures grew Klebsiella, proteus ESBL, MDRO, CRE  - sputum cx with carbapenem resistent Klebsiella  - ID recs: Cefiderocol 2g q8h started   - as per ID, add Tigecycline if rising WBC or fever   - blood cultures NGTD  - c/w oxygen support via trach    # Elevated LFTs  - likely due to Cefiderocol  - RUQ U/S WNL, hep panel NR  - hepatology recs: trend LFT and INR, check EBV, CMV, herpes, Avoid hepatotoxic medications  - as per ID: Cefiderocol d/c'ed   - 7/15 Tigecycline and Meropenem started  - f/u AM labs    # Hypernatremia  - Na 151 > 141 >147  - D5 d/c'ed  - monitor BMP  - increase H20 in peg feeds    #Persistent sinus tachycardia  - pt was persistently tachycardic on previous admission baseline 100-120s  - EP eval, HR <130 acceptable  - on admission pt was tachycardic to 150-160s, now at baseline 90-120s s/p ABx  - c/w Metoprolol 50 BID    #HO SVC Thrombosis and Embolism   - C/w Eliquis 5 mg PO BID    #History of Encephalitis post Tooth Abscess in 10/2021   - s/p Tracheostomy (has an O2 tank per sister but unsure about flow) and G-tube placement  - aspiration precautions, respiratory toilet/suction as per resp team     #GERD  * Home med Omeprazole 40mg QD  - C/w Protonix 40 mg PO daily    #Iron Deficiency Anemia   #History of Wernicke Encephalopathy  #Suspected thiamine deficiency  #Suspected folic acid deficiency  * Home med iron replacement, thiamine, B12, and folic acid  - C/w home iron replacement, thiamine, B12, and folic acid  - Monitor for now    #DVT ppx: Eliquis 5 mg PO BID  #GI ppx: Protonix 40 mg PO daily  #Diet: NPO w G-tube feeds  #Activity: Functional quadriplegic  #Dispo: From [facility];  #Code status: Full code -- has MOLST confirming                                                                                                              ----------------------------------------------------  # DVT prophylaxis     # GI prophylaxis     # Diet     # Activity Score (AM-PAC)    # Code status     # Disposition                                                                              --------------------------------------------------------    # Handoff

## 2022-07-16 NOTE — PROGRESS NOTE ADULT - ATTENDING COMMENTS
21 y/o F with a PMHx of encephalitis s/p tooth abscess s/p tracheostomy and PEG tube placement, HTN, GERD, and SVC thrombosis who was brought to ED from [place] for fevers. Admitted for tx for sepsis due to PNA.    # Sepsis 2/2 multi drug resistant pneumonia, sputum growing Klebsiella pneumoniae carbapenem resistant  # Acute on chronic respiratory failure 2/2 MDR pneumonia  # h/o encephalitis from tooth abscess  s/p trach and PEG, bedbound, non-verbal   # Recent COVID-19 infection, MDR colonization on respiratory tract, inability to clear secretion  - Change IV antibiotic from cefiderocol to Meropenem, Tigecycline (given the LFTs elevation)    - called micro to add on sensitivities for cefiderocol to Kleb CRE as per ID recommendation  - c/w oxygen support via trach  - aspiration precaution, dietary evaluation for feeding regimen to avoid aspiration, may consider low volume and frequent feed  - respiratory toilet/suction as per respiratory team    #Worsening LFTs (as of 7/14):   Might be 2/2 Abx. Changed as above. Monitor.   RUQ US noted  Check  EBV CMV herpes hepatitis E serologies per hepatology.     # Hypernatremia  Na 151 (7/13) > 141 (7/14) > 147 (7/15).   Increase FW flushes in PEG to 450 Q6H    - monitor BMP    # h/o SVC Thrombosis and Embolism   - c/w Eliquis 5 mg PO BID    # GERD  - c/w Protonix 40 mg PO daily    # Iron Deficiency Anemia, stable   - monitor CBC  - c/w home iron replacement, thiamine, B12, and folic acid    # DVT prophlaxis   - on eliquis 5 mg PO BID    #Functional Quadruparesis  s/p Trach/PEG    Dispo: Grave prognosis. 23 y/o F with a PMHx of encephalitis s/p tooth abscess s/p tracheostomy and PEG tube placement, HTN, GERD, and SVC thrombosis who was brought to ED from [place] for fevers. Admitted for tx for sepsis due to PNA.    # Sepsis 2/2 multi drug resistant pneumonia, sputum growing Klebsiella pneumoniae carbapenem resistant  # Acute on chronic respiratory failure 2/2 MDR pneumonia  # h/o encephalitis from tooth abscess  s/p trach and PEG, bedbound, non-verbal   # Recent COVID-19 infection, MDR colonization on respiratory tract, inability to clear secretion  - Change IV antibiotic from cefiderocol to Meropenem, Tigecycline (given the LFTs elevation)    - called micro to add on sensitivities for cefiderocol to Kleb CRE as per ID recommendation  - c/w oxygen support via trach  - aspiration precaution, dietary evaluation for feeding regimen to avoid aspiration, may consider low volume and frequent feed  - respiratory toilet/suction as per respiratory team. Scopolamine patch    #Worsening LFTs (as of 7/14):   Might be 2/2 Abx. Changed as above. Monitor.   RUQ US noted  Check  EBV CMV herpes hepatitis E serologies per hepatology.     # Hypernatremia  Na 151 (7/13) > 141 (7/14) > 147 (7/15) > f/u  Increase FW flushes in PEG to 450 Q6H    - monitor BMP    # h/o SVC Thrombosis and Embolism   - c/w Eliquis 5 mg PO BID    # GERD  - c/w Protonix 40 mg PO daily    # Iron Deficiency Anemia, stable   - monitor CBC  - c/w home iron replacement, thiamine, B12, and folic acid    # DVT prophlaxis   - on eliquis 5 mg PO BID    #Functional Quadruparesis  s/p Trach/PEG    Dispo: Grave prognosis.

## 2022-07-17 LAB
ALBUMIN SERPL ELPH-MCNC: 3.3 G/DL — LOW (ref 3.5–5.2)
ALP SERPL-CCNC: 303 U/L — HIGH (ref 30–115)
ALT FLD-CCNC: 259 U/L — HIGH (ref 0–41)
ANION GAP SERPL CALC-SCNC: 7 MMOL/L — SIGNIFICANT CHANGE UP (ref 7–14)
APTT BLD: 40.1 SEC — HIGH (ref 27–39.2)
AST SERPL-CCNC: 44 U/L — HIGH (ref 0–41)
BASOPHILS # BLD AUTO: 0.05 K/UL — SIGNIFICANT CHANGE UP (ref 0–0.2)
BASOPHILS NFR BLD AUTO: 0.3 % — SIGNIFICANT CHANGE UP (ref 0–1)
BILIRUB SERPL-MCNC: 0.3 MG/DL — SIGNIFICANT CHANGE UP (ref 0.2–1.2)
BUN SERPL-MCNC: 13 MG/DL — SIGNIFICANT CHANGE UP (ref 10–20)
CALCIUM SERPL-MCNC: 9.2 MG/DL — SIGNIFICANT CHANGE UP (ref 8.5–10.1)
CHLORIDE SERPL-SCNC: 101 MMOL/L — SIGNIFICANT CHANGE UP (ref 98–110)
CO2 SERPL-SCNC: 30 MMOL/L — SIGNIFICANT CHANGE UP (ref 17–32)
CREAT SERPL-MCNC: 0.5 MG/DL — LOW (ref 0.7–1.5)
EGFR: 136 ML/MIN/1.73M2 — SIGNIFICANT CHANGE UP
EOSINOPHIL # BLD AUTO: 0.82 K/UL — HIGH (ref 0–0.7)
EOSINOPHIL NFR BLD AUTO: 5.5 % — SIGNIFICANT CHANGE UP (ref 0–8)
GLUCOSE BLDC GLUCOMTR-MCNC: 84 MG/DL — SIGNIFICANT CHANGE UP (ref 70–99)
GLUCOSE BLDC GLUCOMTR-MCNC: 87 MG/DL — SIGNIFICANT CHANGE UP (ref 70–99)
GLUCOSE BLDC GLUCOMTR-MCNC: 89 MG/DL — SIGNIFICANT CHANGE UP (ref 70–99)
GLUCOSE SERPL-MCNC: 128 MG/DL — HIGH (ref 70–99)
HCT VFR BLD CALC: 40.8 % — SIGNIFICANT CHANGE UP (ref 37–47)
HGB BLD-MCNC: 12.2 G/DL — SIGNIFICANT CHANGE UP (ref 12–16)
IMM GRANULOCYTES NFR BLD AUTO: 1.1 % — HIGH (ref 0.1–0.3)
INR BLD: 1.6 RATIO — HIGH (ref 0.65–1.3)
LYMPHOCYTES # BLD AUTO: 13.5 % — LOW (ref 20.5–51.1)
LYMPHOCYTES # BLD AUTO: 2.01 K/UL — SIGNIFICANT CHANGE UP (ref 1.2–3.4)
MAGNESIUM SERPL-MCNC: 1.8 MG/DL — SIGNIFICANT CHANGE UP (ref 1.8–2.4)
MCHC RBC-ENTMCNC: 23.6 PG — LOW (ref 27–31)
MCHC RBC-ENTMCNC: 29.9 G/DL — LOW (ref 32–37)
MCV RBC AUTO: 78.9 FL — LOW (ref 81–99)
MONOCYTES # BLD AUTO: 0.88 K/UL — HIGH (ref 0.1–0.6)
MONOCYTES NFR BLD AUTO: 5.9 % — SIGNIFICANT CHANGE UP (ref 1.7–9.3)
NEUTROPHILS # BLD AUTO: 10.99 K/UL — HIGH (ref 1.4–6.5)
NEUTROPHILS NFR BLD AUTO: 73.7 % — SIGNIFICANT CHANGE UP (ref 42.2–75.2)
NRBC # BLD: 0 /100 WBCS — SIGNIFICANT CHANGE UP (ref 0–0)
PLATELET # BLD AUTO: 516 K/UL — HIGH (ref 130–400)
POTASSIUM SERPL-MCNC: 5 MMOL/L — SIGNIFICANT CHANGE UP (ref 3.5–5)
POTASSIUM SERPL-SCNC: 5 MMOL/L — SIGNIFICANT CHANGE UP (ref 3.5–5)
PROT SERPL-MCNC: 6.5 G/DL — SIGNIFICANT CHANGE UP (ref 6–8)
PROTHROM AB SERPL-ACNC: 18.3 SEC — HIGH (ref 9.95–12.87)
RBC # BLD: 5.17 M/UL — SIGNIFICANT CHANGE UP (ref 4.2–5.4)
RBC # FLD: 16.8 % — HIGH (ref 11.5–14.5)
SARS-COV-2 RNA SPEC QL NAA+PROBE: DETECTED
SODIUM SERPL-SCNC: 138 MMOL/L — SIGNIFICANT CHANGE UP (ref 135–146)
WBC # BLD: 14.92 K/UL — HIGH (ref 4.8–10.8)
WBC # FLD AUTO: 14.92 K/UL — HIGH (ref 4.8–10.8)

## 2022-07-17 PROCEDURE — 93010 ELECTROCARDIOGRAM REPORT: CPT

## 2022-07-17 PROCEDURE — 99233 SBSQ HOSP IP/OBS HIGH 50: CPT

## 2022-07-17 RX ORDER — SODIUM CHLORIDE 9 MG/ML
1000 INJECTION INTRAMUSCULAR; INTRAVENOUS; SUBCUTANEOUS
Refills: 0 | Status: DISCONTINUED | OUTPATIENT
Start: 2022-07-17 | End: 2022-07-22

## 2022-07-17 RX ORDER — DILTIAZEM HCL 120 MG
30 CAPSULE, EXT RELEASE 24 HR ORAL ONCE
Refills: 0 | Status: COMPLETED | OUTPATIENT
Start: 2022-07-17 | End: 2022-07-17

## 2022-07-17 RX ADMIN — PANTOPRAZOLE SODIUM 40 MILLIGRAM(S): 20 TABLET, DELAYED RELEASE ORAL at 05:15

## 2022-07-17 RX ADMIN — Medication 10 MILLIGRAM(S): at 13:16

## 2022-07-17 RX ADMIN — ROBINUL 2 MILLIGRAM(S): 0.2 INJECTION INTRAMUSCULAR; INTRAVENOUS at 13:16

## 2022-07-17 RX ADMIN — MEROPENEM 35 MILLIGRAM(S): 1 INJECTION INTRAVENOUS at 22:13

## 2022-07-17 RX ADMIN — ROBINUL 2 MILLIGRAM(S): 0.2 INJECTION INTRAMUSCULAR; INTRAVENOUS at 05:16

## 2022-07-17 RX ADMIN — ALBUTEROL 1 PUFF(S): 90 AEROSOL, METERED ORAL at 09:57

## 2022-07-17 RX ADMIN — MEROPENEM 35 MILLIGRAM(S): 1 INJECTION INTRAVENOUS at 13:27

## 2022-07-17 RX ADMIN — ROBINUL 2 MILLIGRAM(S): 0.2 INJECTION INTRAMUSCULAR; INTRAVENOUS at 21:21

## 2022-07-17 RX ADMIN — CYCLOBENZAPRINE HYDROCHLORIDE 5 MILLIGRAM(S): 10 TABLET, FILM COATED ORAL at 21:17

## 2022-07-17 RX ADMIN — Medication 50 MILLIGRAM(S): at 17:45

## 2022-07-17 RX ADMIN — APIXABAN 5 MILLIGRAM(S): 2.5 TABLET, FILM COATED ORAL at 17:44

## 2022-07-17 RX ADMIN — Medication 1 PUFF(S): at 09:58

## 2022-07-17 RX ADMIN — Medication 10 MILLIGRAM(S): at 17:44

## 2022-07-17 RX ADMIN — MIDODRINE HYDROCHLORIDE 5 MILLIGRAM(S): 2.5 TABLET ORAL at 05:15

## 2022-07-17 RX ADMIN — SCOPALAMINE 1 PATCH: 1 PATCH, EXTENDED RELEASE TRANSDERMAL at 19:00

## 2022-07-17 RX ADMIN — Medication 100 MILLIGRAM(S): at 17:43

## 2022-07-17 RX ADMIN — PREGABALIN 1000 MICROGRAM(S): 225 CAPSULE ORAL at 13:15

## 2022-07-17 RX ADMIN — Medication 300 MILLIGRAM(S): at 13:15

## 2022-07-17 RX ADMIN — MIDODRINE HYDROCHLORIDE 5 MILLIGRAM(S): 2.5 TABLET ORAL at 21:27

## 2022-07-17 RX ADMIN — Medication 10 MILLIGRAM(S): at 05:15

## 2022-07-17 RX ADMIN — MEROPENEM 35 MILLIGRAM(S): 1 INJECTION INTRAVENOUS at 06:12

## 2022-07-17 RX ADMIN — Medication 1 MILLIGRAM(S): at 13:15

## 2022-07-17 RX ADMIN — ALBUTEROL 1 PUFF(S): 90 AEROSOL, METERED ORAL at 20:32

## 2022-07-17 RX ADMIN — Medication 1 PUFF(S): at 20:31

## 2022-07-17 RX ADMIN — CYCLOBENZAPRINE HYDROCHLORIDE 5 MILLIGRAM(S): 10 TABLET, FILM COATED ORAL at 13:15

## 2022-07-17 RX ADMIN — TIGECYCLINE 105 MILLIGRAM(S): 50 INJECTION, POWDER, LYOPHILIZED, FOR SOLUTION INTRAVENOUS at 17:44

## 2022-07-17 RX ADMIN — Medication 100 MILLIGRAM(S): at 05:15

## 2022-07-17 RX ADMIN — Medication 10 MILLIGRAM(S): at 21:18

## 2022-07-17 RX ADMIN — CYCLOBENZAPRINE HYDROCHLORIDE 5 MILLIGRAM(S): 10 TABLET, FILM COATED ORAL at 05:15

## 2022-07-17 RX ADMIN — TIGECYCLINE 105 MILLIGRAM(S): 50 INJECTION, POWDER, LYOPHILIZED, FOR SOLUTION INTRAVENOUS at 06:12

## 2022-07-17 RX ADMIN — APIXABAN 5 MILLIGRAM(S): 2.5 TABLET, FILM COATED ORAL at 05:15

## 2022-07-17 RX ADMIN — MIDODRINE HYDROCHLORIDE 5 MILLIGRAM(S): 2.5 TABLET ORAL at 13:17

## 2022-07-17 NOTE — PROGRESS NOTE ADULT - ASSESSMENT
21 y/o F with a PMHx of encephalitis s/p tooth abscess s/p tracheostomy and PEG tube placement, HTN, GERD, and SVC thrombosis who was brought to ED from [place] for fevers. Admitted for tx for sepsis due to PNA.    # Sepsis 2/2 multi drug resistant pneumonia, sputum growing Klebsiella pneumoniae carbapenem resistant  # Acute on chronic respiratory failure 2/2 MDR pneumonia  # h/o encephalitis from tooth abscess  s/p trach and PEG, bedbound, non-verbal   # Recent COVID-19 infection, MDR colonization on respiratory tract, inability to clear secretion  - Changed IV antibiotic from cefiderocol to Meropenem, Tigecycline (given the LFTs elevation)    - called micro to add on sensitivities for cefiderocol to Kleb CRE as per ID recommendation  - c/w oxygen support via trach  - aspiration precaution, dietary evaluation for feeding regimen to avoid aspiration, may consider low volume and frequent feed  - respiratory toilet/suction as per respiratory team. Scopolamine patch    #Worsening LFTs (as of 7/14):   Might be 2/2 Abx. Changed as above. Monitor. Improving  RUQ US noted  Check  EBV CMV herpes hepatitis E serologies per hepatology.     # Hypernatremia  Na 151 (7/13) > 141 (7/14) > 147 (7/15) > Improved  Increased FW flushes in PEG to 450 Q6H    - monitor BMP    #Tachycardia:   120-150s all day. -110s.   c/w Metoprolol T 50 BID. Added Diltiazem IR 30 mg once 7/17 evening and observe.   Try NS @ 50     # h/o SVC Thrombosis and Embolism   - c/w Eliquis 5 mg PO BID    # GERD  - c/w Protonix 40 mg PO daily    # Iron Deficiency Anemia, stable   - monitor CBC  - c/w home iron replacement, thiamine, B12, and folic acid    # DVT prophlaxis   - on eliquis 5 mg PO BID    #Functional Quadruparesis  s/p Trach/PEG    Dispo: Grave prognosis.

## 2022-07-17 NOTE — CHART NOTE - NSCHARTNOTEFT_GEN_A_CORE
ALP is 343. Child Perez stage is B and therefore tigecycline does not need to be adjusted. signed out to day team to check the Child Perez score and adjust the tigecycline accordingly.

## 2022-07-17 NOTE — PROGRESS NOTE ADULT - SUBJECTIVE AND OBJECTIVE BOX
S: tachycardia noted      All other pertinent ROS negative.      07-16-22 @ 07:01  -  07-17-22 @ 07:00  --------------------------------------------------------  IN: 0 mL / OUT: 1100 mL / NET: -1100 mL      Vital Signs Last 24 Hrs  T(C): 37.7 (17 Jul 2022 12:26), Max: 37.7 (17 Jul 2022 12:26)  T(F): 99.8 (17 Jul 2022 12:26), Max: 99.8 (17 Jul 2022 12:26)  HR: 129 (17 Jul 2022 12:26) (109 - 129)  BP: 111/56 (17 Jul 2022 12:26) (88/51 - 111/56)  BP(mean): --  RR: 21 (17 Jul 2022 12:26) (20 - 23)  SpO2: 98% (17 Jul 2022 12:26) (96% - 99%)    Parameters below as of 17 Jul 2022 05:00  Patient On (Oxygen Delivery Method): T-piece      PHYSICAL EXAM:    Constitutional: trach/PEG  HEENT: PERR, EOMI, Normal Hearing, MMM  Neck: Soft and supple, No LAD, No JVD  Respiratory: Breath sounds are clear bilaterally, No wheezing, rales or rhonchi  Cardiovascular: S1 and S2, regular rate and rhythm, no Murmurs, gallops or rubs  Gastrointestinal: Bowel Sounds present, soft, nontender, nondistended, no guarding, no rebound  Extremities: edmea +      MEDICATIONS:  MEDICATIONS  (STANDING):  ALBUTerol    90 MICROgram(s) HFA Inhaler 1 Puff(s) Inhalation every 4 hours  apixaban 5 milliGRAM(s) Oral two times a day  cyanocobalamin 1000 MICROGram(s) Oral daily  cyclobenzaprine Oral Tab/Cap - Peds 5 milliGRAM(s) Oral three times a day  dextrose 50% Injectable 25 Gram(s) IV Push once  diltiazem    Tablet 30 milliGRAM(s) Oral once  ferrous    sulfate Liquid 300 milliGRAM(s) Enteral Tube daily  folic acid  Oral Tab/Cap - Peds 1 milliGRAM(s) Oral daily  glycopyrrolate 2 milliGRAM(s) Oral three times a day  ipratropium 17 MICROgram(s) HFA Inhaler 1 Puff(s) Inhalation every 6 hours  meropenem  IVPB 2000 milliGRAM(s) IV Intermittent every 8 hours  metoclopramide 10 milliGRAM(s) Oral Before meals and at bedtime  metoprolol tartrate 50 milliGRAM(s) Oral two times a day  midodrine 5 milliGRAM(s) Oral every 8 hours  pantoprazole    Tablet 40 milliGRAM(s) Oral before breakfast  scopolamine 1 mG/72 Hr(s) Patch 1 Patch Transdermal every 72 hours  sodium chloride 0.9%. 1000 milliLiter(s) (50 mL/Hr) IV Continuous <Continuous>  thiamine  Oral Tab/Cap - Peds 100 milliGRAM(s) Oral two times a day  tigecycline IVPB 50 milliGRAM(s) IV Intermittent every 12 hours  tigecycline IVPB          LABS: All Labs Reviewed:                        12.2   14.92 )-----------( 516      ( 17 Jul 2022 06:32 )             40.8     07-17    138  |  101  |  13  ----------------------------<  128<H>  5.0   |  30  |  0.5<L>    Ca    9.2      17 Jul 2022 06:32  Mg     1.8     07-17    TPro  6.5  /  Alb  3.3<L>  /  TBili  0.3  /  DBili  x   /  AST  44<H>  /  ALT  259<H>  /  AlkPhos  303<H>  07-17    PT/INR - ( 17 Jul 2022 06:32 )   PT: 18.30 sec;   INR: 1.60 ratio         PTT - ( 17 Jul 2022 06:32 )  PTT:40.1 sec      Blood Culture:     Radiology: reviewed

## 2022-07-18 LAB
ALBUMIN SERPL ELPH-MCNC: 3 G/DL — LOW (ref 3.5–5.2)
ALP SERPL-CCNC: 274 U/L — HIGH (ref 30–115)
ALT FLD-CCNC: 169 U/L — HIGH (ref 0–41)
ANION GAP SERPL CALC-SCNC: 13 MMOL/L — SIGNIFICANT CHANGE UP (ref 7–14)
AST SERPL-CCNC: 31 U/L — SIGNIFICANT CHANGE UP (ref 0–41)
BASOPHILS # BLD AUTO: 0.07 K/UL — SIGNIFICANT CHANGE UP (ref 0–0.2)
BASOPHILS NFR BLD AUTO: 0.4 % — SIGNIFICANT CHANGE UP (ref 0–1)
BILIRUB SERPL-MCNC: 0.3 MG/DL — SIGNIFICANT CHANGE UP (ref 0.2–1.2)
BUN SERPL-MCNC: 15 MG/DL — SIGNIFICANT CHANGE UP (ref 10–20)
CALCIUM SERPL-MCNC: 9.2 MG/DL — SIGNIFICANT CHANGE UP (ref 8.5–10.1)
CHLORIDE SERPL-SCNC: 97 MMOL/L — LOW (ref 98–110)
CMV DNA CSF QL NAA+PROBE: SIGNIFICANT CHANGE UP
CMV DNA SPEC NAA+PROBE-LOG#: SIGNIFICANT CHANGE UP LOG10IU/ML
CO2 SERPL-SCNC: 26 MMOL/L — SIGNIFICANT CHANGE UP (ref 17–32)
CREAT SERPL-MCNC: 0.6 MG/DL — LOW (ref 0.7–1.5)
D DIMER BLD IA.RAPID-MCNC: 278 NG/ML DDU — HIGH (ref 0–230)
EGFR: 130 ML/MIN/1.73M2 — SIGNIFICANT CHANGE UP
EOSINOPHIL # BLD AUTO: 0.79 K/UL — HIGH (ref 0–0.7)
EOSINOPHIL NFR BLD AUTO: 4.4 % — SIGNIFICANT CHANGE UP (ref 0–8)
GLUCOSE BLDC GLUCOMTR-MCNC: 102 MG/DL — HIGH (ref 70–99)
GLUCOSE BLDC GLUCOMTR-MCNC: 107 MG/DL — HIGH (ref 70–99)
GLUCOSE BLDC GLUCOMTR-MCNC: 83 MG/DL — SIGNIFICANT CHANGE UP (ref 70–99)
GLUCOSE SERPL-MCNC: 108 MG/DL — HIGH (ref 70–99)
HCT VFR BLD CALC: 39 % — SIGNIFICANT CHANGE UP (ref 37–47)
HGB BLD-MCNC: 11.5 G/DL — LOW (ref 12–16)
IMM GRANULOCYTES NFR BLD AUTO: 1 % — HIGH (ref 0.1–0.3)
LYMPHOCYTES # BLD AUTO: 13.9 % — LOW (ref 20.5–51.1)
LYMPHOCYTES # BLD AUTO: 2.46 K/UL — SIGNIFICANT CHANGE UP (ref 1.2–3.4)
MAGNESIUM SERPL-MCNC: 1.8 MG/DL — SIGNIFICANT CHANGE UP (ref 1.8–2.4)
MCHC RBC-ENTMCNC: 23.1 PG — LOW (ref 27–31)
MCHC RBC-ENTMCNC: 29.5 G/DL — LOW (ref 32–37)
MCV RBC AUTO: 78.3 FL — LOW (ref 81–99)
MONOCYTES # BLD AUTO: 1.36 K/UL — HIGH (ref 0.1–0.6)
MONOCYTES NFR BLD AUTO: 7.7 % — SIGNIFICANT CHANGE UP (ref 1.7–9.3)
NEUTROPHILS # BLD AUTO: 12.91 K/UL — HIGH (ref 1.4–6.5)
NEUTROPHILS NFR BLD AUTO: 72.6 % — SIGNIFICANT CHANGE UP (ref 42.2–75.2)
NRBC # BLD: 0 /100 WBCS — SIGNIFICANT CHANGE UP (ref 0–0)
PLATELET # BLD AUTO: 576 K/UL — HIGH (ref 130–400)
POTASSIUM SERPL-MCNC: 5.1 MMOL/L — HIGH (ref 3.5–5)
POTASSIUM SERPL-SCNC: 5.1 MMOL/L — HIGH (ref 3.5–5)
PROT SERPL-MCNC: 6.4 G/DL — SIGNIFICANT CHANGE UP (ref 6–8)
RBC # BLD: 4.98 M/UL — SIGNIFICANT CHANGE UP (ref 4.2–5.4)
RBC # FLD: 17.1 % — HIGH (ref 11.5–14.5)
SODIUM SERPL-SCNC: 136 MMOL/L — SIGNIFICANT CHANGE UP (ref 135–146)
WBC # BLD: 17.76 K/UL — HIGH (ref 4.8–10.8)
WBC # FLD AUTO: 17.76 K/UL — HIGH (ref 4.8–10.8)

## 2022-07-18 PROCEDURE — 99233 SBSQ HOSP IP/OBS HIGH 50: CPT

## 2022-07-18 PROCEDURE — 71045 X-RAY EXAM CHEST 1 VIEW: CPT | Mod: 26

## 2022-07-18 RX ORDER — DILTIAZEM HCL 120 MG
30 CAPSULE, EXT RELEASE 24 HR ORAL EVERY 6 HOURS
Refills: 0 | Status: DISCONTINUED | OUTPATIENT
Start: 2022-07-18 | End: 2022-07-22

## 2022-07-18 RX ORDER — SODIUM CHLORIDE 9 MG/ML
500 INJECTION, SOLUTION INTRAVENOUS ONCE
Refills: 0 | Status: COMPLETED | OUTPATIENT
Start: 2022-07-18 | End: 2022-07-18

## 2022-07-18 RX ADMIN — ROBINUL 2 MILLIGRAM(S): 0.2 INJECTION INTRAMUSCULAR; INTRAVENOUS at 05:30

## 2022-07-18 RX ADMIN — Medication 1 PUFF(S): at 14:41

## 2022-07-18 RX ADMIN — SODIUM CHLORIDE 50 MILLILITER(S): 9 INJECTION INTRAMUSCULAR; INTRAVENOUS; SUBCUTANEOUS at 21:32

## 2022-07-18 RX ADMIN — ALBUTEROL 1 PUFF(S): 90 AEROSOL, METERED ORAL at 08:59

## 2022-07-18 RX ADMIN — CYCLOBENZAPRINE HYDROCHLORIDE 5 MILLIGRAM(S): 10 TABLET, FILM COATED ORAL at 21:27

## 2022-07-18 RX ADMIN — PANTOPRAZOLE SODIUM 40 MILLIGRAM(S): 20 TABLET, DELAYED RELEASE ORAL at 05:29

## 2022-07-18 RX ADMIN — MIDODRINE HYDROCHLORIDE 5 MILLIGRAM(S): 2.5 TABLET ORAL at 21:26

## 2022-07-18 RX ADMIN — SCOPALAMINE 1 PATCH: 1 PATCH, EXTENDED RELEASE TRANSDERMAL at 06:10

## 2022-07-18 RX ADMIN — TIGECYCLINE 105 MILLIGRAM(S): 50 INJECTION, POWDER, LYOPHILIZED, FOR SOLUTION INTRAVENOUS at 05:29

## 2022-07-18 RX ADMIN — MIDODRINE HYDROCHLORIDE 5 MILLIGRAM(S): 2.5 TABLET ORAL at 05:27

## 2022-07-18 RX ADMIN — Medication 10 MILLIGRAM(S): at 13:04

## 2022-07-18 RX ADMIN — MEROPENEM 35 MILLIGRAM(S): 1 INJECTION INTRAVENOUS at 13:14

## 2022-07-18 RX ADMIN — ALBUTEROL 1 PUFF(S): 90 AEROSOL, METERED ORAL at 12:30

## 2022-07-18 RX ADMIN — Medication 100 MILLIGRAM(S): at 05:30

## 2022-07-18 RX ADMIN — Medication 10 MILLIGRAM(S): at 17:34

## 2022-07-18 RX ADMIN — ALBUTEROL 1 PUFF(S): 90 AEROSOL, METERED ORAL at 14:41

## 2022-07-18 RX ADMIN — CYCLOBENZAPRINE HYDROCHLORIDE 5 MILLIGRAM(S): 10 TABLET, FILM COATED ORAL at 13:03

## 2022-07-18 RX ADMIN — Medication 1 PUFF(S): at 20:18

## 2022-07-18 RX ADMIN — PREGABALIN 1000 MICROGRAM(S): 225 CAPSULE ORAL at 13:03

## 2022-07-18 RX ADMIN — TIGECYCLINE 105 MILLIGRAM(S): 50 INJECTION, POWDER, LYOPHILIZED, FOR SOLUTION INTRAVENOUS at 18:16

## 2022-07-18 RX ADMIN — Medication 50 MILLIGRAM(S): at 05:27

## 2022-07-18 RX ADMIN — Medication 300 MILLIGRAM(S): at 13:05

## 2022-07-18 RX ADMIN — SODIUM CHLORIDE 1000 MILLILITER(S): 9 INJECTION, SOLUTION INTRAVENOUS at 11:25

## 2022-07-18 RX ADMIN — Medication 30 MILLIGRAM(S): at 17:33

## 2022-07-18 RX ADMIN — Medication 10 MILLIGRAM(S): at 05:27

## 2022-07-18 RX ADMIN — Medication 100 MILLIGRAM(S): at 17:32

## 2022-07-18 RX ADMIN — Medication 1 PUFF(S): at 08:59

## 2022-07-18 RX ADMIN — CYCLOBENZAPRINE HYDROCHLORIDE 5 MILLIGRAM(S): 10 TABLET, FILM COATED ORAL at 05:29

## 2022-07-18 RX ADMIN — MEROPENEM 35 MILLIGRAM(S): 1 INJECTION INTRAVENOUS at 21:26

## 2022-07-18 RX ADMIN — Medication 10 MILLIGRAM(S): at 21:28

## 2022-07-18 RX ADMIN — MIDODRINE HYDROCHLORIDE 5 MILLIGRAM(S): 2.5 TABLET ORAL at 13:03

## 2022-07-18 RX ADMIN — SCOPALAMINE 1 PATCH: 1 PATCH, EXTENDED RELEASE TRANSDERMAL at 19:56

## 2022-07-18 RX ADMIN — Medication 1 MILLIGRAM(S): at 13:04

## 2022-07-18 RX ADMIN — ROBINUL 2 MILLIGRAM(S): 0.2 INJECTION INTRAMUSCULAR; INTRAVENOUS at 13:04

## 2022-07-18 RX ADMIN — Medication 50 MILLIGRAM(S): at 17:34

## 2022-07-18 RX ADMIN — ROBINUL 2 MILLIGRAM(S): 0.2 INJECTION INTRAMUSCULAR; INTRAVENOUS at 21:31

## 2022-07-18 RX ADMIN — APIXABAN 5 MILLIGRAM(S): 2.5 TABLET, FILM COATED ORAL at 05:28

## 2022-07-18 RX ADMIN — MEROPENEM 35 MILLIGRAM(S): 1 INJECTION INTRAVENOUS at 05:30

## 2022-07-18 RX ADMIN — SODIUM CHLORIDE 50 MILLILITER(S): 9 INJECTION INTRAMUSCULAR; INTRAVENOUS; SUBCUTANEOUS at 07:59

## 2022-07-18 RX ADMIN — ALBUTEROL 1 PUFF(S): 90 AEROSOL, METERED ORAL at 20:18

## 2022-07-18 RX ADMIN — APIXABAN 5 MILLIGRAM(S): 2.5 TABLET, FILM COATED ORAL at 17:32

## 2022-07-18 NOTE — CHART NOTE - NSCHARTNOTEFT_GEN_A_CORE
Registered Dietitian Follow-Up     Patient Profile Reviewed                           Yes [x]   No []     Nutrition History Previously Obtained        Yes [x]  No []       Pertinent Subjective Information:  RD spoke with RN about patient today. Per RN, patient is tolerating current tubefeed regimen with no issues. No s/s of GI distress. Last bowel movement 7/17     Pertinent Medical Interventions:  Patient is 21 yo with PMHx of encephalitis s/p tooth abscess s/p tracheostomy and PEG tube placement, HTN, GERD, and SVC thrombosis, recent admission for COVID19 PNA and pyelonephritis in June 2022. Pt is non-verbal, unable to contribute to hx or ROS.   #Sepsis 2/2 MDR PNA  #Acute on chronic respiratory failure  - pt's first covid test was positive in 5/22, subsequent tests negative, positive again today. ? recurrence? vs persistent infection?  #Elevated LFTs  #Hx of Encephalitis post Tooth Abscess in 10/2021  - s/p Tracheostomy (has an O2 tank per sister but unsure about flow) and G-tube placement  - aspiration precautions, respiratory toilet/suction as per resp  team  #Hypernatremia - resolved  #GERD  #Iron Deficiency Anemia  #History of Wernicke Encephalopathy  #Suspected thiamine deficiency  #suspected folic acid deficiency  * Home med iron replacement, thiamine, B12, and folic acid  - C/w home iron replacement, thiamine, B12, and folic acid  - Monitor for now     Diet order:  Diet, NPO with Tube Feed:   Tube Feeding Modality: Gastrostomy  Jevity 1.2 Gilberto  Total Volume for 24 Hours (mL): 1440  Bolus  Total Volume of Bolus (mL):  480  Tube Feed Frequency: Every 8 hours   Tube Feed Start Time: 11:00  Bolus Feed Rate (mL per Hour): 480   Bolus Feed Duration (in Hours): 1  Bolus   Total Volume per Flush (mL): 450   Frequency: Every 6 Hours (07-15-22 @ 14:44) [Active]    Anthropometrics:  Height (cm): 154.9 (07-13-22 @ 20:36)  Weight (kg): 63.049 (07-13-22 @ 20:36)  BMI (kg/m2): 26.3 (07-13-22 @ 20:36)  IBW: 50 kg    MEDICATIONS  (STANDING):  ALBUTerol    90 MICROgram(s) HFA Inhaler 1 Puff(s) Inhalation every 4 hours  apixaban 5 milliGRAM(s) Oral two times a day  cyanocobalamin 1000 MICROGram(s) Oral daily  cyclobenzaprine Oral Tab/Cap - Peds 5 milliGRAM(s) Oral three times a day  dextrose 50% Injectable 25 Gram(s) IV Push once  diltiazem    Tablet 30 milliGRAM(s) Oral every 6 hours  ferrous    sulfate Liquid 300 milliGRAM(s) Enteral Tube daily  folic acid  Oral Tab/Cap - Peds 1 milliGRAM(s) Oral daily  glycopyrrolate 2 milliGRAM(s) Oral three times a day  ipratropium 17 MICROgram(s) HFA Inhaler 1 Puff(s) Inhalation every 6 hours  meropenem  IVPB 2000 milliGRAM(s) IV Intermittent every 8 hours  metoclopramide 10 milliGRAM(s) Oral Before meals and at bedtime  metoprolol tartrate 50 milliGRAM(s) Oral two times a day  midodrine 5 milliGRAM(s) Oral every 8 hours  pantoprazole    Tablet 40 milliGRAM(s) Oral before breakfast  scopolamine 1 mG/72 Hr(s) Patch 1 Patch Transdermal every 72 hours  sodium chloride 0.9%. 1000 milliLiter(s) (50 mL/Hr) IV Continuous <Continuous>  thiamine  Oral Tab/Cap - Peds 100 milliGRAM(s) Oral two times a day  tigecycline IVPB      tigecycline IVPB 50 milliGRAM(s) IV Intermittent every 12 hours    MEDICATIONS  (PRN):  acetaminophen     Tablet .. 650 milliGRAM(s) Oral every 6 hours PRN Temp greater or equal to 38C (100.4F), Mild Pain (1 - 3)  aluminum hydroxide/magnesium hydroxide/simethicone Suspension 30 milliLiter(s) Oral every 4 hours PRN Dyspepsia  melatonin 3 milliGRAM(s) Oral at bedtime PRN Insomnia  ondansetron Injectable 4 milliGRAM(s) IV Push every 8 hours PRN Nausea and/or Vomiting    Pertinent Labs: 07-18 @ 06:45: Na 136, BUN 15, Cr 0.6<L>, <H>, K+ 5.1<H>, Mg 1.8, Alk Phos 274<H>, ALT/SGPT 169<H>, AST/SGOT 31    Finger Sticks:  POCT Blood Glucose.: 107 mg/dL (07-18 @ 11:16)  POCT Blood Glucose.: 102 mg/dL (07-18 @ 05:57)  POCT Blood Glucose.: 84 mg/dL (07-17 @ 23:39)    Physical Findings:  - Appearance: disoriented x 4, Trach  - GI function: per RN, last Bowel movement 7/17  - Tubes: +PEG, +Trach  - Oral/Mouth cavity: NPO w/ TF via PEG, Trach  - Skin: ecchymosis     Nutrition Requirements:  Weight Used: IBW 59 kg     Estimated Energy Needs    Continue [x]  Adjust []  1476- 1772 kcal/day ( 25-30 marah/kg IBW)     Estimated Protein Needs    Continue [x]  Adjust []  65 - 71 gm/day (1.1 - 1.2 gm/kg IBW)     Estimated Fluid Needs        Continue []  Adjust [x]  1770 - 2065 mL/day (30 - 35 mL/kg IBW)     Nutrient Intake: Patient meeting at least % of estimated nutrient needs     [x] Previous Nutrition Diagnosis:  Imadequate Oral Intake            [x] Ongoing          [] Resolved    Intervention:  Please change tube feed regimen to RD recommendation from initial assessment to better meet patient's estimated needs without overfeeding:  Jevity 1.2, change to @345 mL bolus Q6hr (4x) provides total formula 1380mL, 1656kcal, 76g protein, 1118mL free water     Recommend free water flushes to be decreased to 240 mL q6hrs (4x) = 960 mL + 1118 mL free water = 2078 mL Fluid as Na Currently 136 on 7/18    Current recommendation exceeding estimated nutrient needs at this time.    Goal/Expected Outcome:   Patient to continue to tolerate EN and meet at least % of estimated nutrient needs     Indicator/Monitoring:   RD to monitor, EN tolerance energy intake, weight, labs, skin status, NFPF     Recommendation:  1) Please change diet order to above TF recommendations  2) Obtain New Weight when possible  3) Continue all current supplementation (Cyanocobalamin, ferrous sulfate, folic acid)  4) Continue to monitor for s/s of GI distress  5) Maintain Aspiration Precautions    Patient at high nutrition risk, RD to f/u in 4 days or PRN

## 2022-07-18 NOTE — PROGRESS NOTE ADULT - ATTENDING COMMENTS
23 y/o F with a PMHx of encephalitis s/p tooth abscess s/p tracheostomy and PEG tube placement, HTN, GERD, and SVC thrombosis who was brought to ED from [place] for fevers. Admitted for tx for sepsis due to PNA.    # Sepsis 2/2 multi drug resistant pneumonia, sputum growing Klebsiella pneumoniae carbapenem resistant  # Acute on chronic respiratory failure 2/2 MDR pneumonia  # h/o encephalitis from tooth abscess  s/p trach and PEG, bedbound, non-verbal   # Recent COVID-19 infection, MDR colonization on respiratory tract, inability to clear secretion  - Changed IV antibiotic from cefiderocol to Meropenem, Tigecycline (given the LFTs elevation)    - called micro to add on sensitivities for cefiderocol to Kleb CRE as per ID recommendation  - c/w oxygen support via trach  - aspiration precaution, dietary evaluation for feeding regimen to avoid aspiration, may consider low volume and frequent feed  - respiratory toilet/suction as per respiratory team. Scopolamine patch    #Worsening LFTs (as of 7/14):   Might be 2/2 Abx. Changed as above. Monitor. Improving  RUQ US noted  Check  EBV CMV herpes hepatitis E serologies per hepatology.     # Hypernatremia  Na 151 (7/13) > 141 (7/14) > 147 (7/15) > Improved  Increased FW flushes in PEG to 450 Q6H    - monitor BMP    #Tachycardia:   120-150s all day. -110s.   c/w Metoprolol T 50 BID. Added Diltiazem IR 30 mg Q6H ATC (hold if SBP < 100 )   Try NS @ 50 (s/p 500cc bolus 7/18)    # h/o SVC Thrombosis and Embolism   - c/w Eliquis 5 mg PO BID    # GERD  - c/w Protonix 40 mg PO daily    # Iron Deficiency Anemia, stable   - monitor CBC  - c/w home iron replacement, thiamine, B12, and folic acid    # DVT prophlaxis   - on eliquis 5 mg PO BID    #Functional Quadruparesis  s/p Trach/PEG    Dispo: Grave prognosis.

## 2022-07-18 NOTE — PROGRESS NOTE ADULT - ASSESSMENT
23 y/o F with a PMHx of encephalitis s/p tooth abscess s/p tracheostomy and PEG tube placement, HTN, GERD, and SVC thrombosis , recent admission for Covid19 PNA and pyelonephritis in June 2022, currently being treated with IV Vancomycin, cefepime for PNA since 7/8, BIBEMS from [facility] for fever this AM. Per NH notes, pt spiked temp of 102.6 today and was sent to ED.     IMPRESSION;   #Leukocytosis & Tachycardia    CXR no PNA   #Transaminitis, suspect secondary to cefiderocol  #Sepsis on admission T>101 P>90 WBC 14 secondary to suspected GNR PNA , also COVID19 +     7/10 sputum     Numerous Klebsiella pneumoniae (Carbapenem Resistant)    7/10 BCX NGTD     7/9 BCX NGTD     Procalcitonin, Serum: 0.11 ng/mL (07-09-22 @ 13:32)    UA without significant pyuria     CT Airspace consolidations predominantly within the left upper and lower lobes with enlarged left hilar lymph nodes, most consistent with pneumonia.    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     5/29-5/31 BCX NGTD     5/28 BCX  Klebsiella pneumoniae (Carbapenem Resistant)    5/27 BCx Klebsiella pneumoniae (Carbapenem Resistant)    5/27 UCx Klebsiella pneumoniae (Carbapenem Resistant)    Pyuria  #Trach/PEG  Creatinine, Serum: <0.5 mg/dL (07.10.22 @ 06:01)      RECOMMENDATIONS;  - Sally 2g q8h IV over extended infusion of 4 hours and tigecycline  50mg q12h IV (need to monitor Alk Ph on Tigecycline) end 7/19 to complete 10 days   - Off loading to prevent pressure sores and preventive measures to avoid aspiration   - Colonized with MRDO, GOC , grave prognosis. No real further ABX options  - Trend WBC    If any questions, please call or send a message on [facility] Teams  Please continue to update ID with any pertinent new laboratory or radiographic findings  Spectra [phone]

## 2022-07-18 NOTE — PROGRESS NOTE ADULT - ASSESSMENT
21 y/o F with a PMHx of encephalitis s/p tooth abscess s/p tracheostomy and PEG tube placement, HTN, GERD, and SVC thrombosis , recent admission for Covid19 PNA and pyelonephritis in June 2022, currently being treated with IV Vancomycin, cefepime for PNA since 7/8, BIBEMS from [facility] for fever this AM. Per NH notes, pt spiked temp of 102.6 today and was sent to ED. Pt is non-verbal, unable to contribute to history or ROS. pt has sputum around the trach area.     # sepsis 2/2 MDR PNA  # Acute on chronic respiratory failure  - pt's first covid test was positive in 5/22, subsequent tests negative, positive again today. ? recurrence? vs persistent infection?  - hx of MDR Klebsiella bacteremia  - in the ED,pt's VS T Max: 37.9  BP 89/49  - labs done showed WBC 11k, creat 0.5, CRP 73,covid is positive  - CT Angio Chest PE Protocol w/ IV Cont > NO PE, consistent with pneumonia  - pt was started on cefepime and vancomycin 7/8 at [facility]  - previous cultures grew Klebsiella, proteus ESBL, MDRO, CRE  - sputum cx with carbapenem resistent Klebsiella  - ID recs: Cefiderocol 2g q8h started   - blood cultures NGTD  - c/w oxygen support via trach  - 07/15: switched to severo + tiga due to increasing LFTs    # Elevated LFTs  - likely due to Cefiderocol  - RUQ U/S WNL, hep panel NR  - hepatology recs: trend LFT and INR, check EBV, CMV, herpes, Avoid hepatotoxic medications  - as per ID: Cefiderocol d/c'ed   - 7/15 Tigecycline and Meropenem started    # Hypernatremia  - Na 151 > 141 >147  - D5 d/c'ed  - monitor BMP  - increase free water in peg feeds    #Persistent sinus tachycardia  - pt was persistently tachycardic on previous admission baseline 100-120s  - EP eval, HR <130 acceptable  - on admission pt was tachycardic to 150-160s, now at baseline 90-120s s/p ABx  - c/w Metoprolol 50 BID    #HO SVC Thrombosis and Embolism   - C/w Eliquis 5 mg PO BID    #History of Encephalitis post Tooth Abscess in 10/2021   - s/p Tracheostomy (has an O2 tank per sister but unsure about flow) and G-tube placement  - aspiration precautions, respiratory toilet/suction as per resp team     #GERD  * Home med Omeprazole 40mg QD  - C/w Protonix 40 mg PO daily    #Iron Deficiency Anemia   #History of Wernicke Encephalopathy  #Suspected thiamine deficiency  #Suspected folic acid deficiency  * Home med iron replacement, thiamine, B12, and folic acid  - C/w home iron replacement, thiamine, B12, and folic acid  - Monitor for now    #DVT ppx: Eliquis 5 mg PO BID  #GI ppx: Protonix 40 mg PO daily  #Diet: NPO w G-tube feeds  #Activity: Functional quadriplegic  #Dispo: From [facility];  #Code status: Full code -- has MOLST confirming 23 y/o F with a PMHx of encephalitis s/p tooth abscess s/p tracheostomy and PEG tube placement, HTN, GERD, and SVC thrombosis , recent admission for Covid19 PNA and pyelonephritis in June 2022, currently being treated with IV Vancomycin, cefepime for PNA since 7/8, BIBEMS from [facility] for fever this AM. Per NH notes, pt spiked temp of 102.6 today and was sent to ED. Pt is non-verbal, unable to contribute to history or ROS. pt has sputum around the trach area.     # sepsis 2/2 MDR PNA  # Acute on chronic respiratory failure  - pt's first covid test was positive in 5/22, subsequent tests negative, positive again today. ? recurrence? vs persistent infection?  - hx of MDR Klebsiella bacteremia  - in the ED,pt's VS T Max: 37.9  BP 89/49  - labs done showed WBC 11k, creat 0.5, CRP 73,covid is positive  - CT Angio Chest PE Protocol w/ IV Cont > NO PE, consistent with pneumonia  - pt was started on cefepime and vancomycin 7/8 at [facility]  - previous cultures grew Klebsiella, proteus ESBL, MDRO, CRE  - sputum cx with carbapenem resistent Klebsiella  - ID recs: Cefiderocol 2g q8h started   - blood cultures NGTD  - c/w oxygen support via trach  - 07/15: switched to severo + tiga due to increasing LFTs  - 07/18: WBC increasing (14 to 17), chest xray ordered, keep same Ax for now as per ID, patient hemodynamically stable with no fever    # Elevated LFTs  - likely due to Cefiderocol  - RUQ U/S WNL, hep panel NR  - hepatology recs: trend LFT and INR, check EBV, CMV, herpes, Avoid hepatotoxic medications  - as per ID: Cefiderocol d/c'ed   - 7/15 Tigecycline and Meropenem started    # Hypernatremia- resolved    #Persistent sinus tachycardia  - pt was persistently tachycardic on previous admission baseline 100-120s  - EP eval, HR <130 acceptable  - on admission pt was tachycardic to 150-160s, now at baseline 90-120s s/p ABx  - c/w Metoprolol 50 BID  - 07/18: HR going up to 150 =>d-dimer ordered to assess for DVT, fluid bolus to be given (patient on dry side), might add diltiazem IR 30mg Q6H if still tachycardic    #HO SVC Thrombosis and Embolism   - C/w Eliquis 5 mg PO BID    #History of Encephalitis post Tooth Abscess in 10/2021   - s/p Tracheostomy (has an O2 tank per sister but unsure about flow) and G-tube placement  - aspiration precautions, respiratory toilet/suction as per resp team     #GERD  * Home med Omeprazole 40mg QD  - C/w Protonix 40 mg PO daily    #Iron Deficiency Anemia   #History of Wernicke Encephalopathy  #Suspected thiamine deficiency  #Suspected folic acid deficiency  * Home med iron replacement, thiamine, B12, and folic acid  - C/w home iron replacement, thiamine, B12, and folic acid  - Monitor for now    #DVT ppx: Eliquis 5 mg PO BID  #GI ppx: Protonix 40 mg PO daily  #Diet: NPO w G-tube feeds  #Activity: Functional quadriplegic  #Dispo: From [facility];  #Code status: Full code -- has MOLST confirming 21 y/o F with a PMHx of encephalitis s/p tooth abscess s/p tracheostomy and PEG tube placement, HTN, GERD, and SVC thrombosis , recent admission for Covid19 PNA and pyelonephritis in June 2022, currently being treated with IV Vancomycin, cefepime for PNA since 7/8, BIBEMS from [facility] for fever this AM. Per NH notes, pt spiked temp of 102.6 today and was sent to ED. Pt is non-verbal, unable to contribute to history or ROS. pt has sputum around the trach area.     # sepsis 2/2 MDR PNA  # Acute on chronic respiratory failure  - pt's first covid test was positive in 5/22, subsequent tests negative, positive again today. ? recurrence? vs persistent infection?  - hx of MDR Klebsiella bacteremia  - in the ED,pt's VS T Max: 37.9  BP 89/49  - labs done showed WBC 11k, creat 0.5, CRP 73,covid is positive  - CT Angio Chest PE Protocol w/ IV Cont > NO PE, consistent with pneumonia  - pt was started on cefepime and vancomycin 7/8 at [facility]  - previous cultures grew Klebsiella, proteus ESBL, MDRO, CRE  - sputum cx with carbapenem resistent Klebsiella  - ID recs: Cefiderocol 2g q8h started   - blood cultures NGTD  - c/w oxygen support via trach  - 07/15: switched to severo + tiga due to increasing LFTs  - 07/18: WBC increasing (14 to 17), chest xray ordered, keep same Ax for now as per ID, patient hemodynamically stable with no fever    # Elevated LFTs  - likely due to Cefiderocol  - RUQ U/S WNL, hep panel NR  - hepatology recs: trend LFT and INR, check EBV, CMV, herpes, Avoid hepatotoxic medications  - as per ID: Cefiderocol d/c'ed   - 7/15 Tigecycline and Meropenem started      #Persistent sinus tachycardia  - pt was persistently tachycardic on previous admission baseline 100-120s  - EP eval, HR <130 acceptable  - on admission pt was tachycardic to 150-160s, now at baseline 90-120s s/p ABx  - c/w Metoprolol 50 BID  - 07/18: HR going up to 150 =>d-dimer ordered to assess for DVT, fluid bolus to be given to r/o intravascular volume depletion as a cause (patient on dry side), might add diltiazem IR 30mg Q6H if still tachycardic    #HO SVC Thrombosis and Embolism   - C/w Eliquis 5 mg PO BID    #History of Encephalitis post Tooth Abscess in 10/2021   - s/p Tracheostomy (has an O2 tank per sister but unsure about flow) and G-tube placement  - aspiration precautions, respiratory toilet/suction as per resp team    # Hypernatremia- resolved     #GERD  * Home med Omeprazole 40mg QD  - C/w Protonix 40 mg PO daily    #Iron Deficiency Anemia   #History of Wernicke Encephalopathy  #Suspected thiamine deficiency  #Suspected folic acid deficiency  * Home med iron replacement, thiamine, B12, and folic acid  - C/w home iron replacement, thiamine, B12, and folic acid  - Monitor for now    #DVT ppx: Eliquis 5 mg PO BID  #GI ppx: Protonix 40 mg PO daily  #Diet: NPO w G-tube feeds  #Activity: Functional quadriplegic  #Dispo: From [facility];  #Code status: Full code -- has MOLST confirming

## 2022-07-18 NOTE — PROGRESS NOTE ADULT - SUBJECTIVE AND OBJECTIVE BOX
CONSUELO CARO 22y Female  MRN#: [id number]   Hospital Day: 9d    SUBJECTIVE  Patient is a 22y old Female who presents with a chief complaint of fever (18 Jul 2022 09:27)  Currently admitted to medicine with the primary diagnosis of 2019 novel coronavirus disease (COVID-19)      INTERVAL HPI AND OVERNIGHT EVENTS:  Patient was examined and seen at bedside. This morning she is resting comfortably in bed and reports no issues or overnight events.    OBJECTIVE  PAST MEDICAL & SURGICAL HISTORY  Encephalitis    H/O tracheostomy    PEG (percutaneous endoscopic gastrostomy) status    Acute deep vein thrombosis (DVT) of superior vena cava    HTN (hypertension)    GERD (gastroesophageal reflux disease)      ALLERGIES:  Allergy Status Unknown    MEDICATIONS:  STANDING MEDICATIONS  ALBUTerol    90 MICROgram(s) HFA Inhaler 1 Puff(s) Inhalation every 4 hours  apixaban 5 milliGRAM(s) Oral two times a day  cyanocobalamin 1000 MICROGram(s) Oral daily  cyclobenzaprine Oral Tab/Cap - Peds 5 milliGRAM(s) Oral three times a day  dextrose 50% Injectable 25 Gram(s) IV Push once  diltiazem    Tablet 30 milliGRAM(s) Oral every 6 hours  ferrous    sulfate Liquid 300 milliGRAM(s) Enteral Tube daily  folic acid  Oral Tab/Cap - Peds 1 milliGRAM(s) Oral daily  glycopyrrolate 2 milliGRAM(s) Oral three times a day  ipratropium 17 MICROgram(s) HFA Inhaler 1 Puff(s) Inhalation every 6 hours  lactated ringers Bolus 500 milliLiter(s) IV Bolus once  meropenem  IVPB 2000 milliGRAM(s) IV Intermittent every 8 hours  metoclopramide 10 milliGRAM(s) Oral Before meals and at bedtime  metoprolol tartrate 50 milliGRAM(s) Oral two times a day  midodrine 5 milliGRAM(s) Oral every 8 hours  pantoprazole    Tablet 40 milliGRAM(s) Oral before breakfast  scopolamine 1 mG/72 Hr(s) Patch 1 Patch Transdermal every 72 hours  sodium chloride 0.9%. 1000 milliLiter(s) IV Continuous <Continuous>  thiamine  Oral Tab/Cap - Peds 100 milliGRAM(s) Oral two times a day  tigecycline IVPB      tigecycline IVPB 50 milliGRAM(s) IV Intermittent every 12 hours    PRN MEDICATIONS  acetaminophen     Tablet .. 650 milliGRAM(s) Oral every 6 hours PRN  aluminum hydroxide/magnesium hydroxide/simethicone Suspension 30 milliLiter(s) Oral every 4 hours PRN  melatonin 3 milliGRAM(s) Oral at bedtime PRN  ondansetron Injectable 4 milliGRAM(s) IV Push every 8 hours PRN      VITAL SIGNS: Last 24 Hours  T(C): 37.1 (18 Jul 2022 04:48), Max: 37.7 (17 Jul 2022 12:26)  T(F): 98.8 (18 Jul 2022 04:48), Max: 99.8 (17 Jul 2022 12:26)  HR: 150 (18 Jul 2022 04:48) (129 - 150)  BP: 109/56 (18 Jul 2022 04:48) (109/56 - 111/56)  BP(mean): --  RR: 18 (18 Jul 2022 04:48) (18 - 22)  SpO2: 98% (18 Jul 2022 04:48) (98% - 98%)    LABS:                        11.5   17.76 )-----------( 576      ( 18 Jul 2022 06:45 )             39.0     07-18    136  |  97<L>  |  15  ----------------------------<  108<H>  5.1<H>   |  26  |  0.6<L>    Ca    9.2      18 Jul 2022 06:45  Mg     1.8     07-18    TPro  6.4  /  Alb  3.0<L>  /  TBili  0.3  /  DBili  x   /  AST  31  /  ALT  169<H>  /  AlkPhos  274<H>  07-18    PT/INR - ( 17 Jul 2022 06:32 )   PT: 18.30 sec;   INR: 1.60 ratio         PTT - ( 17 Jul 2022 06:32 )  PTT:40.1 sec              RADIOLOGY:      PHYSICAL EXAM:  GENERAL: Pt has Trach and PEG, does not communicate so assessment limited  CHEST/LUNG: Equal air entry bilaterally; no wheezing, bilateral rhonchi heard  HEART: Regular rate and rhythm; No murmurs, rubs, or gallops  ABDOMEN: Soft, patient does not grimace on palpation, Nondistended; Bowel sounds present  EXTREMITIES:  2+ Peripheral Pulses, No clubbing, cyanosis, or edema

## 2022-07-18 NOTE — PROGRESS NOTE ADULT - SUBJECTIVE AND OBJECTIVE BOX
CONSUELO CARO  22y, Female  Allergy: Allergy Status Unknown      LOS  9d    CHIEF COMPLAINT: fever (18 Jul 2022 09:27)      INTERVAL EVENTS/HPI  - T(F): , Max: 98.9 (07-18-22 @ 12:11)  - WBC Count: 17.76 (07-18-22 @ 06:45) uptrending   WBC Count: 14.92 (07-17-22 @ 06:32)     - Creatinine, Serum: 0.6 (07-18-22 @ 06:45)  Creatinine, Serum: 0.5 (07-17-22 @ 06:32)     -   - D-Dimer Assay, Quantitative: 278 ng/mL DDU (07-18-22 @ 12:10)    -     ROS  cannot obtain secondary to patient's sedation and/or mental status    VITALS:  T(F): 98.9, Max: 98.9 (07-18-22 @ 12:11)  HR: 120  BP: 99/58  RR: 20Vital Signs Last 24 Hrs  T(C): 37.2 (18 Jul 2022 12:11), Max: 37.2 (18 Jul 2022 12:11)  T(F): 98.9 (18 Jul 2022 12:11), Max: 98.9 (18 Jul 2022 12:11)  HR: 120 (18 Jul 2022 12:11) (120 - 150)  BP: 99/58 (18 Jul 2022 12:11) (99/58 - 111/55)  BP(mean): --  RR: 20 (18 Jul 2022 12:11) (18 - 22)  SpO2: 96% (18 Jul 2022 12:11) (96% - 98%)    Parameters below as of 17 Jul 2022 19:55  Patient On (Oxygen Delivery Method): T-piece    O2 Concentration (%): 35    PHYSICAL EXAM:  Gen: trach/ vent  CV: RRR  Lungs: Decreased BS at bases  Abd: Soft  Neuro: does not follow commands  Skin: no rash   Lines clean, no phlebitis   FH: Non-contributory  Social Hx: Non-contributory    TESTS & MEASUREMENTS:                        11.5   17.76 )-----------( 576      ( 18 Jul 2022 06:45 )             39.0     07-18    136  |  97<L>  |  15  ----------------------------<  108<H>  5.1<H>   |  26  |  0.6<L>    Ca    9.2      18 Jul 2022 06:45  Mg     1.8     07-18    TPro  6.4  /  Alb  3.0<L>  /  TBili  0.3  /  DBili  x   /  AST  31  /  ALT  169<H>  /  AlkPhos  274<H>  07-18      LIVER FUNCTIONS - ( 18 Jul 2022 06:45 )  Alb: 3.0 g/dL / Pro: 6.4 g/dL / ALK PHOS: 274 U/L / ALT: 169 U/L / AST: 31 U/L / GGT: x               Culture - Sputum (collected 07-10-22 @ 17:14)  Source: Trach Asp Tracheal Aspirate  Gram Stain (07-11-22 @ 07:31):    Few polymorphonuclear leukocytes per low power field    Few Squamous epithelial cells per low power field    Moderate Gram Negative Rods seen per oil power field    Moderate Gram Positive Rods seen per oil power field    Moderate Gram positive cocci in pairs seen per oil power field  Final Report (07-12-22 @ 16:06):    Numerous Klebsiella pneumoniae (Carbapenem Resistant)    Normal Respiratory Trinidad absent  Organism: Klepne MDRO  Klepne MDRO (07-14-22 @ 16:42)  Organism: Klepne MDRO (07-14-22 @ 16:42)      -  Cefiderocol: S      Method Type: KB  Organism: Klepne MDRO (07-14-22 @ 16:42)      -  Amikacin: R >32      -  Amoxicillin/Clavulanic Acid: R >16/8      -  Ampicillin: R >16 These ampicillin results predict results for amoxicillin      -  Ampicillin/Sulbactam: R >16/8 Enterobacter, Klebsiella aerogenes, Citrobacter, and Serratia may develop resistance during prolonged therapy (3-4 days)      -  Aztreonam: R >16      -  Cefazolin: R >16 Enterobacter, Klebsiella aerogenes, Citrobacter, and Serratia may develop resistance during prolonged therapy (3-4 days)      -  Cefepime: R >16      -  Cefoxitin: R >16      -  Ceftazidime/Avibactam: R >16      -  Ceftolozane/tazobactam: R >8      -  Ceftriaxone: R >32 Enterobacter, Klebsiella aerogenes, Citrobacter, and Serratia may develop resistance during prolonged therapy      -  Ciprofloxacin: R >2      -  Ertapenem: R >1      -  Gentamicin: R >8      -  Imipenem: R >8      -  Levofloxacin: R >4      -  Meropenem: R >8      -  Minocycline: S <=4      -  Piperacillin/Tazobactam: R >64      -  Tetra/Doxy: S <=4      -  Tigecycline: S <=2      -  Tobramycin: R >8      -  Trimethoprim/Sulfamethoxazole: R >2/38      Method Type: HERB    Culture - Blood (collected 07-10-22 @ 06:01)  Source: .Blood None  Final Report (07-15-22 @ 13:01):    No Growth Final    Culture - Blood (collected 07-09-22 @ 13:32)  Source: .Blood Blood-Peripheral  Final Report (07-15-22 @ 02:00):    No Growth Final    Culture - Blood (collected 07-09-22 @ 13:32)  Source: .Blood Blood-Peripheral  Final Report (07-15-22 @ 02:00):    No Growth Final            INFECTIOUS DISEASES TESTING  COVID-19 PCR: Detected (07-17-22 @ 09:00)  MRSA PCR Result.: Negative (07-15-22 @ 02:30)  MRSA PCR Result.: Negative (07-11-22 @ 09:40)  Procalcitonin, Serum: 0.07 (07-10-22 @ 06:01)  COVID-19 PCR: Detected (07-09-22 @ 14:28)  Procalcitonin, Serum: 0.11 (07-09-22 @ 13:32)  COVID-19 PCR: NotDetec (06-08-22 @ 14:00)  COVID-19 PCR: NotDetec (06-05-22 @ 04:30)  Rapid RVP Result: NotDetec (05-27-22 @ 16:07)  Procalcitonin, Serum: 12.30 (05-27-22 @ 11:39)  COVID-19 PCR: NotDetec (05-24-22 @ 07:59)  MRSA PCR Result.: Negative (05-17-22 @ 10:10)  Procalcitonin, Serum: 0.03 (05-17-22 @ 07:37)  Rapid RVP Result: Detected (05-16-22 @ 12:09)      INFLAMMATORY MARKERS  C-Reactive Protein, Serum: 73.5 mg/L (07-09-22 @ 13:32)  Sedimentation Rate, Erythrocyte: 46 mm/Hr (05-27-22 @ 11:39)  Sedimentation Rate, Erythrocyte: 85 mm/Hr (05-17-22 @ 07:37)  C-Reactive Protein, Serum: 61 mg/L (05-17-22 @ 07:37)      RADIOLOGY & ADDITIONAL TESTS:  I have personally reviewed the last available Chest xray  CXR      CT      CARDIOLOGY TESTING  12 Lead ECG:   Ventricular Rate 134 BPM    Atrial Rate 134 BPM    P-R Interval 130 ms    QRS Duration 62 ms    Q-T Interval 306 ms    QTC Calculation(Bazett) 456 ms    P Axis 77 degrees    R Axis 52 degrees    T Axis 59 degrees    Diagnosis Line Sinus tachycardia  Possible Septal infarct , age undetermined  Abnormal ECG    Confirmed by DEYANIRA BALL MDFIM ([id number]) on 7/17/2022 11:07:44 PM (07-17-22 @ 11:57)  12 Lead ECG:   Ventricular Rate 128 BPM    Atrial Rate 128 BPM    P-R Interval 112 ms    QRS Duration 66 ms    Q-T Interval 308 ms    QTC Calculation(Bazett) 449 ms    P Axis 78 degrees    R Axis 98 degrees    T Axis 38 degrees    Diagnosis Line Sinus tachycardia  Rightward axis  Borderline ECG    Confirmed by Brett Obrien ([id number]) on 7/9/2022 1:59:41 PM (07-09-22 @ 13:35)      MEDICATIONS  ALBUTerol    90 MICROgram(s) HFA Inhaler 1  apixaban 5  cyanocobalamin 1000  cyclobenzaprine Oral Tab/Cap - Peds 5  dextrose 50% Injectable 25  diltiazem    Tablet 30  ferrous    sulfate Liquid 300  folic acid  Oral Tab/Cap - Peds 1  glycopyrrolate 2  ipratropium 17 MICROgram(s) HFA Inhaler 1  meropenem  IVPB 2000  metoclopramide 10  metoprolol tartrate 50  midodrine 5  pantoprazole    Tablet 40  scopolamine 1 mG/72 Hr(s) Patch 1  sodium chloride 0.9%. 1000  thiamine  Oral Tab/Cap - Peds 100  tigecycline IVPB   tigecycline IVPB 50      WEIGHT  Weight (kg): 63.049 (07-13-22 @ 20:36)  Creatinine, Serum: 0.6 mg/dL (07-18-22 @ 06:45)      ANTIBIOTICS:  meropenem  IVPB 2000 milliGRAM(s) IV Intermittent every 8 hours  tigecycline IVPB      tigecycline IVPB 50 milliGRAM(s) IV Intermittent every 12 hours      All available historical records have been reviewed

## 2022-07-19 LAB
ALBUMIN SERPL ELPH-MCNC: 2.8 G/DL — LOW (ref 3.5–5.2)
ALP SERPL-CCNC: 228 U/L — HIGH (ref 30–115)
ALT FLD-CCNC: 110 U/L — HIGH (ref 0–41)
ANION GAP SERPL CALC-SCNC: 9 MMOL/L — SIGNIFICANT CHANGE UP (ref 7–14)
AST SERPL-CCNC: 21 U/L — SIGNIFICANT CHANGE UP (ref 0–41)
BASOPHILS # BLD AUTO: 0.05 K/UL — SIGNIFICANT CHANGE UP (ref 0–0.2)
BASOPHILS NFR BLD AUTO: 0.3 % — SIGNIFICANT CHANGE UP (ref 0–1)
BILIRUB SERPL-MCNC: <0.2 MG/DL — SIGNIFICANT CHANGE UP (ref 0.2–1.2)
BUN SERPL-MCNC: 14 MG/DL — SIGNIFICANT CHANGE UP (ref 10–20)
CALCIUM SERPL-MCNC: 8.6 MG/DL — SIGNIFICANT CHANGE UP (ref 8.5–10.1)
CHLORIDE SERPL-SCNC: 98 MMOL/L — SIGNIFICANT CHANGE UP (ref 98–110)
CO2 SERPL-SCNC: 27 MMOL/L — SIGNIFICANT CHANGE UP (ref 17–32)
CREAT SERPL-MCNC: <0.5 MG/DL — LOW (ref 0.7–1.5)
EGFR: 143 ML/MIN/1.73M2 — SIGNIFICANT CHANGE UP
EOSINOPHIL # BLD AUTO: 0.48 K/UL — SIGNIFICANT CHANGE UP (ref 0–0.7)
EOSINOPHIL NFR BLD AUTO: 2.8 % — SIGNIFICANT CHANGE UP (ref 0–8)
GLUCOSE BLDC GLUCOMTR-MCNC: 102 MG/DL — HIGH (ref 70–99)
GLUCOSE BLDC GLUCOMTR-MCNC: 110 MG/DL — HIGH (ref 70–99)
GLUCOSE BLDC GLUCOMTR-MCNC: 112 MG/DL — HIGH (ref 70–99)
GLUCOSE SERPL-MCNC: 111 MG/DL — HIGH (ref 70–99)
HCT VFR BLD CALC: 37.1 % — SIGNIFICANT CHANGE UP (ref 37–47)
HGB BLD-MCNC: 11.4 G/DL — LOW (ref 12–16)
IMM GRANULOCYTES NFR BLD AUTO: 0.6 % — HIGH (ref 0.1–0.3)
LYMPHOCYTES # BLD AUTO: 1.81 K/UL — SIGNIFICANT CHANGE UP (ref 1.2–3.4)
LYMPHOCYTES # BLD AUTO: 10.4 % — LOW (ref 20.5–51.1)
MCHC RBC-ENTMCNC: 23.8 PG — LOW (ref 27–31)
MCHC RBC-ENTMCNC: 30.7 G/DL — LOW (ref 32–37)
MCV RBC AUTO: 77.6 FL — LOW (ref 81–99)
MONOCYTES # BLD AUTO: 1.22 K/UL — HIGH (ref 0.1–0.6)
MONOCYTES NFR BLD AUTO: 7 % — SIGNIFICANT CHANGE UP (ref 1.7–9.3)
NEUTROPHILS # BLD AUTO: 13.71 K/UL — HIGH (ref 1.4–6.5)
NEUTROPHILS NFR BLD AUTO: 78.9 % — HIGH (ref 42.2–75.2)
NRBC # BLD: 0 /100 WBCS — SIGNIFICANT CHANGE UP (ref 0–0)
PLATELET # BLD AUTO: 518 K/UL — HIGH (ref 130–400)
POTASSIUM SERPL-MCNC: 4.7 MMOL/L — SIGNIFICANT CHANGE UP (ref 3.5–5)
POTASSIUM SERPL-SCNC: 4.7 MMOL/L — SIGNIFICANT CHANGE UP (ref 3.5–5)
PROT SERPL-MCNC: 6.1 G/DL — SIGNIFICANT CHANGE UP (ref 6–8)
RBC # BLD: 4.78 M/UL — SIGNIFICANT CHANGE UP (ref 4.2–5.4)
RBC # FLD: 17 % — HIGH (ref 11.5–14.5)
SODIUM SERPL-SCNC: 134 MMOL/L — LOW (ref 135–146)
WBC # BLD: 17.37 K/UL — HIGH (ref 4.8–10.8)
WBC # FLD AUTO: 17.37 K/UL — HIGH (ref 4.8–10.8)

## 2022-07-19 PROCEDURE — 99233 SBSQ HOSP IP/OBS HIGH 50: CPT

## 2022-07-19 RX ORDER — SODIUM CHLORIDE 9 MG/ML
500 INJECTION, SOLUTION INTRAVENOUS ONCE
Refills: 0 | Status: COMPLETED | OUTPATIENT
Start: 2022-07-19 | End: 2022-07-19

## 2022-07-19 RX ADMIN — ALBUTEROL 1 PUFF(S): 90 AEROSOL, METERED ORAL at 05:08

## 2022-07-19 RX ADMIN — Medication 300 MILLIGRAM(S): at 11:40

## 2022-07-19 RX ADMIN — Medication 1 PUFF(S): at 20:08

## 2022-07-19 RX ADMIN — TIGECYCLINE 105 MILLIGRAM(S): 50 INJECTION, POWDER, LYOPHILIZED, FOR SOLUTION INTRAVENOUS at 17:45

## 2022-07-19 RX ADMIN — Medication 10 MILLIGRAM(S): at 11:40

## 2022-07-19 RX ADMIN — APIXABAN 5 MILLIGRAM(S): 2.5 TABLET, FILM COATED ORAL at 17:45

## 2022-07-19 RX ADMIN — Medication 50 MILLIGRAM(S): at 05:06

## 2022-07-19 RX ADMIN — CYCLOBENZAPRINE HYDROCHLORIDE 5 MILLIGRAM(S): 10 TABLET, FILM COATED ORAL at 21:57

## 2022-07-19 RX ADMIN — ALBUTEROL 1 PUFF(S): 90 AEROSOL, METERED ORAL at 13:20

## 2022-07-19 RX ADMIN — MEROPENEM 35 MILLIGRAM(S): 1 INJECTION INTRAVENOUS at 13:39

## 2022-07-19 RX ADMIN — SODIUM CHLORIDE 500 MILLILITER(S): 9 INJECTION, SOLUTION INTRAVENOUS at 09:41

## 2022-07-19 RX ADMIN — ALBUTEROL 1 PUFF(S): 90 AEROSOL, METERED ORAL at 15:55

## 2022-07-19 RX ADMIN — ROBINUL 2 MILLIGRAM(S): 0.2 INJECTION INTRAMUSCULAR; INTRAVENOUS at 21:51

## 2022-07-19 RX ADMIN — CYCLOBENZAPRINE HYDROCHLORIDE 5 MILLIGRAM(S): 10 TABLET, FILM COATED ORAL at 05:07

## 2022-07-19 RX ADMIN — Medication 1 PUFF(S): at 08:19

## 2022-07-19 RX ADMIN — PREGABALIN 1000 MICROGRAM(S): 225 CAPSULE ORAL at 11:40

## 2022-07-19 RX ADMIN — MEROPENEM 35 MILLIGRAM(S): 1 INJECTION INTRAVENOUS at 21:58

## 2022-07-19 RX ADMIN — Medication 50 MILLIGRAM(S): at 17:45

## 2022-07-19 RX ADMIN — Medication 1 PUFF(S): at 15:55

## 2022-07-19 RX ADMIN — APIXABAN 5 MILLIGRAM(S): 2.5 TABLET, FILM COATED ORAL at 05:06

## 2022-07-19 RX ADMIN — CYCLOBENZAPRINE HYDROCHLORIDE 5 MILLIGRAM(S): 10 TABLET, FILM COATED ORAL at 13:39

## 2022-07-19 RX ADMIN — Medication 100 MILLIGRAM(S): at 05:08

## 2022-07-19 RX ADMIN — MIDODRINE HYDROCHLORIDE 5 MILLIGRAM(S): 2.5 TABLET ORAL at 05:06

## 2022-07-19 RX ADMIN — TIGECYCLINE 105 MILLIGRAM(S): 50 INJECTION, POWDER, LYOPHILIZED, FOR SOLUTION INTRAVENOUS at 05:08

## 2022-07-19 RX ADMIN — Medication 10 MILLIGRAM(S): at 05:07

## 2022-07-19 RX ADMIN — ROBINUL 2 MILLIGRAM(S): 0.2 INJECTION INTRAMUSCULAR; INTRAVENOUS at 13:40

## 2022-07-19 RX ADMIN — ROBINUL 2 MILLIGRAM(S): 0.2 INJECTION INTRAMUSCULAR; INTRAVENOUS at 05:08

## 2022-07-19 RX ADMIN — Medication 10 MILLIGRAM(S): at 21:57

## 2022-07-19 RX ADMIN — ALBUTEROL 1 PUFF(S): 90 AEROSOL, METERED ORAL at 20:08

## 2022-07-19 RX ADMIN — MIDODRINE HYDROCHLORIDE 5 MILLIGRAM(S): 2.5 TABLET ORAL at 21:58

## 2022-07-19 RX ADMIN — Medication 100 MILLIGRAM(S): at 17:45

## 2022-07-19 RX ADMIN — SCOPALAMINE 1 PATCH: 1 PATCH, EXTENDED RELEASE TRANSDERMAL at 19:42

## 2022-07-19 RX ADMIN — SCOPALAMINE 1 PATCH: 1 PATCH, EXTENDED RELEASE TRANSDERMAL at 17:45

## 2022-07-19 RX ADMIN — Medication 1 PUFF(S): at 01:29

## 2022-07-19 RX ADMIN — MIDODRINE HYDROCHLORIDE 5 MILLIGRAM(S): 2.5 TABLET ORAL at 13:39

## 2022-07-19 RX ADMIN — Medication 10 MILLIGRAM(S): at 17:45

## 2022-07-19 RX ADMIN — MEROPENEM 35 MILLIGRAM(S): 1 INJECTION INTRAVENOUS at 05:09

## 2022-07-19 RX ADMIN — Medication 1 MILLIGRAM(S): at 11:40

## 2022-07-19 RX ADMIN — ALBUTEROL 1 PUFF(S): 90 AEROSOL, METERED ORAL at 08:18

## 2022-07-19 RX ADMIN — PANTOPRAZOLE SODIUM 40 MILLIGRAM(S): 20 TABLET, DELAYED RELEASE ORAL at 05:06

## 2022-07-19 RX ADMIN — ALBUTEROL 1 PUFF(S): 90 AEROSOL, METERED ORAL at 01:28

## 2022-07-19 NOTE — PROGRESS NOTE ADULT - ATTENDING COMMENTS
23 y/o F with a PMHx of encephalitis s/p tooth abscess s/p tracheostomy and PEG tube placement, HTN, GERD, and SVC thrombosis who was brought to ED from [place] for fevers. Admitted for tx for sepsis due to PNA.    # Sepsis 2/2 multi drug resistant pneumonia, sputum growing Klebsiella pneumoniae carbapenem resistant  # Acute on chronic respiratory failure 2/2 MDR pneumonia  # h/o encephalitis from tooth abscess  s/p trach and PEG, bedbound, non-verbal   # Recent COVID-19 infection, MDR colonization on respiratory tract, inability to clear secretion  - Changed IV antibiotic from cefiderocol to Meropenem, Tigecycline (given the LFTs elevation)  : Abx ends 7/19. f/u ID. Please discontinue Abx orders on 7/20 if remains hemodynamically stable.   - called micro to add on sensitivities for cefiderocol to Kleb CRE as per ID recommendation  - c/w oxygen support via trach  - aspiration precaution, dietary evaluation for feeding regimen to avoid aspiration, may consider low volume and frequent feed  - respiratory toilet/suction as per respiratory team. Scopolamine patch    #Worsening LFTs (as of 7/14):   Might be 2/2 Abx. Changed as above. Monitor. Improving  RUQ US noted  Check  EBV CMV herpes hepatitis E serologies per hepatology.     # Hypernatremia  Na 151 (7/13) > 141 (7/14) > 147 (7/15) > Improved  Increased FW flushes in PEG to 450 Q6H    - monitor BMP    #Tachycardia:   120-150s all day. SBP 90 - 100s  c/w Metoprolol T 50 BID. can try Diltiazem IR 30 mg Q6H ATC (hold if SBP < 100 )   If no improvement seen by 7/20, can discontinue Diltiazem order (anyways being skipped most of the times d/t SBP < 100)  tried boluses x 2 past 2 days, no long lasting improvement in HR.   Can d/c the NS @ 50 on 7/20, if not causing any improvement in HR (patient is getting FW via PEG). Clinically Euvolemic.     # h/o SVC Thrombosis and Embolism   - c/w Eliquis 5 mg PO BID    # GERD  - c/w Protonix 40 mg PO daily    # Iron Deficiency Anemia, stable   - monitor CBC  - c/w home iron replacement, thiamine, B12, and folic acid    # DVT prophlaxis   - on eliquis 5 mg PO BID    #Functional Quadruparesis  s/p Trach/PEG    Dispo: [facility].   HANDOFF: D/c Abx orders on 7/20 if ID agrees. D/c NS @ 50 & Diltiazem order on 7/20, if not bringing about any improvement in Heart rate.

## 2022-07-19 NOTE — PROGRESS NOTE ADULT - ASSESSMENT
23 y/o F with a PMHx of encephalitis s/p tooth abscess s/p tracheostomy and PEG tube placement, HTN, GERD, and SVC thrombosis , recent admission for Covid19 PNA and pyelonephritis in June 2022, currently being treated with IV Vancomycin, cefepime for PNA since 7/8, BIBEMS from [facility] for fever this AM. Per NH notes, pt spiked temp of 102.6 today and was sent to ED. Pt is non-verbal, unable to contribute to history or ROS. pt has sputum around the trach area.     # sepsis 2/2 MDR PNA  # Acute on chronic respiratory failure  - pt's first covid test was positive in 5/22, subsequent tests negative, positive again today. ? recurrence? vs persistent infection?  - hx of MDR Klebsiella bacteremia  - in the ED,pt's VS T Max: 37.9  BP 89/49  - labs done showed WBC 11k, creat 0.5, CRP 73,covid is positive  - CT Angio Chest PE Protocol w/ IV Cont > NO PE, consistent with pneumonia  - pt was started on cefepime and vancomycin 7/8 at [facility]  - previous cultures grew Klebsiella, proteus ESBL, MDRO, CRE  - sputum cx with carbapenem resistent Klebsiella  - ID recs: Cefiderocol 2g q8h started   - blood cultures NGTD  - c/w oxygen support via trach  - 07/15: switched to severo + tiga due to increasing LFTs  - 07/18: WBC increasing (14 to 17), chest xray ordered, keep same Ax for now as per ID, patient hemodynamically stable with no fever  - 07/19: WBC stale, CXR has no worsening signs, patient afebrile and stable, continue Ax and f/u ID plan tomorrow     # Elevated LFTs  - likely due to Cefiderocol  - RUQ U/S WNL, hep panel NR  - hepatology recs: trend LFT and INR, check EBV, CMV, herpes, Avoid hepatotoxic medications  - as per ID: Cefiderocol d/c'ed   - 7/15 Tigecycline and Meropenem started  - 07/19: LFTs trending down    #Persistent sinus tachycardia  - pt was persistently tachycardic on previous admission baseline 100-120s  - EP eval, HR <130 acceptable  - on admission pt was tachycardic to 150-160s, now at baseline 90-120s s/p ABx  - c/w Metoprolol 50 BID  - 07/18: HR going up to 150 =>d-dimer ordered to assess for DVT, fluid bolus to be given to r/o intravascular volume depletion as a cause (patient on dry side), might add diltiazem IR 30mg Q6H if still tachycardic  - 07/19: D-dimer not suggestive of PE, HR slightly improved (in the 100-110 range) on fluids, to be monitored    #HO SVC Thrombosis and Embolism   - C/w Eliquis 5 mg PO BID    #History of Encephalitis post Tooth Abscess in 10/2021   - s/p Tracheostomy (has an O2 tank per sister but unsure about flow) and G-tube placement  - aspiration precautions, respiratory toilet/suction as per resp team    # Hypernatremia- resolved     #GERD  * Home med Omeprazole 40mg QD  - C/w Protonix 40 mg PO daily    #Iron Deficiency Anemia   #History of Wernicke Encephalopathy  #Suspected thiamine deficiency  #Suspected folic acid deficiency  * Home med iron replacement, thiamine, B12, and folic acid  - C/w home iron replacement, thiamine, B12, and folic acid  - Monitor for now    #DVT ppx: Eliquis 5 mg PO BID  #GI ppx: Protonix 40 mg PO daily  #Diet: NPO w G-tube feeds  #Activity: Functional quadriplegic  #Dispo: From [facility];  #Code status: Full code -- has MOLST confirming

## 2022-07-19 NOTE — PROGRESS NOTE ADULT - SUBJECTIVE AND OBJECTIVE BOX
CONSUELO CARO 22y Female  MRN#: [id number]   Hospital Day: 10d    SUBJECTIVE  Patient is a 22y old Female who presents with a chief complaint of fever (19 Jul 2022 14:10)  Currently admitted to medicine with the primary diagnosis of 2019 novel coronavirus disease (COVID-19)      INTERVAL HPI AND OVERNIGHT EVENTS:  Patient was examined and seen at bedside. This morning she is resting comfortably in bed and reports no issues or overnight events.    OBJECTIVE  PAST MEDICAL & SURGICAL HISTORY  Encephalitis    H/O tracheostomy    PEG (percutaneous endoscopic gastrostomy) status    Acute deep vein thrombosis (DVT) of superior vena cava    HTN (hypertension)    GERD (gastroesophageal reflux disease)      ALLERGIES:  Allergy Status Unknown    MEDICATIONS:  STANDING MEDICATIONS  ALBUTerol    90 MICROgram(s) HFA Inhaler 1 Puff(s) Inhalation every 4 hours  apixaban 5 milliGRAM(s) Oral two times a day  cyanocobalamin 1000 MICROGram(s) Oral daily  cyclobenzaprine Oral Tab/Cap - Peds 5 milliGRAM(s) Oral three times a day  dextrose 50% Injectable 25 Gram(s) IV Push once  diltiazem    Tablet 30 milliGRAM(s) Oral every 6 hours  ferrous    sulfate Liquid 300 milliGRAM(s) Enteral Tube daily  folic acid  Oral Tab/Cap - Peds 1 milliGRAM(s) Oral daily  glycopyrrolate 2 milliGRAM(s) Oral three times a day  ipratropium 17 MICROgram(s) HFA Inhaler 1 Puff(s) Inhalation every 6 hours  meropenem  IVPB 2000 milliGRAM(s) IV Intermittent every 8 hours  metoclopramide 10 milliGRAM(s) Oral Before meals and at bedtime  metoprolol tartrate 50 milliGRAM(s) Oral two times a day  midodrine 5 milliGRAM(s) Oral every 8 hours  pantoprazole    Tablet 40 milliGRAM(s) Oral before breakfast  scopolamine 1 mG/72 Hr(s) Patch 1 Patch Transdermal every 72 hours  sodium chloride 0.9%. 1000 milliLiter(s) IV Continuous <Continuous>  thiamine  Oral Tab/Cap - Peds 100 milliGRAM(s) Oral two times a day  tigecycline IVPB 50 milliGRAM(s) IV Intermittent every 12 hours  tigecycline IVPB        PRN MEDICATIONS  acetaminophen     Tablet .. 650 milliGRAM(s) Oral every 6 hours PRN  aluminum hydroxide/magnesium hydroxide/simethicone Suspension 30 milliLiter(s) Oral every 4 hours PRN  melatonin 3 milliGRAM(s) Oral at bedtime PRN  ondansetron Injectable 4 milliGRAM(s) IV Push every 8 hours PRN      VITAL SIGNS: Last 24 Hours  T(C): 35.6 (19 Jul 2022 12:40), Max: 37.1 (18 Jul 2022 20:00)  T(F): 96 (19 Jul 2022 12:40), Max: 98.8 (18 Jul 2022 20:00)  HR: 112 (19 Jul 2022 12:40) (102 - 129)  BP: 94/50 (19 Jul 2022 12:40) (90/45 - 112/58)  BP(mean): 65 (19 Jul 2022 05:08) (65 - 65)  RR: 19 (19 Jul 2022 12:40) (18 - 19)  SpO2: 92% (19 Jul 2022 12:40) (92% - 98%)    LABS:                        11.4   17.37 )-----------( 518      ( 19 Jul 2022 08:55 )             37.1     07-19    134<L>  |  98  |  14  ----------------------------<  111<H>  4.7   |  27  |  <0.5<L>    Ca    8.6      19 Jul 2022 08:55  Mg     1.8     07-18    TPro  6.1  /  Alb  2.8<L>  /  TBili  <0.2  /  DBili  x   /  AST  21  /  ALT  110<H>  /  AlkPhos  228<H>  07-19                  RADIOLOGY:      PHYSICAL EXAM:  GENERAL: Pt has Trach and PEG, does not communicate so assessment limited  CHEST/LUNG: Equal air entry bilaterally; no wheezing, bilateral rhonchi heard  HEART: Regular rate and rhythm; No murmurs, rubs, or gallops  ABDOMEN: Soft, patient does not grimace on palpation, Nondistended; Bowel sounds present  EXTREMITIES:  2+ Peripheral Pulses, trace ankle edema

## 2022-07-20 LAB
ALBUMIN SERPL ELPH-MCNC: 2.8 G/DL — LOW (ref 3.5–5.2)
ALP SERPL-CCNC: 249 U/L — HIGH (ref 30–115)
ALT FLD-CCNC: 97 U/L — HIGH (ref 0–41)
ANION GAP SERPL CALC-SCNC: 10 MMOL/L — SIGNIFICANT CHANGE UP (ref 7–14)
APPEARANCE UR: ABNORMAL
AST SERPL-CCNC: 45 U/L — HIGH (ref 0–41)
BACTERIA # UR AUTO: NEGATIVE — SIGNIFICANT CHANGE UP
BASOPHILS # BLD AUTO: 0.08 K/UL — SIGNIFICANT CHANGE UP (ref 0–0.2)
BASOPHILS NFR BLD AUTO: 0.3 % — SIGNIFICANT CHANGE UP (ref 0–1)
BILIRUB SERPL-MCNC: 0.2 MG/DL — SIGNIFICANT CHANGE UP (ref 0.2–1.2)
BILIRUB UR-MCNC: NEGATIVE — SIGNIFICANT CHANGE UP
BUN SERPL-MCNC: 11 MG/DL — SIGNIFICANT CHANGE UP (ref 10–20)
CALCIUM SERPL-MCNC: 8.7 MG/DL — SIGNIFICANT CHANGE UP (ref 8.5–10.1)
CHLORIDE SERPL-SCNC: 101 MMOL/L — SIGNIFICANT CHANGE UP (ref 98–110)
CO2 SERPL-SCNC: 27 MMOL/L — SIGNIFICANT CHANGE UP (ref 17–32)
COLOR SPEC: YELLOW — SIGNIFICANT CHANGE UP
COMMENT - URINE: SIGNIFICANT CHANGE UP
CREAT SERPL-MCNC: <0.5 MG/DL — LOW (ref 0.7–1.5)
CRP SERPL-MCNC: 17.1 MG/L — HIGH
DIFF PNL FLD: NEGATIVE — SIGNIFICANT CHANGE UP
EGFR: 143 ML/MIN/1.73M2 — SIGNIFICANT CHANGE UP
EOSINOPHIL # BLD AUTO: 0.45 K/UL — SIGNIFICANT CHANGE UP (ref 0–0.7)
EOSINOPHIL NFR BLD AUTO: 1.8 % — SIGNIFICANT CHANGE UP (ref 0–8)
EPI CELLS # UR: 15 /HPF — HIGH (ref 0–5)
ERYTHROCYTE [SEDIMENTATION RATE] IN BLOOD: 65 MM/HR — HIGH (ref 0–20)
GLUCOSE BLDC GLUCOMTR-MCNC: 101 MG/DL — HIGH (ref 70–99)
GLUCOSE BLDC GLUCOMTR-MCNC: 109 MG/DL — HIGH (ref 70–99)
GLUCOSE BLDC GLUCOMTR-MCNC: 95 MG/DL — SIGNIFICANT CHANGE UP (ref 70–99)
GLUCOSE SERPL-MCNC: 120 MG/DL — HIGH (ref 70–99)
GLUCOSE UR QL: NEGATIVE — SIGNIFICANT CHANGE UP
HCT VFR BLD CALC: 39.5 % — SIGNIFICANT CHANGE UP (ref 37–47)
HGB BLD-MCNC: 11.7 G/DL — LOW (ref 12–16)
HYALINE CASTS # UR AUTO: 21 /LPF — HIGH (ref 0–7)
IMM GRANULOCYTES NFR BLD AUTO: 0.5 % — HIGH (ref 0.1–0.3)
KETONES UR-MCNC: SIGNIFICANT CHANGE UP
LEUKOCYTE ESTERASE UR-ACNC: ABNORMAL
LYMPHOCYTES # BLD AUTO: 1.98 K/UL — SIGNIFICANT CHANGE UP (ref 1.2–3.4)
LYMPHOCYTES # BLD AUTO: 8 % — LOW (ref 20.5–51.1)
MAGNESIUM SERPL-MCNC: 1.9 MG/DL — SIGNIFICANT CHANGE UP (ref 1.8–2.4)
MCHC RBC-ENTMCNC: 23.3 PG — LOW (ref 27–31)
MCHC RBC-ENTMCNC: 29.6 G/DL — LOW (ref 32–37)
MCV RBC AUTO: 78.7 FL — LOW (ref 81–99)
MONOCYTES # BLD AUTO: 1.53 K/UL — HIGH (ref 0.1–0.6)
MONOCYTES NFR BLD AUTO: 6.2 % — SIGNIFICANT CHANGE UP (ref 1.7–9.3)
NEUTROPHILS # BLD AUTO: 20.58 K/UL — HIGH (ref 1.4–6.5)
NEUTROPHILS NFR BLD AUTO: 83.2 % — HIGH (ref 42.2–75.2)
NITRITE UR-MCNC: NEGATIVE — SIGNIFICANT CHANGE UP
NRBC # BLD: 0 /100 WBCS — SIGNIFICANT CHANGE UP (ref 0–0)
PH UR: 7.5 — SIGNIFICANT CHANGE UP (ref 5–8)
PLATELET # BLD AUTO: 520 K/UL — HIGH (ref 130–400)
POTASSIUM SERPL-MCNC: 5.1 MMOL/L — HIGH (ref 3.5–5)
POTASSIUM SERPL-SCNC: 5.1 MMOL/L — HIGH (ref 3.5–5)
PROT SERPL-MCNC: 6.6 G/DL — SIGNIFICANT CHANGE UP (ref 6–8)
PROT UR-MCNC: SIGNIFICANT CHANGE UP
RBC # BLD: 5.02 M/UL — SIGNIFICANT CHANGE UP (ref 4.2–5.4)
RBC # FLD: 16.9 % — HIGH (ref 11.5–14.5)
RBC CASTS # UR COMP ASSIST: 11 /HPF — HIGH (ref 0–4)
SODIUM SERPL-SCNC: 138 MMOL/L — SIGNIFICANT CHANGE UP (ref 135–146)
SP GR SPEC: 1.02 — SIGNIFICANT CHANGE UP (ref 1.01–1.03)
UROBILINOGEN FLD QL: SIGNIFICANT CHANGE UP
WBC # BLD: 24.74 K/UL — HIGH (ref 4.8–10.8)
WBC # FLD AUTO: 24.74 K/UL — HIGH (ref 4.8–10.8)
WBC UR QL: 45 /HPF — HIGH (ref 0–5)

## 2022-07-20 PROCEDURE — 99233 SBSQ HOSP IP/OBS HIGH 50: CPT

## 2022-07-20 PROCEDURE — 93970 EXTREMITY STUDY: CPT | Mod: 26

## 2022-07-20 RX ORDER — VANCOMYCIN HCL 1 G
1000 VIAL (EA) INTRAVENOUS ONCE
Refills: 0 | Status: COMPLETED | OUTPATIENT
Start: 2022-07-20 | End: 2022-07-21

## 2022-07-20 RX ORDER — VANCOMYCIN HCL 1 G
1000 VIAL (EA) INTRAVENOUS ONCE
Refills: 0 | Status: DISCONTINUED | OUTPATIENT
Start: 2022-07-20 | End: 2022-07-20

## 2022-07-20 RX ORDER — PETROLATUM,WHITE
1 JELLY (GRAM) TOPICAL
Refills: 0 | Status: DISCONTINUED | OUTPATIENT
Start: 2022-07-20 | End: 2022-07-23

## 2022-07-20 RX ADMIN — ALBUTEROL 1 PUFF(S): 90 AEROSOL, METERED ORAL at 08:40

## 2022-07-20 RX ADMIN — ALBUTEROL 1 PUFF(S): 90 AEROSOL, METERED ORAL at 15:07

## 2022-07-20 RX ADMIN — MIDODRINE HYDROCHLORIDE 5 MILLIGRAM(S): 2.5 TABLET ORAL at 21:26

## 2022-07-20 RX ADMIN — MEROPENEM 35 MILLIGRAM(S): 1 INJECTION INTRAVENOUS at 05:02

## 2022-07-20 RX ADMIN — Medication 10 MILLIGRAM(S): at 17:04

## 2022-07-20 RX ADMIN — Medication 1 PUFF(S): at 01:12

## 2022-07-20 RX ADMIN — PREGABALIN 1000 MICROGRAM(S): 225 CAPSULE ORAL at 11:05

## 2022-07-20 RX ADMIN — Medication 10 MILLIGRAM(S): at 21:28

## 2022-07-20 RX ADMIN — PANTOPRAZOLE SODIUM 40 MILLIGRAM(S): 20 TABLET, DELAYED RELEASE ORAL at 05:04

## 2022-07-20 RX ADMIN — Medication 1 PUFF(S): at 20:40

## 2022-07-20 RX ADMIN — APIXABAN 5 MILLIGRAM(S): 2.5 TABLET, FILM COATED ORAL at 17:04

## 2022-07-20 RX ADMIN — Medication 10 MILLIGRAM(S): at 05:03

## 2022-07-20 RX ADMIN — APIXABAN 5 MILLIGRAM(S): 2.5 TABLET, FILM COATED ORAL at 05:03

## 2022-07-20 RX ADMIN — TIGECYCLINE 105 MILLIGRAM(S): 50 INJECTION, POWDER, LYOPHILIZED, FOR SOLUTION INTRAVENOUS at 05:01

## 2022-07-20 RX ADMIN — CYCLOBENZAPRINE HYDROCHLORIDE 5 MILLIGRAM(S): 10 TABLET, FILM COATED ORAL at 05:04

## 2022-07-20 RX ADMIN — ROBINUL 2 MILLIGRAM(S): 0.2 INJECTION INTRAMUSCULAR; INTRAVENOUS at 21:33

## 2022-07-20 RX ADMIN — CYCLOBENZAPRINE HYDROCHLORIDE 5 MILLIGRAM(S): 10 TABLET, FILM COATED ORAL at 21:25

## 2022-07-20 RX ADMIN — SCOPALAMINE 1 PATCH: 1 PATCH, EXTENDED RELEASE TRANSDERMAL at 18:55

## 2022-07-20 RX ADMIN — Medication 1 PUFF(S): at 15:08

## 2022-07-20 RX ADMIN — MEROPENEM 35 MILLIGRAM(S): 1 INJECTION INTRAVENOUS at 22:11

## 2022-07-20 RX ADMIN — SODIUM CHLORIDE 50 MILLILITER(S): 9 INJECTION INTRAMUSCULAR; INTRAVENOUS; SUBCUTANEOUS at 05:01

## 2022-07-20 RX ADMIN — ROBINUL 2 MILLIGRAM(S): 0.2 INJECTION INTRAMUSCULAR; INTRAVENOUS at 13:28

## 2022-07-20 RX ADMIN — CYCLOBENZAPRINE HYDROCHLORIDE 5 MILLIGRAM(S): 10 TABLET, FILM COATED ORAL at 13:27

## 2022-07-20 RX ADMIN — ALBUTEROL 1 PUFF(S): 90 AEROSOL, METERED ORAL at 05:41

## 2022-07-20 RX ADMIN — Medication 100 MILLIGRAM(S): at 05:03

## 2022-07-20 RX ADMIN — ROBINUL 2 MILLIGRAM(S): 0.2 INJECTION INTRAMUSCULAR; INTRAVENOUS at 05:05

## 2022-07-20 RX ADMIN — Medication 10 MILLIGRAM(S): at 11:05

## 2022-07-20 RX ADMIN — MIDODRINE HYDROCHLORIDE 5 MILLIGRAM(S): 2.5 TABLET ORAL at 13:27

## 2022-07-20 RX ADMIN — MIDODRINE HYDROCHLORIDE 5 MILLIGRAM(S): 2.5 TABLET ORAL at 05:05

## 2022-07-20 RX ADMIN — Medication 50 MILLIGRAM(S): at 05:03

## 2022-07-20 RX ADMIN — Medication 50 MILLIGRAM(S): at 17:04

## 2022-07-20 RX ADMIN — MEROPENEM 35 MILLIGRAM(S): 1 INJECTION INTRAVENOUS at 13:26

## 2022-07-20 RX ADMIN — Medication 1 PUFF(S): at 08:40

## 2022-07-20 RX ADMIN — Medication 100 MILLIGRAM(S): at 17:05

## 2022-07-20 RX ADMIN — ALBUTEROL 1 PUFF(S): 90 AEROSOL, METERED ORAL at 01:11

## 2022-07-20 RX ADMIN — Medication 1 MILLIGRAM(S): at 11:05

## 2022-07-20 RX ADMIN — Medication 300 MILLIGRAM(S): at 11:06

## 2022-07-20 RX ADMIN — TIGECYCLINE 105 MILLIGRAM(S): 50 INJECTION, POWDER, LYOPHILIZED, FOR SOLUTION INTRAVENOUS at 17:04

## 2022-07-20 RX ADMIN — ALBUTEROL 1 PUFF(S): 90 AEROSOL, METERED ORAL at 12:09

## 2022-07-20 RX ADMIN — ALBUTEROL 1 PUFF(S): 90 AEROSOL, METERED ORAL at 20:40

## 2022-07-20 NOTE — PROGRESS NOTE ADULT - ASSESSMENT
21 y/o F with a PMHx of encephalitis s/p tooth abscess s/p tracheostomy and PEG tube placement, HTN, GERD, and SVC thrombosis , recent admission for Covid19 PNA and pyelonephritis in June 2022, currently being treated with IV Vancomycin, cefepime for PNA since 7/8, BIBEMS from [facility] for fever this AM. Per NH notes, pt spiked temp of 102.6 today and was sent to ED. Pt is non-verbal, unable to contribute to history or ROS. pt has sputum around the trach area.     # sepsis 2/2 MDR PNA  # Acute on chronic respiratory failure  - pt's first covid test was positive in 5/22, subsequent tests negative, positive again today. ? recurrence? vs persistent infection?  - hx of MDR Klebsiella bacteremia  - in the ED,pt's VS T Max: 37.9  BP 89/49  - labs done showed WBC 11k, creat 0.5, CRP 73,covid is positive  - CT Angio Chest PE Protocol w/ IV Cont > NO PE, consistent with pneumonia  - pt was started on cefepime and vancomycin 7/8 at [facility]  - previous cultures grew Klebsiella, proteus ESBL, MDRO, CRE  - sputum cx with carbapenem resistent Klebsiella  - ID recs: Cefiderocol 2g q8h started   - blood cultures NGTD  - c/w oxygen support via trach  - 07/15: switched to severo + tiga due to increasing LFTs  - 07/18: WBC increasing (14 to 17), chest xray ordered, keep same Ax for now as per ID, patient hemodynamically stable with no fever  - 07/19: WBC stable, CXR has no worsening signs, patient afebrile and stable, continue Ax and f/u ID plan tomorrow  - 07/20: WBC increasing, patient afebrile and hemodynamically no changes relative to her baseline, ID plan is to take urine analysis, blood Cx, remove the midline (another one placed with no complications), and give one dose of vancomycin 1g after cultures are drawn.    # Elevated LFTs  - likely due to Cefiderocol  - RUQ U/S WNL, hep panel NR  - hepatology recs: trend LFT and INR, check EBV, CMV, herpes, Avoid hepatotoxic medications  - as per ID: Cefiderocol d/c'ed   - 7/15 Tigecycline and Meropenem started  - 07/19: LFTs trending down  - 07/20: LFTs stable    #Persistent sinus tachycardia  - pt was persistently tachycardic on previous admission baseline 100-120s  - EP eval, HR <130 acceptable  - on admission pt was tachycardic to 150-160s, now at baseline 90-120s s/p ABx  - c/w Metoprolol 50 BID  - 07/18: HR going up to 150 =>d-dimer ordered to assess for DVT, fluid bolus to be given to r/o intravascular volume depletion as a cause (patient on dry side), might add diltiazem IR 30mg Q6H if still tachycardic  - 07/19: D-dimer not suggestive of PE, HR slightly improved (in the 100-110 range) on fluids, to be monitored    #HO SVC Thrombosis and Embolism   - C/w Eliquis 5 mg PO BID    #History of Encephalitis post Tooth Abscess in 10/2021   - s/p Tracheostomy (has an O2 tank per sister but unsure about flow) and G-tube placement  - aspiration precautions, respiratory toilet/suction as per resp team    # Hypernatremia- resolved     #GERD  * Home med Omeprazole 40mg QD  - C/w Protonix 40 mg PO daily    #Iron Deficiency Anemia   #History of Wernicke Encephalopathy  #Suspected thiamine deficiency  #Suspected folic acid deficiency  * Home med iron replacement, thiamine, B12, and folic acid  - C/w home iron replacement, thiamine, B12, and folic acid  - Monitor for now    #DVT ppx: Eliquis 5 mg PO BID  #GI ppx: Protonix 40 mg PO daily  #Diet: NPO w G-tube feeds  #Activity: Functional quadriplegic  #Dispo: From [facility];  #Code status: Full code -- has MOLST confirming   23 y/o F with a PMHx of encephalitis s/p tooth abscess s/p tracheostomy and PEG tube placement, HTN, GERD, and SVC thrombosis , recent admission for Covid19 PNA and pyelonephritis in June 2022, currently being treated with IV Vancomycin, cefepime for PNA since 7/8, BIBEMS from [facility] for fever this AM. Per NH notes, pt spiked temp of 102.6 today and was sent to ED. Pt is non-verbal, unable to contribute to history or ROS. pt has sputum around the trach area.     # sepsis 2/2 MDR PNA  # Acute on chronic respiratory failure  - pt's first covid test was positive in 5/22, subsequent tests negative, positive again today. ? recurrence? vs persistent infection?  - hx of MDR Klebsiella bacteremia  - in the ED,pt's VS T Max: 37.9  BP 89/49  - labs done showed WBC 11k, creat 0.5, CRP 73,covid is positive  - CT Angio Chest PE Protocol w/ IV Cont > NO PE, consistent with pneumonia  - pt was started on cefepime and vancomycin 7/8 at [facility]  - previous cultures grew Klebsiella, proteus ESBL, MDRO, CRE  - sputum cx with carbapenem resistent Klebsiella  - ID recs: Cefiderocol 2g q8h started   - blood cultures NGTD  - c/w oxygen support via trach  - 07/15: switched to severo + tiga due to increasing LFTs  - 07/18: WBC increasing (14 to 17), chest xray ordered, keep same Ax for now as per ID, patient hemodynamically stable with no fever  - 07/19: WBC stable, CXR has no worsening signs, patient afebrile and stable, continue Ax and f/u ID plan tomorrow  - 07/20: WBC increasing, patient afebrile and hemodynamically no changes relative to her baseline, ID plan is to take urine analysis, blood Cx, remove the midline (another one placed with no complications), and give one dose of vancomycin 1g after cultures are drawn.    # Elevated LFTs  - likely due to Cefiderocol  - RUQ U/S WNL, hep panel NR  - hepatology recs: trend LFT and INR, check EBV, CMV, herpes, Avoid hepatotoxic medications  - as per ID: Cefiderocol d/c'ed   - 7/15 Tigecycline and Meropenem started  - 07/19: LFTs trending down  - 07/20: LFTs stable    #Persistent sinus tachycardia  - pt was persistently tachycardic on previous admission baseline 100-120s  - EP eval, HR <130 acceptable  - on admission pt was tachycardic to 150-160s, now at baseline 90-120s s/p ABx  - c/w Metoprolol 50 BID  - 07/18: HR going up to 150 =>d-dimer ordered to assess for DVT, fluid bolus to be given to r/o intravascular volume depletion as a cause (patient on dry side), might add diltiazem IR 30mg Q6H if still tachycardic  - 07/19: D-dimer not suggestive of PE, HR slightly improved (in the 100-110 range) on fluids, to be monitored  - 07/20: HR in the 100-110 range, no changes made today    #HO SVC Thrombosis and Embolism   - C/w Eliquis 5 mg PO BID    #History of Encephalitis post Tooth Abscess in 10/2021   - s/p Tracheostomy (has an O2 tank per sister but unsure about flow) and G-tube placement  - aspiration precautions, respiratory toilet/suction as per resp team    # Hypernatremia- resolved     #GERD  * Home med Omeprazole 40mg QD  - C/w Protonix 40 mg PO daily    #Iron Deficiency Anemia   #History of Wernicke Encephalopathy  #Suspected thiamine deficiency  #Suspected folic acid deficiency  * Home med iron replacement, thiamine, B12, and folic acid  - C/w home iron replacement, thiamine, B12, and folic acid  - Monitor for now    #DVT ppx: Eliquis 5 mg PO BID  #GI ppx: Protonix 40 mg PO daily  #Diet: NPO w G-tube feeds  #Activity: Functional quadriplegic  #Dispo: From [facility];  #Code status: Full code -- has MOLST confirming

## 2022-07-20 NOTE — PROGRESS NOTE ADULT - SUBJECTIVE AND OBJECTIVE BOX
CONSUELO CARO  22y, Female  Allergy: Allergy Status Unknown      LOS  11d    CHIEF COMPLAINT: fever (20 Jul 2022 15:50)      INTERVAL EVENTS/HPI  - T(F): , Max: 99.7 (07-20-22 @ 05:05)  - WBC Count: 24.74 (07-20-22 @ 08:22) uptrending   WBC Count: 17.37 (07-19-22 @ 08:55)     - Creatinine, Serum: <0.5 (07-20-22 @ 08:22)  Creatinine, Serum: <0.5 (07-19-22 @ 08:55)     -   -   -     ROS  cannot obtain secondary to patient's sedation and/or mental status    VITALS:  T(F): 99.4, Max: 99.7 (07-20-22 @ 05:05)  HR: 120  BP: 98/53  RR: 18Vital Signs Last 24 Hrs  T(C): 37.4 (20 Jul 2022 13:00), Max: 37.6 (20 Jul 2022 05:05)  T(F): 99.4 (20 Jul 2022 13:00), Max: 99.7 (20 Jul 2022 05:05)  HR: 120 (20 Jul 2022 13:00) (99 - 129)  BP: 98/53 (20 Jul 2022 13:00) (96/55 - 108/51)  BP(mean): 74 (20 Jul 2022 05:05) (74 - 74)  RR: 18 (20 Jul 2022 13:00) (18 - 18)  SpO2: 96% (20 Jul 2022 13:00) (95% - 99%)    Parameters below as of 20 Jul 2022 13:00  Patient On (Oxygen Delivery Method): tracheostomy collar        PHYSICAL EXAM:  Gen: trach/ vent  CV: RRR  Lungs: Decreased BS at bases  Abd: Soft  Neuro: does not follow commands  Skin: no rash   Lines clean, no phlebitis     FH: Non-contributory  Social Hx: Non-contributory    TESTS & MEASUREMENTS:                        11.7   24.74 )-----------( 520      ( 20 Jul 2022 08:22 )             39.5     07-20    138  |  101  |  11  ----------------------------<  120<H>  5.1<H>   |  27  |  <0.5<L>    Ca    8.7      20 Jul 2022 08:22  Mg     1.9     07-20    TPro  6.6  /  Alb  2.8<L>  /  TBili  0.2  /  DBili  x   /  AST  45<H>  /  ALT  97<H>  /  AlkPhos  249<H>  07-20      LIVER FUNCTIONS - ( 20 Jul 2022 08:22 )  Alb: 2.8 g/dL / Pro: 6.6 g/dL / ALK PHOS: 249 U/L / ALT: 97 U/L / AST: 45 U/L / GGT: x               Culture - Sputum (collected 07-10-22 @ 17:14)  Source: Trach Asp Tracheal Aspirate  Gram Stain (07-11-22 @ 07:31):    Few polymorphonuclear leukocytes per low power field    Few Squamous epithelial cells per low power field    Moderate Gram Negative Rods seen per oil power field    Moderate Gram Positive Rods seen per oil power field    Moderate Gram positive cocci in pairs seen per oil power field  Final Report (07-12-22 @ 16:06):    Numerous Klebsiella pneumoniae (Carbapenem Resistant)    Normal Respiratory Trinidad absent  Organism: Klepne MDRO  Klepne MDRO (07-14-22 @ 16:42)  Organism: Klepne MDRO (07-14-22 @ 16:42)      -  Cefiderocol: S      Method Type:   Organism: Klepne MDRO (07-14-22 @ 16:42)      -  Amikacin: R >32      -  Amoxicillin/Clavulanic Acid: R >16/8      -  Ampicillin: R >16 These ampicillin results predict results for amoxicillin      -  Ampicillin/Sulbactam: R >16/8 Enterobacter, Klebsiella aerogenes, Citrobacter, and Serratia may develop resistance during prolonged therapy (3-4 days)      -  Aztreonam: R >16      -  Cefazolin: R >16 Enterobacter, Klebsiella aerogenes, Citrobacter, and Serratia may develop resistance during prolonged therapy (3-4 days)      -  Cefepime: R >16      -  Cefoxitin: R >16      -  Ceftazidime/Avibactam: R >16      -  Ceftolozane/tazobactam: R >8      -  Ceftriaxone: R >32 Enterobacter, Klebsiella aerogenes, Citrobacter, and Serratia may develop resistance during prolonged therapy      -  Ciprofloxacin: R >2      -  Ertapenem: R >1      -  Gentamicin: R >8      -  Imipenem: R >8      -  Levofloxacin: R >4      -  Meropenem: R >8      -  Minocycline: S <=4      -  Piperacillin/Tazobactam: R >64      -  Tetra/Doxy: S <=4      -  Tigecycline: S <=2      -  Tobramycin: R >8      -  Trimethoprim/Sulfamethoxazole: R >2/38      Method Type: HERB    Culture - Blood (collected 07-10-22 @ 06:01)  Source: .Blood None  Final Report (07-15-22 @ 13:01):    No Growth Final    Culture - Blood (collected 07-09-22 @ 13:32)  Source: .Blood Blood-Peripheral  Final Report (07-15-22 @ 02:00):    No Growth Final    Culture - Blood (collected 07-09-22 @ 13:32)  Source: .Blood Blood-Peripheral  Final Report (07-15-22 @ 02:00):    No Growth Final            INFECTIOUS DISEASES TESTING  COVID-19 PCR: Detected (07-17-22 @ 09:00)  MRSA PCR Result.: Negative (07-15-22 @ 02:30)  MRSA PCR Result.: Negative (07-11-22 @ 09:40)  Procalcitonin, Serum: 0.07 (07-10-22 @ 06:01)  COVID-19 PCR: Detected (07-09-22 @ 14:28)  Procalcitonin, Serum: 0.11 (07-09-22 @ 13:32)  COVID-19 PCR: NotDetec (06-08-22 @ 14:00)  COVID-19 PCR: NotDetec (06-05-22 @ 04:30)  Rapid RVP Result: NotDetec (05-27-22 @ 16:07)  Procalcitonin, Serum: 12.30 (05-27-22 @ 11:39)  COVID-19 PCR: NotDetec (05-24-22 @ 07:59)  MRSA PCR Result.: Negative (05-17-22 @ 10:10)  Procalcitonin, Serum: 0.03 (05-17-22 @ 07:37)  Rapid RVP Result: Detected (05-16-22 @ 12:09)      INFLAMMATORY MARKERS  C-Reactive Protein, Serum: 73.5 mg/L (07-09-22 @ 13:32)  Sedimentation Rate, Erythrocyte: 46 mm/Hr (05-27-22 @ 11:39)  Sedimentation Rate, Erythrocyte: 85 mm/Hr (05-17-22 @ 07:37)  C-Reactive Protein, Serum: 61 mg/L (05-17-22 @ 07:37)      RADIOLOGY & ADDITIONAL TESTS:  I have personally reviewed the last available Chest xray  CXR      CT      CARDIOLOGY TESTING  12 Lead ECG:   Ventricular Rate 134 BPM    Atrial Rate 134 BPM    P-R Interval 130 ms    QRS Duration 62 ms    Q-T Interval 306 ms    QTC Calculation(Bazett) 456 ms    P Axis 77 degrees    R Axis 52 degrees    T Axis 59 degrees    Diagnosis Line Sinus tachycardia  Possible Septal infarct , age undetermined  Abnormal ECG    Confirmed by DEYANIRA BALL MDFIM ([id number]) on 7/17/2022 11:07:44 PM (07-17-22 @ 11:57)  12 Lead ECG:   Ventricular Rate 128 BPM    Atrial Rate 128 BPM    P-R Interval 112 ms    QRS Duration 66 ms    Q-T Interval 308 ms    QTC Calculation(Bazett) 449 ms    P Axis 78 degrees    R Axis 98 degrees    T Axis 38 degrees    Diagnosis Line Sinus tachycardia  Rightward axis  Borderline ECG    Confirmed by Brett Obrien ([id number]) on 7/9/2022 1:59:41 PM (07-09-22 @ 13:35)      MEDICATIONS  ALBUTerol    90 MICROgram(s) HFA Inhaler 1  apixaban 5  cyanocobalamin 1000  cyclobenzaprine Oral Tab/Cap - Peds 5  dextrose 50% Injectable 25  diltiazem    Tablet 30  ferrous    sulfate Liquid 300  folic acid  Oral Tab/Cap - Peds 1  glycopyrrolate 2  ipratropium 17 MICROgram(s) HFA Inhaler 1  meropenem  IVPB 2000  metoclopramide 10  metoprolol tartrate 50  midodrine 5  pantoprazole    Tablet 40  petrolatum white Ointment 1  scopolamine 1 mG/72 Hr(s) Patch 1  sodium chloride 0.9%. 1000  thiamine  Oral Tab/Cap - Peds 100  tigecycline IVPB 50  tigecycline IVPB   vancomycin  IVPB 1000      WEIGHT  Weight (kg): 63.049 (07-13-22 @ 20:36)  Creatinine, Serum: <0.5 mg/dL (07-20-22 @ 08:22)      ANTIBIOTICS:  meropenem  IVPB 2000 milliGRAM(s) IV Intermittent every 8 hours  tigecycline IVPB 50 milliGRAM(s) IV Intermittent every 12 hours  tigecycline IVPB      vancomycin  IVPB 1000 milliGRAM(s) IV Intermittent once      All available historical records have been reviewed

## 2022-07-20 NOTE — PROGRESS NOTE ADULT - SUBJECTIVE AND OBJECTIVE BOX
CONSUELO CARO 22y Female  MRN#: [id number]   Hospital Day: 11d    SUBJECTIVE  Patient is a 22y old Female who presents with a chief complaint of fever (19 Jul 2022 14:10)  Currently admitted to medicine with the primary diagnosis of 2019 novel coronavirus disease (COVID-19)      INTERVAL HPI AND OVERNIGHT EVENTS:  Patient was examined and seen at bedside. This morning she is resting comfortably in bed and reports no issues or overnight events.    OBJECTIVE  PAST MEDICAL & SURGICAL HISTORY  Encephalitis    H/O tracheostomy    PEG (percutaneous endoscopic gastrostomy) status    Acute deep vein thrombosis (DVT) of superior vena cava    HTN (hypertension)    GERD (gastroesophageal reflux disease)      ALLERGIES:  Allergy Status Unknown    MEDICATIONS:  STANDING MEDICATIONS  ALBUTerol    90 MICROgram(s) HFA Inhaler 1 Puff(s) Inhalation every 4 hours  apixaban 5 milliGRAM(s) Oral two times a day  cyanocobalamin 1000 MICROGram(s) Oral daily  cyclobenzaprine Oral Tab/Cap - Peds 5 milliGRAM(s) Oral three times a day  dextrose 50% Injectable 25 Gram(s) IV Push once  diltiazem    Tablet 30 milliGRAM(s) Oral every 6 hours  ferrous    sulfate Liquid 300 milliGRAM(s) Enteral Tube daily  folic acid  Oral Tab/Cap - Peds 1 milliGRAM(s) Oral daily  glycopyrrolate 2 milliGRAM(s) Oral three times a day  ipratropium 17 MICROgram(s) HFA Inhaler 1 Puff(s) Inhalation every 6 hours  meropenem  IVPB 2000 milliGRAM(s) IV Intermittent every 8 hours  metoclopramide 10 milliGRAM(s) Oral Before meals and at bedtime  metoprolol tartrate 50 milliGRAM(s) Oral two times a day  midodrine 5 milliGRAM(s) Oral every 8 hours  pantoprazole    Tablet 40 milliGRAM(s) Oral before breakfast  petrolatum white Ointment 1 Application(s) Topical two times a day  scopolamine 1 mG/72 Hr(s) Patch 1 Patch Transdermal every 72 hours  sodium chloride 0.9%. 1000 milliLiter(s) IV Continuous <Continuous>  thiamine  Oral Tab/Cap - Peds 100 milliGRAM(s) Oral two times a day  tigecycline IVPB 50 milliGRAM(s) IV Intermittent every 12 hours  tigecycline IVPB        PRN MEDICATIONS  acetaminophen     Tablet .. 650 milliGRAM(s) Oral every 6 hours PRN  aluminum hydroxide/magnesium hydroxide/simethicone Suspension 30 milliLiter(s) Oral every 4 hours PRN  melatonin 3 milliGRAM(s) Oral at bedtime PRN  ondansetron Injectable 4 milliGRAM(s) IV Push every 8 hours PRN      VITAL SIGNS: Last 24 Hours  T(C): 37.4 (20 Jul 2022 13:00), Max: 37.6 (20 Jul 2022 05:05)  T(F): 99.4 (20 Jul 2022 13:00), Max: 99.7 (20 Jul 2022 05:05)  HR: 120 (20 Jul 2022 13:00) (99 - 129)  BP: 98/53 (20 Jul 2022 13:00) (96/55 - 108/51)  BP(mean): 74 (20 Jul 2022 05:05) (74 - 74)  RR: 18 (20 Jul 2022 13:00) (18 - 18)  SpO2: 96% (20 Jul 2022 13:00) (95% - 99%)    LABS:                        11.7   24.74 )-----------( 520      ( 20 Jul 2022 08:22 )             39.5     07-20    138  |  101  |  11  ----------------------------<  120<H>  5.1<H>   |  27  |  <0.5<L>    Ca    8.7      20 Jul 2022 08:22  Mg     1.9     07-20    TPro  6.6  /  Alb  2.8<L>  /  TBili  0.2  /  DBili  x   /  AST  45<H>  /  ALT  97<H>  /  AlkPhos  249<H>  07-20                  RADIOLOGY:      PHYSICAL EXAM:  GENERAL: Pt has Trach and PEG, does not communicate so assessment limited  CHEST/LUNG: Equal air entry bilaterally; no wheezing, bilateral rhonchi heard  HEART: Regular rate and rhythm; No murmurs, rubs, or gallops  ABDOMEN: Soft, patient does not grimace on palpation, Nondistended; Bowel sounds present  EXTREMITIES:  2+ Peripheral Pulses, trace ankle edema

## 2022-07-20 NOTE — PROGRESS NOTE ADULT - ASSESSMENT
21 y/o F with a PMHx of encephalitis s/p tooth abscess s/p tracheostomy and PEG tube placement, HTN, GERD, and SVC thrombosis , recent admission for Covid19 PNA and pyelonephritis in June 2022, currently being treated with IV Vancomycin, cefepime for PNA since 7/8, BIBEMS from [facility] for fever this AM. Per NH notes, pt spiked temp of 102.6 today and was sent to ED.     IMPRESSION;   #Leukocytosis & Tachycardia    CXR no PNA   #Transaminitis, suspect secondary to cefiderocol  #Sepsis on admission T>101 P>90 WBC 14 secondary to suspected GNR PNA , also COVID19 +     7/10 sputum     Numerous Klebsiella pneumoniae (Carbapenem Resistant)    7/10 BCX NGTD     7/9 BCX NGTD     Procalcitonin, Serum: 0.11 ng/mL (07-09-22 @ 13:32)    UA without significant pyuria     CT Airspace consolidations predominantly within the left upper and lower lobes with enlarged left hilar lymph nodes, most consistent with pneumonia.    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     5/29-5/31 BCX NGTD     5/28 BCX  Klebsiella pneumoniae (Carbapenem Resistant)    5/27 BCx Klebsiella pneumoniae (Carbapenem Resistant)    5/27 UCx Klebsiella pneumoniae (Carbapenem Resistant)    Pyuria  #Trach/PEG  Creatinine, Serum: <0.5 mg/dL (07.10.22 @ 06:01)      RECOMMENDATIONS;  - SEND BCX, then Dose Vanc 1g x1  - REMOVE Midline  - Send UA  - CXR no PNA  - If hemodynamic compromise, add Caspo 70mg x1 then 50mg daily   - PE rule out per primary team   - Sally 2g q8h IV over extended infusion of 4 hours and tigecycline  50mg q12h IV (need to monitor Alk Ph on Tigecycline) Day 10 7/19, continue for now as rising WBC  - Off loading to prevent pressure sores and preventive measures to avoid aspiration   - Colonized with MRDO, GOC , grave prognosis. No real further ABX options  - Trend WBC    If any questions, please call or send a message on [employer] Teams  Please continue to update ID with any pertinent new laboratory or radiographic findings  Spectra [phone]

## 2022-07-21 LAB
ALBUMIN SERPL ELPH-MCNC: 2.8 G/DL — LOW (ref 3.5–5.2)
ALP SERPL-CCNC: 237 U/L — HIGH (ref 30–115)
ALT FLD-CCNC: 83 U/L — HIGH (ref 0–41)
ANION GAP SERPL CALC-SCNC: 8 MMOL/L — SIGNIFICANT CHANGE UP (ref 7–14)
AST SERPL-CCNC: 43 U/L — HIGH (ref 0–41)
BASOPHILS # BLD AUTO: 0.09 K/UL — SIGNIFICANT CHANGE UP (ref 0–0.2)
BASOPHILS NFR BLD AUTO: 0.4 % — SIGNIFICANT CHANGE UP (ref 0–1)
BILIRUB SERPL-MCNC: 0.3 MG/DL — SIGNIFICANT CHANGE UP (ref 0.2–1.2)
BUN SERPL-MCNC: 13 MG/DL — SIGNIFICANT CHANGE UP (ref 10–20)
CALCIUM SERPL-MCNC: 8.5 MG/DL — SIGNIFICANT CHANGE UP (ref 8.5–10.1)
CHLORIDE SERPL-SCNC: 98 MMOL/L — SIGNIFICANT CHANGE UP (ref 98–110)
CO2 SERPL-SCNC: 28 MMOL/L — SIGNIFICANT CHANGE UP (ref 17–32)
CREAT SERPL-MCNC: <0.5 MG/DL — LOW (ref 0.7–1.5)
EGFR: 143 ML/MIN/1.73M2 — SIGNIFICANT CHANGE UP
EOSINOPHIL # BLD AUTO: 0.6 K/UL — SIGNIFICANT CHANGE UP (ref 0–0.7)
EOSINOPHIL NFR BLD AUTO: 2.5 % — SIGNIFICANT CHANGE UP (ref 0–8)
GLUCOSE BLDC GLUCOMTR-MCNC: 101 MG/DL — HIGH (ref 70–99)
GLUCOSE BLDC GLUCOMTR-MCNC: 103 MG/DL — HIGH (ref 70–99)
GLUCOSE BLDC GLUCOMTR-MCNC: 113 MG/DL — HIGH (ref 70–99)
GLUCOSE BLDC GLUCOMTR-MCNC: 118 MG/DL — HIGH (ref 70–99)
GLUCOSE BLDC GLUCOMTR-MCNC: 92 MG/DL — SIGNIFICANT CHANGE UP (ref 70–99)
GLUCOSE SERPL-MCNC: 89 MG/DL — SIGNIFICANT CHANGE UP (ref 70–99)
HCT VFR BLD CALC: 36.8 % — LOW (ref 37–47)
HGB BLD-MCNC: 11.3 G/DL — LOW (ref 12–16)
IMM GRANULOCYTES NFR BLD AUTO: 0.6 % — HIGH (ref 0.1–0.3)
LACTATE SERPL-SCNC: 1.1 MMOL/L — SIGNIFICANT CHANGE UP (ref 0.7–2)
LYMPHOCYTES # BLD AUTO: 2.04 K/UL — SIGNIFICANT CHANGE UP (ref 1.2–3.4)
LYMPHOCYTES # BLD AUTO: 8.4 % — LOW (ref 20.5–51.1)
MCHC RBC-ENTMCNC: 23.9 PG — LOW (ref 27–31)
MCHC RBC-ENTMCNC: 30.7 G/DL — LOW (ref 32–37)
MCV RBC AUTO: 78 FL — LOW (ref 81–99)
MONOCYTES # BLD AUTO: 1.65 K/UL — HIGH (ref 0.1–0.6)
MONOCYTES NFR BLD AUTO: 6.8 % — SIGNIFICANT CHANGE UP (ref 1.7–9.3)
NEUTROPHILS # BLD AUTO: 19.72 K/UL — HIGH (ref 1.4–6.5)
NEUTROPHILS NFR BLD AUTO: 81.3 % — HIGH (ref 42.2–75.2)
NRBC # BLD: 0 /100 WBCS — SIGNIFICANT CHANGE UP (ref 0–0)
PLATELET # BLD AUTO: 541 K/UL — HIGH (ref 130–400)
POTASSIUM SERPL-MCNC: 4.7 MMOL/L — SIGNIFICANT CHANGE UP (ref 3.5–5)
POTASSIUM SERPL-SCNC: 4.7 MMOL/L — SIGNIFICANT CHANGE UP (ref 3.5–5)
PROT SERPL-MCNC: 6.3 G/DL — SIGNIFICANT CHANGE UP (ref 6–8)
RBC # BLD: 4.72 M/UL — SIGNIFICANT CHANGE UP (ref 4.2–5.4)
RBC # FLD: 16.7 % — HIGH (ref 11.5–14.5)
SODIUM SERPL-SCNC: 134 MMOL/L — LOW (ref 135–146)
WBC # BLD: 24.24 K/UL — HIGH (ref 4.8–10.8)
WBC # FLD AUTO: 24.24 K/UL — HIGH (ref 4.8–10.8)

## 2022-07-21 PROCEDURE — 70450 CT HEAD/BRAIN W/O DYE: CPT | Mod: 26

## 2022-07-21 PROCEDURE — 71045 X-RAY EXAM CHEST 1 VIEW: CPT | Mod: 26

## 2022-07-21 PROCEDURE — 99233 SBSQ HOSP IP/OBS HIGH 50: CPT

## 2022-07-21 RX ORDER — CASPOFUNGIN ACETATE 7 MG/ML
INJECTION, POWDER, LYOPHILIZED, FOR SOLUTION INTRAVENOUS
Refills: 0 | Status: DISCONTINUED | OUTPATIENT
Start: 2022-07-21 | End: 2022-07-22

## 2022-07-21 RX ORDER — CASPOFUNGIN ACETATE 7 MG/ML
70 INJECTION, POWDER, LYOPHILIZED, FOR SOLUTION INTRAVENOUS ONCE
Refills: 0 | Status: COMPLETED | OUTPATIENT
Start: 2022-07-21 | End: 2022-07-21

## 2022-07-21 RX ORDER — CASPOFUNGIN ACETATE 7 MG/ML
50 INJECTION, POWDER, LYOPHILIZED, FOR SOLUTION INTRAVENOUS EVERY 24 HOURS
Refills: 0 | Status: DISCONTINUED | OUTPATIENT
Start: 2022-07-22 | End: 2022-07-22

## 2022-07-21 RX ADMIN — Medication 1 APPLICATION(S): at 17:48

## 2022-07-21 RX ADMIN — MEROPENEM 35 MILLIGRAM(S): 1 INJECTION INTRAVENOUS at 05:11

## 2022-07-21 RX ADMIN — Medication 100 MILLIGRAM(S): at 17:32

## 2022-07-21 RX ADMIN — MEROPENEM 35 MILLIGRAM(S): 1 INJECTION INTRAVENOUS at 14:45

## 2022-07-21 RX ADMIN — Medication 1 MILLIGRAM(S): at 11:57

## 2022-07-21 RX ADMIN — MEROPENEM 35 MILLIGRAM(S): 1 INJECTION INTRAVENOUS at 21:17

## 2022-07-21 RX ADMIN — SODIUM CHLORIDE 50 MILLILITER(S): 9 INJECTION INTRAMUSCULAR; INTRAVENOUS; SUBCUTANEOUS at 15:18

## 2022-07-21 RX ADMIN — CASPOFUNGIN ACETATE 260 MILLIGRAM(S): 7 INJECTION, POWDER, LYOPHILIZED, FOR SOLUTION INTRAVENOUS at 18:11

## 2022-07-21 RX ADMIN — PANTOPRAZOLE SODIUM 40 MILLIGRAM(S): 20 TABLET, DELAYED RELEASE ORAL at 05:10

## 2022-07-21 RX ADMIN — Medication 10 MILLIGRAM(S): at 05:09

## 2022-07-21 RX ADMIN — SCOPALAMINE 1 PATCH: 1 PATCH, EXTENDED RELEASE TRANSDERMAL at 07:16

## 2022-07-21 RX ADMIN — ALBUTEROL 1 PUFF(S): 90 AEROSOL, METERED ORAL at 00:00

## 2022-07-21 RX ADMIN — ALBUTEROL 1 PUFF(S): 90 AEROSOL, METERED ORAL at 11:27

## 2022-07-21 RX ADMIN — Medication 250 MILLIGRAM(S): at 08:10

## 2022-07-21 RX ADMIN — MIDODRINE HYDROCHLORIDE 5 MILLIGRAM(S): 2.5 TABLET ORAL at 05:10

## 2022-07-21 RX ADMIN — Medication 1 PUFF(S): at 14:00

## 2022-07-21 RX ADMIN — Medication 100 MILLIGRAM(S): at 05:10

## 2022-07-21 RX ADMIN — ALBUTEROL 1 PUFF(S): 90 AEROSOL, METERED ORAL at 08:28

## 2022-07-21 RX ADMIN — ROBINUL 2 MILLIGRAM(S): 0.2 INJECTION INTRAMUSCULAR; INTRAVENOUS at 05:13

## 2022-07-21 RX ADMIN — Medication 300 MILLIGRAM(S): at 11:51

## 2022-07-21 RX ADMIN — APIXABAN 5 MILLIGRAM(S): 2.5 TABLET, FILM COATED ORAL at 17:32

## 2022-07-21 RX ADMIN — ROBINUL 2 MILLIGRAM(S): 0.2 INJECTION INTRAMUSCULAR; INTRAVENOUS at 14:43

## 2022-07-21 RX ADMIN — MIDODRINE HYDROCHLORIDE 5 MILLIGRAM(S): 2.5 TABLET ORAL at 14:42

## 2022-07-21 RX ADMIN — CYCLOBENZAPRINE HYDROCHLORIDE 5 MILLIGRAM(S): 10 TABLET, FILM COATED ORAL at 05:09

## 2022-07-21 RX ADMIN — Medication 10 MILLIGRAM(S): at 13:56

## 2022-07-21 RX ADMIN — CYCLOBENZAPRINE HYDROCHLORIDE 5 MILLIGRAM(S): 10 TABLET, FILM COATED ORAL at 14:42

## 2022-07-21 RX ADMIN — MIDODRINE HYDROCHLORIDE 5 MILLIGRAM(S): 2.5 TABLET ORAL at 21:14

## 2022-07-21 RX ADMIN — TIGECYCLINE 105 MILLIGRAM(S): 50 INJECTION, POWDER, LYOPHILIZED, FOR SOLUTION INTRAVENOUS at 05:12

## 2022-07-21 RX ADMIN — SODIUM CHLORIDE 50 MILLILITER(S): 9 INJECTION INTRAMUSCULAR; INTRAVENOUS; SUBCUTANEOUS at 00:15

## 2022-07-21 RX ADMIN — Medication 1 PUFF(S): at 08:29

## 2022-07-21 RX ADMIN — APIXABAN 5 MILLIGRAM(S): 2.5 TABLET, FILM COATED ORAL at 05:09

## 2022-07-21 RX ADMIN — Medication 10 MILLIGRAM(S): at 21:14

## 2022-07-21 RX ADMIN — ALBUTEROL 1 PUFF(S): 90 AEROSOL, METERED ORAL at 15:17

## 2022-07-21 RX ADMIN — CYCLOBENZAPRINE HYDROCHLORIDE 5 MILLIGRAM(S): 10 TABLET, FILM COATED ORAL at 21:13

## 2022-07-21 RX ADMIN — SCOPALAMINE 1 PATCH: 1 PATCH, EXTENDED RELEASE TRANSDERMAL at 19:00

## 2022-07-21 RX ADMIN — PREGABALIN 1000 MICROGRAM(S): 225 CAPSULE ORAL at 11:51

## 2022-07-21 RX ADMIN — TIGECYCLINE 105 MILLIGRAM(S): 50 INJECTION, POWDER, LYOPHILIZED, FOR SOLUTION INTRAVENOUS at 17:37

## 2022-07-21 RX ADMIN — ROBINUL 2 MILLIGRAM(S): 0.2 INJECTION INTRAMUSCULAR; INTRAVENOUS at 21:18

## 2022-07-21 RX ADMIN — Medication 50 MILLIGRAM(S): at 05:10

## 2022-07-21 RX ADMIN — Medication 1 APPLICATION(S): at 05:13

## 2022-07-21 RX ADMIN — Medication 10 MILLIGRAM(S): at 15:33

## 2022-07-21 NOTE — PROGRESS NOTE ADULT - ATTENDING COMMENTS
Patient is a 21 y/o Female  with a PMHx of encephalitis s/p tooth abscess s/p tracheostomy and PEG tube placement, HTN, GERD, and SVC thrombosis who was brought to ED from [place] for fevers. Admitted for tx for sepsis due to PNA.    # Sepsis 2/2 multi drug resistant pneumonia, sputum growing Klebsiella pneumoniae carbapenem resistant  # Acute on chronic respiratory failure 2/2 MDR pneumonia  # h/o encephalitis from tooth abscess  s/p trach and PEG, bedbound, non-verbal   # Recent COVID-19 infection, MDR colonization on respiratory tract, inability to clear secretion  - Changed IV antibiotic from cefiderocol to Meropenem, Tigecycline (given the LFTs elevation)    - 7/21- worsening Leukocytosis- added on Caspofungin , obtain fungitell level, repeat procal level, exchange to new echeverria, resend urine cxs, obtain MRSA swab  -Vent management as per Pulm. team       #Worsening LFTs (as of 7/14):   Might be 2/2 Abx. Changed as above. Monitor. Improving  RUQ US noted  Check  EBV CMV herpes hepatitis E serologies per hepatology.     #Tachycardia:   - cont. metroprolol , diltiazem,  IVF     # h/o SVC Thrombosis and Embolism   - c/w Eliquis 5 mg PO BID    # GERD  - c/w Protonix 40 mg PO daily    # Iron Deficiency Anemia, stable   - monitor CBC  - c/w home iron replacement, thiamine, B12, and folic acid    # DVT prophlaxis   - on eliquis 5 mg PO BID    #Functional Quadruparesis  s/p Trach/PEG    Dispo: [facility].     #Progress Note Handoff: monitor CBC daily, f/up septic work up  Family discussion: yes, medical team Disposition: SNF once medically stable    Code status: full code     Total time spent to complete patient's bedside assessment, review medical chart, discuss medical plan of care with covering medical team was more than 35 minutes

## 2022-07-21 NOTE — PROGRESS NOTE ADULT - SUBJECTIVE AND OBJECTIVE BOX
GENERAL: Pt has Trach and PEG, does not communicate so assessment limited  CHEST/LUNG: Equal air entry bilaterally; no wheezing, bilateral rhonchi heard  HEART: Regular rate and rhythm; No murmurs, rubs, or gallops  ABDOMEN: Soft, patient does not grimace on palpation, Nondistended; Bowel sounds present  EXTREMITIES:  2+ Peripheral Pulses, trace ankle edema     CONSUELO CARO 22y Female  MRN#: [id number]   Hospital Day: 12d    SUBJECTIVE  Patient is a 22y old Female who presents with a chief complaint of fever (2022 10:52)  Currently admitted to medicine with the primary diagnosis of 2019 novel coronavirus disease (COVID-19)      INTERVAL HPI AND OVERNIGHT EVENTS:  Patient was examined and seen at bedside. This morning she is resting comfortably in bed and reports no issues or overnight events.    OBJECTIVE  PAST MEDICAL & SURGICAL HISTORY  Encephalitis    H/O tracheostomy    PEG (percutaneous endoscopic gastrostomy) status    Acute deep vein thrombosis (DVT) of superior vena cava    HTN (hypertension)    GERD (gastroesophageal reflux disease)      ALLERGIES:  Allergy Status Unknown    MEDICATIONS:  STANDING MEDICATIONS  ALBUTerol    90 MICROgram(s) HFA Inhaler 1 Puff(s) Inhalation every 4 hours  apixaban 5 milliGRAM(s) Oral two times a day  cyanocobalamin 1000 MICROGram(s) Oral daily  cyclobenzaprine Oral Tab/Cap - Peds 5 milliGRAM(s) Oral three times a day  dextrose 50% Injectable 25 Gram(s) IV Push once  diltiazem    Tablet 30 milliGRAM(s) Oral every 6 hours  ferrous    sulfate Liquid 300 milliGRAM(s) Enteral Tube daily  folic acid  Oral Tab/Cap - Peds 1 milliGRAM(s) Oral daily  glycopyrrolate 2 milliGRAM(s) Oral three times a day  ipratropium 17 MICROgram(s) HFA Inhaler 1 Puff(s) Inhalation every 6 hours  meropenem  IVPB 2000 milliGRAM(s) IV Intermittent every 8 hours  metoclopramide 10 milliGRAM(s) Oral Before meals and at bedtime  metoprolol tartrate 50 milliGRAM(s) Oral two times a day  midodrine 5 milliGRAM(s) Oral every 8 hours  pantoprazole    Tablet 40 milliGRAM(s) Oral before breakfast  petrolatum white Ointment 1 Application(s) Topical two times a day  scopolamine 1 mG/72 Hr(s) Patch 1 Patch Transdermal every 72 hours  sodium chloride 0.9%. 1000 milliLiter(s) IV Continuous <Continuous>  thiamine  Oral Tab/Cap - Peds 100 milliGRAM(s) Oral two times a day  tigecycline IVPB 50 milliGRAM(s) IV Intermittent every 12 hours  tigecycline IVPB        PRN MEDICATIONS  acetaminophen     Tablet .. 650 milliGRAM(s) Oral every 6 hours PRN  aluminum hydroxide/magnesium hydroxide/simethicone Suspension 30 milliLiter(s) Oral every 4 hours PRN  melatonin 3 milliGRAM(s) Oral at bedtime PRN  ondansetron Injectable 4 milliGRAM(s) IV Push every 8 hours PRN      VITAL SIGNS: Last 24 Hours  T(C): 37.2 (2022 04:50), Max: 37.2 (2022 04:50)  T(F): 98.9 (2022 04:50), Max: 98.9 (2022 04:50)  HR: 109 (2022 04:50) (105 - 119)  BP: 97/50 (2022 04:50) (95/59 - 99/53)  BP(mean): --  RR: 18 (2022 04:50) (18 - 18)  SpO2: 100% (2022 04:50) (95% - 100%)    LABS:                        11.3   24.24 )-----------( 541      ( 2022 11:00 )             36.8     07    134<L>  |  98  |  13  ----------------------------<  89  4.7   |  28  |  <0.5<L>    Ca    8.5      2022 11:00  Mg     1.9         TPro  6.3  /  Alb  2.8<L>  /  TBili  0.3  /  DBili  x   /  AST  43<H>  /  ALT  83<H>  /  AlkPhos  237<H>        Urinalysis Basic - ( 2022 19:36 )    Color: Yellow / Appearance: Slightly Turbid / S.017 / pH: x  Gluc: x / Ketone: Trace  / Bili: Negative / Urobili: <2 mg/dL   Blood: x / Protein: Trace / Nitrite: Negative   Leuk Esterase: Moderate / RBC: 11 /HPF / WBC 45 /HPF   Sq Epi: x / Non Sq Epi: 15 /HPF / Bacteria: Negative        Lactate, Blood: 1.1 mmol/L (22 @ 11:00)  Sedimentation Rate, Erythrocyte: 65 mm/Hr *H* (22 @ 18:37)          RADIOLOGY:      PHYSICAL EXAM:  GENERAL: Pt has Trach and PEG, does not communicate so assessment limited  CHEST/LUNG: Equal air entry bilaterally; no wheezing, bilateral rhonchi heard  HEART: Regular rate and rhythm; No murmurs, rubs, or gallops  ABDOMEN: Soft, patient does not grimace on palpation, Nondistended; Bowel sounds present  EXTREMITIES:  2+ Peripheral Pulses, trace ankle edema

## 2022-07-21 NOTE — PROGRESS NOTE ADULT - ASSESSMENT
23 y/o F with a PMHx of encephalitis s/p tooth abscess s/p tracheostomy and PEG tube placement, HTN, GERD, and SVC thrombosis , recent admission for Covid19 PNA and pyelonephritis in June 2022, currently being treated with IV Vancomycin, cefepime for PNA since 7/8, BIBEMS from [facility] for fever this AM. Per NH notes, pt spiked temp of 102.6 today and was sent to ED. Pt is non-verbal, unable to contribute to history or ROS. pt has sputum around the trach area.     # sepsis 2/2 MDR PNA  # Acute on chronic respiratory failure  - pt's first covid test was positive in 5/22, subsequent tests negative, positive again today. ? recurrence? vs persistent infection?  - hx of MDR Klebsiella bacteremia  - in the ED,pt's VS T Max: 37.9  BP 89/49  - labs done showed WBC 11k, creat 0.5, CRP 73,covid is positive  - CT Angio Chest PE Protocol w/ IV Cont > NO PE, consistent with pneumonia  - pt was started on cefepime and vancomycin 7/8 at [facility]  - previous cultures grew Klebsiella, proteus ESBL, MDRO, CRE  - sputum cx with carbapenem resistent Klebsiella  - ID recs: Cefiderocol 2g q8h started   - blood cultures NGTD  - c/w oxygen support via trach  - 07/15: switched to severo + tiga due to increasing LFTs  - 07/18: WBC increasing (14 to 17), chest xray ordered, keep same Ax for now as per ID, patient hemodynamically stable with no fever  - 07/19: WBC stable, CXR has no worsening signs, patient afebrile and stable, continue Ax and f/u ID plan tomorrow  - 07/20: WBC increasing, patient afebrile and hemodynamically no changes relative to her baseline, ID plan is to take urine analysis, blood Cx, remove the midline (another one placed with no complications), and give one dose of vancomycin 1g after cultures are drawn.  - 07/21:     # Elevated LFTs  - likely due to Cefiderocol  - RUQ U/S WNL, hep panel NR  - hepatology recs: trend LFT and INR, check EBV, CMV, herpes, Avoid hepatotoxic medications  - as per ID: Cefiderocol d/c'ed   - 7/15 Tigecycline and Meropenem started  - 07/19: LFTs trending down  - 07/20: LFTs stable  - 07/21:     #Persistent sinus tachycardia  - pt was persistently tachycardic on previous admission baseline 100-120s  - EP eval, HR <130 acceptable  - on admission pt was tachycardic to 150-160s, now at baseline 90-120s s/p ABx  - c/w Metoprolol 50 BID  - 07/18: HR going up to 150 =>d-dimer ordered to assess for DVT, fluid bolus to be given to r/o intravascular volume depletion as a cause (patient on dry side), might add diltiazem IR 30mg Q6H if still tachycardic  - 07/19: D-dimer not suggestive of PE, HR slightly improved (in the 100-110 range) on fluids, to be monitored  - 07/20: HR in the 100-110 range, no changes made today  - 07/21: pending echocardio for assessment (possible cardiomyopathy leading to the tachycardia)    #HO SVC Thrombosis and Embolism   - C/w Eliquis 5 mg PO BID    #History of Encephalitis post Tooth Abscess in 10/2021   - s/p Tracheostomy (has an O2 tank per sister but unsure about flow) and G-tube placement  - aspiration precautions, respiratory toilet/suction as per resp team    # Hypernatremia- resolved     #GERD  * Home med Omeprazole 40mg QD  - C/w Protonix 40 mg PO daily    #Iron Deficiency Anemia   #History of Wernicke Encephalopathy  #Suspected thiamine deficiency  #Suspected folic acid deficiency  * Home med iron replacement, thiamine, B12, and folic acid  - C/w home iron replacement, thiamine, B12, and folic acid  - Monitor for now    #DVT ppx: Eliquis 5 mg PO BID  #GI ppx: Protonix 40 mg PO daily  #Diet: NPO w G-tube feeds  #Activity: Functional quadriplegic  #Dispo: From [facility];  #Code status: Full code -- has MOLST confirming 23 y/o F with a PMHx of encephalitis s/p tooth abscess s/p tracheostomy and PEG tube placement, HTN, GERD, and SVC thrombosis , recent admission for Covid19 PNA and pyelonephritis in June 2022, currently being treated with IV Vancomycin, cefepime for PNA since 7/8, BIBEMS from [facility] for fever this AM. Per NH notes, pt spiked temp of 102.6 today and was sent to ED. Pt is non-verbal, unable to contribute to history or ROS. pt has sputum around the trach area.     # sepsis 2/2 MDR PNA  # Acute on chronic respiratory failure  - pt's first covid test was positive in 5/22, subsequent tests negative, positive again today. ? recurrence? vs persistent infection?  - hx of MDR Klebsiella bacteremia  - in the ED,pt's VS T Max: 37.9  BP 89/49  - labs done showed WBC 11k, creat 0.5, CRP 73,covid is positive  - CT Angio Chest PE Protocol w/ IV Cont > NO PE, consistent with pneumonia  - pt was started on cefepime and vancomycin 7/8 at [facility]  - previous cultures grew Klebsiella, proteus ESBL, MDRO, CRE  - sputum cx with carbapenem resistent Klebsiella  - ID recs: Cefiderocol 2g q8h started   - blood cultures NGTD  - c/w oxygen support via trach  - 07/15: switched to severo + tiga due to increasing LFTs  - 07/18: WBC increasing (14 to 17), chest xray ordered, keep same Ax for now as per ID, patient hemodynamically stable with no fever  - 07/19: WBC stable, CXR has no worsening signs, patient afebrile and stable, continue Ax and f/u ID plan tomorrow  - 07/20: WBC increasing, patient afebrile and hemodynamically no changes relative to her baseline, ID plan is to take urine analysis, blood Cx, remove the midline (another one placed with no complications), and give one dose of vancomycin 1g after cultures are drawn.  - 07/21: WBC stable, still no focus of infection (possibility of fungal, awaiting fungitel), keep on antibiotics, following cultures (NGTD), hemodynamically stable, afebrile    # Elevated LFTs  - likely due to Cefiderocol  - RUQ U/S WNL, hep panel NR  - hepatology recs: trend LFT and INR, check EBV, CMV, herpes, Avoid hepatotoxic medications  - as per ID: Cefiderocol d/c'ed   - 7/15 Tigecycline and Meropenem started  - 07/19: LFTs trending down  - 07/20: LFTs stable  - 07/21: Lffs stable, will keep monitoring as patient is on Tigacycline    #Persistent sinus tachycardia  - pt was persistently tachycardic on previous admission baseline 100-120s  - EP eval, HR <130 acceptable  - on admission pt was tachycardic to 150-160s, now at baseline 90-120s s/p ABx  - c/w Metoprolol 50 BID  - 07/18: HR going up to 150 =>d-dimer ordered to assess for DVT, fluid bolus to be given to r/o intravascular volume depletion as a cause (patient on dry side), might add diltiazem IR 30mg Q6H if still tachycardic  - 07/19: D-dimer not suggestive of PE, HR slightly improved (in the 100-110 range) on fluids, to be monitored  - 07/20: HR in the 100-110 range, no changes made today  - 07/21: pending echocardio for assessment (possible cardiomyopathy leading to the tachycardia)    #HO SVC Thrombosis and Embolism   - C/w Eliquis 5 mg PO BID    #History of Encephalitis post Tooth Abscess in 10/2021   - s/p Tracheostomy (has an O2 tank per sister but unsure about flow) and G-tube placement  - aspiration precautions, respiratory toilet/suction as per resp team    # Hypernatremia- resolved     #GERD  * Home med Omeprazole 40mg QD  - C/w Protonix 40 mg PO daily    #Iron Deficiency Anemia   #History of Wernicke Encephalopathy  #Suspected thiamine deficiency  #Suspected folic acid deficiency  * Home med iron replacement, thiamine, B12, and folic acid  - C/w home iron replacement, thiamine, B12, and folic acid  - Monitor for now    #DVT ppx: Eliquis 5 mg PO BID  #GI ppx: Protonix 40 mg PO daily  #Diet: NPO w G-tube feeds  #Activity: Functional quadriplegic  #Dispo: From [facility];  #Code status: Full code -- has MOLST confirming

## 2022-07-22 LAB
ALBUMIN SERPL ELPH-MCNC: 2.9 G/DL — LOW (ref 3.5–5.2)
ALP SERPL-CCNC: 253 U/L — HIGH (ref 30–115)
ALT FLD-CCNC: 89 U/L — HIGH (ref 0–41)
ANION GAP SERPL CALC-SCNC: 7 MMOL/L — SIGNIFICANT CHANGE UP (ref 7–14)
AST SERPL-CCNC: 66 U/L — HIGH (ref 0–41)
BASOPHILS # BLD AUTO: 0.1 K/UL — SIGNIFICANT CHANGE UP (ref 0–0.2)
BASOPHILS NFR BLD AUTO: 0.5 % — SIGNIFICANT CHANGE UP (ref 0–1)
BILIRUB SERPL-MCNC: 0.3 MG/DL — SIGNIFICANT CHANGE UP (ref 0.2–1.2)
BUN SERPL-MCNC: 13 MG/DL — SIGNIFICANT CHANGE UP (ref 10–20)
CALCIUM SERPL-MCNC: 8.4 MG/DL — LOW (ref 8.5–10.1)
CHLORIDE SERPL-SCNC: 102 MMOL/L — SIGNIFICANT CHANGE UP (ref 98–110)
CO2 SERPL-SCNC: 31 MMOL/L — SIGNIFICANT CHANGE UP (ref 17–32)
COVID-19 NUCLEOCAPSID GAM AB INTERP: POSITIVE
COVID-19 NUCLEOCAPSID TOTAL GAM ANTIBODY RESULT: 26 INDEX — HIGH
COVID-19 SPIKE DOMAIN AB INTERP: POSITIVE
COVID-19 SPIKE DOMAIN ANTIBODY RESULT: >250 U/ML — HIGH
CREAT SERPL-MCNC: <0.5 MG/DL — LOW (ref 0.7–1.5)
EGFR: 143 ML/MIN/1.73M2 — SIGNIFICANT CHANGE UP
EOSINOPHIL # BLD AUTO: 0.84 K/UL — HIGH (ref 0–0.7)
EOSINOPHIL NFR BLD AUTO: 4.4 % — SIGNIFICANT CHANGE UP (ref 0–8)
FUNGITELL: 52 PG/ML — SIGNIFICANT CHANGE UP
GLUCOSE BLDC GLUCOMTR-MCNC: 102 MG/DL — HIGH (ref 70–99)
GLUCOSE BLDC GLUCOMTR-MCNC: 106 MG/DL — HIGH (ref 70–99)
GLUCOSE BLDC GLUCOMTR-MCNC: 106 MG/DL — HIGH (ref 70–99)
GLUCOSE BLDC GLUCOMTR-MCNC: 117 MG/DL — HIGH (ref 70–99)
GLUCOSE SERPL-MCNC: 81 MG/DL — SIGNIFICANT CHANGE UP (ref 70–99)
HCT VFR BLD CALC: 36.9 % — LOW (ref 37–47)
HGB BLD-MCNC: 11.2 G/DL — LOW (ref 12–16)
IMM GRANULOCYTES NFR BLD AUTO: 0.4 % — HIGH (ref 0.1–0.3)
LYMPHOCYTES # BLD AUTO: 12.1 % — LOW (ref 20.5–51.1)
LYMPHOCYTES # BLD AUTO: 2.34 K/UL — SIGNIFICANT CHANGE UP (ref 1.2–3.4)
MCHC RBC-ENTMCNC: 23.5 PG — LOW (ref 27–31)
MCHC RBC-ENTMCNC: 30.4 G/DL — LOW (ref 32–37)
MCV RBC AUTO: 77.5 FL — LOW (ref 81–99)
MONOCYTES # BLD AUTO: 1.78 K/UL — HIGH (ref 0.1–0.6)
MONOCYTES NFR BLD AUTO: 9.2 % — SIGNIFICANT CHANGE UP (ref 1.7–9.3)
NEUTROPHILS # BLD AUTO: 14.14 K/UL — HIGH (ref 1.4–6.5)
NEUTROPHILS NFR BLD AUTO: 73.4 % — SIGNIFICANT CHANGE UP (ref 42.2–75.2)
NRBC # BLD: 0 /100 WBCS — SIGNIFICANT CHANGE UP (ref 0–0)
PLATELET # BLD AUTO: 597 K/UL — HIGH (ref 130–400)
POTASSIUM SERPL-MCNC: 4.9 MMOL/L — SIGNIFICANT CHANGE UP (ref 3.5–5)
POTASSIUM SERPL-SCNC: 4.9 MMOL/L — SIGNIFICANT CHANGE UP (ref 3.5–5)
PROCALCITONIN SERPL-MCNC: 0.04 NG/ML — SIGNIFICANT CHANGE UP (ref 0.02–0.1)
PROT SERPL-MCNC: 6.4 G/DL — SIGNIFICANT CHANGE UP (ref 6–8)
RBC # BLD: 4.76 M/UL — SIGNIFICANT CHANGE UP (ref 4.2–5.4)
RBC # FLD: 16.9 % — HIGH (ref 11.5–14.5)
SARS-COV-2 IGG+IGM SERPL QL IA: 26 INDEX — HIGH
SARS-COV-2 IGG+IGM SERPL QL IA: >250 U/ML — HIGH
SARS-COV-2 IGG+IGM SERPL QL IA: POSITIVE
SARS-COV-2 IGG+IGM SERPL QL IA: POSITIVE
SODIUM SERPL-SCNC: 140 MMOL/L — SIGNIFICANT CHANGE UP (ref 135–146)
WBC # BLD: 19.28 K/UL — HIGH (ref 4.8–10.8)
WBC # FLD AUTO: 19.28 K/UL — HIGH (ref 4.8–10.8)

## 2022-07-22 PROCEDURE — 99233 SBSQ HOSP IP/OBS HIGH 50: CPT

## 2022-07-22 RX ORDER — OXYCODONE AND ACETAMINOPHEN 5; 325 MG/1; MG/1
1 TABLET ORAL ONCE
Refills: 0 | Status: DISCONTINUED | OUTPATIENT
Start: 2022-07-22 | End: 2022-07-22

## 2022-07-22 RX ORDER — SODIUM CHLORIDE 9 MG/ML
1000 INJECTION INTRAMUSCULAR; INTRAVENOUS; SUBCUTANEOUS
Refills: 0 | Status: DISCONTINUED | OUTPATIENT
Start: 2022-07-22 | End: 2022-07-23

## 2022-07-22 RX ADMIN — Medication 1 PUFF(S): at 07:39

## 2022-07-22 RX ADMIN — MIDODRINE HYDROCHLORIDE 5 MILLIGRAM(S): 2.5 TABLET ORAL at 05:20

## 2022-07-22 RX ADMIN — Medication 10 MILLIGRAM(S): at 05:20

## 2022-07-22 RX ADMIN — ROBINUL 2 MILLIGRAM(S): 0.2 INJECTION INTRAMUSCULAR; INTRAVENOUS at 05:22

## 2022-07-22 RX ADMIN — MEROPENEM 35 MILLIGRAM(S): 1 INJECTION INTRAVENOUS at 22:32

## 2022-07-22 RX ADMIN — ROBINUL 2 MILLIGRAM(S): 0.2 INJECTION INTRAMUSCULAR; INTRAVENOUS at 22:32

## 2022-07-22 RX ADMIN — ROBINUL 2 MILLIGRAM(S): 0.2 INJECTION INTRAMUSCULAR; INTRAVENOUS at 13:38

## 2022-07-22 RX ADMIN — CYCLOBENZAPRINE HYDROCHLORIDE 5 MILLIGRAM(S): 10 TABLET, FILM COATED ORAL at 05:21

## 2022-07-22 RX ADMIN — ALBUTEROL 1 PUFF(S): 90 AEROSOL, METERED ORAL at 14:20

## 2022-07-22 RX ADMIN — MIDODRINE HYDROCHLORIDE 5 MILLIGRAM(S): 2.5 TABLET ORAL at 13:38

## 2022-07-22 RX ADMIN — Medication 1 PUFF(S): at 14:22

## 2022-07-22 RX ADMIN — Medication 1 MILLIGRAM(S): at 11:13

## 2022-07-22 RX ADMIN — Medication 10 MILLIGRAM(S): at 11:12

## 2022-07-22 RX ADMIN — SCOPALAMINE 1 PATCH: 1 PATCH, EXTENDED RELEASE TRANSDERMAL at 16:55

## 2022-07-22 RX ADMIN — MEROPENEM 35 MILLIGRAM(S): 1 INJECTION INTRAVENOUS at 05:19

## 2022-07-22 RX ADMIN — ALBUTEROL 1 PUFF(S): 90 AEROSOL, METERED ORAL at 07:39

## 2022-07-22 RX ADMIN — APIXABAN 5 MILLIGRAM(S): 2.5 TABLET, FILM COATED ORAL at 05:21

## 2022-07-22 RX ADMIN — TIGECYCLINE 105 MILLIGRAM(S): 50 INJECTION, POWDER, LYOPHILIZED, FOR SOLUTION INTRAVENOUS at 05:19

## 2022-07-22 RX ADMIN — Medication 100 MILLIGRAM(S): at 17:09

## 2022-07-22 RX ADMIN — SODIUM CHLORIDE 100 MILLILITER(S): 9 INJECTION INTRAMUSCULAR; INTRAVENOUS; SUBCUTANEOUS at 16:01

## 2022-07-22 RX ADMIN — SCOPALAMINE 1 PATCH: 1 PATCH, EXTENDED RELEASE TRANSDERMAL at 07:00

## 2022-07-22 RX ADMIN — MEROPENEM 35 MILLIGRAM(S): 1 INJECTION INTRAVENOUS at 13:39

## 2022-07-22 RX ADMIN — OXYCODONE AND ACETAMINOPHEN 1 TABLET(S): 5; 325 TABLET ORAL at 16:01

## 2022-07-22 RX ADMIN — Medication 1 APPLICATION(S): at 05:22

## 2022-07-22 RX ADMIN — CYCLOBENZAPRINE HYDROCHLORIDE 5 MILLIGRAM(S): 10 TABLET, FILM COATED ORAL at 13:38

## 2022-07-22 RX ADMIN — ALBUTEROL 1 PUFF(S): 90 AEROSOL, METERED ORAL at 11:33

## 2022-07-22 RX ADMIN — TIGECYCLINE 105 MILLIGRAM(S): 50 INJECTION, POWDER, LYOPHILIZED, FOR SOLUTION INTRAVENOUS at 17:08

## 2022-07-22 RX ADMIN — Medication 300 MILLIGRAM(S): at 11:12

## 2022-07-22 RX ADMIN — Medication 50 MILLIGRAM(S): at 05:20

## 2022-07-22 RX ADMIN — APIXABAN 5 MILLIGRAM(S): 2.5 TABLET, FILM COATED ORAL at 17:09

## 2022-07-22 RX ADMIN — MIDODRINE HYDROCHLORIDE 5 MILLIGRAM(S): 2.5 TABLET ORAL at 22:31

## 2022-07-22 RX ADMIN — PREGABALIN 1000 MICROGRAM(S): 225 CAPSULE ORAL at 11:12

## 2022-07-22 RX ADMIN — CYCLOBENZAPRINE HYDROCHLORIDE 5 MILLIGRAM(S): 10 TABLET, FILM COATED ORAL at 22:32

## 2022-07-22 RX ADMIN — Medication 10 MILLIGRAM(S): at 22:31

## 2022-07-22 RX ADMIN — Medication 10 MILLIGRAM(S): at 16:54

## 2022-07-22 RX ADMIN — OXYCODONE AND ACETAMINOPHEN 1 TABLET(S): 5; 325 TABLET ORAL at 16:31

## 2022-07-22 RX ADMIN — PANTOPRAZOLE SODIUM 40 MILLIGRAM(S): 20 TABLET, DELAYED RELEASE ORAL at 05:20

## 2022-07-22 RX ADMIN — Medication 100 MILLIGRAM(S): at 05:20

## 2022-07-22 NOTE — PROGRESS NOTE ADULT - ASSESSMENT
21 y/o F with a PMHx of encephalitis s/p tooth abscess s/p tracheostomy and PEG tube placement, HTN, GERD, and SVC thrombosis , recent admission for Covid19 PNA and pyelonephritis in June 2022, currently being treated with IV Vancomycin, cefepime for PNA since 7/8, BIBEMS from [facility] for fever this AM. Per NH notes, pt spiked temp of 102.6 today and was sent to ED.     IMPRESSION;   #Leukocytosis & Tachycardia    CXR no PNA   #Transaminitis, suspect secondary to cefiderocol  #Sepsis on admission T>101 P>90 WBC 14 secondary to suspected GNR PNA , also COVID19 +     7/10 sputum     Numerous Klebsiella pneumoniae (Carbapenem Resistant)    7/10 BCX NGTD     7/9 BCX NGTD     Procalcitonin, Serum: 0.11 ng/mL (07-09-22 @ 13:32)    UA without significant pyuria     CT Airspace consolidations predominantly within the left upper and lower lobes with enlarged left hilar lymph nodes, most consistent with pneumonia.    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     5/29-5/31 BCX NGTD     5/28 BCX  Klebsiella pneumoniae (Carbapenem Resistant)    5/27 BCx Klebsiella pneumoniae (Carbapenem Resistant)    5/27 UCx Klebsiella pneumoniae (Carbapenem Resistant)    Pyuria  #Trach/PEG  Creatinine, Serum: <0.5 mg/dL (07.10.22 @ 06:01)      RECOMMENDATIONS;  - D/C antibiotics and monitor if hemodynamic compromise, restart:   - Sally 2g q8h IV over extended infusion of 4 hours and tigecycline  50mg q12h IV (need to monitor Alk Ph on Tigecycline) Day 13  - Trend WBC   - Off loading to prevent pressure sores and preventive measures to avoid aspiration   - Colonized with MRDO, GOC , grave prognosis. No real further ABX options  - Trend WBC    If any questions, please call or send a message on [employer] Teams  Please continue to update ID with any pertinent new laboratory or radiographic findings  Spectra [phone]   21 y/o F with a PMHx of encephalitis s/p tooth abscess s/p tracheostomy and PEG tube placement, HTN, GERD, and SVC thrombosis , recent admission for Covid19 PNA and pyelonephritis in June 2022, currently being treated with IV Vancomycin, cefepime for PNA since 7/8, BIBEMS from [facility] for fever this AM. Per NH notes, pt spiked temp of 102.6 today and was sent to ED.     IMPRESSION;   #Leukocytosis & Tachycardia    CXR no PNA   #Transaminitis, suspect secondary to cefiderocol  #Sepsis on admission T>101 P>90 WBC 14 secondary to suspected GNR PNA , also COVID19 +     7/10 sputum     Numerous Klebsiella pneumoniae (Carbapenem Resistant)    7/10 BCX NGTD     7/9 BCX NGTD     Procalcitonin, Serum: 0.11 ng/mL (07-09-22 @ 13:32)    UA without significant pyuria     CT Airspace consolidations predominantly within the left upper and lower lobes with enlarged left hilar lymph nodes, most consistent with pneumonia.    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     5/29-5/31 BCX NGTD     5/28 BCX  Klebsiella pneumoniae (Carbapenem Resistant)    5/27 BCx Klebsiella pneumoniae (Carbapenem Resistant)    5/27 UCx Klebsiella pneumoniae (Carbapenem Resistant)    Pyuria  #Trach/PEG  Creatinine, Serum: <0.5 mg/dL (07.10.22 @ 06:01)      RECOMMENDATIONS;  - No clear source of infection, BCX NG, consider CT Chest/ A/ P  w/  - Sally 2g q8h IV over extended infusion of 4 hours and tigecycline  50mg q12h IV (need to monitor Alk Ph on Tigecycline) END 7/23 14 days  - Trend WBC   - Off loading to prevent pressure sores and preventive measures to avoid aspiration   - Colonized with MRDO, GOC , grave prognosis. No real further ABX options  - Trend WBC    If any questions, please call or send a message on [employer] Teams  Please continue to update ID with any pertinent new laboratory or radiographic findings  Spectra [phone]

## 2022-07-22 NOTE — PROGRESS NOTE ADULT - ATTENDING COMMENTS
Patient is a 23 y/o Female  with a PMHx of encephalitis s/p tooth abscess s/p tracheostomy and PEG tube placement, HTN, GERD, and SVC thrombosis who was brought to ED from [place] for fevers. Admitted for tx for sepsis due to PNA.    # Sepsis 2/2 multi drug resistant pneumonia, sputum growing Klebsiella pneumoniae carbapenem resistant  # Acute on chronic respiratory failure 2/2 MDR pneumonia  # h/o encephalitis from tooth abscess  s/p trach and PEG, bedbound, non-verbal   # Recent COVID-19 infection, MDR colonization on respiratory tract, inability to clear secretion  - Changed IV antibiotic from cefiderocol to Meropenem, Tigecycline (given the LFTs elevation)  till 7/23 to complete 14 days.  -  7/21 - exchanged echeverria - and resent urine cxs  -  fungitell - low, d/c caspofungin   - blood cxs - negative, procal level - 0.04, repeated CXR on 7/21 - no new opacifications     -Vent management as per Pulm. team       #Mild transaminitis:   Might be 2/2 Abx. Changed as above. Monitor. Improving  RUQ US noted  Check  EBV CMV herpes hepatitis E serologies per hepatology.     #Tachycardia:   - cont. metroprolol , diltiazem,  restarted on IVF   -thyroid function wnr    # h/o SVC Thrombosis and Embolism   - c/w Eliquis 5 mg PO BID    # GERD  - c/w Protonix 40 mg PO daily    # Iron Deficiency Anemia, stable   - monitor CBC  - c/w home iron replacement, thiamine, B12, and folic acid    # DVT prophlaxis   - on eliquis 5 mg PO BID    #Functional Quadruparesis  s/p Trach/PEG    Dispo: [facility].     #Progress Note Handoff: monitor CBC daily, f/up  resent urine cxs  Family discussion: yes, medical team Disposition: SNF once medically stable    Code status: full code     Total time spent to complete patient's bedside assessment, review medical chart, discuss medical plan of care with covering medical team was more than 35 minutes . Patient is a 21 y/o Female  with a PMHx of encephalitis s/p tooth abscess s/p tracheostomy and PEG tube placement, HTN, GERD, and SVC thrombosis who was [place]. Admitted for tx for sepsis due to PNA.    # Sepsis 2/2 multi drug resistant pneumonia, sputum growing Klebsiella pneumoniae carbapenem resistant  # Acute on chronic respiratory failure 2/2 MDR pneumonia  # h/o encephalitis from tooth abscess  s/p trach and PEG, bedbound, non-verbal   # Recent COVID-19 infection, MDR colonization on respiratory tract, inability to clear secretion  - Changed IV antibiotic from cefiderocol to Meropenem, Tigecycline (given the LFTs elevation)  till 7/23 to complete 14 days.  -  7/21 - exchanged echeverria - and resent urine cxs  -  fungitell - low, d/c caspofungin   - blood cxs - negative, procal level - 0.04, repeated CXR on 7/21 - no new opacifications     -Vent management as per Pulm. team       #Mild transaminitis:   Might be 2/2 Abx. Changed as above. Monitor. Improving  RUQ US noted  Check  EBV CMV herpes hepatitis E serologies per hepatology.     # Hypotension- on midodrine 5 mg  three times daily   - obtain serum am cortisol level    #Tachycardia: ? due to pain, started on percocet prn for pain every 6 hours  - cont. metroprolol ,  restarted on IVF   -thyroid function wnr    # h/o SVC Thrombosis and Embolism   - c/w Eliquis 5 mg PO BID    # GERD  - c/w Protonix 40 mg PO daily    # Iron Deficiency Anemia, stable   - monitor CBC  - c/w home iron replacement, thiamine, B12, and folic acid    # DVT prophlaxis   - on eliquis 5 mg PO BID    #Functional Quadruparesis  s/p Trach/PEG    Dispo: Grave prognosis.     #Progress Note Handoff: CBC in am, f/up  resent urine cxs, f/up am cortisol level, d/c abx tomorrow  Family discussion: yes, medical team Disposition: SNF once medically stable    Code status: full code     Total time spent to complete patient's bedside assessment, review medical chart, discuss medical plan of care with covering medical team was more than 35 minutes .

## 2022-07-22 NOTE — PROGRESS NOTE ADULT - ASSESSMENT
21 y/o F with a PMHx of encephalitis s/p tooth abscess s/p tracheostomy and PEG tube placement, HTN, GERD, and SVC thrombosis , recent admission for Covid19 PNA and pyelonephritis in June 2022, currently being treated with IV Vancomycin, cefepime for PNA since 7/8, BIBEMS from [facility] for fever this AM. Per NH notes, pt spiked temp of 102.6 today and was sent to ED. Pt is non-verbal, unable to contribute to history or ROS. pt has sputum around the trach area.     # sepsis 2/2 MDR PNA  # Acute on chronic respiratory failure  - pt's first covid test was positive in 5/22, subsequent tests negative, positive again today. ? recurrence? vs persistent infection?  - hx of MDR Klebsiella bacteremia  - in the ED,pt's VS T Max: 37.9  BP 89/49  - labs done showed WBC 11k, creat 0.5, CRP 73,covid is positive  - CT Angio Chest PE Protocol w/ IV Cont > NO PE, consistent with pneumonia  - pt was started on cefepime and vancomycin 7/8 at [facility]  - previous cultures grew Klebsiella, proteus ESBL, MDRO, CRE  - sputum cx with carbapenem resistent Klebsiella  - ID recs: Cefiderocol 2g q8h started   - blood cultures NGTD  - c/w oxygen support via trach  - 07/15: switched to severo + tiga due to increasing LFTs  - 07/18: WBC increasing (14 to 17), chest xray ordered, keep same Ax for now as per ID, patient hemodynamically stable with no fever  - 07/19: WBC stable, CXR has no worsening signs, patient afebrile and stable, continue Ax and f/u ID plan tomorrow  - 07/20: WBC increasing, patient afebrile and hemodynamically no changes relative to her baseline, ID plan is to take urine analysis, blood Cx, remove the midline (another one placed with no complications), and give one dose of vancomycin 1g after cultures are drawn.  - 07/21: WBC stable, still no focus of infection (possibility of fungal, awaiting fungitel), keep on antibiotics, following cultures (NGTD), hemodynamically stable, afebrile  - 07/22: WBC decreasing , still no focus of infection, procal negative (possibility of fungal, awaiting fungitel, started on Caspufungin awaiting results), keep on antibiotics until tomorrow (14 days total), following cultures (NGTD), hemodynamically stable, afebrile    # Elevated LFTs  - likely due to Cefiderocol  - RUQ U/S WNL, hep panel NR  - hepatology recs: trend LFT and INR, check EBV, CMV, herpes, Avoid hepatotoxic medications  - as per ID: Cefiderocol d/c'ed   - 7/15 Tigecycline and Meropenem started  - 07/19: LFTs trending down  - 07/20: LFTs stable  - 07/21: Lffs stable, will keep monitoring as patient is on Tigacycline    #Persistent sinus tachycardia  - pt was persistently tachycardic on previous admission baseline 100-120s  - EP eval, HR <130 acceptable  - on admission pt was tachycardic to 150-160s, now at baseline 90-120s s/p ABx  - c/w Metoprolol 50 BID  - 07/18: HR going up to 150 =>d-dimer ordered to assess for DVT, fluid bolus to be given to r/o intravascular volume depletion as a cause (patient on dry side), might add diltiazem IR 30mg Q6H if still tachycardic  - 07/19: D-dimer not suggestive of PE, HR slightly improved (in the 100-110 range) on fluids, to be monitored  - 07/20: HR in the 100-110 range, no changes made today  - 07/22: pending echocardio for assessment (possible cardiomyopathy leading to the tachycardia)    #HO SVC Thrombosis and Embolism   - C/w Eliquis 5 mg PO BID    #History of Encephalitis post Tooth Abscess in 10/2021   - s/p Tracheostomy (has an O2 tank per sister but unsure about flow) and G-tube placement  - aspiration precautions, respiratory toilet/suction as per resp team    # Hypernatremia- resolved     #GERD  * Home med Omeprazole 40mg QD  - C/w Protonix 40 mg PO daily    #Iron Deficiency Anemia   #History of Wernicke Encephalopathy  #Suspected thiamine deficiency  #Suspected folic acid deficiency  * Home med iron replacement, thiamine, B12, and folic acid  - C/w home iron replacement, thiamine, B12, and folic acid  - Monitor for now    #DVT ppx: Eliquis 5 mg PO BID  #GI ppx: Protonix 40 mg PO daily  #Diet: NPO w G-tube feeds  #Activity: Functional quadriplegic  #Dispo: From [facility];  #Code status: Full code -- has MOLST confirming

## 2022-07-22 NOTE — CHART NOTE - NSCHARTNOTEFT_GEN_A_CORE
Registered Dietitian Follow-Up     Patient Profile Reviewed                           Yes [x]   No []  Nutrition History Previously Obtained        Yes [x]  No []      Pertinent Medical Interventions:  Patient is a 23 y/o Female  with a PMHx of encephalitis s/p tooth abscess s/p tracheostomy and PEG tube placement, HTN, GERD, and SVC thrombosis who was brought to ED from [place] for fevers. Admitted for tx for sepsis due to PNA.    Nutrition Interval History:   PEG Jevity 1.2 @345 Q6hr (4x) provides total formula 1380mL, 1656kcal, 76g protein, 1118mL free water + free water flushes 240 mL q6hrs (4x) = 960 mL + 1118 mL free water = 2078 mL Fluid  **Patient meeting >85% of estimated energy needs in-house via EN    Diet order:   Diet, NPO with Tube Feed:   Tube Feeding Modality: Gastrostomy  Jevity 1.2 Gilberto  Total Volume for 24 Hours (mL): 1380  Bolus  Total Volume of Bolus (mL):  345  Total # of Feeds: 4  Tube Feed Frequency: Every 6 hours   Tube Feed Start Time: 00:00  Bolus Feed Rate (mL per Hour): 345   Bolus Feed Duration (in Hours): 1  Bolus   Total Volume per Flush (mL): 240   Frequency: Every 6 Hours (22 @ 17:52) [Active]    Anthropometrics:  Height --167.6 per NH report, current height of 154.9cm inaccurate  Weight: 63kg  BMI (kg/m2): 22.43kg/m2  IBW: 59 kg    OTHER WEIGHTS:   Daily Weight in k.2 (), Weight in k ()  UBW is 61.27 KG (134.8#); height is 5'5" -- last checked on 22 per RN at NH  % Weight Change -- stable    MEDICATIONS  (STANDING):  ALBUTerol    90 MICROgram(s) HFA Inhaler 1 Puff(s) Inhalation every 4 hours  apixaban 5 milliGRAM(s) Oral two times a day  caspofungin IVPB      caspofungin IVPB 50 milliGRAM(s) IV Intermittent every 24 hours  cyanocobalamin 1000 MICROGram(s) Oral daily  cyclobenzaprine Oral Tab/Cap - Peds 5 milliGRAM(s) Oral three times a day  dextrose 50% Injectable 25 Gram(s) IV Push once  diltiazem    Tablet 30 milliGRAM(s) Oral every 6 hours  ferrous    sulfate Liquid 300 milliGRAM(s) Enteral Tube daily  folic acid  Oral Tab/Cap - Peds 1 milliGRAM(s) Oral daily  glycopyrrolate 2 milliGRAM(s) Oral three times a day  ipratropium 17 MICROgram(s) HFA Inhaler 1 Puff(s) Inhalation every 6 hours  meropenem  IVPB 2000 milliGRAM(s) IV Intermittent every 8 hours  metoclopramide 10 milliGRAM(s) Oral Before meals and at bedtime  metoprolol tartrate 50 milliGRAM(s) Oral two times a day  midodrine 5 milliGRAM(s) Oral every 8 hours  pantoprazole    Tablet 40 milliGRAM(s) Oral before breakfast  petrolatum white Ointment 1 Application(s) Topical two times a day  scopolamine 1 mG/72 Hr(s) Patch 1 Patch Transdermal every 72 hours  sodium chloride 0.9%. 1000 milliLiter(s) (50 mL/Hr) IV Continuous <Continuous>  thiamine  Oral Tab/Cap - Peds 100 milliGRAM(s) Oral two times a day  tigecycline IVPB 50 milliGRAM(s) IV Intermittent every 12 hours  tigecycline IVPB        MEDICATIONS  (PRN):  acetaminophen     Tablet .. 650 milliGRAM(s) Oral every 6 hours PRN Temp greater or equal to 38C (100.4F), Mild Pain (1 - 3)  aluminum hydroxide/magnesium hydroxide/simethicone Suspension 30 milliLiter(s) Oral every 4 hours PRN Dyspepsia  melatonin 3 milliGRAM(s) Oral at bedtime PRN Insomnia  ondansetron Injectable 4 milliGRAM(s) IV Push every 8 hours PRN Nausea and/or Vomiting    Pertinent Labs:  @ 10:54: Na 140, BUN 13, Cr <0.5<L>, BG 81, K+ 4.9, Phos --, Mg --, Alk Phos 253<H>, ALT/SGPT 89<H>, AST/SGOT 66<H>, HbA1c --    Finger Sticks:  POCT Blood Glucose.: 106 mg/dL ( @ 11:46)  POCT Blood Glucose.: 117 mg/dL ( @ 06:04)  POCT Blood Glucose.: 103 mg/dL ( @ 23:47)  POCT Blood Glucose.: 113 mg/dL ( @ 17:55)  POCT Blood Glucose.: 101 mg/dL ( @ 12:10)    Physical Findings:  - Appearance: A&Ox0, bedbound chronic trach to vent  - GI function: fecal incontinence  - Tubes: + PEG  - Oral/Mouth cavity: NPO  - Skin: L lateral foot DTI as of   - Edema: none documented     Nutrition Requirements: adjusted new weight obtained, + wound healing  Weight Used: 62.2kg height used: 167.6cm     Estimated Energy Needs    Continue []  Adjust [x]  1680-2099kcal/day (MSJ x1.2-1.5)  Estimated Protein Needs    Continue []  Adjust [x]  75-93 gm/day (1.2-1.5gm/kg)  Estimated Fluid Needs        Continue []  Adjust [x]  2855-7428 mL/day (30 - 35 mL/kg)     [x] Previous Nutrition Diagnosis:            [x] Ongoing          [] Resolved  #1 Inadequate Oral Intake  dysphagia precluding PO intake  patient receiving long term enteral nutrition via PEG tube    Goal/Expected Outcome: meet >85% and <105% energy needs via EN    Nutrition Intervention:   Enteral Nutrition, Vitamin Supplement, Nutrition Related Medication, Coordination of Care      Indicator/Monitoring:   Jessa Sagastume, #[id number] to monitor diet order, energy intake,  body composition, weight    Recommendations:  1. NPO with EN via PEG    3.  4.    *** Risk, follow up x *** days Registered Dietitian Follow-Up     Patient Profile Reviewed                           Yes [x]   No []  Nutrition History Previously Obtained        Yes [x]  No []      Pertinent Medical Interventions:  Patient is a 23 y/o Female  with a PMHx of encephalitis s/p tooth abscess s/p tracheostomy and PEG tube placement, HTN, GERD, and SVC thrombosis who was brought to ED from [place] for fevers. Admitted for tx for sepsis due to PNA.    Nutrition Interval History:   PEG Jevity 1.2 @345 Q6hr (4x) provides total formula 1380mL, 1656kcal, 76g protein, 1118mL free water + free water flushes 240 mL q6hrs (4x) = 960 mL + 1118 mL free water = 2078 mL Fluid  Per RN patient tolerating feeds well with no s/s of GI distress  **Patient meeting >85% of estimated energy needs in-house via EN    Diet order:   Diet, NPO with Tube Feed:   Tube Feeding Modality: Gastrostomy  Jevity 1.2 Gilberto  Total Volume for 24 Hours (mL): 1380  Bolus  Total Volume of Bolus (mL):  345  Total # of Feeds: 4  Tube Feed Frequency: Every 6 hours   Tube Feed Start Time: 00:00  Bolus Feed Rate (mL per Hour): 345   Bolus Feed Duration (in Hours): 1  Bolus   Total Volume per Flush (mL): 240   Frequency: Every 6 Hours (22 @ 17:52) [Active]    Anthropometrics:  Height --167.6 per NH report, current height of 154.9cm inaccurate  Weight: 63kg  BMI (kg/m2): 22.43kg/m2  IBW: 59 kg    OTHER WEIGHTS:   Daily Weight in k.2 (), Weight in k ()  UBW is 61.27 KG (134.8#); height is 5'5" -- last checked on 22 per RN at NH  % Weight Change -- stable    MEDICATIONS  (STANDING):  ALBUTerol    90 MICROgram(s) HFA Inhaler 1 Puff(s) Inhalation every 4 hours  apixaban 5 milliGRAM(s) Oral two times a day  caspofungin IVPB      caspofungin IVPB 50 milliGRAM(s) IV Intermittent every 24 hours  cyanocobalamin 1000 MICROGram(s) Oral daily  cyclobenzaprine Oral Tab/Cap - Peds 5 milliGRAM(s) Oral three times a day  dextrose 50% Injectable 25 Gram(s) IV Push once  diltiazem    Tablet 30 milliGRAM(s) Oral every 6 hours  ferrous    sulfate Liquid 300 milliGRAM(s) Enteral Tube daily  folic acid  Oral Tab/Cap - Peds 1 milliGRAM(s) Oral daily  glycopyrrolate 2 milliGRAM(s) Oral three times a day  ipratropium 17 MICROgram(s) HFA Inhaler 1 Puff(s) Inhalation every 6 hours  meropenem  IVPB 2000 milliGRAM(s) IV Intermittent every 8 hours  metoclopramide 10 milliGRAM(s) Oral Before meals and at bedtime  metoprolol tartrate 50 milliGRAM(s) Oral two times a day  midodrine 5 milliGRAM(s) Oral every 8 hours  pantoprazole    Tablet 40 milliGRAM(s) Oral before breakfast  petrolatum white Ointment 1 Application(s) Topical two times a day  scopolamine 1 mG/72 Hr(s) Patch 1 Patch Transdermal every 72 hours  sodium chloride 0.9%. 1000 milliLiter(s) (50 mL/Hr) IV Continuous <Continuous>  thiamine  Oral Tab/Cap - Peds 100 milliGRAM(s) Oral two times a day  tigecycline IVPB 50 milliGRAM(s) IV Intermittent every 12 hours  tigecycline IVPB        MEDICATIONS  (PRN):  acetaminophen     Tablet .. 650 milliGRAM(s) Oral every 6 hours PRN Temp greater or equal to 38C (100.4F), Mild Pain (1 - 3)  aluminum hydroxide/magnesium hydroxide/simethicone Suspension 30 milliLiter(s) Oral every 4 hours PRN Dyspepsia  melatonin 3 milliGRAM(s) Oral at bedtime PRN Insomnia  ondansetron Injectable 4 milliGRAM(s) IV Push every 8 hours PRN Nausea and/or Vomiting    Pertinent Labs:  @ 10:54: Na 140, BUN 13, Cr <0.5<L>, BG 81, K+ 4.9, Phos --, Mg --, Alk Phos 253<H>, ALT/SGPT 89<H>, AST/SGOT 66<H>, HbA1c --    Finger Sticks:  POCT Blood Glucose.: 106 mg/dL ( @ 11:46)  POCT Blood Glucose.: 117 mg/dL ( @ 06:04)  POCT Blood Glucose.: 103 mg/dL ( @ 23:47)  POCT Blood Glucose.: 113 mg/dL ( @ 17:55)  POCT Blood Glucose.: 101 mg/dL ( @ 12:10)    Physical Findings:  - Appearance: A&Ox0, bedbound chronic trach to vent  - GI function: fecal incontinence. last BM x1 on  per RN  - Tubes: + PEG  - Oral/Mouth cavity: NPO  - Skin: L lateral foot DTI as of   - Edema: none documented     Nutrition Requirements: adjusted new weight obtained, + wound healing  Weight Used: 62.2kg height used: 167.6cm     Estimated Energy Needs    Continue []  Adjust [x]  1680-2099kcal/day (MSJ x1.2-1.5)  Estimated Protein Needs    Continue []  Adjust [x]  75-93 gm/day (1.2-1.5gm/kg)  Estimated Fluid Needs        Continue []  Adjust [x]  6266-4129 mL/day (30 - 35 mL/kg)     [x] Previous Nutrition Diagnosis:            [x] Ongoing          [] Resolved  #1 Inadequate Oral Intake  dysphagia precluding PO intake  patient receiving long term enteral nutrition via PEG tube    Goal/Expected Outcome: meet >85% and <105% energy needs via EN    Nutrition Intervention:   Enteral Nutrition, Vitamin Supplement, Nutrition Related Medication, Coordination of Care      Indicator/Monitoring:   Jessa Sagastume, #[id number] to monitor diet order, energy intake,  body composition, weight    Recommendations:  1. NPO with EN via PEG   Formula Jevity 1.2 @345 Q6hr (4x) provides total formula 1380mL, 1656kcal, 76g protein, 1118mL free water + free water flushes 240 mL q6hrs (4x) = 960 mL + 1118 mL free water = 2078 mL Fluid  ADD NO Carb prosource modular 1x/day (+15g pro, +60kcal) =  total provided 1716kcal, 91 g pro  2. RECOMMEND: zinc 220mg/day ([id number]), ascorbic acid 500mg/daily BID, multivitamin with minerals for wound healing    MODERATE Risk, follow up x 6days

## 2022-07-22 NOTE — PROGRESS NOTE ADULT - SUBJECTIVE AND OBJECTIVE BOX
CONSUELO CARO 22y Female  MRN#: [id number]   Hospital Day: 13d    SUBJECTIVE  Patient is a 22y old Female who presents with a chief complaint of fever (2022 07:16)  Currently admitted to medicine with the primary diagnosis of 2019 novel coronavirus disease (COVID-19)      INTERVAL HPI AND OVERNIGHT EVENTS:  Patient was examined and seen at bedside. This morning she is resting comfortably in bed and reports no issues or overnight events.    OBJECTIVE  PAST MEDICAL & SURGICAL HISTORY  Encephalitis    H/O tracheostomy    PEG (percutaneous endoscopic gastrostomy) status    Acute deep vein thrombosis (DVT) of superior vena cava    HTN (hypertension)    GERD (gastroesophageal reflux disease)      ALLERGIES:  Allergy Status Unknown    MEDICATIONS:  STANDING MEDICATIONS  ALBUTerol    90 MICROgram(s) HFA Inhaler 1 Puff(s) Inhalation every 4 hours  apixaban 5 milliGRAM(s) Oral two times a day  caspofungin IVPB      caspofungin IVPB 50 milliGRAM(s) IV Intermittent every 24 hours  cyanocobalamin 1000 MICROGram(s) Oral daily  cyclobenzaprine Oral Tab/Cap - Peds 5 milliGRAM(s) Oral three times a day  dextrose 50% Injectable 25 Gram(s) IV Push once  diltiazem    Tablet 30 milliGRAM(s) Oral every 6 hours  ferrous    sulfate Liquid 300 milliGRAM(s) Enteral Tube daily  folic acid  Oral Tab/Cap - Peds 1 milliGRAM(s) Oral daily  glycopyrrolate 2 milliGRAM(s) Oral three times a day  ipratropium 17 MICROgram(s) HFA Inhaler 1 Puff(s) Inhalation every 6 hours  meropenem  IVPB 2000 milliGRAM(s) IV Intermittent every 8 hours  metoclopramide 10 milliGRAM(s) Oral Before meals and at bedtime  metoprolol tartrate 50 milliGRAM(s) Oral two times a day  midodrine 5 milliGRAM(s) Oral every 8 hours  pantoprazole    Tablet 40 milliGRAM(s) Oral before breakfast  petrolatum white Ointment 1 Application(s) Topical two times a day  scopolamine 1 mG/72 Hr(s) Patch 1 Patch Transdermal every 72 hours  sodium chloride 0.9%. 1000 milliLiter(s) IV Continuous <Continuous>  thiamine  Oral Tab/Cap - Peds 100 milliGRAM(s) Oral two times a day  tigecycline IVPB      tigecycline IVPB 50 milliGRAM(s) IV Intermittent every 12 hours    PRN MEDICATIONS  acetaminophen     Tablet .. 650 milliGRAM(s) Oral every 6 hours PRN  aluminum hydroxide/magnesium hydroxide/simethicone Suspension 30 milliLiter(s) Oral every 4 hours PRN  melatonin 3 milliGRAM(s) Oral at bedtime PRN  ondansetron Injectable 4 milliGRAM(s) IV Push every 8 hours PRN      VITAL SIGNS: Last 24 Hours  T(C): 36.6 (2022 12:40), Max: 36.7 (2022 05:00)  T(F): 97.9 (2022 12:40), Max: 98.1 (2022 05:00)  HR: 112 (2022 12:40) (108 - 129)  BP: 90/54 (2022 12:40) (85/52 - 106/52)  BP(mean): --  RR: 18 (2022 12:40) (18 - 20)  SpO2: 97% (2022 05:00) (97% - 99%)    LABS:                        11.2   19.28 )-----------( 597      ( 2022 10:54 )             36.9     07-22    140  |  102  |  13  ----------------------------<  81  4.9   |  31  |  <0.5<L>    Ca    8.4<L>      2022 10:54    TPro  6.4  /  Alb  2.9<L>  /  TBili  0.3  /  DBili  x   /  AST  66<H>  /  ALT  89<H>  /  AlkPhos  253<H>  0722      Urinalysis Basic - ( 2022 19:36 )    Color: Yellow / Appearance: Slightly Turbid / S.017 / pH: x  Gluc: x / Ketone: Trace  / Bili: Negative / Urobili: <2 mg/dL   Blood: x / Protein: Trace / Nitrite: Negative   Leuk Esterase: Moderate / RBC: 11 /HPF / WBC 45 /HPF   Sq Epi: x / Non Sq Epi: 15 /HPF / Bacteria: Negative            Culture - Blood (collected 2022 20:51)  Source: .Blood None  Preliminary Report (2022 02:01):    No growth to date.          RADIOLOGY:      PHYSICAL EXAM:  GENERAL: Pt has Trach and PEG, does not communicate so assessment limited  CHEST/LUNG: Equal air entry bilaterally; no wheezing, bilateral rhonchi heard  HEART: Regular rate and rhythm; No murmurs, rubs, or gallops  ABDOMEN: Soft, patient does not grimace on palpation, Nondistended; Bowel sounds present  EXTREMITIES:  2+ Peripheral Pulses, trace ankle edema

## 2022-07-22 NOTE — PROGRESS NOTE ADULT - SUBJECTIVE AND OBJECTIVE BOX
CONSUELO CARO  22y, Female  Allergy: Allergy Status Unknown      LOS  13d    CHIEF COMPLAINT: fever (2022 10:52)      INTERVAL EVENTS/HPI  - T(F): , Max: 98.1 (22 @ 05:00)  - WBC Count: 24.24 (22 @ 11:00)  WBC Count: 24.74 (22 @ 08:22)     - Creatinine, Serum: <0.5 (22 @ 11:00)  Creatinine, Serum: <0.5 (22 @ 08:22)     - Procalcitonin, Serum: 0.04 ng/mL (22 @ 11:00)    -   -     ROS  cannot obtain secondary to patient's sedation and/or mental status    VITALS:  T(F): 98.1, Max: 98.1 (22 @ 05:00)  HR: 126  BP: 97/53  RR: 20Vital Signs Last 24 Hrs  T(C): 36.7 (2022 05:00), Max: 36.7 (2022 05:00)  T(F): 98.1 (2022 05:00), Max: 98.1 (2022 05:00)  HR: 126 (2022 05:00) (108 - 129)  BP: 97/53 (2022 05:00) (91/51 - 106/52)  BP(mean): --  RR: 20 (2022 05:00) (18 - 20)  SpO2: 97% (2022 05:00) (96% - 99%)    Parameters below as of 2022 05:00  Patient On (Oxygen Delivery Method): T-piece        PHYSICAL EXAM:  Gen: trach  CV: RRR  Lungs: Decreased BS at bases  Abd: Soft PEG  Neuro: not following commands  Lines clean, no phlebitis    FH: Non-contributory  Social Hx: Non-contributory    TESTS & MEASUREMENTS:                        11.3   24.24 )-----------( 541      ( 2022 11:00 )             36.8     07-21    134<L>  |  98  |  13  ----------------------------<  89  4.7   |  28  |  <0.5<L>    Ca    8.5      2022 11:00  Mg     1.9         TPro  6.3  /  Alb  2.8<L>  /  TBili  0.3  /  DBili  x   /  AST  43<H>  /  ALT  83<H>  /  AlkPhos  237<H>        LIVER FUNCTIONS - ( 2022 11:00 )  Alb: 2.8 g/dL / Pro: 6.3 g/dL / ALK PHOS: 237 U/L / ALT: 83 U/L / AST: 43 U/L / GGT: x           Urinalysis Basic - ( 2022 19:36 )    Color: Yellow / Appearance: Slightly Turbid / S.017 / pH: x  Gluc: x / Ketone: Trace  / Bili: Negative / Urobili: <2 mg/dL   Blood: x / Protein: Trace / Nitrite: Negative   Leuk Esterase: Moderate / RBC: 11 /HPF / WBC 45 /HPF   Sq Epi: x / Non Sq Epi: 15 /HPF / Bacteria: Negative        Culture - Blood (collected 22 @ 20:51)  Source: .Blood None  Preliminary Report (22 @ 02:01):    No growth to date.    Culture - Sputum (collected 07-10-22 @ 17:14)  Source: Trach Asp Tracheal Aspirate  Gram Stain (22 @ 07:31):    Few polymorphonuclear leukocytes per low power field    Few Squamous epithelial cells per low power field    Moderate Gram Negative Rods seen per oil power field    Moderate Gram Positive Rods seen per oil power field    Moderate Gram positive cocci in pairs seen per oil power field  Final Report (22 @ 16:06):    Numerous Klebsiella pneumoniae (Carbapenem Resistant)    Normal Respiratory Trinidad absent  Organism: Klepne MDRO  Klepne MDRO (22 @ 16:42)  Organism: Klepne MDRO (22 @ 16:42)      -  Cefiderocol: S      Method Type: CRYSTAL  Organism: Klepne MDRO (22 @ 16:42)      -  Amikacin: R >32      -  Amoxicillin/Clavulanic Acid: R >16/8      -  Ampicillin: R >16 These ampicillin results predict results for amoxicillin      -  Ampicillin/Sulbactam: R >16/8 Enterobacter, Klebsiella aerogenes, Citrobacter, and Serratia may develop resistance during prolonged therapy (3-4 days)      -  Aztreonam: R >16      -  Cefazolin: R >16 Enterobacter, Klebsiella aerogenes, Citrobacter, and Serratia may develop resistance during prolonged therapy (3-4 days)      -  Cefepime: R >16      -  Cefoxitin: R >16      -  Ceftazidime/Avibactam: R >16      -  Ceftolozane/tazobactam: R >8      -  Ceftriaxone: R >32 Enterobacter, Klebsiella aerogenes, Citrobacter, and Serratia may develop resistance during prolonged therapy      -  Ciprofloxacin: R >2      -  Ertapenem: R >1      -  Gentamicin: R >8      -  Imipenem: R >8      -  Levofloxacin: R >4      -  Meropenem: R >8      -  Minocycline: S <=4      -  Piperacillin/Tazobactam: R >64      -  Tetra/Doxy: S <=4      -  Tigecycline: S <=2      -  Tobramycin: R >8      -  Trimethoprim/Sulfamethoxazole: R >2/38      Method Type: HERB    Culture - Blood (collected 07-10-22 @ 06:01)  Source: .Blood None  Final Report (07-15-22 @ 13:01):    No Growth Final    Culture - Blood (collected 22 @ 13:32)  Source: .Blood Blood-Peripheral  Final Report (07-15-22 @ 02:00):    No Growth Final    Culture - Blood (collected 22 @ 13:32)  Source: .Blood Blood-Peripheral  Final Report (07-15-22 @ 02:00):    No Growth Final        Lactate, Blood: 1.1 mmol/L (22 @ 11:00)      INFECTIOUS DISEASES TESTING  Procalcitonin, Serum: 0.04 (22 @ 11:00)  COVID-19 PCR: Detected (22 @ 09:00)  MRSA PCR Result.: Negative (07-15-22 @ 02:30)  MRSA PCR Result.: Negative (22 @ 09:40)  Procalcitonin, Serum: 0.07 (07-10-22 @ 06:01)  COVID-19 PCR: Detected (22 @ 14:28)  Procalcitonin, Serum: 0.11 (22 @ 13:32)  COVID-19 PCR: NotDetec (22 @ 14:00)  COVID-19 PCR: NotDetec (22 @ 04:30)  Rapid RVP Result: NotDetec (22 @ 16:07)  Procalcitonin, Serum: 12.30 (22 @ 11:39)  COVID-19 PCR: NotDetec (22 @ 07:59)  MRSA PCR Result.: Negative (22 @ 10:10)  Procalcitonin, Serum: 0.03 (22 @ 07:37)  Rapid RVP Result: Detected (22 @ 12:09)      INFLAMMATORY MARKERS  Sedimentation Rate, Erythrocyte: 65 mm/Hr (22 @ 18:37)  C-Reactive Protein, Serum: 17.1 mg/L (22 @ 18:37)  C-Reactive Protein, Serum: 73.5 mg/L (22 @ 13:32)  Sedimentation Rate, Erythrocyte: 46 mm/Hr (22 @ 11:39)      RADIOLOGY & ADDITIONAL TESTS:  I have personally reviewed the last available Chest xray  CXR      CT      CARDIOLOGY TESTING  12 Lead ECG:   Ventricular Rate 134 BPM    Atrial Rate 134 BPM    P-R Interval 130 ms    QRS Duration 62 ms    Q-T Interval 306 ms    QTC Calculation(Bazett) 456 ms    P Axis 77 degrees    R Axis 52 degrees    T Axis 59 degrees    Diagnosis Line Sinus tachycardia  Possible Septal infarct , age undetermined  Abnormal ECG    Confirmed by QUINN RINCON, AUGUSTINE ([id number]) on 2022 11:07:44 PM (22 @ 11:57)  12 Lead ECG:   Ventricular Rate 128 BPM    Atrial Rate 128 BPM    P-R Interval 112 ms    QRS Duration 66 ms    Q-T Interval 308 ms    QTC Calculation(Bazett) 449 ms    P Axis 78 degrees    R Axis 98 degrees    T Axis 38 degrees    Diagnosis Line Sinus tachycardia  Rightward axis  Borderline ECG    Confirmed by Brett Obrien ([id number]) on 2022 1:59:41 PM (22 @ 13:35)      MEDICATIONS  ALBUTerol    90 MICROgram(s) HFA Inhaler 1  apixaban 5  caspofungin IVPB   caspofungin IVPB 50  cyanocobalamin 1000  cyclobenzaprine Oral Tab/Cap - Peds 5  dextrose 50% Injectable 25  diltiazem    Tablet 30  ferrous    sulfate Liquid 300  folic acid  Oral Tab/Cap - Peds 1  glycopyrrolate 2  ipratropium 17 MICROgram(s) HFA Inhaler 1  meropenem  IVPB 2000  metoclopramide 10  metoprolol tartrate 50  midodrine 5  pantoprazole    Tablet 40  petrolatum white Ointment 1  scopolamine 1 mG/72 Hr(s) Patch 1  sodium chloride 0.9%. 1000  thiamine  Oral Tab/Cap - Peds 100  tigecycline IVPB 50  tigecycline IVPB       WEIGHT  Weight (kg): 63.049 (22 @ 20:36)  Creatinine, Serum: <0.5 mg/dL (22 @ 11:00)      ANTIBIOTICS:  caspofungin IVPB 50 milliGRAM(s) IV Intermittent every 24 hours  caspofungin IVPB      meropenem  IVPB 2000 milliGRAM(s) IV Intermittent every 8 hours  tigecycline IVPB      tigecycline IVPB 50 milliGRAM(s) IV Intermittent every 12 hours      All available historical records have been reviewed

## 2022-07-22 NOTE — PROGRESS NOTE ADULT - TIME BILLING
I have personally seen and examined this patient.  I have reviewed all pertinent clinical information and reviewed all relevant imaging and diagnostic studies personally.   If possible, I counseled the patient about diagnostic testing and treatment plan.   I discussed my recommendations with the primary team.

## 2022-07-23 LAB
ALBUMIN SERPL ELPH-MCNC: 2.5 G/DL — LOW (ref 3.5–5.2)
ALBUMIN SERPL ELPH-MCNC: 2.6 G/DL — LOW (ref 3.5–5.2)
ALP SERPL-CCNC: 244 U/L — HIGH (ref 30–115)
ALP SERPL-CCNC: 244 U/L — HIGH (ref 30–115)
ALT FLD-CCNC: 90 U/L — HIGH (ref 0–41)
ALT FLD-CCNC: 91 U/L — HIGH (ref 0–41)
ANION GAP SERPL CALC-SCNC: 13 MMOL/L — SIGNIFICANT CHANGE UP (ref 7–14)
ANION GAP SERPL CALC-SCNC: 8 MMOL/L — SIGNIFICANT CHANGE UP (ref 7–14)
AST SERPL-CCNC: 64 U/L — HIGH (ref 0–41)
AST SERPL-CCNC: 69 U/L — HIGH (ref 0–41)
BASOPHILS # BLD AUTO: 0.09 K/UL — SIGNIFICANT CHANGE UP (ref 0–0.2)
BASOPHILS NFR BLD AUTO: 0.6 % — SIGNIFICANT CHANGE UP (ref 0–1)
BILIRUB SERPL-MCNC: 0.2 MG/DL — SIGNIFICANT CHANGE UP (ref 0.2–1.2)
BILIRUB SERPL-MCNC: 0.3 MG/DL — SIGNIFICANT CHANGE UP (ref 0.2–1.2)
BUN SERPL-MCNC: 13 MG/DL — SIGNIFICANT CHANGE UP (ref 10–20)
BUN SERPL-MCNC: 13 MG/DL — SIGNIFICANT CHANGE UP (ref 10–20)
CALCIUM SERPL-MCNC: 8.1 MG/DL — LOW (ref 8.5–10.1)
CALCIUM SERPL-MCNC: 8.3 MG/DL — LOW (ref 8.5–10.1)
CHLORIDE SERPL-SCNC: 101 MMOL/L — SIGNIFICANT CHANGE UP (ref 98–110)
CHLORIDE SERPL-SCNC: 99 MMOL/L — SIGNIFICANT CHANGE UP (ref 98–110)
CO2 SERPL-SCNC: 27 MMOL/L — SIGNIFICANT CHANGE UP (ref 17–32)
CO2 SERPL-SCNC: 30 MMOL/L — SIGNIFICANT CHANGE UP (ref 17–32)
CREAT SERPL-MCNC: <0.5 MG/DL — LOW (ref 0.7–1.5)
CREAT SERPL-MCNC: <0.5 MG/DL — LOW (ref 0.7–1.5)
EGFR: 143 ML/MIN/1.73M2 — SIGNIFICANT CHANGE UP
EGFR: 143 ML/MIN/1.73M2 — SIGNIFICANT CHANGE UP
EOSINOPHIL # BLD AUTO: 0.6 K/UL — SIGNIFICANT CHANGE UP (ref 0–0.7)
EOSINOPHIL NFR BLD AUTO: 4.1 % — SIGNIFICANT CHANGE UP (ref 0–8)
GLUCOSE BLDC GLUCOMTR-MCNC: 102 MG/DL — HIGH (ref 70–99)
GLUCOSE BLDC GLUCOMTR-MCNC: 104 MG/DL — HIGH (ref 70–99)
GLUCOSE BLDC GLUCOMTR-MCNC: 107 MG/DL — HIGH (ref 70–99)
GLUCOSE SERPL-MCNC: 72 MG/DL — SIGNIFICANT CHANGE UP (ref 70–99)
GLUCOSE SERPL-MCNC: 73 MG/DL — SIGNIFICANT CHANGE UP (ref 70–99)
HCT VFR BLD CALC: 35.4 % — LOW (ref 37–47)
HGB BLD-MCNC: 10.9 G/DL — LOW (ref 12–16)
IMM GRANULOCYTES NFR BLD AUTO: 0.3 % — SIGNIFICANT CHANGE UP (ref 0.1–0.3)
LYMPHOCYTES # BLD AUTO: 1.74 K/UL — SIGNIFICANT CHANGE UP (ref 1.2–3.4)
LYMPHOCYTES # BLD AUTO: 11.8 % — LOW (ref 20.5–51.1)
MAGNESIUM SERPL-MCNC: 2.1 MG/DL — SIGNIFICANT CHANGE UP (ref 1.8–2.4)
MCHC RBC-ENTMCNC: 24.2 PG — LOW (ref 27–31)
MCHC RBC-ENTMCNC: 30.8 G/DL — LOW (ref 32–37)
MCV RBC AUTO: 78.5 FL — LOW (ref 81–99)
MONOCYTES # BLD AUTO: 1.22 K/UL — HIGH (ref 0.1–0.6)
MONOCYTES NFR BLD AUTO: 8.3 % — SIGNIFICANT CHANGE UP (ref 1.7–9.3)
NEUTROPHILS # BLD AUTO: 11.05 K/UL — HIGH (ref 1.4–6.5)
NEUTROPHILS NFR BLD AUTO: 74.9 % — SIGNIFICANT CHANGE UP (ref 42.2–75.2)
NRBC # BLD: 0 /100 WBCS — SIGNIFICANT CHANGE UP (ref 0–0)
PLATELET # BLD AUTO: 523 K/UL — HIGH (ref 130–400)
POTASSIUM SERPL-MCNC: 5.2 MMOL/L — HIGH (ref 3.5–5)
POTASSIUM SERPL-MCNC: 5.5 MMOL/L — HIGH (ref 3.5–5)
POTASSIUM SERPL-SCNC: 5.2 MMOL/L — HIGH (ref 3.5–5)
POTASSIUM SERPL-SCNC: 5.5 MMOL/L — HIGH (ref 3.5–5)
PROT SERPL-MCNC: 6 G/DL — SIGNIFICANT CHANGE UP (ref 6–8)
PROT SERPL-MCNC: 6.1 G/DL — SIGNIFICANT CHANGE UP (ref 6–8)
RBC # BLD: 4.51 M/UL — SIGNIFICANT CHANGE UP (ref 4.2–5.4)
RBC # FLD: 17.2 % — HIGH (ref 11.5–14.5)
SODIUM SERPL-SCNC: 139 MMOL/L — SIGNIFICANT CHANGE UP (ref 135–146)
SODIUM SERPL-SCNC: 139 MMOL/L — SIGNIFICANT CHANGE UP (ref 135–146)
WBC # BLD: 14.75 K/UL — HIGH (ref 4.8–10.8)
WBC # FLD AUTO: 14.75 K/UL — HIGH (ref 4.8–10.8)

## 2022-07-23 PROCEDURE — 99233 SBSQ HOSP IP/OBS HIGH 50: CPT

## 2022-07-23 RX ORDER — METOPROLOL TARTRATE 50 MG
50 TABLET ORAL
Refills: 0 | Status: DISCONTINUED | OUTPATIENT
Start: 2022-07-23 | End: 2022-07-26

## 2022-07-23 RX ORDER — FENTANYL CITRATE 50 UG/ML
1 INJECTION INTRAVENOUS
Refills: 0 | Status: DISCONTINUED | OUTPATIENT
Start: 2022-07-23 | End: 2022-07-23

## 2022-07-23 RX ORDER — SODIUM POLYSTYRENE SULFONATE 4.1 MEQ/G
30 POWDER, FOR SUSPENSION ORAL ONCE
Refills: 0 | Status: COMPLETED | OUTPATIENT
Start: 2022-07-23 | End: 2022-07-23

## 2022-07-23 RX ORDER — MORPHINE SULFATE 50 MG/1
2 CAPSULE, EXTENDED RELEASE ORAL ONCE
Refills: 0 | Status: DISCONTINUED | OUTPATIENT
Start: 2022-07-23 | End: 2022-07-23

## 2022-07-23 RX ORDER — HYDROMORPHONE HYDROCHLORIDE 2 MG/ML
1 INJECTION INTRAMUSCULAR; INTRAVENOUS; SUBCUTANEOUS EVERY 4 HOURS
Refills: 0 | Status: DISCONTINUED | OUTPATIENT
Start: 2022-07-23 | End: 2022-07-26

## 2022-07-23 RX ORDER — SODIUM ZIRCONIUM CYCLOSILICATE 10 G/10G
5 POWDER, FOR SUSPENSION ORAL ONCE
Refills: 0 | Status: COMPLETED | OUTPATIENT
Start: 2022-07-23 | End: 2022-07-23

## 2022-07-23 RX ORDER — HYDROMORPHONE HYDROCHLORIDE 2 MG/ML
1 INJECTION INTRAMUSCULAR; INTRAVENOUS; SUBCUTANEOUS ONCE
Refills: 0 | Status: DISCONTINUED | OUTPATIENT
Start: 2022-07-23 | End: 2022-07-23

## 2022-07-23 RX ORDER — CYCLOBENZAPRINE HYDROCHLORIDE 10 MG/1
10 TABLET, FILM COATED ORAL THREE TIMES A DAY
Refills: 0 | Status: DISCONTINUED | OUTPATIENT
Start: 2022-07-23 | End: 2022-07-26

## 2022-07-23 RX ORDER — SODIUM ZIRCONIUM CYCLOSILICATE 10 G/10G
5 POWDER, FOR SUSPENSION ORAL ONCE
Refills: 0 | Status: DISCONTINUED | OUTPATIENT
Start: 2022-07-23 | End: 2022-07-23

## 2022-07-23 RX ADMIN — PREGABALIN 1000 MICROGRAM(S): 225 CAPSULE ORAL at 11:25

## 2022-07-23 RX ADMIN — MIDODRINE HYDROCHLORIDE 5 MILLIGRAM(S): 2.5 TABLET ORAL at 13:21

## 2022-07-23 RX ADMIN — PANTOPRAZOLE SODIUM 40 MILLIGRAM(S): 20 TABLET, DELAYED RELEASE ORAL at 05:04

## 2022-07-23 RX ADMIN — Medication 100 MILLIGRAM(S): at 17:20

## 2022-07-23 RX ADMIN — MORPHINE SULFATE 2 MILLIGRAM(S): 50 CAPSULE, EXTENDED RELEASE ORAL at 11:25

## 2022-07-23 RX ADMIN — CYCLOBENZAPRINE HYDROCHLORIDE 10 MILLIGRAM(S): 10 TABLET, FILM COATED ORAL at 13:21

## 2022-07-23 RX ADMIN — HYDROMORPHONE HYDROCHLORIDE 1 MILLIGRAM(S): 2 INJECTION INTRAMUSCULAR; INTRAVENOUS; SUBCUTANEOUS at 13:41

## 2022-07-23 RX ADMIN — CYCLOBENZAPRINE HYDROCHLORIDE 10 MILLIGRAM(S): 10 TABLET, FILM COATED ORAL at 21:55

## 2022-07-23 RX ADMIN — Medication 100 MILLIGRAM(S): at 05:04

## 2022-07-23 RX ADMIN — TIGECYCLINE 105 MILLIGRAM(S): 50 INJECTION, POWDER, LYOPHILIZED, FOR SOLUTION INTRAVENOUS at 05:08

## 2022-07-23 RX ADMIN — Medication 10 MILLIGRAM(S): at 17:21

## 2022-07-23 RX ADMIN — MEROPENEM 35 MILLIGRAM(S): 1 INJECTION INTRAVENOUS at 05:09

## 2022-07-23 RX ADMIN — SODIUM POLYSTYRENE SULFONATE 30 GRAM(S): 4.1 POWDER, FOR SUSPENSION ORAL at 16:27

## 2022-07-23 RX ADMIN — APIXABAN 5 MILLIGRAM(S): 2.5 TABLET, FILM COATED ORAL at 05:06

## 2022-07-23 RX ADMIN — APIXABAN 5 MILLIGRAM(S): 2.5 TABLET, FILM COATED ORAL at 17:20

## 2022-07-23 RX ADMIN — Medication 1 MILLIGRAM(S): at 11:25

## 2022-07-23 RX ADMIN — ALBUTEROL 1 PUFF(S): 90 AEROSOL, METERED ORAL at 08:38

## 2022-07-23 RX ADMIN — SODIUM ZIRCONIUM CYCLOSILICATE 5 GRAM(S): 10 POWDER, FOR SUSPENSION ORAL at 12:36

## 2022-07-23 RX ADMIN — Medication 1 PUFF(S): at 17:25

## 2022-07-23 RX ADMIN — SODIUM CHLORIDE 100 MILLILITER(S): 9 INJECTION INTRAMUSCULAR; INTRAVENOUS; SUBCUTANEOUS at 05:09

## 2022-07-23 RX ADMIN — CYCLOBENZAPRINE HYDROCHLORIDE 5 MILLIGRAM(S): 10 TABLET, FILM COATED ORAL at 05:04

## 2022-07-23 RX ADMIN — Medication 10 MILLIGRAM(S): at 21:54

## 2022-07-23 RX ADMIN — Medication 1 PUFF(S): at 08:39

## 2022-07-23 RX ADMIN — MIDODRINE HYDROCHLORIDE 5 MILLIGRAM(S): 2.5 TABLET ORAL at 05:04

## 2022-07-23 RX ADMIN — Medication 10 MILLIGRAM(S): at 09:07

## 2022-07-23 RX ADMIN — Medication 300 MILLIGRAM(S): at 11:25

## 2022-07-23 RX ADMIN — Medication 10 MILLIGRAM(S): at 11:25

## 2022-07-23 RX ADMIN — ROBINUL 2 MILLIGRAM(S): 0.2 INJECTION INTRAMUSCULAR; INTRAVENOUS at 05:04

## 2022-07-23 RX ADMIN — MIDODRINE HYDROCHLORIDE 5 MILLIGRAM(S): 2.5 TABLET ORAL at 21:54

## 2022-07-23 RX ADMIN — Medication 1 APPLICATION(S): at 05:10

## 2022-07-23 NOTE — PROGRESS NOTE ADULT - ASSESSMENT
21 y/o F with a PMHx of encephalitis s/p tooth abscess s/p tracheostomy and PEG tube placement, HTN, GERD, and SVC thrombosis , recent admission for Covid19 PNA and pyelonephritis in June 2022, currently being treated with IV Vancomycin, cefepime for PNA since 7/8, BIBEMS from [facility] for fever this AM. Per NH notes, pt spiked temp of 102.6 today and was sent to ED. Pt is non-verbal, unable to contribute to history or ROS. pt has sputum around the trach area.     # sepsis 2/2 MDR PNA  # Acute on chronic respiratory failure  - pt's first covid test was positive in 5/22, subsequent tests negative, positive again today. ? recurrence? vs persistent infection?  - hx of MDR Klebsiella bacteremia  - in the ED,pt's VS T Max: 37.9  BP 89/49  - labs done showed WBC 11k, creat 0.5, CRP 73,covid is positive  - CT Angio Chest PE Protocol w/ IV Cont > NO PE, consistent with pneumonia  - pt was started on cefepime and vancomycin 7/8 at [facility]  - previous cultures grew Klebsiella, proteus ESBL, MDRO, CRE  - sputum cx with carbapenem resistent Klebsiella  - ID recs: Cefiderocol 2g q8h started   - blood cultures NGTD  - c/w oxygen support via trach  - 07/15: switched to severo + tiga due to increasing LFTs  - 07/18: WBC increasing (14 to 17), chest xray ordered, keep same Ax for now as per ID, patient hemodynamically stable with no fever  - 07/19: WBC stable, CXR has no worsening signs, patient afebrile and stable, continue Ax and f/u ID plan tomorrow  - 07/20: WBC increasing, patient afebrile and hemodynamically no changes relative to her baseline, ID plan is to take urine analysis, blood Cx, remove the midline (another one placed with no complications), and give one dose of vancomycin 1g after cultures are drawn.  - 07/21: WBC stable, still no focus of infection (possibility of fungal, awaiting fungitel), keep on antibiotics, following cultures (NGTD), hemodynamically stable, afebrile  - 07/22: WBC decreasing , still no focus of infection, procal negative (possibility of fungal, awaiting fungitel, started on Caspufungin awaiting results), keep on antibiotics until tomorrow (14 days total), following cultures (NGTD), hemodynamically stable, afebrile  - 07/23: WBC decreasing, fungitel negative, Ax stopped today, will monitor off Ax, stable, afebrile    # Elevated LFTs  - likely due to Cefiderocol  - RUQ U/S WNL, hep panel NR  - hepatology recs: trend LFT and INR, check EBV, CMV, herpes, Avoid hepatotoxic medications  - as per ID: Cefiderocol d/c'ed   - 7/15 Tigecycline and Meropenem started  - 07/19: LFTs trending down  - 07/20: LFTs stable  - 07/21: Lffs stable, will keep monitoring as patient is on Tigacycline  - 07/23: LFTs still high but decreasing, will monitor off Ax (stopped on 07/23)    #Persistent sinus tachycardia  - pt was persistently tachycardic on previous admission baseline 100-120s  - EP eval, HR <130 acceptable  - on admission pt was tachycardic to 150-160s, now at baseline 90-120s s/p ABx  - c/w Metoprolol 50 BID  - 07/18: HR going up to 150 =>d-dimer ordered to assess for DVT, fluid bolus to be given to r/o intravascular volume depletion as a cause (patient on dry side), might add diltiazem IR 30mg Q6H if still tachycardic  - 07/19: D-dimer not suggestive of PE, HR slightly improved (in the 100-110 range) on fluids, to be monitored  - 07/20: HR in the 100-110 range, no changes made today  - 07/22: pending echocardio for assessment (possible cardiomyopathy leading to the tachycardia)  - 07/23: still pending echo, meds that could cause tachycardia stopped (albuterol), fentanyl patch added (r/o pain as a cause of tachycardia)    #HO SVC Thrombosis and Embolism   - C/w Eliquis 5 mg PO BID    #History of Encephalitis post Tooth Abscess in 10/2021   - s/p Tracheostomy (has an O2 tank per sister but unsure about flow) and G-tube placement  - aspiration precautions, respiratory toilet/suction as per resp team    # Hypernatremia- resolved     #GERD  * Home med Omeprazole 40mg QD  - C/w Protonix 40 mg PO daily    #Iron Deficiency Anemia   #History of Wernicke Encephalopathy  #Suspected thiamine deficiency  #Suspected folic acid deficiency  * Home med iron replacement, thiamine, B12, and folic acid  - C/w home iron replacement, thiamine, B12, and folic acid  - Monitor for now    #DVT ppx: Eliquis 5 mg PO BID  #GI ppx: Protonix 40 mg PO daily  #Diet: NPO w G-tube feeds  #Activity: Functional quadriplegic  #Dispo: From [facility];  #Code status: Full code -- has MOLST confirming 21 y/o F with a PMHx of encephalitis s/p tooth abscess s/p tracheostomy and PEG tube placement, HTN, GERD, and SVC thrombosis , recent admission for Covid19 PNA and pyelonephritis in June 2022, currently being treated with IV Vancomycin, cefepime for PNA since 7/8, BIBEMS from [facility] for fever this AM. Per NH notes, pt spiked temp of 102.6 today and was sent to ED. Pt is non-verbal, unable to contribute to history or ROS. pt has sputum around the trach area.     # sepsis 2/2 MDR PNA  # Acute on chronic respiratory failure  - pt's first covid test was positive in 5/22, subsequent tests negative, positive again today. ? recurrence? vs persistent infection?  - hx of MDR Klebsiella bacteremia  - in the ED,pt's VS T Max: 37.9  BP 89/49  - labs done showed WBC 11k, creat 0.5, CRP 73,covid is positive  - CT Angio Chest PE Protocol w/ IV Cont > NO PE, consistent with pneumonia  - pt was started on cefepime and vancomycin 7/8 at [facility]  - previous cultures grew Klebsiella, proteus ESBL, MDRO, CRE  - sputum cx with carbapenem resistent Klebsiella  - ID recs: Cefiderocol 2g q8h started   - blood cultures NGTD  - c/w oxygen support via trach  - 07/15: switched to severo + tiga due to increasing LFTs  - 07/18: WBC increasing (14 to 17), chest xray ordered, keep same Ax for now as per ID, patient hemodynamically stable with no fever  - 07/19: WBC stable, CXR has no worsening signs, patient afebrile and stable, continue Ax and f/u ID plan tomorrow  - 07/20: WBC increasing, patient afebrile and hemodynamically no changes relative to her baseline, ID plan is to take urine analysis, blood Cx, remove the midline (another one placed with no complications), and give one dose of vancomycin 1g after cultures are drawn.  - 07/21: WBC stable, still no focus of infection (possibility of fungal, awaiting fungitel), keep on antibiotics, following cultures (NGTD), hemodynamically stable, afebrile  - 07/22: WBC decreasing , still no focus of infection, procal negative (possibility of fungal, awaiting fungitel, started on Caspufungin awaiting results), keep on antibiotics until tomorrow (14 days total), following cultures (NGTD), hemodynamically stable, afebrile  - 07/23: WBC decreasing, fungitel negative, Ax stopped today, will monitor off Ax, stable, afebrile    # Elevated LFTs  - likely due to Cefiderocol  - RUQ U/S WNL, hep panel NR  - hepatology recs: trend LFT and INR, check EBV, CMV, herpes, Avoid hepatotoxic medications  - as per ID: Cefiderocol d/c'ed   - 7/15 Tigecycline and Meropenem started  - 07/19: LFTs trending down  - 07/20: LFTs stable  - 07/21: Lffs stable, will keep monitoring as patient is on Tigacycline  - 07/23: LFTs still high but decreasing, will monitor off Ax (stopped on 07/23)    #Persistent sinus tachycardia  - pt was persistently tachycardic on previous admission baseline 100-120s  - EP eval, HR <130 acceptable  - on admission pt was tachycardic to 150-160s, now at baseline 90-120s s/p ABx  - c/w Metoprolol 50 BID  - 07/18: HR going up to 150 =>d-dimer ordered to assess for DVT, fluid bolus to be given to r/o intravascular volume depletion as a cause (patient on dry side), might add diltiazem IR 30mg Q6H if still tachycardic  - 07/19: D-dimer not suggestive of PE, HR slightly improved (in the 100-110 range) on fluids, to be monitored  - 07/20: HR in the 100-110 range, no changes made today  - 07/22: pending echocardio for assessment (possible cardiomyopathy leading to the tachycardia)  - 07/23: still pending echo, meds that could cause tachycardia stopped (albuterol), fentanyl patch added (r/o pain as a cause of tachycardia), cortisol pending (r/o adrenal insufficiency)    #HO SVC Thrombosis and Embolism   - C/w Eliquis 5 mg PO BID    #History of Encephalitis post Tooth Abscess in 10/2021   - s/p Tracheostomy (has an O2 tank per sister but unsure about flow) and G-tube placement  - aspiration precautions, respiratory toilet/suction as per resp team    # Hypernatremia- resolved     #GERD  * Home med Omeprazole 40mg QD  - C/w Protonix 40 mg PO daily    #Iron Deficiency Anemia   #History of Wernicke Encephalopathy  #Suspected thiamine deficiency  #Suspected folic acid deficiency  * Home med iron replacement, thiamine, B12, and folic acid  - C/w home iron replacement, thiamine, B12, and folic acid  - Monitor for now    #DVT ppx: Eliquis 5 mg PO BID  #GI ppx: Protonix 40 mg PO daily  #Diet: NPO w G-tube feeds  #Activity: Functional quadriplegic  #Dispo: From [facility];  #Code status: Full code -- has MOLST confirming

## 2022-07-23 NOTE — PROGRESS NOTE ADULT - ATTENDING COMMENTS
Patient is a 23 y/o Female  with a PMHx of encephalitis s/p tooth abscess s/p tracheostomy and PEG tube placement, HTN, GERD, and SVC thrombosis who was brought to ED from [place] for fevers. Admitted for tx for sepsis due to PNA.    # Sepsis 2/2 multi drug resistant pneumonia, sputum growing Klebsiella pneumoniae carbapenem resistant  # Acute on chronic respiratory failure 2/2 MDR pneumonia  # h/o encephalitis from tooth abscess  s/p trach and PEG, bedbound, non-verbal   # Recent COVID-19 infection, MDR colonization on respiratory tract, inability to clear secretion  - Changed IV antibiotic from cefiderocol to Meropenem, Tigecycline (given the LFTs elevation)  d/c on 7/23  completed 14 days.  -  7/21 - exchanged echeverria - and resent urine cxs  -  fungitell - low, d/c caspofungin   - blood cxs - negative, procal level - 0.04, repeated CXR on 7/21 - no new opacifications   -7/23- WBC has decreased to 14.75    -Vent management as per Pulm. team     #Tachycardia: ? due to pain, started on Dilaudid 1mg IV every 4 hours prn. for pain  -d/c albuterol nebs, d/c glycopyrrolate, increased flexeril dose    - cont. metroprolol   -thyroid function wnr    # Hypotension- on midodrine 5 mg  three times daily   - obtain serum am cortisol level    #Mild transaminitis:   Might be 2/2 Abx. Changed as above. Monitor. Improving  RUQ US noted  Check  EBV CMV herpes hepatitis E serologies per hepatology.       # h/o SVC Thrombosis and Embolism   - c/w Eliquis 5 mg PO BID    # GERD  - c/w Protonix 40 mg PO daily    # Iron Deficiency Anemia, stable   - monitor CBC  - c/w home iron replacement, thiamine, B12, and folic acid    # DVT prophlaxis   - on eliquis 5 mg PO BID    #Functional Quadruparesis  s/p Trach/PEG    Dispo: [facility].     #Progress Note Handoff: CBC in am, f/up  resent urine cxs, f/up am cortisol level, monitor HR  Family discussion: yes, medical team Disposition: SNF once medically stable    Code status: full code     Total time spent to complete patient's bedside assessment, review medical chart, discuss medical plan of care with covering medical team was more than 35 minutes . Patient is a 23 y/o Female  with a PMHx of encephalitis s/p tooth abscess s/p tracheostomy and PEG tube placement, HTN, GERD, and SVC thrombosis who was brought to ED from [place] for fevers. Admitted for tx for sepsis due to PNA.    # Sepsis 2/2 multi drug resistant pneumonia, sputum growing Klebsiella pneumoniae carbapenem resistant  # Acute on chronic respiratory failure 2/2 MDR pneumonia  # h/o encephalitis from tooth abscess  s/p trach and PEG, bedbound, non-verbal   # Recent COVID-19 infection, MDR colonization on respiratory tract, inability to clear secretion  - Changed IV antibiotic from cefiderocol to Meropenem, Tigecycline (given the LFTs elevation)  d/c on 7/23  completed 14 days.  -  7/21 - exchanged echeverria - and resent urine cxs  -  fungitell - low, d/c caspofungin   - blood cxs - negative, procal level - 0.04, repeated CXR on 7/21 - no new opacifications   -7/23- WBC has decreased to 14.75    -Vent management as per Pulm. team     #Tachycardia: ? due to pain, started on Dilaudid 1mg IV every 4 hours prn. for pain  -d/c albuterol nebs, d/c glycopyrrolate, increased flexeril dose    - cont. metroprolol   -thyroid function wnr    # Hypotension- on midodrine 5 mg  three times daily   - obtain serum am cortisol level    # Hyperkalemia- will give 1 dose of Kayexalate  today, f/up am BMP    #Mild transaminitis:   Might be 2/2 Abx. Changed as above. Monitor. Improving  RUQ US noted  Check  EBV CMV herpes hepatitis E serologies per hepatology.       # h/o SVC Thrombosis and Embolism   - c/w Eliquis 5 mg PO BID    # GERD  - c/w Protonix 40 mg PO daily    # Iron Deficiency Anemia, stable   - monitor CBC  - c/w home iron replacement, thiamine, B12, and folic acid    # DVT prophlaxis   - on eliquis 5 mg PO BID    #Functional Quadruparesis  s/p Trach/PEG    Dispo: [facility].     #Progress Note Handoff: CBC in am, f/up  resent urine cxs, f/up am cortisol level, monitor HR  Family discussion: yes, medical team Disposition: SNF once medically stable    Code status: full code     Total time spent to complete patient's bedside assessment, review medical chart, discuss medical plan of care with covering medical team was more than 35 minutes .

## 2022-07-23 NOTE — PROGRESS NOTE ADULT - SUBJECTIVE AND OBJECTIVE BOX
CONSUELO CARO 22y Female  MRN#: [id number]   Hospital Day: 14d    SUBJECTIVE  Patient is a 22y old Female who presents with a chief complaint of fever (22 Jul 2022 13:40)  Currently admitted to medicine with the primary diagnosis of 2019 novel coronavirus disease (COVID-19)      INTERVAL HPI AND OVERNIGHT EVENTS:  Patient was examined and seen at bedside. This morning she is resting comfortably in bed and reports no issues or overnight events.    OBJECTIVE  PAST MEDICAL & SURGICAL HISTORY  Encephalitis    H/O tracheostomy    PEG (percutaneous endoscopic gastrostomy) status    Acute deep vein thrombosis (DVT) of superior vena cava    HTN (hypertension)    GERD (gastroesophageal reflux disease)      ALLERGIES:  Allergy Status Unknown    MEDICATIONS:  STANDING MEDICATIONS  apixaban 5 milliGRAM(s) Oral two times a day  cyanocobalamin 1000 MICROGram(s) Oral daily  cyclobenzaprine 10 milliGRAM(s) Oral three times a day  dextrose 50% Injectable 25 Gram(s) IV Push once  fentaNYL   Patch  25 MICROgram(s)/Hr. 1 Patch Transdermal every 48 hours  ferrous    sulfate Liquid 300 milliGRAM(s) Enteral Tube daily  folic acid  Oral Tab/Cap - Peds 1 milliGRAM(s) Oral daily  ipratropium 17 MICROgram(s) HFA Inhaler 1 Puff(s) Inhalation every 6 hours  metoclopramide 10 milliGRAM(s) Oral Before meals and at bedtime  metoprolol tartrate 50 milliGRAM(s) Oral two times a day  midodrine 5 milliGRAM(s) Oral every 8 hours  pantoprazole    Tablet 40 milliGRAM(s) Oral before breakfast  thiamine  Oral Tab/Cap - Peds 100 milliGRAM(s) Oral two times a day    PRN MEDICATIONS  acetaminophen     Tablet .. 650 milliGRAM(s) Oral every 6 hours PRN  aluminum hydroxide/magnesium hydroxide/simethicone Suspension 30 milliLiter(s) Oral every 4 hours PRN  melatonin 3 milliGRAM(s) Oral at bedtime PRN  ondansetron Injectable 4 milliGRAM(s) IV Push every 8 hours PRN      VITAL SIGNS: Last 24 Hours  T(C): 37.1 (23 Jul 2022 06:34), Max: 37.2 (23 Jul 2022 04:50)  T(F): 98.7 (23 Jul 2022 06:34), Max: 98.9 (23 Jul 2022 04:50)  HR: 110 (23 Jul 2022 06:34) (104 - 130)  BP: 102/58 (23 Jul 2022 06:34) (85/52 - 102/58)  BP(mean): --  RR: 19 (23 Jul 2022 06:34) (18 - 20)  SpO2: 96% (23 Jul 2022 06:34) (95% - 96%)    LABS:                        10.9   14.75 )-----------( 523      ( 23 Jul 2022 08:55 )             35.4     07-23    139  |  101  |  13  ----------------------------<  73  5.2<H>   |  30  |  <0.5<L>    Ca    8.3<L>      23 Jul 2022 08:55    TPro  6.0  /  Alb  2.5<L>  /  TBili  0.3  /  DBili  x   /  AST  64<H>  /  ALT  91<H>  /  AlkPhos  244<H>  07-23              Culture - Blood (collected 20 Jul 2022 20:51)  Source: .Blood None  Preliminary Report (22 Jul 2022 02:01):    No growth to date.          RADIOLOGY:      PHYSICAL EXAM:  GENERAL: Pt has Trach and PEG, does not communicate so assessment limited  CHEST/LUNG: Equal air entry bilaterally; no wheezing, bilateral rhonchi heard  HEART: Regular rate and rhythm; No murmurs, rubs, or gallops  ABDOMEN: Soft, patient does not grimace on palpation, Nondistended; Bowel sounds present  EXTREMITIES:  2+ Peripheral Pulses, trace ankle edema

## 2022-07-24 LAB
ALBUMIN SERPL ELPH-MCNC: 2.7 G/DL — LOW (ref 3.5–5.2)
ALP SERPL-CCNC: 235 U/L — HIGH (ref 30–115)
ALT FLD-CCNC: 88 U/L — HIGH (ref 0–41)
ANION GAP SERPL CALC-SCNC: 6 MMOL/L — LOW (ref 7–14)
AST SERPL-CCNC: 53 U/L — HIGH (ref 0–41)
BASOPHILS # BLD AUTO: 0.08 K/UL — SIGNIFICANT CHANGE UP (ref 0–0.2)
BASOPHILS NFR BLD AUTO: 0.6 % — SIGNIFICANT CHANGE UP (ref 0–1)
BILIRUB SERPL-MCNC: 0.6 MG/DL — SIGNIFICANT CHANGE UP (ref 0.2–1.2)
BUN SERPL-MCNC: 8 MG/DL — LOW (ref 10–20)
CALCIUM SERPL-MCNC: 8.5 MG/DL — SIGNIFICANT CHANGE UP (ref 8.5–10.1)
CHLORIDE SERPL-SCNC: 98 MMOL/L — SIGNIFICANT CHANGE UP (ref 98–110)
CO2 SERPL-SCNC: 32 MMOL/L — SIGNIFICANT CHANGE UP (ref 17–32)
CORTIS AM PEAK SERPL-MCNC: 18.1 UG/DL — SIGNIFICANT CHANGE UP (ref 6–18.4)
CREAT SERPL-MCNC: <0.5 MG/DL — LOW (ref 0.7–1.5)
CULTURE RESULTS: SIGNIFICANT CHANGE UP
EGFR: 143 ML/MIN/1.73M2 — SIGNIFICANT CHANGE UP
EOSINOPHIL # BLD AUTO: 0.49 K/UL — SIGNIFICANT CHANGE UP (ref 0–0.7)
EOSINOPHIL NFR BLD AUTO: 3.7 % — SIGNIFICANT CHANGE UP (ref 0–8)
GLUCOSE BLDC GLUCOMTR-MCNC: 121 MG/DL — HIGH (ref 70–99)
GLUCOSE BLDC GLUCOMTR-MCNC: 82 MG/DL — SIGNIFICANT CHANGE UP (ref 70–99)
GLUCOSE SERPL-MCNC: 137 MG/DL — HIGH (ref 70–99)
HCT VFR BLD CALC: 36.3 % — LOW (ref 37–47)
HGB BLD-MCNC: 10.7 G/DL — LOW (ref 12–16)
IMM GRANULOCYTES NFR BLD AUTO: 0.4 % — HIGH (ref 0.1–0.3)
LYMPHOCYTES # BLD AUTO: 1.68 K/UL — SIGNIFICANT CHANGE UP (ref 1.2–3.4)
LYMPHOCYTES # BLD AUTO: 12.8 % — LOW (ref 20.5–51.1)
MAGNESIUM SERPL-MCNC: 2.1 MG/DL — SIGNIFICANT CHANGE UP (ref 1.8–2.4)
MCHC RBC-ENTMCNC: 23.4 PG — LOW (ref 27–31)
MCHC RBC-ENTMCNC: 29.5 G/DL — LOW (ref 32–37)
MCV RBC AUTO: 79.4 FL — LOW (ref 81–99)
MONOCYTES # BLD AUTO: 1 K/UL — HIGH (ref 0.1–0.6)
MONOCYTES NFR BLD AUTO: 7.6 % — SIGNIFICANT CHANGE UP (ref 1.7–9.3)
MRSA PCR RESULT.: NEGATIVE — SIGNIFICANT CHANGE UP
NEUTROPHILS # BLD AUTO: 9.78 K/UL — HIGH (ref 1.4–6.5)
NEUTROPHILS NFR BLD AUTO: 74.9 % — SIGNIFICANT CHANGE UP (ref 42.2–75.2)
NRBC # BLD: 0 /100 WBCS — SIGNIFICANT CHANGE UP (ref 0–0)
PLATELET # BLD AUTO: 521 K/UL — HIGH (ref 130–400)
POTASSIUM SERPL-MCNC: 4.7 MMOL/L — SIGNIFICANT CHANGE UP (ref 3.5–5)
POTASSIUM SERPL-SCNC: 4.7 MMOL/L — SIGNIFICANT CHANGE UP (ref 3.5–5)
PROT SERPL-MCNC: 6 G/DL — SIGNIFICANT CHANGE UP (ref 6–8)
RBC # BLD: 4.57 M/UL — SIGNIFICANT CHANGE UP (ref 4.2–5.4)
RBC # FLD: 17.2 % — HIGH (ref 11.5–14.5)
SODIUM SERPL-SCNC: 136 MMOL/L — SIGNIFICANT CHANGE UP (ref 135–146)
SPECIMEN SOURCE: SIGNIFICANT CHANGE UP
WBC # BLD: 13.08 K/UL — HIGH (ref 4.8–10.8)
WBC # FLD AUTO: 13.08 K/UL — HIGH (ref 4.8–10.8)

## 2022-07-24 PROCEDURE — 93010 ELECTROCARDIOGRAM REPORT: CPT

## 2022-07-24 PROCEDURE — 99233 SBSQ HOSP IP/OBS HIGH 50: CPT

## 2022-07-24 RX ORDER — METOCLOPRAMIDE HCL 10 MG
10 TABLET ORAL EVERY 8 HOURS
Refills: 0 | Status: DISCONTINUED | OUTPATIENT
Start: 2022-07-24 | End: 2022-07-26

## 2022-07-24 RX ADMIN — Medication 10 MILLIGRAM(S): at 06:11

## 2022-07-24 RX ADMIN — CYCLOBENZAPRINE HYDROCHLORIDE 10 MILLIGRAM(S): 10 TABLET, FILM COATED ORAL at 21:59

## 2022-07-24 RX ADMIN — CYCLOBENZAPRINE HYDROCHLORIDE 10 MILLIGRAM(S): 10 TABLET, FILM COATED ORAL at 06:11

## 2022-07-24 RX ADMIN — Medication 1 PUFF(S): at 09:25

## 2022-07-24 RX ADMIN — APIXABAN 5 MILLIGRAM(S): 2.5 TABLET, FILM COATED ORAL at 17:11

## 2022-07-24 RX ADMIN — PANTOPRAZOLE SODIUM 40 MILLIGRAM(S): 20 TABLET, DELAYED RELEASE ORAL at 06:11

## 2022-07-24 RX ADMIN — MIDODRINE HYDROCHLORIDE 5 MILLIGRAM(S): 2.5 TABLET ORAL at 06:11

## 2022-07-24 RX ADMIN — MIDODRINE HYDROCHLORIDE 5 MILLIGRAM(S): 2.5 TABLET ORAL at 13:15

## 2022-07-24 RX ADMIN — PREGABALIN 1000 MICROGRAM(S): 225 CAPSULE ORAL at 11:16

## 2022-07-24 RX ADMIN — Medication 1 MILLIGRAM(S): at 11:16

## 2022-07-24 RX ADMIN — Medication 100 MILLIGRAM(S): at 06:11

## 2022-07-24 RX ADMIN — Medication 50 MILLIGRAM(S): at 06:11

## 2022-07-24 RX ADMIN — CYCLOBENZAPRINE HYDROCHLORIDE 10 MILLIGRAM(S): 10 TABLET, FILM COATED ORAL at 13:15

## 2022-07-24 RX ADMIN — MIDODRINE HYDROCHLORIDE 5 MILLIGRAM(S): 2.5 TABLET ORAL at 21:59

## 2022-07-24 RX ADMIN — Medication 300 MILLIGRAM(S): at 11:16

## 2022-07-24 RX ADMIN — APIXABAN 5 MILLIGRAM(S): 2.5 TABLET, FILM COATED ORAL at 06:11

## 2022-07-24 RX ADMIN — Medication 100 MILLIGRAM(S): at 17:11

## 2022-07-24 NOTE — PROGRESS NOTE ADULT - SUBJECTIVE AND OBJECTIVE BOX
CONSUELO CARO  [facility] T2-3A 0[address] ([facility] T2-3A)      Patient was evaluated and examined  by bedside, no active events over night time, remains tachycardic HR range in low 100-120's      REVIEW OF SYSTEMS: unable to obtain, patient is non-verbal        T(C): , Max: 37.6 (07-23-22 @ 21:14)  HR: 113 (07-24-22 @ 04:44)  BP: 107/56 (07-24-22 @ 04:44)  RR: 19 (07-24-22 @ 04:44)  SpO2: 95% (07-24-22 @ 04:44)  CAPILLARY BLOOD GLUCOSE      POCT Blood Glucose.: 82 mg/dL (24 Jul 2022 05:53)  POCT Blood Glucose.: 104 mg/dL (23 Jul 2022 23:48)  POCT Blood Glucose.: 107 mg/dL (23 Jul 2022 15:23)      PHYSICAL EXAM:  General: NAD, Awake, patient is laying comfortably in bed  HEENT: AT, NC, Supple, NO JVD, NO CB, trach present  Lungs: good breath sounds B/L, no wheezing, no rhonchi  CVS: normal S1, S2, RRR, NO M/G/R  Abdomen: soft, bowel sounds present, non-tender, non-distended, peg present  Extremities: contracted and spastic all extremities,  no edema, no clubbing, no cyanosis, positive peripheral pulses b/l  Neuro: quadriplegia with spasticity, patient is non-verbal  Skin: no rash, no ecchymosis, left foot DTI covered with dressing      LAB  CBC  Date: 07-24-22 @ 08:53  Mean cell Incvmrrfqj36.4  Mean cell Hemoglobin Conc29.5  Mean cell Volum 79.4  Platelet count-Automate 521  RBC Count 4.57  Red Cell Distrib Width17.2  WBC Count13.08  % Albumin, Urine--  Hematocrit 36.3  Hemoglobin 10.7  CBC  Date: 07-23-22 @ 08:55  Mean cell Lmfgzygibw70.2  Mean cell Hemoglobin Conc30.8  Mean cell Volum 78.5  Platelet count-Automate 523  RBC Count 4.51  Red Cell Distrib Width17.2  WBC Count14.75  % Albumin, Urine--  Hematocrit 35.4  Hemoglobin 10.9  CBC  Date: 07-22-22 @ 10:54  Mean cell Yhdpyuxxuy63.5  Mean cell Hemoglobin Conc30.4  Mean cell Volum 77.5  Platelet count-Automate 597  RBC Count 4.76  Red Cell Distrib Width16.9  WBC Count19.28  % Albumin, Urine--  Hematocrit 36.9  Hemoglobin 11.2        [facility]  07-24-22 @ 08:53  Blood Gas Arterial-Calcium,Ionized--  Blood Urea Nitrogen, Serum 8 mg/dL<L> [10 - 20]  Carbon Dioxide, Serum32 mmol/L [17 - 32]  Chloride, Serum98 mmol/L [98 - 110]  Creatinie, Serum<0.5 mg/dL<L> [0.7 - 1.5]  Glucose, Itfuy539 mg/dL<H> [70 - 99]  Potassium, Serum4.7 mmol/L [3.5 - 5.0]  Sodium, Serum 136 mmol/L [135 - 146]  [facility]  07-23-22 @ 11:34  Blood Gas Arterial-Calcium,Ionized--  Blood Urea Nitrogen, Serum 13 mg/dL [10 - 20]  Carbon Dioxide, Serum27 mmol/L [17 - 32]  Chloride, Serum99 mmol/L [98 - 110]  Creatinie, Serum<0.5 mg/dL<L> [0.7 - 1.5]  Glucose, Serum72 mg/dL [70 - 99]  Potassium, Serum5.5 mmol/L<H> [3.5 - 5.0]  Sodium, Serum 139 mmol/L [135 - 146]  [facility]  07-23-22 @ 08:55  Blood Gas Arterial-Calcium,Ionized--  Blood Urea Nitrogen, Serum 13 mg/dL [10 - 20]  Carbon Dioxide, Serum30 mmol/L [17 - 32]  Chloride, Nmcnv040 mmol/L [98 - 110]  Creatinie, Serum<0.5 mg/dL<L> [0.7 - 1.5]  Glucose, Serum73 mg/dL [70 - 99]  Potassium, Serum5.2 mmol/L<H> [3.5 - 5.0]  Sodium, Serum 139 mmol/L [135 - 146]      Microbiology:    Culture - Urine (collected 07-21-22 @ 15:11)  Source: Catheterized Catheterized  Final Report (07-24-22 @ 07:24):    <10,000 CFU/mL Normal Urogenital Trinidad    Culture - Blood (collected 07-20-22 @ 20:51)  Source: .Blood None  Preliminary Report (07-22-22 @ 02:01):    No growth to date.    Culture - Sputum (collected 07-10-22 @ 17:14)  Source: Trach Asp Tracheal Aspirate  Gram Stain (07-11-22 @ 07:31):    Few polymorphonuclear leukocytes per low power field    Few Squamous epithelial cells per low power field    Moderate Gram Negative Rods seen per oil power field    Moderate Gram Positive Rods seen per oil power field    Moderate Gram positive cocci in pairs seen per oil power field  Final Report (07-12-22 @ 16:06):    Numerous Klebsiella pneumoniae (Carbapenem Resistant)    Normal Respiratory Trinidad absent  Organism: Klepne MDRO  Klepne MDRO (07-14-22 @ 16:42)  Organism: Klepne MDRO (07-14-22 @ 16:42)      -  Cefiderocol: S      Method Type: CRYSTAL  Organism: Klepne MDRO (07-14-22 @ 16:42)      -  Amikacin: R >32      -  Amoxicillin/Clavulanic Acid: R >16/8      -  Ampicillin: R >16 These ampicillin results predict results for amoxicillin      -  Ampicillin/Sulbactam: R >16/8 Enterobacter, Klebsiella aerogenes, Citrobacter, and Serratia may develop resistance during prolonged therapy (3-4 days)      -  Aztreonam: R >16      -  Cefazolin: R >16 Enterobacter, Klebsiella aerogenes, Citrobacter, and Serratia may develop resistance during prolonged therapy (3-4 days)      -  Cefepime: R >16      -  Cefoxitin: R >16      -  Ceftazidime/Avibactam: R >16      -  Ceftolozane/tazobactam: R >8      -  Ceftriaxone: R >32 Enterobacter, Klebsiella aerogenes, Citrobacter, and Serratia may develop resistance during prolonged therapy      -  Ciprofloxacin: R >2      -  Ertapenem: R >1      -  Gentamicin: R >8      -  Imipenem: R >8      -  Levofloxacin: R >4      -  Meropenem: R >8      -  Minocycline: S <=4      -  Piperacillin/Tazobactam: R >64      -  Tetra/Doxy: S <=4      -  Tigecycline: S <=2      -  Tobramycin: R >8      -  Trimethoprim/Sulfamethoxazole: R >2/38      Method Type: HERB    Culture - Blood (collected 07-10-22 @ 06:01)  Source: .Blood None  Final Report (07-15-22 @ 13:01):    No Growth Final    Culture - Blood (collected 07-09-22 @ 13:32)  Source: .Blood Blood-Peripheral  Final Report (07-15-22 @ 02:00):    No Growth Final    Culture - Blood (collected 07-09-22 @ 13:32)  Source: .Blood Blood-Peripheral  Final Report (07-15-22 @ 02:00):    No Growth Final      Medications:  acetaminophen     Tablet .. 650 milliGRAM(s) Oral every 6 hours PRN  aluminum hydroxide/magnesium hydroxide/simethicone Suspension 30 milliLiter(s) Oral every 4 hours PRN  apixaban 5 milliGRAM(s) Oral two times a day  cyanocobalamin 1000 MICROGram(s) Oral daily  cyclobenzaprine 10 milliGRAM(s) Oral three times a day  dextrose 50% Injectable 25 Gram(s) IV Push once  ferrous    sulfate Liquid 300 milliGRAM(s) Enteral Tube daily  folic acid  Oral Tab/Cap - Peds 1 milliGRAM(s) Oral daily  HYDROmorphone  Injectable 1 milliGRAM(s) IV Push every 4 hours PRN  melatonin 3 milliGRAM(s) Oral at bedtime PRN  metoclopramide 10 milliGRAM(s) Oral every 8 hours PRN  metoprolol tartrate 50 milliGRAM(s) Oral two times a day  midodrine 5 milliGRAM(s) Oral every 8 hours  ondansetron Injectable 4 milliGRAM(s) IV Push every 8 hours PRN  pantoprazole    Tablet 40 milliGRAM(s) Oral before breakfast  thiamine  Oral Tab/Cap - Peds 100 milliGRAM(s) Oral two times a day        Assessment and Plan:  Patient is a 21 y/o Female  with a PMHx of encephalitis s/p tooth abscess s/p tracheostomy and PEG tube placement, HTN, GERD, and SVC thrombosis who was brought to ED from [facility] for fevers. Admitted for tx for sepsis due to PNA.    # Sepsis 2/2 multi drug resistant pneumonia, sputum grew Klebsiella pneumoniae carbapenem resistant  # Acute on chronic respiratory failure 2/2 MDR pneumonia  # h/o encephalitis from tooth abscess  s/p trach and PEG, bedbound, non-verbal   # Recent COVID-19 infection, MDR colonization on respiratory tract, inability to clear secretion  - Changed IV antibiotic from cefiderocol to Meropenem, Tigecycline (given the LFTs elevation)  d/c on 7/23  completed 14 days.  -  7/21 - exchanged echeverria - and resent urine cxs- negative   -  fungitell - low, d/c caspofungin   - blood cxs - negative, procal level - 0.04, repeated CXR on 7/21 - no new opacifications   -7/24- WBC has decreased to 13.08    -Vent management as per Pulm. team     #Tachycardia: ? due to pain, started on Dilaudid 1mg IV every 4 hours prn. for pain  -d/c albuterol nebs, d/c glycopyrrolate, increased flexeril dose    - cont. metroprolol   -thyroid function wnr    # Hypotension- on midodrine 5 mg  three times daily   - f/up serum am cortisol level    # Hyperkalemia- corrected     #Mild transaminitis:   Might be 2/2 Abx. Changed as above. Monitor. Improving  RUQ US noted       # h/o SVC Thrombosis and Embolism   - c/w Eliquis 5 mg PO BID    # GERD  - c/w Protonix 40 mg PO daily    # Iron Deficiency Anemia, stable   - monitor CBC  - c/w home iron replacement, thiamine, B12, and folic acid    # DVT prophlaxis   - on eliquis 5 mg PO BID    #Functional Quadruparesis  s/p Trach/PEG    Dispo: [facility].     #Progress Note Handoff: CBC in am, f/up am cortisol level, monitor HR  Family discussion: yes, medical team Disposition: SNF once medically stable    Code status: full code     Total time spent to complete patient's bedside assessment, review medical chart, discuss medical plan of care with covering medical team was more than 35 minutes .

## 2022-07-25 LAB
ALBUMIN SERPL ELPH-MCNC: 2.8 G/DL — LOW (ref 3.5–5.2)
ALP SERPL-CCNC: 234 U/L — HIGH (ref 30–115)
ALT FLD-CCNC: 70 U/L — HIGH (ref 0–41)
ANION GAP SERPL CALC-SCNC: 11 MMOL/L — SIGNIFICANT CHANGE UP (ref 7–14)
AST SERPL-CCNC: 36 U/L — SIGNIFICANT CHANGE UP (ref 0–41)
BASOPHILS # BLD AUTO: 0.07 K/UL — SIGNIFICANT CHANGE UP (ref 0–0.2)
BASOPHILS NFR BLD AUTO: 0.5 % — SIGNIFICANT CHANGE UP (ref 0–1)
BILIRUB SERPL-MCNC: 0.2 MG/DL — SIGNIFICANT CHANGE UP (ref 0.2–1.2)
BUN SERPL-MCNC: 7 MG/DL — LOW (ref 10–20)
CALCIUM SERPL-MCNC: 8.6 MG/DL — SIGNIFICANT CHANGE UP (ref 8.5–10.1)
CHLORIDE SERPL-SCNC: 101 MMOL/L — SIGNIFICANT CHANGE UP (ref 98–110)
CO2 SERPL-SCNC: 30 MMOL/L — SIGNIFICANT CHANGE UP (ref 17–32)
CREAT SERPL-MCNC: <0.5 MG/DL — LOW (ref 0.7–1.5)
EGFR: 143 ML/MIN/1.73M2 — SIGNIFICANT CHANGE UP
EOSINOPHIL # BLD AUTO: 0.41 K/UL — SIGNIFICANT CHANGE UP (ref 0–0.7)
EOSINOPHIL NFR BLD AUTO: 3.2 % — SIGNIFICANT CHANGE UP (ref 0–8)
GLUCOSE BLDC GLUCOMTR-MCNC: 104 MG/DL — HIGH (ref 70–99)
GLUCOSE BLDC GLUCOMTR-MCNC: 106 MG/DL — HIGH (ref 70–99)
GLUCOSE BLDC GLUCOMTR-MCNC: 108 MG/DL — HIGH (ref 70–99)
GLUCOSE BLDC GLUCOMTR-MCNC: 120 MG/DL — HIGH (ref 70–99)
GLUCOSE BLDC GLUCOMTR-MCNC: 91 MG/DL — SIGNIFICANT CHANGE UP (ref 70–99)
GLUCOSE SERPL-MCNC: 123 MG/DL — HIGH (ref 70–99)
HCT VFR BLD CALC: 36.1 % — LOW (ref 37–47)
HGB BLD-MCNC: 10.6 G/DL — LOW (ref 12–16)
IMM GRANULOCYTES NFR BLD AUTO: 0.4 % — HIGH (ref 0.1–0.3)
LYMPHOCYTES # BLD AUTO: 15.7 % — LOW (ref 20.5–51.1)
LYMPHOCYTES # BLD AUTO: 2 K/UL — SIGNIFICANT CHANGE UP (ref 1.2–3.4)
MAGNESIUM SERPL-MCNC: 2.1 MG/DL — SIGNIFICANT CHANGE UP (ref 1.8–2.4)
MCHC RBC-ENTMCNC: 23.2 PG — LOW (ref 27–31)
MCHC RBC-ENTMCNC: 29.4 G/DL — LOW (ref 32–37)
MCV RBC AUTO: 79.2 FL — LOW (ref 81–99)
MONOCYTES # BLD AUTO: 1.03 K/UL — HIGH (ref 0.1–0.6)
MONOCYTES NFR BLD AUTO: 8.1 % — SIGNIFICANT CHANGE UP (ref 1.7–9.3)
NEUTROPHILS # BLD AUTO: 9.18 K/UL — HIGH (ref 1.4–6.5)
NEUTROPHILS NFR BLD AUTO: 72.1 % — SIGNIFICANT CHANGE UP (ref 42.2–75.2)
NRBC # BLD: 0 /100 WBCS — SIGNIFICANT CHANGE UP (ref 0–0)
PLATELET # BLD AUTO: 557 K/UL — HIGH (ref 130–400)
POTASSIUM SERPL-MCNC: 4.6 MMOL/L — SIGNIFICANT CHANGE UP (ref 3.5–5)
POTASSIUM SERPL-SCNC: 4.6 MMOL/L — SIGNIFICANT CHANGE UP (ref 3.5–5)
PROT SERPL-MCNC: 6.5 G/DL — SIGNIFICANT CHANGE UP (ref 6–8)
RBC # BLD: 4.56 M/UL — SIGNIFICANT CHANGE UP (ref 4.2–5.4)
RBC # FLD: 17.6 % — HIGH (ref 11.5–14.5)
SARS-COV-2 RNA SPEC QL NAA+PROBE: DETECTED
SODIUM SERPL-SCNC: 142 MMOL/L — SIGNIFICANT CHANGE UP (ref 135–146)
WBC # BLD: 12.74 K/UL — HIGH (ref 4.8–10.8)
WBC # FLD AUTO: 12.74 K/UL — HIGH (ref 4.8–10.8)

## 2022-07-25 PROCEDURE — 99233 SBSQ HOSP IP/OBS HIGH 50: CPT

## 2022-07-25 RX ORDER — CYCLOBENZAPRINE HYDROCHLORIDE 10 MG/1
1 TABLET, FILM COATED ORAL
Qty: 0 | Refills: 0 | DISCHARGE

## 2022-07-25 RX ORDER — CYCLOBENZAPRINE HYDROCHLORIDE 10 MG/1
2 TABLET, FILM COATED ORAL
Qty: 0 | Refills: 0 | DISCHARGE

## 2022-07-25 RX ORDER — ALBUTEROL 90 UG/1
1 AEROSOL, METERED ORAL
Qty: 0 | Refills: 0 | DISCHARGE

## 2022-07-25 RX ADMIN — MIDODRINE HYDROCHLORIDE 5 MILLIGRAM(S): 2.5 TABLET ORAL at 21:00

## 2022-07-25 RX ADMIN — Medication 300 MILLIGRAM(S): at 11:11

## 2022-07-25 RX ADMIN — Medication 1 MILLIGRAM(S): at 11:08

## 2022-07-25 RX ADMIN — CYCLOBENZAPRINE HYDROCHLORIDE 10 MILLIGRAM(S): 10 TABLET, FILM COATED ORAL at 13:44

## 2022-07-25 RX ADMIN — APIXABAN 5 MILLIGRAM(S): 2.5 TABLET, FILM COATED ORAL at 05:57

## 2022-07-25 RX ADMIN — PANTOPRAZOLE SODIUM 40 MILLIGRAM(S): 20 TABLET, DELAYED RELEASE ORAL at 06:00

## 2022-07-25 RX ADMIN — MIDODRINE HYDROCHLORIDE 5 MILLIGRAM(S): 2.5 TABLET ORAL at 13:44

## 2022-07-25 RX ADMIN — Medication 100 MILLIGRAM(S): at 17:37

## 2022-07-25 RX ADMIN — Medication 100 MILLIGRAM(S): at 05:58

## 2022-07-25 RX ADMIN — CYCLOBENZAPRINE HYDROCHLORIDE 10 MILLIGRAM(S): 10 TABLET, FILM COATED ORAL at 05:59

## 2022-07-25 RX ADMIN — APIXABAN 5 MILLIGRAM(S): 2.5 TABLET, FILM COATED ORAL at 17:36

## 2022-07-25 RX ADMIN — Medication 50 MILLIGRAM(S): at 05:58

## 2022-07-25 RX ADMIN — PREGABALIN 1000 MICROGRAM(S): 225 CAPSULE ORAL at 11:08

## 2022-07-25 RX ADMIN — Medication 50 MILLIGRAM(S): at 17:37

## 2022-07-25 RX ADMIN — MIDODRINE HYDROCHLORIDE 5 MILLIGRAM(S): 2.5 TABLET ORAL at 05:58

## 2022-07-25 RX ADMIN — CYCLOBENZAPRINE HYDROCHLORIDE 10 MILLIGRAM(S): 10 TABLET, FILM COATED ORAL at 21:00

## 2022-07-25 NOTE — DISCHARGE NOTE PROVIDER - CARE PROVIDER_API CALL
Jose M Miller (DO)  Internal Medicine  [address] [place] [address]  Phone: [phone]  Fax: [phone]  Follow Up Time: 2 weeks

## 2022-07-25 NOTE — DISCHARGE NOTE PROVIDER - NSDCQMSTROKE_NEU_ALL_CORE
Speech Therapy E-Visit     Patient verbally consented to e-visit.    SUBJECTIVE:   New or change in symptoms/reports since last e-visit: Pt's mother requesting recommendations on continued speech/language exercises for at home.  Current pain/limitations: n/a    OBJECTIVE (per patient report):   Pt's mother reported pt began to say \"hi\" any time the phone rings. Pt's mother also reporting that Randy has been \"more vocal\" as compared to his twin brother. Pt's mother also working on animal sounds via \"lovevery\" a [employer] kit and that the pt has begun to produce the animal sounds for cow, horse, and pig.    Treatment Recommendations:   Provided recommendations for continued speech and language activities via [employer] Handouts includin.) First Words  2.) Oral Motor Workout for Home  3.) Stimulability for Speech Sounds  4.) Benefits of Music  HEP ([employer] access code): n/a    ASSESSMENT (Impression of current status):   Pt continues to present with receptive and expressive language delay and requires continued speech and language intervention.    PLAN:   Will f/u with pt/pt's mother or father on a weekly basis during COVID-19 quarantine.    Time spent in E-visit with patient: 5 minutes   (conducted e-visit 5-10 minutes)    No

## 2022-07-25 NOTE — PROGRESS NOTE ADULT - ASSESSMENT
23 y/o F with a PMHx of encephalitis s/p tooth abscess s/p tracheostomy and PEG tube placement, HTN, GERD, and SVC thrombosis , recent admission for Covid19 PNA and pyelonephritis in June 2022, currently being treated with IV Vancomycin, cefepime for PNA since 7/8, BIBEMS from [facility] for fever this AM. Per NH notes, pt spiked temp of 102.6 today and was sent to ED. Pt is non-verbal, unable to contribute to history or ROS. pt has sputum around the trach area.     # sepsis 2/2 MDR PNA  # Acute on chronic respiratory failure  - in the ED,pt's VS T Max: 37.9  BP 89/49  - CT Angio Chest PE Protocol w/ IV Cont > NO PE, consistent with pneumonia  - pt was started on cefepime and vancomycin 7/8 at [facility]  - previous cultures grew Klebsiella, proteus ESBL, MDRO, CRE  - sputum cx with carbapenem resistent Klebsiella  - ID recs: Cefiderocol 2g q8h started   - blood cultures NGTD  - 07/15: switched to severo + tiga due to increasing LFTs  - 07/18: WBC increasing (14 to 17), chest xray ordered, keep same Ax for now as per ID, patient hemodynamically stable with no fever  - 07/19: WBC stable, CXR has no worsening signs, patient afebrile and stable, continue Ax and f/u ID plan tomorrow  - 07/20: WBC increasing, patient afebrile and hemodynamically no changes relative to her baseline, ID plan is to take urine analysis, blood Cx, remove the midline (another one placed with no complications), and give one dose of vancomycin 1g after cultures are drawn.  - 07/21: WBC stable, still no focus of infection (possibility of fungal, awaiting fungitel), keep on antibiotics, following cultures (NGTD), hemodynamically stable, afebrile  - 07/22: WBC decreasing , still no focus of infection, procal negative (possibility of fungal, awaiting fungitel, started on Caspufungin awaiting results), keep on antibiotics until tomorrow (14 days total), following cultures (NGTD), hemodynamically stable, afebrile  - 07/23: WBC decreasing, fungitel negative, Ax stopped today, will monitor off Ax, stable, afebrile  - 07/25: off antibiotics, hemodynamically stable, WBC trending down.    # Elevated LFTs  - likely due to Cefiderocol  - RUQ U/S WNL, hep panel NR  - hepatology recs: trend LFT and INR, check EBV, CMV, herpes, Avoid hepatotoxic medications  - as per ID: Cefiderocol d/c'ed   - 7/15 Tigecycline and Meropenem started  - 07/19: LFTs trending down  - 07/20: LFTs stable  - 07/21: Lffs stable, will keep monitoring as patient is on Tigacycline  - 07/23: LFTs still high but decreasing, will monitor off Ax (stopped on 07/23)    #Persistent sinus tachycardia  - pt was persistently tachycardic on previous admission baseline 100-120s  - EP eval, HR <130 acceptable  - on admission pt was tachycardic to 150-160s, now at baseline 90-120s s/p ABx  - c/w Metoprolol 50 BID  - 07/18: HR going up to 150 =>d-dimer ordered to assess for DVT, fluid bolus to be given to r/o intravascular volume depletion as a cause (patient on dry side), might add diltiazem IR 30mg Q6H if still tachycardic  - 07/19: D-dimer not suggestive of PE, HR slightly improved (in the 100-110 range) on fluids, to be monitored  - 07/20: HR in the 100-110 range, no changes made today  - 07/22: pending echocardio for assessment (possible cardiomyopathy leading to the tachycardia)  - 07/23: still pending echo, meds that could cause tachycardia stopped (albuterol), fentanyl patch added (r/o pain as a cause of tachycardia), cortisol pending (r/o adrenal insufficiency)  - 07/25: echo still pending, meds optimized, cortisol normal, no explanation for tachycardia yet.    #HO SVC Thrombosis and Embolism   - C/w Eliquis 5 mg PO BID    #History of Encephalitis post Tooth Abscess in 10/2021   - s/p Tracheostomy (has an O2 tank per sister but unsure about flow) and G-tube placement  - aspiration precautions, respiratory toilet/suction as per resp team    # Hypernatremia- resolved     #GERD  * Home med Omeprazole 40mg QD  - C/w Protonix 40 mg PO daily    #Iron Deficiency Anemia   #History of Wernicke Encephalopathy  #Suspected thiamine deficiency  #Suspected folic acid deficiency  * Home med iron replacement, thiamine, B12, and folic acid  - C/w home iron replacement, thiamine, B12, and folic acid  - Monitor for now    #DVT ppx: Eliquis 5 mg PO BID  #GI ppx: Protonix 40 mg PO daily  #Diet: NPO w G-tube feeds  #Activity: Functional quadriplegic  #Dispo: From [facility];  #Code status: Full code -- has MOLST confirming

## 2022-07-25 NOTE — PROGRESS NOTE ADULT - SUBJECTIVE AND OBJECTIVE BOX
GENERAL: Pt has Trach and PEG, does not communicate so assessment limited  CHEST/LUNG: Equal air entry bilaterally; no wheezing, bilateral rhonchi heard  HEART: Regular rate and rhythm; No murmurs, rubs, or gallops  ABDOMEN: Soft, patient does not grimace on palpation, Nondistended; Bowel sounds present  EXTREMITIES:  2+ Peripheral Pulses, trace ankle edema CONSUELO CARO 22y Female  MRN#: [id number]   Hospital Day: 16d    SUBJECTIVE  Patient is a 22y old Female who presents with a chief complaint of fever (25 Jul 2022 13:20)  Currently admitted to medicine with the primary diagnosis of 2019 novel coronavirus disease (COVID-19)      INTERVAL HPI AND OVERNIGHT EVENTS:  Patient was examined and seen at bedside. This morning she is resting comfortably in bed and reports no issues or overnight events.    OBJECTIVE  PAST MEDICAL & SURGICAL HISTORY  Encephalitis    H/O tracheostomy    PEG (percutaneous endoscopic gastrostomy) status    Acute deep vein thrombosis (DVT) of superior vena cava    HTN (hypertension)    GERD (gastroesophageal reflux disease)      ALLERGIES:  Allergy Status Unknown    MEDICATIONS:  STANDING MEDICATIONS  apixaban 5 milliGRAM(s) Oral two times a day  cyanocobalamin 1000 MICROGram(s) Oral daily  cyclobenzaprine 10 milliGRAM(s) Oral three times a day  dextrose 50% Injectable 25 Gram(s) IV Push once  ferrous    sulfate Liquid 300 milliGRAM(s) Enteral Tube daily  folic acid  Oral Tab/Cap - Peds 1 milliGRAM(s) Oral daily  metoprolol tartrate 50 milliGRAM(s) Oral two times a day  midodrine 5 milliGRAM(s) Oral every 8 hours  pantoprazole    Tablet 40 milliGRAM(s) Oral before breakfast  thiamine  Oral Tab/Cap - Peds 100 milliGRAM(s) Oral two times a day    PRN MEDICATIONS  acetaminophen     Tablet .. 650 milliGRAM(s) Oral every 6 hours PRN  aluminum hydroxide/magnesium hydroxide/simethicone Suspension 30 milliLiter(s) Oral every 4 hours PRN  HYDROmorphone  Injectable 1 milliGRAM(s) IV Push every 4 hours PRN  melatonin 3 milliGRAM(s) Oral at bedtime PRN  metoclopramide 10 milliGRAM(s) Oral every 8 hours PRN  ondansetron Injectable 4 milliGRAM(s) IV Push every 8 hours PRN      VITAL SIGNS: Last 24 Hours  T(C): 35.6 (25 Jul 2022 12:25), Max: 37 (24 Jul 2022 20:41)  T(F): 96.1 (25 Jul 2022 12:25), Max: 98.6 (24 Jul 2022 20:41)  HR: 117 (25 Jul 2022 12:25) (105 - 133)  BP: 90/45 (25 Jul 2022 12:25) (90/45 - 105/56)  BP(mean): --  RR: 19 (25 Jul 2022 12:25) (18 - 20)  SpO2: 94% (25 Jul 2022 12:25) (94% - 97%)    LABS:                        10.6   12.74 )-----------( 557      ( 25 Jul 2022 07:28 )             36.1     07-25    142  |  101  |  7<L>  ----------------------------<  123<H>  4.6   |  30  |  <0.5<L>    Ca    8.6      25 Jul 2022 07:28  Mg     2.1     07-25    TPro  6.5  /  Alb  2.8<L>  /  TBili  0.2  /  DBili  x   /  AST  36  /  ALT  70<H>  /  AlkPhos  234<H>  07-25                  RADIOLOGY:      PHYSICAL EXAM:  GENERAL: Pt has Trach and PEG, does not communicate so assessment limited  CHEST/LUNG: Equal air entry bilaterally; no wheezing, bilateral rhonchi heard  HEART: Regular rate and rhythm; No murmurs, rubs, or gallops  ABDOMEN: Soft, patient does not grimace on palpation, Nondistended; Bowel sounds present  EXTREMITIES:  2+ Peripheral Pulses, trace ankle edema

## 2022-07-25 NOTE — DISCHARGE NOTE PROVIDER - HOSPITAL COURSE
HPI:   21 y/o F with a PMHx of encephalitis s/p tooth abscess s/p tracheostomy and PEG tube placement, HTN, GERD, and SVC thrombosis , recent admission for Covid19 PNA and pyelonephritis in June 2022, currently being treated with IV Vancomycin, cefepime for PNA since 7/8, BIBEMS from [facility] for fever this AM. Per NH notes, pt spiked temp of 102.6 today and was sent to ED. Pt is non-verbal, unable to contribute to history or ROS. pt has sputum around the trach area.     in the ED,pt's VS T(C): 37.3 (07-09-22 @ 19:56), Max: 37.9 (07-09-22 @ 13:53)  HR: 112 (07-09-22 @ 19:56) (112 - 127)  BP: 91/61 (07-09-22 @ 19:56) (89/49 - 104/60)  RR: 18 (07-09-22 @ 19:56) (18 - 18)  SpO2: 100% (07-09-22 @ 19:56) (96% - 100%), labs done showed WBC 11k, creat 0.5, CRP 73,covid is positive, positive UA < from: CT Angio Chest PE Protocol w/ IV Cont (07.09.22 @ 20:57) >  No CTA evidence of acute pulmonary embolus.  Airspace consolidations predominantly within the left upper and lower   lobes with enlarged left hilar lymph nodes, most consistent with   pneumonia.    pt received cefepime, vancomycin.   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  -  7/21 - exchanged echeverria - and resent urine cxs- negative   -  fungitell - low, d/c caspofungin   - blood cxs - negative, procal level - 0.04, repeated CXR on 7/21 - no new opacifications   -7/25- WBC 12.7    -Vent management as per Pulm. team     #Tachycardia: ?  - Due to pain?, started on Dilaudid 1mg IV every 4 hours prn. for pain  - D/c albuterol nebs, d/c glycopyrrolate, increased flexeril dose    - cont. metroprolol   -thyroid function wnr  - Echocardio to be done as OP    # Hypotension- on midodrine 5 mg  three times daily   - normal serum cortisol level    # Hyperkalemia- corrected     #Mild transaminitis:   Might be 2/2 Abx. Changed as above. Monitor. Improving  RUQ US noted       # h/o SVC Thrombosis and Embolism   - c/w Eliquis 5 mg PO BID    # GERD  - c/w Protonix 40 mg PO daily    # Iron Deficiency Anemia, stable   - monitor CBC  - c/w home iron replacement, thiamine, B12, and folic acid    # DVT prophlaxis   - on eliquis 5 mg PO BID    #Functional Quadruparesis  s/p Trach/PEG    Dispo: Grave prognosis.     #Progress Note Handoff: start d/c planning   Family discussion: yes, medical team Disposition: SNF once bed is available    Code status: full code    HPI:   21 y/o F with a PMHx of encephalitis s/p tooth abscess s/p tracheostomy and PEG tube placement, HTN, GERD, and SVC thrombosis , recent admission for Covid19 PNA and pyelonephritis in June 2022, currently being treated with IV Vancomycin, cefepime for PNA since 7/8, BIBEMS from [facility] for fever this AM. Per NH notes, pt spiked temp of 102.6 today and was sent to ED. Pt is non-verbal, unable to contribute to history or ROS. pt has sputum around the trach area.     in the ED,pt's VS T(C): 37.3 (07-09-22 @ 19:56), Max: 37.9 (07-09-22 @ 13:53)  HR: 112 (07-09-22 @ 19:56) (112 - 127)  BP: 91/61 (07-09-22 @ 19:56) (89/49 - 104/60)  RR: 18 (07-09-22 @ 19:56) (18 - 18)  SpO2: 100% (07-09-22 @ 19:56) (96% - 100%), labs done showed WBC 11k, creat 0.5, CRP 73,covid is positive, positive UA < from: CT Angio Chest PE Protocol w/ IV Cont (07.09.22 @ 20:57) >  No CTA evidence of acute pulmonary embolus.  Airspace consolidations predominantly within the left upper and lower   lobes with enlarged left hilar lymph nodes, most consistent with   pneumonia.    pt received cefepime, vancomycin.   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  -  7/21 - exchanged echeverria - and resent urine cxs- negative   -  fungitell - low, d/c caspofungin   - blood cxs - negative, procal level - 0.04, repeated CXR on 7/21 - no new opacifications   -7/25- WBC 12.7, stable off antibiotics    -Vent management as per Pulm. team     #Tachycardia: ?  - Due to pain?, started on Dilaudid 1mg IV every 4 hours prn. for pain  - D/c albuterol nebs, d/c glycopyrrolate, increased flexeril dose    - cont. metroprolol   -thyroid function wnr  - Echocardio to be done as OP    # Hypotension- on midodrine 5 mg  three times daily   - normal serum cortisol level    # Hyperkalemia- corrected     #Mild transaminitis:   Might be 2/2 Abx. Changed as above. Monitor. Improving  RUQ US noted       # h/o SVC Thrombosis and Embolism   - c/w Eliquis 5 mg PO BID    # GERD  - c/w Protonix 40 mg PO daily    # Iron Deficiency Anemia, stable   - monitor CBC  - c/w home iron replacement, thiamine, B12, and folic acid    # DVT prophlaxis   - on eliquis 5 mg PO BID    #Functional Quadruparesis  s/p Trach/PEG    Dispo: Grave prognosis.     #Progress Note Handoff: start d/c planning   Family discussion: yes, medical team Disposition: SNF once bed is available    Code status: full code    HPI:   21 y/o F with a PMHx of encephalitis s/p tooth abscess s/p tracheostomy and PEG tube placement, HTN, GERD, and SVC thrombosis , recent admission for Covid19 PNA and pyelonephritis in June 2022, currently being treated with IV Vancomycin, cefepime for PNA since 7/8, BIBEMS from [facility] for fever this AM. Per NH notes, pt spiked temp of 102.6 today and was sent to ED. Pt is non-verbal, unable to contribute to history or ROS. pt has sputum around the trach area.     in the ED,pt's VS T(C): 37.3 (07-09-22 @ 19:56), Max: 37.9 (07-09-22 @ 13:53)  HR: 112 (07-09-22 @ 19:56) (112 - 127)  BP: 91/61 (07-09-22 @ 19:56) (89/49 - 104/60)  RR: 18 (07-09-22 @ 19:56) (18 - 18)  SpO2: 100% (07-09-22 @ 19:56) (96% - 100%), labs done showed WBC 11k, creat 0.5, CRP 73,covid is positive, positive UA < from: CT Angio Chest PE Protocol w/ IV Cont (07.09.22 @ 20:57) >  No CTA evidence of acute pulmonary embolus.  Airspace consolidations predominantly within the left upper and lower   lobes with enlarged left hilar lymph nodes, most consistent with   pneumonia.    pt received cefepime, vancomycin.   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  -  7/21 - exchanged echeverria - and resent urine cxs- negative   -  fungitell - low, d/c caspofungin   - blood cxs - negative, procal level - 0.04, repeated CXR on 7/21 - no new opacifications   -7/26- WBC 15, stable off antibiotics, afebrile    -Vent management as per Pulm. team     #Tachycardia: ?  - Due to pain?, started on Dilaudid 1mg IV every 4 hours prn. for pain  - D/c albuterol nebs, d/c glycopyrrolate, increased flexeril dose    - cont. metroprolol   -thyroid function wnr  - Echocardio to be done as OP    # Hypotension- on midodrine 5 mg  three times daily   - normal serum cortisol level    # Hyperkalemia- corrected     #Mild transaminitis:   Might be 2/2 Abx. Changed as above. Monitor. Improving  RUQ US noted       # h/o SVC Thrombosis and Embolism   - c/w Eliquis 5 mg PO BID    # GERD  - c/w Protonix 40 mg PO daily    # Iron Deficiency Anemia, stable   - monitor CBC  - c/w home iron replacement, thiamine, B12, and folic acid    # DVT prophlaxis   - on eliquis 5 mg PO BID    #Functional Quadruparesis  s/p Trach/PEG

## 2022-07-25 NOTE — PROGRESS NOTE ADULT - SUBJECTIVE AND OBJECTIVE BOX
CAROCONSUELO  [facility] T2-3A 024 B ([facility] T2-3A)      Patient was evaluated and examined  by bedside, HR - range low 100's , no fever, tolerating peg feedings, no hypoxia on vent settings       REVIEW OF SYSTEMS: unable to obtain, patient is non-verbal        T(C): , Max: 37 (07-24-22 @ 20:41)  HR: 117 (07-25-22 @ 12:25)  BP: 90/45 (07-25-22 @ 12:25)  RR: 19 (07-25-22 @ 12:25)  SpO2: 94% (07-25-22 @ 12:25)  CAPILLARY BLOOD GLUCOSE      POCT Blood Glucose.: 91 mg/dL (25 Jul 2022 11:54)  POCT Blood Glucose.: 106 mg/dL (25 Jul 2022 06:16)  POCT Blood Glucose.: 108 mg/dL (25 Jul 2022 00:13)  POCT Blood Glucose.: 121 mg/dL (24 Jul 2022 14:20)      PHYSICAL EXAM:  General: NAD, Awake, patient is laying comfortably in bed  HEENT: AT, NC, Supple, NO JVD, NO CB, trach present  Lungs: good breath sounds B/L, no wheezing, no rhonchi  CVS: normal S1, S2, RRR, NO M/G/R  Abdomen: soft, bowel sounds present, non-tender, non-distended, peg present  Extremities: contracted and spastic all extremities,  no edema, no clubbing, no cyanosis, positive peripheral pulses b/l  Neuro: quadriplegia with spasticity, patient is non-verbal, patient is able to blink , move head and  smile on occasions as a response to questions   Skin: no rash, no ecchymosis, left foot DTI covered with dressing        LAB  CBC  Date: 07-25-22 @ 07:28  Mean cell Bvhifjydof22.2  Mean cell Hemoglobin Conc29.4  Mean cell Volum 79.2  Platelet count-Automate 557  RBC Count 4.56  Red Cell Distrib Width17.6  WBC Count12.74  % Albumin, Urine--  Hematocrit 36.1  Hemoglobin 10.6  CBC  Date: 07-24-22 @ 08:53  Mean cell Vdyjhzqqzl21.4  Mean cell Hemoglobin Conc29.5  Mean cell Volum 79.4  Platelet count-Automate 521  RBC Count 4.57  Red Cell Distrib Width17.2  WBC Count13.08  % Albumin, Urine--  Hematocrit 36.3  Hemoglobin 10.7      [facility]  07-25-22 @ 07:28  Blood Gas Arterial-Calcium,Ionized--  Blood Urea Nitrogen, Serum 7 mg/dL<L> [10 - 20]  Carbon Dioxide, Serum30 mmol/L [17 - 32]  Chloride, Fkexg395 mmol/L [98 - 110]  Creatinie, Serum<0.5 mg/dL<L> [0.7 - 1.5]  Glucose, Unxjq148 mg/dL<H> [70 - 99]  Potassium, Serum4.6 mmol/L [3.5 - 5.0]  Sodium, Serum 142 mmol/L [135 - 146]  [facility]  07-24-22 @ 08:53  Blood Gas Arterial-Calcium,Ionized--  Blood Urea Nitrogen, Serum 8 mg/dL<L> [10 - 20]  Carbon Dioxide, Serum32 mmol/L [17 - 32]  Chloride, Serum98 mmol/L [98 - 110]  Creatinie, Serum<0.5 mg/dL<L> [0.7 - 1.5]  Glucose, Eljho848 mg/dL<H> [70 - 99]  Potassium, Serum4.7 mmol/L [3.5 - 5.0]  Sodium, Serum 136 mmol/L [135 - 146]  [facility]  07-23-22 @ 11:34  Blood Gas Arterial-Calcium,Ionized--  Blood Urea Nitrogen, Serum 13 mg/dL [10 - 20]  Carbon Dioxide, Serum27 mmol/L [17 - 32]  Chloride, Serum99 mmol/L [98 - 110]  Creatinie, Serum<0.5 mg/dL<L> [0.7 - 1.5]  Glucose, Serum72 mg/dL [70 - 99]  Potassium, Serum5.5 mmol/L<H> [3.5 - 5.0]  Sodium, Serum 139 mmol/L [135 - 146]  [facility]  07-23-22 @ 08:55  Blood Gas Arterial-Calcium,Ionized--  Blood Urea Nitrogen, Serum 13 mg/dL [10 - 20]  Carbon Dioxide, Serum30 mmol/L [17 - 32]  Chloride, Bpmuj052 mmol/L [98 - 110]  Creatinie, Serum<0.5 mg/dL<L> [0.7 - 1.5]  Glucose, Serum73 mg/dL [70 - 99]  Potassium, Serum5.2 mmol/L<H> [3.5 - 5.0]  Sodium, Serum 139 mmol/L [135 - 146]  BMP  07-22-22 @ 10:54  Blood Gas Arterial-Calcium,Ionized--  Blood Urea Nitrogen, Serum 13 mg/dL [10 - 20]  Carbon Dioxide, Serum31 mmol/L [17 - 32]  Chloride, Tgcvh105 mmol/L [98 - 110]  Creatinie, Serum<0.5 mg/dL<L> [0.7 - 1.5]  Glucose, Serum81 mg/dL [70 - 99]  Potassium, Serum4.9 mmol/L [3.5 - 5.0]  Sodium, Serum 140 mmol/L [135 - 146]        Microbiology:    Culture - Urine (collected 07-21-22 @ 15:11)  Source: Catheterized Catheterized  Final Report (07-24-22 @ 07:24):    <10,000 CFU/mL Normal Urogenital Trinidad    Culture - Blood (collected 07-20-22 @ 20:51)  Source: .Blood None  Preliminary Report (07-22-22 @ 02:01):    No growth to date.    Culture - Sputum (collected 07-10-22 @ 17:14)  Source: Trach Asp Tracheal Aspirate  Gram Stain (07-11-22 @ 07:31):    Few polymorphonuclear leukocytes per low power field    Few Squamous epithelial cells per low power field    Moderate Gram Negative Rods seen per oil power field    Moderate Gram Positive Rods seen per oil power field    Moderate Gram positive cocci in pairs seen per oil power field  Final Report (07-12-22 @ 16:06):    Numerous Klebsiella pneumoniae (Carbapenem Resistant)    Normal Respiratory Trinidad absent  Organism: Klepne MDRO  Klepne MDRO (07-14-22 @ 16:42)  Organism: Klepne MDRO (07-14-22 @ 16:42)      -  Cefiderocol: S      Method Type:   Organism: Klepne MDRO (07-14-22 @ 16:42)      -  Amikacin: R >32      -  Amoxicillin/Clavulanic Acid: R >16/8      -  Ampicillin: R >16 These ampicillin results predict results for amoxicillin      -  Ampicillin/Sulbactam: R >16/8 Enterobacter, Klebsiella aerogenes, Citrobacter, and Serratia may develop resistance during prolonged therapy (3-4 days)      -  Aztreonam: R >16      -  Cefazolin: R >16 Enterobacter, Klebsiella aerogenes, Citrobacter, and Serratia may develop resistance during prolonged therapy (3-4 days)      -  Cefepime: R >16      -  Cefoxitin: R >16      -  Ceftazidime/Avibactam: R >16      -  Ceftolozane/tazobactam: R >8      -  Ceftriaxone: R >32 Enterobacter, Klebsiella aerogenes, Citrobacter, and Serratia may develop resistance during prolonged therapy      -  Ciprofloxacin: R >2      -  Ertapenem: R >1      -  Gentamicin: R >8      -  Imipenem: R >8      -  Levofloxacin: R >4      -  Meropenem: R >8      -  Minocycline: S <=4      -  Piperacillin/Tazobactam: R >64      -  Tetra/Doxy: S <=4      -  Tigecycline: S <=2      -  Tobramycin: R >8      -  Trimethoprim/Sulfamethoxazole: R >2/38      Method Type: HERB    Culture - Blood (collected 07-10-22 @ 06:01)  Source: .Blood None  Final Report (07-15-22 @ 13:01):    No Growth Final    Culture - Blood (collected 07-09-22 @ 13:32)  Source: .Blood Blood-Peripheral  Final Report (07-15-22 @ 02:00):    No Growth Final    Culture - Blood (collected 07-09-22 @ 13:32)  Source: .Blood Blood-Peripheral  Final Report (07-15-22 @ 02:00):    No Growth Final      Medications:  acetaminophen     Tablet .. 650 milliGRAM(s) Oral every 6 hours PRN  aluminum hydroxide/magnesium hydroxide/simethicone Suspension 30 milliLiter(s) Oral every 4 hours PRN  apixaban 5 milliGRAM(s) Oral two times a day  cyanocobalamin 1000 MICROGram(s) Oral daily  cyclobenzaprine 10 milliGRAM(s) Oral three times a day  dextrose 50% Injectable 25 Gram(s) IV Push once  ferrous    sulfate Liquid 300 milliGRAM(s) Enteral Tube daily  folic acid  Oral Tab/Cap - Peds 1 milliGRAM(s) Oral daily  HYDROmorphone  Injectable 1 milliGRAM(s) IV Push every 4 hours PRN  melatonin 3 milliGRAM(s) Oral at bedtime PRN  metoclopramide 10 milliGRAM(s) Oral every 8 hours PRN  metoprolol tartrate 50 milliGRAM(s) Oral two times a day  midodrine 5 milliGRAM(s) Oral every 8 hours  ondansetron Injectable 4 milliGRAM(s) IV Push every 8 hours PRN  pantoprazole    Tablet 40 milliGRAM(s) Oral before breakfast  thiamine  Oral Tab/Cap - Peds 100 milliGRAM(s) Oral two times a day        Assessment and Plan:  Patient is a 21 y/o Female  with a PMHx of encephalitis s/p tooth abscess s/p tracheostomy and PEG tube placement, HTN, GERD, and SVC thrombosis who was brought to ED from [facility] for fevers. Admitted for tx for sepsis due to PNA.    # Sepsis 2/2 multi drug resistant pneumonia, sputum grew Klebsiella pneumoniae carbapenem resistant  # Acute on chronic respiratory failure 2/2 MDR pneumonia  # h/o encephalitis from tooth abscess  s/p trach and PEG, bedbound, non-verbal   # Recent COVID-19 infection, MDR colonization on respiratory tract, inability to clear secretion  - Changed IV antibiotic from cefiderocol to Meropenem, Tigecycline (given the LFTs elevation)  d/c on 7/23  completed 14 days.  -  7/21 - exchanged echeverria - and resent urine cxs- negative   -  fungitell - low, d/c caspofungin   - blood cxs - negative, procal level - 0.04, repeated CXR on 7/21 - no new opacifications   -7/25- WBC 12.7    -Vent management as per Pulm. team     #Tachycardia: ? due to pain, started on Dilaudid 1mg IV every 4 hours prn. for pain  -d/c albuterol nebs, d/c glycopyrrolate, increased flexeril dose    - cont. metroprolol   -thyroid function wnr    # Hypotension- on midodrine 5 mg  three times daily   - normal serum cortisol level    # Hyperkalemia- corrected     #Mild transaminitis:   Might be 2/2 Abx. Changed as above. Monitor. Improving  RUQ US noted       # h/o SVC Thrombosis and Embolism   - c/w Eliquis 5 mg PO BID    # GERD  - c/w Protonix 40 mg PO daily    # Iron Deficiency Anemia, stable   - monitor CBC  - c/w home iron replacement, thiamine, B12, and folic acid    # DVT prophlaxis   - on eliquis 5 mg PO BID    #Functional Quadruparesis  s/p Trach/PEG    Dispo: Grave prognosis.     #Progress Note Handoff: start d/c planning   Family discussion: yes, medical team Disposition: SNF once bed is available    Code status: full code     Total time spent to complete patient's bedside assessment, review medical chart, discuss medical plan of care with covering medical team was more than 35 minutes .

## 2022-07-25 NOTE — DISCHARGE NOTE PROVIDER - INSTRUCTIONS
Tube feeding via gastrostomy tube:  Jevity 1.2  345ml bolus every 6 hours (12 AM, 6AM, 12PM, 6PM)   Flush tube with 50 CC of water after each feed.

## 2022-07-25 NOTE — DISCHARGE NOTE PROVIDER - NSDCFUADDAPPT_GEN_ALL_CORE_FT
Follow up LFTs as OP    Follow up with echocardiogram as tachycardia workup Follow up LFTs as OP    Follow up with echocardiogram as tachycardia workup as OP

## 2022-07-25 NOTE — DISCHARGE NOTE PROVIDER - NSDCMRMEDTOKEN_GEN_ALL_CORE_FT
albuterol 90 mcg/inh inhalation aerosol with adapter: 1 puff(s) inhaled every 4 hours, As Needed for wheezing  cyclobenzaprine 5 mg oral tablet: 2 tab(s) orally 3 times a day  Eliquis: 5 milligram(s) orally every 12 hours  ferrous sulfate 220 mg/5 mL (44 mg/5 mL elemental iron) oral elixir: 7.5 milliliter(s) orally once a day  folic acid 1 mg oral tablet: 1 tab(s) orally once a day  glycopyrrolate 2 mg oral tablet: 1 tab(s) orally 3 times a day  ipratropium 18 mcg/inh inhalation aerosol: 2 puff(s) inhaled every 6 hours  Lopressor 50 mg oral tablet: 1 tab(s) orally 2 times a day  metoclopramide 10 mg oral tablet: 1 tab(s) orally 4 times a day (before meals and at bedtime)  midodrine 5 mg oral tablet: 1 tab(s) orally every 8 hours  omeprazole 40 mg oral delayed release capsule: 1 cap(s) orally once a day  scopolamine 0.4 mg oral tablet: 1 milligram(s) orally every 72 hours  Tylenol 325 mg oral capsule: 2 cap(s) orally 3 times a day, As Needed  Vitamin B1 100 mg oral tablet: 1 tab(s) orally 2 times a day  Vitamin B12 500 mcg oral tablet: 1 tab(s) orally once a day   acetylcysteine 20% inhalation solution: 4 milliliter(s) inhaled every 6 hours  albuterol 90 mcg/inh inhalation aerosol with adapter: 1 puff(s) inhaled every 4 hours, As Needed for wheezing  cyclobenzaprine 5 mg oral tablet: 2 tab(s) orally 3 times a day  Eliquis: 5 milligram(s) orally every 12 hours  ferrous sulfate 220 mg/5 mL (44 mg/5 mL elemental iron) oral elixir: 7.5 milliliter(s) orally once a day  folic acid 1 mg oral tablet: 1 tab(s) orally once a day  ipratropium 18 mcg/inh inhalation aerosol: 2 puff(s) inhaled every 6 hours  Lopressor 50 mg oral tablet: 1 tab(s) orally 2 times a day  metoclopramide 10 mg oral tablet: 1 tab(s) orally 4 times a day (before meals and at bedtime), As Needed for nausea/vomiting  midodrine 5 mg oral tablet: 1 tab(s) orally every 8 hours  omeprazole 40 mg oral delayed release capsule: 1 cap(s) orally once a day  Tylenol 325 mg oral capsule: 2 cap(s) orally 3 times a day, As Needed  Vitamin B1 100 mg oral tablet: 1 tab(s) orally 2 times a day  Vitamin B12 500 mcg oral tablet: 1 tab(s) orally once a day

## 2022-07-25 NOTE — DISCHARGE NOTE PROVIDER - NSDCCPCAREPLAN_GEN_ALL_CORE_FT
PRINCIPAL DISCHARGE DIAGNOSIS  Diagnosis: Sepsis due to pneumonia  Assessment and Plan of Treatment: Sepsis is a serious condition that occurs when the body overreacts to an infection. It is also called systemic inflammatory response syndrome (SIRS) with infection. An infection is usually caused by bacteria that attack the body. The body's defense system normally fights off infection within the affected body part. With sepsis, the body overreacts and causes symptoms to occur throughout the body. This leads to uncontrolled and widespread inflammation and clotting in small blood vessels. Blood flow to different body parts decreases and may lead to organ failure. Sepsis requires immediate treatment.  You were treated in the hospital with IV antibiotics for a pneumonia, and your symptoms resolved.   Please seek immediate medical attention if you develop fever >100.4 F, coughing blood, have sudden trouble breathing, or you have increased secretions, or your skin or lips are turning blue.      SECONDARY DISCHARGE DIAGNOSES  Diagnosis: Pneumonia due to severe acute respiratory syndrome coronavirus 2 (SARS-CoV-2)  Assessment and Plan of Treatment:     Diagnosis: Tracheostomy present  Assessment and Plan of Treatment:     Diagnosis: Gastrostomy tube dependent  Assessment and Plan of Treatment:

## 2022-07-26 VITALS
SYSTOLIC BLOOD PRESSURE: 112 MMHG | OXYGEN SATURATION: 96 % | DIASTOLIC BLOOD PRESSURE: 68 MMHG | RESPIRATION RATE: 19 BRPM | TEMPERATURE: 98 F | HEART RATE: 107 BPM

## 2022-07-26 LAB
ALBUMIN SERPL ELPH-MCNC: 3.3 G/DL — LOW (ref 3.5–5.2)
ALP SERPL-CCNC: 225 U/L — HIGH (ref 30–115)
ALT FLD-CCNC: 58 U/L — HIGH (ref 0–41)
ANION GAP SERPL CALC-SCNC: 11 MMOL/L — SIGNIFICANT CHANGE UP (ref 7–14)
AST SERPL-CCNC: 25 U/L — SIGNIFICANT CHANGE UP (ref 0–41)
BASOPHILS # BLD AUTO: 0.09 K/UL — SIGNIFICANT CHANGE UP (ref 0–0.2)
BASOPHILS NFR BLD AUTO: 0.6 % — SIGNIFICANT CHANGE UP (ref 0–1)
BILIRUB SERPL-MCNC: 0.2 MG/DL — SIGNIFICANT CHANGE UP (ref 0.2–1.2)
BUN SERPL-MCNC: 9 MG/DL — LOW (ref 10–20)
CALCIUM SERPL-MCNC: 9.4 MG/DL — SIGNIFICANT CHANGE UP (ref 8.5–10.1)
CHLORIDE SERPL-SCNC: 102 MMOL/L — SIGNIFICANT CHANGE UP (ref 98–110)
CO2 SERPL-SCNC: 28 MMOL/L — SIGNIFICANT CHANGE UP (ref 17–32)
CREAT SERPL-MCNC: <0.5 MG/DL — LOW (ref 0.7–1.5)
CULTURE RESULTS: SIGNIFICANT CHANGE UP
EGFR: 143 ML/MIN/1.73M2 — SIGNIFICANT CHANGE UP
EOSINOPHIL # BLD AUTO: 0.33 K/UL — SIGNIFICANT CHANGE UP (ref 0–0.7)
EOSINOPHIL NFR BLD AUTO: 2.1 % — SIGNIFICANT CHANGE UP (ref 0–8)
GLUCOSE BLDC GLUCOMTR-MCNC: 118 MG/DL — HIGH (ref 70–99)
GLUCOSE BLDC GLUCOMTR-MCNC: 132 MG/DL — HIGH (ref 70–99)
GLUCOSE SERPL-MCNC: 80 MG/DL — SIGNIFICANT CHANGE UP (ref 70–99)
HCT VFR BLD CALC: 39.2 % — SIGNIFICANT CHANGE UP (ref 37–47)
HGB BLD-MCNC: 11.8 G/DL — LOW (ref 12–16)
IMM GRANULOCYTES NFR BLD AUTO: 0.3 % — SIGNIFICANT CHANGE UP (ref 0.1–0.3)
LYMPHOCYTES # BLD AUTO: 13.3 % — LOW (ref 20.5–51.1)
LYMPHOCYTES # BLD AUTO: 2.1 K/UL — SIGNIFICANT CHANGE UP (ref 1.2–3.4)
MCHC RBC-ENTMCNC: 23.9 PG — LOW (ref 27–31)
MCHC RBC-ENTMCNC: 30.1 G/DL — LOW (ref 32–37)
MCV RBC AUTO: 79.4 FL — LOW (ref 81–99)
MONOCYTES # BLD AUTO: 1.28 K/UL — HIGH (ref 0.1–0.6)
MONOCYTES NFR BLD AUTO: 8.1 % — SIGNIFICANT CHANGE UP (ref 1.7–9.3)
NEUTROPHILS # BLD AUTO: 11.96 K/UL — HIGH (ref 1.4–6.5)
NEUTROPHILS NFR BLD AUTO: 75.6 % — HIGH (ref 42.2–75.2)
NRBC # BLD: 0 /100 WBCS — SIGNIFICANT CHANGE UP (ref 0–0)
PLATELET # BLD AUTO: 590 K/UL — HIGH (ref 130–400)
POTASSIUM SERPL-MCNC: 5.1 MMOL/L — HIGH (ref 3.5–5)
POTASSIUM SERPL-SCNC: 5.1 MMOL/L — HIGH (ref 3.5–5)
PROT SERPL-MCNC: 7.4 G/DL — SIGNIFICANT CHANGE UP (ref 6–8)
RBC # BLD: 4.94 M/UL — SIGNIFICANT CHANGE UP (ref 4.2–5.4)
RBC # FLD: 18.4 % — HIGH (ref 11.5–14.5)
SODIUM SERPL-SCNC: 141 MMOL/L — SIGNIFICANT CHANGE UP (ref 135–146)
SPECIMEN SOURCE: SIGNIFICANT CHANGE UP
WBC # BLD: 15.81 K/UL — HIGH (ref 4.8–10.8)
WBC # FLD AUTO: 15.81 K/UL — HIGH (ref 4.8–10.8)

## 2022-07-26 PROCEDURE — 99239 HOSP IP/OBS DSCHRG MGMT >30: CPT

## 2022-07-26 RX ORDER — HYDROMORPHONE HYDROCHLORIDE 2 MG/ML
1 INJECTION INTRAMUSCULAR; INTRAVENOUS; SUBCUTANEOUS
Qty: 120 | Refills: 0
Start: 2022-07-26 | End: 2022-08-24

## 2022-07-26 RX ORDER — METOCLOPRAMIDE HCL 10 MG
1 TABLET ORAL
Qty: 0 | Refills: 0 | DISCHARGE

## 2022-07-26 RX ORDER — ROBINUL 0.2 MG/ML
1 INJECTION INTRAMUSCULAR; INTRAVENOUS
Qty: 0 | Refills: 0 | DISCHARGE

## 2022-07-26 RX ORDER — ACETYLCYSTEINE 200 MG/ML
4 VIAL (ML) MISCELLANEOUS
Qty: 0 | Refills: 0 | DISCHARGE
Start: 2022-07-26

## 2022-07-26 RX ORDER — IPRATROPIUM BROMIDE 0.2 MG/ML
500 SOLUTION, NON-ORAL INHALATION ONCE
Refills: 0 | Status: COMPLETED | OUTPATIENT
Start: 2022-07-26 | End: 2022-07-26

## 2022-07-26 RX ORDER — ACETYLCYSTEINE 200 MG/ML
4 VIAL (ML) MISCELLANEOUS EVERY 6 HOURS
Refills: 0 | Status: DISCONTINUED | OUTPATIENT
Start: 2022-07-26 | End: 2022-07-26

## 2022-07-26 RX ORDER — SCOPALAMINE 1 MG/3D
1 PATCH, EXTENDED RELEASE TRANSDERMAL
Qty: 0 | Refills: 0 | DISCHARGE

## 2022-07-26 RX ADMIN — Medication 300 MILLIGRAM(S): at 11:20

## 2022-07-26 RX ADMIN — HYDROMORPHONE HYDROCHLORIDE 1 MILLIGRAM(S): 2 INJECTION INTRAMUSCULAR; INTRAVENOUS; SUBCUTANEOUS at 08:53

## 2022-07-26 RX ADMIN — MIDODRINE HYDROCHLORIDE 5 MILLIGRAM(S): 2.5 TABLET ORAL at 05:01

## 2022-07-26 RX ADMIN — CYCLOBENZAPRINE HYDROCHLORIDE 10 MILLIGRAM(S): 10 TABLET, FILM COATED ORAL at 14:18

## 2022-07-26 RX ADMIN — PANTOPRAZOLE SODIUM 40 MILLIGRAM(S): 20 TABLET, DELAYED RELEASE ORAL at 05:00

## 2022-07-26 RX ADMIN — PREGABALIN 1000 MICROGRAM(S): 225 CAPSULE ORAL at 11:20

## 2022-07-26 RX ADMIN — Medication 1 MILLIGRAM(S): at 11:20

## 2022-07-26 RX ADMIN — Medication 4 MILLILITER(S): at 13:03

## 2022-07-26 RX ADMIN — APIXABAN 5 MILLIGRAM(S): 2.5 TABLET, FILM COATED ORAL at 05:01

## 2022-07-26 RX ADMIN — Medication 50 MILLIGRAM(S): at 05:01

## 2022-07-26 RX ADMIN — Medication 100 MILLIGRAM(S): at 05:01

## 2022-07-26 RX ADMIN — MIDODRINE HYDROCHLORIDE 5 MILLIGRAM(S): 2.5 TABLET ORAL at 14:17

## 2022-07-26 RX ADMIN — CYCLOBENZAPRINE HYDROCHLORIDE 10 MILLIGRAM(S): 10 TABLET, FILM COATED ORAL at 05:01

## 2022-07-26 RX ADMIN — HYDROMORPHONE HYDROCHLORIDE 1 MILLIGRAM(S): 2 INJECTION INTRAMUSCULAR; INTRAVENOUS; SUBCUTANEOUS at 08:23

## 2022-07-26 RX ADMIN — Medication 500 MICROGRAM(S): at 13:00

## 2022-07-26 NOTE — DISCHARGE NOTE NURSING/CASE MANAGEMENT/SOCIAL WORK - PATIENT PORTAL LINK FT
You can access the FollowMyHealth Patient Portal offered by [facility] by registering at the following website: [email or web address]. By joining [facility]’s FollowMyHealth portal, you will also be able to view your health information using other applications (apps) compatible with our system.

## 2022-07-26 NOTE — PROGRESS NOTE ADULT - SUBJECTIVE AND OBJECTIVE BOX
CONSUELO CARO  [facility] T2-3A 024 B ([facility] T2-3A)      Patient was evaluated and examined  by bedside, remains afebrile, moderate trach secretions noted by covering nurse, tolerating peg feedings, remains slightly tachycardic.       REVIEW OF SYSTEMS: unable to obtain, patient is non-verbal        T(C): , Max: 37 (07-25-22 @ 20:24)  HR: 119 (07-26-22 @ 08:07)  BP: 115/68 (07-26-22 @ 05:52)  RR: 19 (07-26-22 @ 05:52)  SpO2: 98% (07-26-22 @ 08:24)  CAPILLARY BLOOD GLUCOSE      POCT Blood Glucose.: 118 mg/dL (26 Jul 2022 11:26)  POCT Blood Glucose.: 132 mg/dL (26 Jul 2022 07:31)  POCT Blood Glucose.: 120 mg/dL (25 Jul 2022 21:16)  POCT Blood Glucose.: 104 mg/dL (25 Jul 2022 18:01)      PHYSICAL EXAM:  General: NAD, Awake, patient is laying comfortably in bed  HEENT: AT, NC, Supple, NO JVD, NO CB, trach present  Lungs: good breath sounds B/L, no wheezing, no rhonchi  CVS: normal S1, S2, RRR, NO M/G/R  Abdomen: soft, bowel sounds present, non-tender, non-distended, peg present  Extremities: contracted and spastic all extremities,  no edema, no clubbing, no cyanosis, positive peripheral pulses b/l  Neuro: quadriplegia with spasticity, patient is non-verbal, patient is able to blink , move head and  smile on occasions as a response to questions   Skin: no rash, no ecchymosis, left foot DTI covered with dressing        LAB  CBC  Date: 07-26-22 @ 10:04  Mean cell Trxxenvotx27.9  Mean cell Hemoglobin Conc30.1  Mean cell Volum 79.4  Platelet count-Automate 590  RBC Count 4.94  Red Cell Distrib Width18.4  WBC Count15.81  % Albumin, Urine--  Hematocrit 39.2  Hemoglobin 11.8  CBC  Date: 07-25-22 @ 07:28  Mean cell Lsyklaaxns51.2  Mean cell Hemoglobin Conc29.4  Mean cell Volum 79.2  Platelet count-Automate 557  RBC Count 4.56  Red Cell Distrib Width17.6  WBC Count12.74  % Albumin, Urine--  Hematocrit 36.1  Hemoglobin 10.6      [facility]  07-26-22 @ 10:04  Blood Gas Arterial-Calcium,Ionized--  Blood Urea Nitrogen, Serum 9 mg/dL<L> [10 - 20]  Carbon Dioxide, Serum28 mmol/L [17 - 32]  Chloride, Qazad060 mmol/L [98 - 110]  Creatinie, Serum<0.5 mg/dL<L> [0.7 - 1.5]  Glucose, Serum80 mg/dL [70 - 99]  Potassium, Serum5.1 mmol/L<H> [3.5 - 5.0]  Sodium, Serum 141 mmol/L [135 - 146]  [facility]  07-25-22 @ 07:28  Blood Gas Arterial-Calcium,Ionized--  Blood Urea Nitrogen, Serum 7 mg/dL<L> [10 - 20]  Carbon Dioxide, Serum30 mmol/L [17 - 32]  Chloride, Yfrac851 mmol/L [98 - 110]  Creatinie, Serum<0.5 mg/dL<L> [0.7 - 1.5]  Glucose, Cpwzl362 mg/dL<H> [70 - 99]  Potassium, Serum4.6 mmol/L [3.5 - 5.0]  Sodium, Serum 142 mmol/L [135 - 146]  [facility]  07-24-22 @ 08:53  Blood Gas Arterial-Calcium,Ionized--  Blood Urea Nitrogen, Serum 8 mg/dL<L> [10 - 20]  Carbon Dioxide, Serum32 mmol/L [17 - 32]  Chloride, Serum98 mmol/L [98 - 110]  Creatinie, Serum<0.5 mg/dL<L> [0.7 - 1.5]  Glucose, Qqtft615 mg/dL<H> [70 - 99]  Potassium, Serum4.7 mmol/L [3.5 - 5.0]  Sodium, Serum 136 mmol/L [135 - 146]      Microbiology:    Culture - Urine (collected 07-21-22 @ 15:11)  Source: Catheterized Catheterized  Final Report (07-24-22 @ 07:24):    <10,000 CFU/mL Normal Urogenital Trinidad    Culture - Blood (collected 07-20-22 @ 20:51)  Source: .Blood None  Final Report (07-26-22 @ 02:00):    No Growth Final    Culture - Sputum (collected 07-10-22 @ 17:14)  Source: Trach Asp Tracheal Aspirate  Gram Stain (07-11-22 @ 07:31):    Few polymorphonuclear leukocytes per low power field    Few Squamous epithelial cells per low power field    Moderate Gram Negative Rods seen per oil power field    Moderate Gram Positive Rods seen per oil power field    Moderate Gram positive cocci in pairs seen per oil power field  Final Report (07-12-22 @ 16:06):    Numerous Klebsiella pneumoniae (Carbapenem Resistant)    Normal Respiratory Trinidad absent  Organism: Klepne MDRO  Klepne MDRO (07-14-22 @ 16:42)  Organism: Klepne MDRO (07-14-22 @ 16:42)      -  Cefiderocol: S      Method Type: KB  Organism: Klepne MDRO (07-14-22 @ 16:42)      -  Amikacin: R >32      -  Amoxicillin/Clavulanic Acid: R >16/8      -  Ampicillin: R >16 These ampicillin results predict results for amoxicillin      -  Ampicillin/Sulbactam: R >16/8 Enterobacter, Klebsiella aerogenes, Citrobacter, and Serratia may develop resistance during prolonged therapy (3-4 days)      -  Aztreonam: R >16      -  Cefazolin: R >16 Enterobacter, Klebsiella aerogenes, Citrobacter, and Serratia may develop resistance during prolonged therapy (3-4 days)      -  Cefepime: R >16      -  Cefoxitin: R >16      -  Ceftazidime/Avibactam: R >16      -  Ceftolozane/tazobactam: R >8      -  Ceftriaxone: R >32 Enterobacter, Klebsiella aerogenes, Citrobacter, and Serratia may develop resistance during prolonged therapy      -  Ciprofloxacin: R >2      -  Ertapenem: R >1      -  Gentamicin: R >8      -  Imipenem: R >8      -  Levofloxacin: R >4      -  Meropenem: R >8      -  Minocycline: S <=4      -  Piperacillin/Tazobactam: R >64      -  Tetra/Doxy: S <=4      -  Tigecycline: S <=2      -  Tobramycin: R >8      -  Trimethoprim/Sulfamethoxazole: R >2/38      Method Type: HERB    Culture - Blood (collected 07-10-22 @ 06:01)  Source: .Blood None  Final Report (07-15-22 @ 13:01):    No Growth Final    Culture - Blood (collected 07-09-22 @ 13:32)  Source: .Blood Blood-Peripheral  Final Report (07-15-22 @ 02:00):    No Growth Final    Culture - Blood (collected 07-09-22 @ 13:32)  Source: .Blood Blood-Peripheral  Final Report (07-15-22 @ 02:00):    No Growth Final      Medications:  acetaminophen     Tablet .. 650 milliGRAM(s) Oral every 6 hours PRN  acetylcysteine 20%  Inhalation 4 milliLiter(s) Inhalation every 6 hours  aluminum hydroxide/magnesium hydroxide/simethicone Suspension 30 milliLiter(s) Oral every 4 hours PRN  apixaban 5 milliGRAM(s) Oral two times a day  cyanocobalamin 1000 MICROGram(s) Oral daily  cyclobenzaprine 10 milliGRAM(s) Oral three times a day  dextrose 50% Injectable 25 Gram(s) IV Push once  ferrous    sulfate Liquid 300 milliGRAM(s) Enteral Tube daily  folic acid  Oral Tab/Cap - Peds 1 milliGRAM(s) Oral daily  HYDROmorphone  Injectable 1 milliGRAM(s) IV Push every 4 hours PRN  melatonin 3 milliGRAM(s) Oral at bedtime PRN  metoclopramide 10 milliGRAM(s) Oral every 8 hours PRN  metoprolol tartrate 50 milliGRAM(s) Oral two times a day  midodrine 5 milliGRAM(s) Oral every 8 hours  ondansetron Injectable 4 milliGRAM(s) IV Push every 8 hours PRN  pantoprazole    Tablet 40 milliGRAM(s) Oral before breakfast  thiamine  Oral Tab/Cap - Peds 100 milliGRAM(s) Oral two times a day        Assessment and Plan:  Patient is a 21 y/o Female  with a PMHx of encephalitis s/p tooth abscess s/p tracheostomy and PEG tube placement, HTN, GERD, and SVC thrombosis who was brought to ED from [place] for fevers. Admitted for tx for sepsis due to PNA.    # Sepsis 2/2 multi drug resistant pneumonia, sputum grew Klebsiella pneumoniae carbapenem resistant  # Acute on chronic respiratory failure 2/2 MDR pneumonia  # h/o encephalitis from tooth abscess  s/p trach and PEG, bedbound, non-verbal   # Recent COVID-19 infection, MDR colonization on respiratory tract, inability to clear secretion  - Changed IV antibiotic from cefiderocol to Meropenem, Tigecycline (given the LFTs elevation)  d/c on 7/23  completed 14 days.  -  7/21 - exchanged echeverria - and resent urine cxs- negative   -  fungitell - low, d/c caspofungin   - blood cxs - negative, procal level - 0.04, repeated CXR on 7/21 - no new opacifications   -7/26- WBC 15.8    -Vent management as per Pulm. team     #Tachycardia: ? due to pain, started on Dilaudid 1mg IV every 4 hours prn. for pain  -d/c albuterol nebs, d/c glycopyrrolate, increased flexeril dose    - cont. metroprolol   -thyroid function wnr    # Hypotension- on midodrine 5 mg  three times daily   - normal serum cortisol level    # Hyperkalemia- corrected     #Mild transaminitis:   Might be 2/2 Abx. Changed as above. Monitor. Improving  RUQ US noted       # h/o SVC Thrombosis and Embolism   - c/w Eliquis 5 mg PO BID    # GERD  - c/w Protonix 40 mg PO daily    # Iron Deficiency Anemia, stable   - monitor CBC  - c/w home iron replacement, thiamine, B12, and folic acid    # DVT prophlaxis   - on eliquis 5 mg PO BID    #Functional Quadruparesis  s/p Trach/PEG    Dispo: [facility].     #Progress Note Handoff:  d/c planning   Family discussion: yes, medical team Disposition: SNF today    Code status: full code     Total time spent to complete patient's bedside assessment, review medical chart, discuss discharge  plan of care with covering medical team was more than 35 minutes .

## 2022-07-26 NOTE — PROGRESS NOTE ADULT - REASON FOR ADMISSION
fever

## 2022-07-26 NOTE — PROGRESS NOTE ADULT - PROVIDER SPECIALTY LIST ADULT
Hospitalist
Hospitalist
Internal Medicine
Hospitalist
Internal Medicine
Internal Medicine
Critical Care
Hospitalist
Hospitalist
Infectious Disease
Internal Medicine
Hospitalist
Infectious Disease

## 2022-07-29 DIAGNOSIS — A41.59 OTHER GRAM-NEGATIVE SEPSIS: ICD-10-CM

## 2022-07-29 DIAGNOSIS — D50.9 IRON DEFICIENCY ANEMIA, UNSPECIFIED: ICD-10-CM

## 2022-07-29 DIAGNOSIS — E53.8 DEFICIENCY OF OTHER SPECIFIED B GROUP VITAMINS: ICD-10-CM

## 2022-07-29 DIAGNOSIS — Z79.01 LONG TERM (CURRENT) USE OF ANTICOAGULANTS: ICD-10-CM

## 2022-07-29 DIAGNOSIS — U07.1 COVID-19: ICD-10-CM

## 2022-07-29 DIAGNOSIS — E51.9 THIAMINE DEFICIENCY, UNSPECIFIED: ICD-10-CM

## 2022-07-29 DIAGNOSIS — E87.0 HYPEROSMOLALITY AND HYPERNATREMIA: ICD-10-CM

## 2022-07-29 DIAGNOSIS — Z93.0 TRACHEOSTOMY STATUS: ICD-10-CM

## 2022-07-29 DIAGNOSIS — J96.20 ACUTE AND CHRONIC RESPIRATORY FAILURE, UNSPECIFIED WHETHER WITH HYPOXIA OR HYPERCAPNIA: ICD-10-CM

## 2022-07-29 DIAGNOSIS — Z16.35 RESISTANCE TO MULTIPLE ANTIMICROBIAL DRUGS: ICD-10-CM

## 2022-07-29 DIAGNOSIS — E87.5 HYPERKALEMIA: ICD-10-CM

## 2022-07-29 DIAGNOSIS — K21.9 GASTRO-ESOPHAGEAL REFLUX DISEASE WITHOUT ESOPHAGITIS: ICD-10-CM

## 2022-07-29 DIAGNOSIS — I10 ESSENTIAL (PRIMARY) HYPERTENSION: ICD-10-CM

## 2022-07-29 DIAGNOSIS — R53.2 FUNCTIONAL QUADRIPLEGIA: ICD-10-CM

## 2022-07-29 DIAGNOSIS — Z93.1 GASTROSTOMY STATUS: ICD-10-CM

## 2022-07-29 DIAGNOSIS — J15.0 PNEUMONIA DUE TO KLEBSIELLA PNEUMONIAE: ICD-10-CM

## 2023-05-01 NOTE — DISCHARGE NOTE PROVIDER - CARE PROVIDER_API CALL
Jose M Miller (DO)  Internal Medicine  [address] [place] [address]  Phone: [phone]  Fax: [phone]  Follow Up Time: 1 week  
Home

## 2023-10-26 NOTE — PATIENT PROFILE ADULT - HOME ACCESSIBILITY CONCERNS
You can access the FollowMyHealth Patient Portal offered by [facility] by registering at the following website: [email or web address]. By joining [employer]’s FollowMyHealth portal, you will also be able to view your health information using other applications (apps) compatible with our system. none

## 2024-08-20 NOTE — PATIENT PROFILE ADULT - FUNCTIONAL SCREEN CURRENT LEVEL: SWALLOWING (IF SCORE 2 OR MORE FOR ANY ITEM, CONSULT REHAB SERVICES), MLM)
2 = difficulty swallowing liquids/foods

## 2024-12-14 NOTE — DIETITIAN INITIAL EVALUATION ADULT - NS FNS DIET ORDER
Diet, NPO with Tube Feed:   Tube Feeding Modality: Gastrostomy  Osmolite 1.5 Gilberto  Total Volume for 24 Hours (mL): 800  Bolus  Total Volume of Bolus (mL):  200  Total # of Feeds: 4  Tube Feed Frequency: Every 6 hours   Tube Feed Start Time: 09:00  Bolus Feed Rate (mL per Hour): 100   Bolus Feed Duration (in Hours): 2  Free Water Flush  Bolus   Total Volume per Flush (mL): 50   Frequency: Every 4 Hours  Free Water Flush Instructions:  pre and post meals (05-17-22 @ 08:57) [Active]
dietitian/nutrition services